# Patient Record
Sex: MALE | Race: WHITE | NOT HISPANIC OR LATINO
[De-identification: names, ages, dates, MRNs, and addresses within clinical notes are randomized per-mention and may not be internally consistent; named-entity substitution may affect disease eponyms.]

---

## 2019-01-03 PROBLEM — Z00.00 ENCOUNTER FOR PREVENTIVE HEALTH EXAMINATION: Status: ACTIVE | Noted: 2019-01-03

## 2023-01-01 ENCOUNTER — RESULT REVIEW (OUTPATIENT)
Age: 63
End: 2023-01-01

## 2023-01-01 ENCOUNTER — APPOINTMENT (OUTPATIENT)
Dept: RADIATION ONCOLOGY | Facility: CLINIC | Age: 63
End: 2023-01-01

## 2023-01-01 ENCOUNTER — OUTPATIENT (OUTPATIENT)
Dept: OUTPATIENT SERVICES | Facility: HOSPITAL | Age: 63
LOS: 1 days | End: 2023-01-01
Payer: COMMERCIAL

## 2023-01-01 ENCOUNTER — APPOINTMENT (OUTPATIENT)
Dept: HEMATOLOGY ONCOLOGY | Facility: CLINIC | Age: 63
End: 2023-01-01
Payer: COMMERCIAL

## 2023-01-01 ENCOUNTER — APPOINTMENT (OUTPATIENT)
Dept: HEMATOLOGY ONCOLOGY | Facility: CLINIC | Age: 63
End: 2023-01-01

## 2023-01-01 ENCOUNTER — NON-APPOINTMENT (OUTPATIENT)
Age: 63
End: 2023-01-01

## 2023-01-01 ENCOUNTER — INPATIENT (INPATIENT)
Facility: HOSPITAL | Age: 63
LOS: 4 days | Discharge: NURSING, REHAB OR LONG-TERM CARE | DRG: 25 | End: 2024-01-05
Attending: NEUROLOGICAL SURGERY | Admitting: NEUROLOGICAL SURGERY
Payer: COMMERCIAL

## 2023-01-01 ENCOUNTER — APPOINTMENT (OUTPATIENT)
Dept: CT IMAGING | Facility: IMAGING CENTER | Age: 63
End: 2023-01-01
Payer: COMMERCIAL

## 2023-01-01 ENCOUNTER — OUTPATIENT (OUTPATIENT)
Dept: OUTPATIENT SERVICES | Facility: HOSPITAL | Age: 63
LOS: 1 days | Discharge: HOME | End: 2023-01-01
Payer: COMMERCIAL

## 2023-01-01 ENCOUNTER — LABORATORY RESULT (OUTPATIENT)
Age: 63
End: 2023-01-01

## 2023-01-01 ENCOUNTER — APPOINTMENT (OUTPATIENT)
Dept: NUCLEAR MEDICINE | Facility: IMAGING CENTER | Age: 63
End: 2023-01-01
Payer: COMMERCIAL

## 2023-01-01 ENCOUNTER — APPOINTMENT (OUTPATIENT)
Dept: INFUSION THERAPY | Facility: HOSPITAL | Age: 63
End: 2023-01-01

## 2023-01-01 ENCOUNTER — APPOINTMENT (OUTPATIENT)
Dept: MRI IMAGING | Facility: IMAGING CENTER | Age: 63
End: 2023-01-01
Payer: COMMERCIAL

## 2023-01-01 ENCOUNTER — APPOINTMENT (OUTPATIENT)
Dept: NEUROSURGERY | Facility: CLINIC | Age: 63
End: 2023-01-01
Payer: COMMERCIAL

## 2023-01-01 ENCOUNTER — APPOINTMENT (OUTPATIENT)
Dept: NEUROLOGY | Facility: CLINIC | Age: 63
End: 2023-01-01
Payer: COMMERCIAL

## 2023-01-01 ENCOUNTER — APPOINTMENT (OUTPATIENT)
Dept: RADIATION ONCOLOGY | Facility: CLINIC | Age: 63
End: 2023-01-01
Payer: COMMERCIAL

## 2023-01-01 ENCOUNTER — APPOINTMENT (OUTPATIENT)
Dept: NEUROLOGY | Facility: CLINIC | Age: 63
End: 2023-01-01

## 2023-01-01 ENCOUNTER — OUTPATIENT (OUTPATIENT)
Dept: OUTPATIENT SERVICES | Facility: HOSPITAL | Age: 63
LOS: 1 days | Discharge: HOME | End: 2023-01-01

## 2023-01-01 ENCOUNTER — TRANSCRIPTION ENCOUNTER (OUTPATIENT)
Age: 63
End: 2023-01-01

## 2023-01-01 ENCOUNTER — APPOINTMENT (OUTPATIENT)
Dept: MRI IMAGING | Facility: CLINIC | Age: 63
End: 2023-01-01
Payer: COMMERCIAL

## 2023-01-01 ENCOUNTER — APPOINTMENT (OUTPATIENT)
Dept: CT IMAGING | Facility: IMAGING CENTER | Age: 63
End: 2023-01-01

## 2023-01-01 ENCOUNTER — APPOINTMENT (OUTPATIENT)
Dept: NEUROSURGERY | Facility: CLINIC | Age: 63
End: 2023-01-01

## 2023-01-01 ENCOUNTER — APPOINTMENT (OUTPATIENT)
Dept: MRI IMAGING | Facility: IMAGING CENTER | Age: 63
End: 2023-01-01

## 2023-01-01 ENCOUNTER — APPOINTMENT (OUTPATIENT)
Dept: CARDIOLOGY | Facility: CLINIC | Age: 63
End: 2023-01-01

## 2023-01-01 ENCOUNTER — INPATIENT (INPATIENT)
Facility: HOSPITAL | Age: 63
LOS: 3 days | Discharge: HOME | End: 2023-08-07
Attending: HOSPITALIST | Admitting: HOSPITALIST
Payer: COMMERCIAL

## 2023-01-01 VITALS
OXYGEN SATURATION: 97 % | HEART RATE: 101 BPM | HEIGHT: 71.97 IN | RESPIRATION RATE: 16 BRPM | SYSTOLIC BLOOD PRESSURE: 132 MMHG | TEMPERATURE: 97.5 F | DIASTOLIC BLOOD PRESSURE: 91 MMHG | BODY MASS INDEX: 25.73 KG/M2 | WEIGHT: 190 LBS

## 2023-01-01 VITALS
TEMPERATURE: 97.7 F | WEIGHT: 184.75 LBS | SYSTOLIC BLOOD PRESSURE: 125 MMHG | OXYGEN SATURATION: 99 % | DIASTOLIC BLOOD PRESSURE: 91 MMHG | HEIGHT: 71.97 IN | HEART RATE: 109 BPM | RESPIRATION RATE: 16 BRPM | BODY MASS INDEX: 25.02 KG/M2

## 2023-01-01 VITALS
RESPIRATION RATE: 16 BRPM | DIASTOLIC BLOOD PRESSURE: 87 MMHG | HEART RATE: 103 BPM | HEIGHT: 71 IN | TEMPERATURE: 97.88 F | OXYGEN SATURATION: 99 % | BODY MASS INDEX: 27.16 KG/M2 | SYSTOLIC BLOOD PRESSURE: 126 MMHG | WEIGHT: 194 LBS

## 2023-01-01 VITALS
SYSTOLIC BLOOD PRESSURE: 129 MMHG | WEIGHT: 184.5 LBS | DIASTOLIC BLOOD PRESSURE: 90 MMHG | HEART RATE: 100 BPM | TEMPERATURE: 98.3 F | RESPIRATION RATE: 16 BRPM | OXYGEN SATURATION: 98 % | BODY MASS INDEX: 26.48 KG/M2

## 2023-01-01 VITALS
OXYGEN SATURATION: 99 % | TEMPERATURE: 97.7 F | HEART RATE: 127 BPM | SYSTOLIC BLOOD PRESSURE: 131 MMHG | RESPIRATION RATE: 16 BRPM | DIASTOLIC BLOOD PRESSURE: 91 MMHG | WEIGHT: 187.39 LBS | HEIGHT: 71.97 IN | BODY MASS INDEX: 25.38 KG/M2

## 2023-01-01 VITALS
SYSTOLIC BLOOD PRESSURE: 123 MMHG | DIASTOLIC BLOOD PRESSURE: 88 MMHG | WEIGHT: 185.63 LBS | BODY MASS INDEX: 24.6 KG/M2 | HEART RATE: 106 BPM | OXYGEN SATURATION: 97 % | HEIGHT: 72.99 IN | RESPIRATION RATE: 16 BRPM | TEMPERATURE: 97.9 F

## 2023-01-01 VITALS
SYSTOLIC BLOOD PRESSURE: 120 MMHG | HEART RATE: 112 BPM | BODY MASS INDEX: 26.67 KG/M2 | RESPIRATION RATE: 16 BRPM | TEMPERATURE: 96.6 F | OXYGEN SATURATION: 99 % | DIASTOLIC BLOOD PRESSURE: 88 MMHG | WEIGHT: 190.48 LBS | HEIGHT: 70.98 IN

## 2023-01-01 VITALS
DIASTOLIC BLOOD PRESSURE: 86 MMHG | HEART RATE: 121 BPM | SYSTOLIC BLOOD PRESSURE: 117 MMHG | RESPIRATION RATE: 22 BRPM | TEMPERATURE: 97.3 F | BODY MASS INDEX: 25.71 KG/M2 | WEIGHT: 189.38 LBS | OXYGEN SATURATION: 98 %

## 2023-01-01 VITALS
WEIGHT: 183.87 LBS | HEART RATE: 109 BPM | DIASTOLIC BLOOD PRESSURE: 90 MMHG | OXYGEN SATURATION: 95 % | BODY MASS INDEX: 24.9 KG/M2 | RESPIRATION RATE: 16 BRPM | SYSTOLIC BLOOD PRESSURE: 130 MMHG | TEMPERATURE: 98 F | HEIGHT: 71.97 IN

## 2023-01-01 VITALS
HEART RATE: 109 BPM | SYSTOLIC BLOOD PRESSURE: 121 MMHG | DIASTOLIC BLOOD PRESSURE: 85 MMHG | WEIGHT: 182.98 LBS | RESPIRATION RATE: 16 BRPM | OXYGEN SATURATION: 100 % | TEMPERATURE: 96.6 F | BODY MASS INDEX: 24.84 KG/M2

## 2023-01-01 VITALS
WEIGHT: 188.83 LBS | HEIGHT: 72 IN | SYSTOLIC BLOOD PRESSURE: 138 MMHG | RESPIRATION RATE: 17 BRPM | HEART RATE: 98 BPM | OXYGEN SATURATION: 98 % | DIASTOLIC BLOOD PRESSURE: 94 MMHG | BODY MASS INDEX: 25.58 KG/M2 | TEMPERATURE: 98.96 F

## 2023-01-01 VITALS
RESPIRATION RATE: 16 BRPM | TEMPERATURE: 96.8 F | OXYGEN SATURATION: 98 % | WEIGHT: 181.22 LBS | BODY MASS INDEX: 24.02 KG/M2 | RESPIRATION RATE: 16 BRPM | WEIGHT: 185.85 LBS | DIASTOLIC BLOOD PRESSURE: 92 MMHG | HEART RATE: 110 BPM | TEMPERATURE: 96.8 F | SYSTOLIC BLOOD PRESSURE: 145 MMHG | SYSTOLIC BLOOD PRESSURE: 127 MMHG | HEART RATE: 75 BPM | HEIGHT: 72.99 IN | DIASTOLIC BLOOD PRESSURE: 98 MMHG | HEIGHT: 70.98 IN | OXYGEN SATURATION: 99 % | BODY MASS INDEX: 26.02 KG/M2

## 2023-01-01 VITALS
OXYGEN SATURATION: 98 % | HEART RATE: 110 BPM | HEIGHT: 71.97 IN | DIASTOLIC BLOOD PRESSURE: 105 MMHG | SYSTOLIC BLOOD PRESSURE: 139 MMHG | RESPIRATION RATE: 20 BRPM | TEMPERATURE: 99 F

## 2023-01-01 VITALS
HEART RATE: 121 BPM | BODY MASS INDEX: 26.04 KG/M2 | OXYGEN SATURATION: 98 % | SYSTOLIC BLOOD PRESSURE: 122 MMHG | RESPIRATION RATE: 17 BRPM | DIASTOLIC BLOOD PRESSURE: 85 MMHG | WEIGHT: 191.8 LBS | TEMPERATURE: 96.6 F

## 2023-01-01 VITALS
DIASTOLIC BLOOD PRESSURE: 118 MMHG | SYSTOLIC BLOOD PRESSURE: 181 MMHG | HEART RATE: 65 BPM | RESPIRATION RATE: 18 BRPM | TEMPERATURE: 98 F | OXYGEN SATURATION: 100 %

## 2023-01-01 VITALS
OXYGEN SATURATION: 98 % | TEMPERATURE: 98 F | SYSTOLIC BLOOD PRESSURE: 136 MMHG | HEART RATE: 84 BPM | RESPIRATION RATE: 18 BRPM | DIASTOLIC BLOOD PRESSURE: 97 MMHG

## 2023-01-01 VITALS
SYSTOLIC BLOOD PRESSURE: 121 MMHG | RESPIRATION RATE: 17 BRPM | OXYGEN SATURATION: 97 % | WEIGHT: 173.5 LBS | HEART RATE: 110 BPM | DIASTOLIC BLOOD PRESSURE: 88 MMHG | TEMPERATURE: 97.2 F | BODY MASS INDEX: 23.55 KG/M2

## 2023-01-01 VITALS
OXYGEN SATURATION: 99 % | HEART RATE: 103 BPM | WEIGHT: 178.77 LBS | TEMPERATURE: 96.4 F | BODY MASS INDEX: 24.27 KG/M2 | SYSTOLIC BLOOD PRESSURE: 127 MMHG | DIASTOLIC BLOOD PRESSURE: 95 MMHG | RESPIRATION RATE: 16 BRPM

## 2023-01-01 VITALS
HEART RATE: 124 BPM | DIASTOLIC BLOOD PRESSURE: 94 MMHG | RESPIRATION RATE: 17 BRPM | TEMPERATURE: 97 F | OXYGEN SATURATION: 97 % | SYSTOLIC BLOOD PRESSURE: 129 MMHG

## 2023-01-01 VITALS
HEART RATE: 93 BPM | TEMPERATURE: 97.7 F | SYSTOLIC BLOOD PRESSURE: 128 MMHG | OXYGEN SATURATION: 99 % | RESPIRATION RATE: 17 BRPM | BODY MASS INDEX: 26.4 KG/M2 | HEIGHT: 70 IN | DIASTOLIC BLOOD PRESSURE: 83 MMHG | WEIGHT: 184.41 LBS

## 2023-01-01 VITALS
DIASTOLIC BLOOD PRESSURE: 72 MMHG | OXYGEN SATURATION: 96 % | TEMPERATURE: 97.1 F | HEART RATE: 130 BPM | WEIGHT: 187.39 LBS | SYSTOLIC BLOOD PRESSURE: 101 MMHG | BODY MASS INDEX: 25.44 KG/M2 | RESPIRATION RATE: 16 BRPM

## 2023-01-01 DIAGNOSIS — C79.31 SECONDARY MALIGNANT NEOPLASM OF BRAIN: ICD-10-CM

## 2023-01-01 DIAGNOSIS — C43.9 MALIGNANT MELANOMA OF SKIN, UNSPECIFIED: ICD-10-CM

## 2023-01-01 DIAGNOSIS — Z51.11 ENCOUNTER FOR ANTINEOPLASTIC CHEMOTHERAPY: ICD-10-CM

## 2023-01-01 DIAGNOSIS — Z98.890 OTHER SPECIFIED POSTPROCEDURAL STATES: Chronic | ICD-10-CM

## 2023-01-01 DIAGNOSIS — C78.00 SECONDARY MALIGNANT NEOPLASM OF UNSPECIFIED LUNG: ICD-10-CM

## 2023-01-01 DIAGNOSIS — L70.8 OTHER ACNE: ICD-10-CM

## 2023-01-01 DIAGNOSIS — R09.89 OTHER SPECIFIED SYMPTOMS AND SIGNS INVOLVING THE CIRCULATORY AND RESPIRATORY SYSTEMS: ICD-10-CM

## 2023-01-01 DIAGNOSIS — I26.99 OTHER PULMONARY EMBOLISM WITHOUT ACUTE COR PULMONALE: ICD-10-CM

## 2023-01-01 DIAGNOSIS — I26.99 OTHER PULMONARY EMBOLISM W/OUT ACUTE COR PULMONALE: ICD-10-CM

## 2023-01-01 DIAGNOSIS — D75.89 OTHER SPECIFIED DISEASES OF BLOOD AND BLOOD-FORMING ORGANS: ICD-10-CM

## 2023-01-01 DIAGNOSIS — Z29.9 ENCOUNTER FOR PROPHYLACTIC MEASURES, UNSPECIFIED: ICD-10-CM

## 2023-01-01 LAB
ACTH SER-ACNC: 31.3 PG/ML
ALBUMIN SERPL ELPH-MCNC: 3.1 G/DL — LOW (ref 3.3–5)
ALBUMIN SERPL ELPH-MCNC: 3.1 G/DL — LOW (ref 3.3–5)
ALBUMIN SERPL ELPH-MCNC: 3.2 G/DL — LOW (ref 3.3–5)
ALBUMIN SERPL ELPH-MCNC: 3.3 G/DL — SIGNIFICANT CHANGE UP (ref 3.3–5)
ALBUMIN SERPL ELPH-MCNC: 3.4 G/DL
ALBUMIN SERPL ELPH-MCNC: 3.4 G/DL
ALBUMIN SERPL ELPH-MCNC: 3.5 G/DL
ALBUMIN SERPL ELPH-MCNC: 3.5 G/DL
ALBUMIN SERPL ELPH-MCNC: 3.5 G/DL — SIGNIFICANT CHANGE UP (ref 3.3–5)
ALBUMIN SERPL ELPH-MCNC: 3.5 G/DL — SIGNIFICANT CHANGE UP (ref 3.3–5)
ALBUMIN SERPL ELPH-MCNC: 3.6 G/DL
ALBUMIN SERPL ELPH-MCNC: 3.7 G/DL
ALBUMIN SERPL ELPH-MCNC: 3.8 G/DL
ALBUMIN SERPL ELPH-MCNC: 3.9 G/DL
ALBUMIN SERPL ELPH-MCNC: 4 G/DL
ALP BLD-CCNC: 101 U/L
ALP BLD-CCNC: 107 U/L
ALP BLD-CCNC: 108 U/L
ALP BLD-CCNC: 111 U/L
ALP BLD-CCNC: 113 U/L
ALP BLD-CCNC: 114 U/L
ALP BLD-CCNC: 73 U/L
ALP BLD-CCNC: 77 U/L
ALP BLD-CCNC: 78 U/L
ALP BLD-CCNC: 79 U/L
ALP BLD-CCNC: 84 U/L
ALP BLD-CCNC: 84 U/L
ALP BLD-CCNC: 87 U/L
ALP BLD-CCNC: 94 U/L
ALP BLD-CCNC: 94 U/L
ALP BLD-CCNC: 96 U/L
ALP SERPL-CCNC: 76 U/L — SIGNIFICANT CHANGE UP (ref 40–120)
ALP SERPL-CCNC: 77 U/L — SIGNIFICANT CHANGE UP (ref 40–120)
ALP SERPL-CCNC: 81 U/L — SIGNIFICANT CHANGE UP (ref 40–120)
ALP SERPL-CCNC: 90 U/L — SIGNIFICANT CHANGE UP (ref 40–120)
ALP SERPL-CCNC: 90 U/L — SIGNIFICANT CHANGE UP (ref 40–120)
ALT FLD-CCNC: 14 U/L — SIGNIFICANT CHANGE UP (ref 10–45)
ALT FLD-CCNC: 14 U/L — SIGNIFICANT CHANGE UP (ref 10–45)
ALT FLD-CCNC: 24 U/L — SIGNIFICANT CHANGE UP (ref 10–45)
ALT FLD-CCNC: 24 U/L — SIGNIFICANT CHANGE UP (ref 10–45)
ALT FLD-CCNC: 31 U/L — SIGNIFICANT CHANGE UP (ref 4–41)
ALT FLD-CCNC: 39 U/L — SIGNIFICANT CHANGE UP (ref 4–41)
ALT FLD-CCNC: 43 U/L — HIGH (ref 4–41)
ALT FLD-CCNC: 51 U/L — HIGH (ref 4–41)
ALT SERPL-CCNC: 11 U/L
ALT SERPL-CCNC: 17 U/L
ALT SERPL-CCNC: 20 U/L
ALT SERPL-CCNC: 22 U/L
ALT SERPL-CCNC: 25 U/L
ALT SERPL-CCNC: 26 U/L
ALT SERPL-CCNC: 26 U/L
ALT SERPL-CCNC: 29 U/L
ALT SERPL-CCNC: 31 U/L
ALT SERPL-CCNC: 40 U/L
ALT SERPL-CCNC: 40 U/L
ALT SERPL-CCNC: 41 U/L
ALT SERPL-CCNC: 54 U/L
ALT SERPL-CCNC: 56 U/L
ALT SERPL-CCNC: 58 U/L
ALT SERPL-CCNC: 61 U/L
ALT SERPL-CCNC: 62 U/L
ALT SERPL-CCNC: 89 U/L
ANCA AB SER IF-ACNC: NEGATIVE
ANION GAP SERPL CALC-SCNC: 10 MMOL/L — SIGNIFICANT CHANGE UP (ref 5–17)
ANION GAP SERPL CALC-SCNC: 11 MMOL/L
ANION GAP SERPL CALC-SCNC: 11 MMOL/L
ANION GAP SERPL CALC-SCNC: 11 MMOL/L — SIGNIFICANT CHANGE UP (ref 5–17)
ANION GAP SERPL CALC-SCNC: 11 MMOL/L — SIGNIFICANT CHANGE UP (ref 5–17)
ANION GAP SERPL CALC-SCNC: 11 MMOL/L — SIGNIFICANT CHANGE UP (ref 7–14)
ANION GAP SERPL CALC-SCNC: 12 MMOL/L
ANION GAP SERPL CALC-SCNC: 12 MMOL/L — SIGNIFICANT CHANGE UP (ref 7–14)
ANION GAP SERPL CALC-SCNC: 13 MMOL/L
ANION GAP SERPL CALC-SCNC: 13 MMOL/L
ANION GAP SERPL CALC-SCNC: 13 MMOL/L — SIGNIFICANT CHANGE UP (ref 7–14)
ANION GAP SERPL CALC-SCNC: 14 MMOL/L
ANION GAP SERPL CALC-SCNC: 14 MMOL/L
ANION GAP SERPL CALC-SCNC: 14 MMOL/L — SIGNIFICANT CHANGE UP (ref 7–14)
ANION GAP SERPL CALC-SCNC: 15 MMOL/L
ANION GAP SERPL CALC-SCNC: 16 MMOL/L
ANISOCYTOSIS BLD QL: SLIGHT — SIGNIFICANT CHANGE UP
APTT BLD: 18.6 SEC — LOW (ref 24.5–35.6)
APTT BLD: 18.6 SEC — LOW (ref 24.5–35.6)
APTT BLD: 20.9 SEC — LOW (ref 24.5–35.6)
APTT BLD: 22 SEC — LOW (ref 24.5–35.6)
APTT BLD: 25.8 SEC — SIGNIFICANT CHANGE UP (ref 24.5–35.6)
APTT BLD: 55.8 SEC — HIGH (ref 24.5–35.6)
APTT BLD: 63 SEC — HIGH (ref 24.5–35.6)
APTT BLD: 64.2 SEC — HIGH (ref 24.5–35.6)
APTT BLD: 74.8 SEC — HIGH (ref 24.5–35.6)
APTT BLD: 76.9 SEC — HIGH (ref 24.5–35.6)
APTT BLD: 83.8 SEC — HIGH (ref 24.5–35.6)
APTT BLD: 88.4 SEC — HIGH (ref 24.5–35.6)
AST SERPL-CCNC: 10 U/L
AST SERPL-CCNC: 11 U/L
AST SERPL-CCNC: 13 U/L
AST SERPL-CCNC: 16 U/L
AST SERPL-CCNC: 17 U/L
AST SERPL-CCNC: 17 U/L
AST SERPL-CCNC: 19 U/L
AST SERPL-CCNC: 19 U/L — SIGNIFICANT CHANGE UP (ref 4–40)
AST SERPL-CCNC: 20 U/L
AST SERPL-CCNC: 20 U/L — SIGNIFICANT CHANGE UP (ref 4–40)
AST SERPL-CCNC: 20 U/L — SIGNIFICANT CHANGE UP (ref 4–40)
AST SERPL-CCNC: 21 U/L
AST SERPL-CCNC: 22 U/L
AST SERPL-CCNC: 23 U/L
AST SERPL-CCNC: 23 U/L — SIGNIFICANT CHANGE UP (ref 10–40)
AST SERPL-CCNC: 23 U/L — SIGNIFICANT CHANGE UP (ref 10–40)
AST SERPL-CCNC: 24 U/L
AST SERPL-CCNC: 24 U/L — SIGNIFICANT CHANGE UP (ref 4–40)
AST SERPL-CCNC: 26 U/L
AST SERPL-CCNC: 26 U/L
AST SERPL-CCNC: 26 U/L — SIGNIFICANT CHANGE UP (ref 10–40)
AST SERPL-CCNC: 26 U/L — SIGNIFICANT CHANGE UP (ref 10–40)
AST SERPL-CCNC: 28 U/L
AST SERPL-CCNC: 30 U/L
BASOPHILS # BLD AUTO: 0 K/UL — SIGNIFICANT CHANGE UP (ref 0–0.2)
BASOPHILS # BLD AUTO: 0.02 K/UL — SIGNIFICANT CHANGE UP (ref 0–0.2)
BASOPHILS # BLD AUTO: 0.03 K/UL — SIGNIFICANT CHANGE UP (ref 0–0.2)
BASOPHILS # BLD AUTO: 0.04 K/UL — SIGNIFICANT CHANGE UP (ref 0–0.2)
BASOPHILS # BLD AUTO: 0.04 K/UL — SIGNIFICANT CHANGE UP (ref 0–0.2)
BASOPHILS # BLD AUTO: 0.05 K/UL — SIGNIFICANT CHANGE UP (ref 0–0.2)
BASOPHILS # BLD AUTO: 0.05 K/UL — SIGNIFICANT CHANGE UP (ref 0–0.2)
BASOPHILS # BLD AUTO: 0.06 K/UL — SIGNIFICANT CHANGE UP (ref 0–0.2)
BASOPHILS # BLD AUTO: 0.08 K/UL — SIGNIFICANT CHANGE UP (ref 0–0.2)
BASOPHILS # BLD AUTO: 0.08 K/UL — SIGNIFICANT CHANGE UP (ref 0–0.2)
BASOPHILS # BLD AUTO: 0.1 K/UL — SIGNIFICANT CHANGE UP (ref 0–0.2)
BASOPHILS # BLD AUTO: 0.1 K/UL — SIGNIFICANT CHANGE UP (ref 0–0.2)
BASOPHILS NFR BLD AUTO: 0 % — SIGNIFICANT CHANGE UP (ref 0–2)
BASOPHILS NFR BLD AUTO: 0.2 % — SIGNIFICANT CHANGE UP (ref 0–2)
BASOPHILS NFR BLD AUTO: 0.5 % — SIGNIFICANT CHANGE UP (ref 0–2)
BASOPHILS NFR BLD AUTO: 0.6 % — SIGNIFICANT CHANGE UP (ref 0–2)
BASOPHILS NFR BLD AUTO: 0.7 % — SIGNIFICANT CHANGE UP (ref 0–2)
BASOPHILS NFR BLD AUTO: 0.9 % — SIGNIFICANT CHANGE UP (ref 0–2)
BASOPHILS NFR BLD AUTO: 0.9 % — SIGNIFICANT CHANGE UP (ref 0–2)
BASOPHILS NFR BLD AUTO: 2 % — SIGNIFICANT CHANGE UP (ref 0–2)
BILIRUB SERPL-MCNC: 0.2 MG/DL
BILIRUB SERPL-MCNC: 0.2 MG/DL — SIGNIFICANT CHANGE UP (ref 0.2–1.2)
BILIRUB SERPL-MCNC: 0.3 MG/DL
BILIRUB SERPL-MCNC: 0.4 MG/DL
BILIRUB SERPL-MCNC: 0.4 MG/DL — SIGNIFICANT CHANGE UP (ref 0.2–1.2)
BILIRUB SERPL-MCNC: 0.4 MG/DL — SIGNIFICANT CHANGE UP (ref 0.2–1.2)
BILIRUB SERPL-MCNC: 0.5 MG/DL — SIGNIFICANT CHANGE UP (ref 0.2–1.2)
BILIRUB SERPL-MCNC: 0.5 MG/DL — SIGNIFICANT CHANGE UP (ref 0.2–1.2)
BILIRUB SERPL-MCNC: 0.6 MG/DL
BLASTS # FLD: 1 % — HIGH (ref 0–0)
BLASTS # FLD: 1 % — HIGH (ref 0–0)
BLASTS # FLD: 2 % — HIGH (ref 0–0)
BLD GP AB SCN SERPL QL: NEGATIVE — SIGNIFICANT CHANGE UP
BUN SERPL-MCNC: 10 MG/DL
BUN SERPL-MCNC: 11 MG/DL
BUN SERPL-MCNC: 18 MG/DL — SIGNIFICANT CHANGE UP (ref 7–23)
BUN SERPL-MCNC: 18 MG/DL — SIGNIFICANT CHANGE UP (ref 7–23)
BUN SERPL-MCNC: 19 MG/DL — SIGNIFICANT CHANGE UP (ref 7–23)
BUN SERPL-MCNC: 19 MG/DL — SIGNIFICANT CHANGE UP (ref 7–23)
BUN SERPL-MCNC: 21 MG/DL
BUN SERPL-MCNC: 22 MG/DL
BUN SERPL-MCNC: 22 MG/DL
BUN SERPL-MCNC: 25 MG/DL
BUN SERPL-MCNC: 26 MG/DL
BUN SERPL-MCNC: 27 MG/DL
BUN SERPL-MCNC: 27 MG/DL — HIGH (ref 7–23)
BUN SERPL-MCNC: 28 MG/DL — HIGH (ref 7–23)
BUN SERPL-MCNC: 28 MG/DL — HIGH (ref 7–23)
BUN SERPL-MCNC: 29 MG/DL
BUN SERPL-MCNC: 29 MG/DL — HIGH (ref 7–23)
BUN SERPL-MCNC: 29 MG/DL — HIGH (ref 7–23)
BUN SERPL-MCNC: 30 MG/DL
BUN SERPL-MCNC: 30 MG/DL — HIGH (ref 7–23)
BUN SERPL-MCNC: 32 MG/DL
BUN SERPL-MCNC: 36 MG/DL
BUN SERPL-MCNC: 6 MG/DL
BUN SERPL-MCNC: 8 MG/DL
CA-I SERPL-SCNC: 4.9 MG/DL
CALCIUM SERPL-MCNC: 8.6 MG/DL — SIGNIFICANT CHANGE UP (ref 8.4–10.5)
CALCIUM SERPL-MCNC: 8.6 MG/DL — SIGNIFICANT CHANGE UP (ref 8.4–10.5)
CALCIUM SERPL-MCNC: 8.7 MG/DL
CALCIUM SERPL-MCNC: 8.7 MG/DL — SIGNIFICANT CHANGE UP (ref 8.4–10.5)
CALCIUM SERPL-MCNC: 8.8 MG/DL
CALCIUM SERPL-MCNC: 8.8 MG/DL — SIGNIFICANT CHANGE UP (ref 8.4–10.5)
CALCIUM SERPL-MCNC: 8.8 MG/DL — SIGNIFICANT CHANGE UP (ref 8.4–10.5)
CALCIUM SERPL-MCNC: 8.9 MG/DL
CALCIUM SERPL-MCNC: 8.9 MG/DL
CALCIUM SERPL-MCNC: 8.9 MG/DL — SIGNIFICANT CHANGE UP (ref 8.4–10.5)
CALCIUM SERPL-MCNC: 8.9 MG/DL — SIGNIFICANT CHANGE UP (ref 8.4–10.5)
CALCIUM SERPL-MCNC: 9 MG/DL
CALCIUM SERPL-MCNC: 9 MG/DL — SIGNIFICANT CHANGE UP (ref 8.4–10.5)
CALCIUM SERPL-MCNC: 9.1 MG/DL
CALCIUM SERPL-MCNC: 9.2 MG/DL
CALCIUM SERPL-MCNC: 9.3 MG/DL
CALCIUM SERPL-MCNC: 9.4 MG/DL
CALCIUM SERPL-MCNC: 9.9 MG/DL
CHLORIDE SERPL-SCNC: 100 MMOL/L
CHLORIDE SERPL-SCNC: 100 MMOL/L — SIGNIFICANT CHANGE UP (ref 98–107)
CHLORIDE SERPL-SCNC: 100 MMOL/L — SIGNIFICANT CHANGE UP (ref 98–107)
CHLORIDE SERPL-SCNC: 101 MMOL/L
CHLORIDE SERPL-SCNC: 101 MMOL/L
CHLORIDE SERPL-SCNC: 102 MMOL/L
CHLORIDE SERPL-SCNC: 102 MMOL/L
CHLORIDE SERPL-SCNC: 103 MMOL/L
CHLORIDE SERPL-SCNC: 103 MMOL/L — SIGNIFICANT CHANGE UP (ref 96–108)
CHLORIDE SERPL-SCNC: 103 MMOL/L — SIGNIFICANT CHANGE UP (ref 96–108)
CHLORIDE SERPL-SCNC: 104 MMOL/L
CHLORIDE SERPL-SCNC: 104 MMOL/L — SIGNIFICANT CHANGE UP (ref 96–108)
CHLORIDE SERPL-SCNC: 104 MMOL/L — SIGNIFICANT CHANGE UP (ref 96–108)
CHLORIDE SERPL-SCNC: 105 MMOL/L
CHLORIDE SERPL-SCNC: 106 MMOL/L — SIGNIFICANT CHANGE UP (ref 96–108)
CHLORIDE SERPL-SCNC: 106 MMOL/L — SIGNIFICANT CHANGE UP (ref 96–108)
CHLORIDE SERPL-SCNC: 107 MMOL/L
CHLORIDE SERPL-SCNC: 108 MMOL/L
CHLORIDE SERPL-SCNC: 98 MMOL/L
CHLORIDE SERPL-SCNC: 99 MMOL/L
CHLORIDE SERPL-SCNC: 99 MMOL/L — SIGNIFICANT CHANGE UP (ref 98–107)
CHLORIDE SERPL-SCNC: 99 MMOL/L — SIGNIFICANT CHANGE UP (ref 98–107)
CO2 SERPL-SCNC: 19 MMOL/L
CO2 SERPL-SCNC: 19 MMOL/L
CO2 SERPL-SCNC: 20 MMOL/L
CO2 SERPL-SCNC: 20 MMOL/L
CO2 SERPL-SCNC: 21 MMOL/L
CO2 SERPL-SCNC: 22 MMOL/L
CO2 SERPL-SCNC: 22 MMOL/L
CO2 SERPL-SCNC: 22 MMOL/L — SIGNIFICANT CHANGE UP (ref 22–31)
CO2 SERPL-SCNC: 23 MMOL/L
CO2 SERPL-SCNC: 23 MMOL/L
CO2 SERPL-SCNC: 23 MMOL/L — SIGNIFICANT CHANGE UP (ref 22–31)
CO2 SERPL-SCNC: 24 MMOL/L
CO2 SERPL-SCNC: 24 MMOL/L — SIGNIFICANT CHANGE UP (ref 22–31)
CO2 SERPL-SCNC: 24 MMOL/L — SIGNIFICANT CHANGE UP (ref 22–31)
CO2 SERPL-SCNC: 26 MMOL/L
CO2 SERPL-SCNC: 27 MMOL/L
CO2 SERPL-SCNC: 28 MMOL/L — SIGNIFICANT CHANGE UP (ref 22–31)
CO2 SERPL-SCNC: 28 MMOL/L — SIGNIFICANT CHANGE UP (ref 22–31)
CO2 SERPL-SCNC: 29 MMOL/L
CORTICOSTEROID BINDING GLOBULIN RESULT: 2 MG/DL — SIGNIFICANT CHANGE UP
CORTICOSTEROID BINDING GLOBULIN RESULT: 2 MG/DL — SIGNIFICANT CHANGE UP
CORTIS F/TOTAL MFR SERPL: 3.6 % — SIGNIFICANT CHANGE UP
CORTIS F/TOTAL MFR SERPL: 3.6 % — SIGNIFICANT CHANGE UP
CORTIS SERPL-MCNC: 15.6 UG/DL
CORTIS SERPL-MCNC: 4.2 UG/DL — SIGNIFICANT CHANGE UP
CORTIS SERPL-MCNC: 4.2 UG/DL — SIGNIFICANT CHANGE UP
CORTISOL, FREE RESULT: 0.15 UG/DL — LOW
CORTISOL, FREE RESULT: 0.15 UG/DL — LOW
CREAT SERPL-MCNC: 0.82 MG/DL
CREAT SERPL-MCNC: 0.84 MG/DL — SIGNIFICANT CHANGE UP (ref 0.5–1.3)
CREAT SERPL-MCNC: 0.88 MG/DL
CREAT SERPL-MCNC: 0.88 MG/DL — SIGNIFICANT CHANGE UP (ref 0.5–1.3)
CREAT SERPL-MCNC: 0.89 MG/DL — SIGNIFICANT CHANGE UP (ref 0.5–1.3)
CREAT SERPL-MCNC: 0.89 MG/DL — SIGNIFICANT CHANGE UP (ref 0.5–1.3)
CREAT SERPL-MCNC: 0.9 MG/DL
CREAT SERPL-MCNC: 0.9 MG/DL
CREAT SERPL-MCNC: 0.92 MG/DL
CREAT SERPL-MCNC: 0.93 MG/DL — SIGNIFICANT CHANGE UP (ref 0.5–1.3)
CREAT SERPL-MCNC: 0.96 MG/DL — SIGNIFICANT CHANGE UP (ref 0.5–1.3)
CREAT SERPL-MCNC: 0.98 MG/DL
CREAT SERPL-MCNC: 1 MG/DL
CREAT SERPL-MCNC: 1.04 MG/DL
CREAT SERPL-MCNC: 1.04 MG/DL
CREAT SERPL-MCNC: 1.05 MG/DL — SIGNIFICANT CHANGE UP (ref 0.5–1.3)
CREAT SERPL-MCNC: 1.05 MG/DL — SIGNIFICANT CHANGE UP (ref 0.5–1.3)
CREAT SERPL-MCNC: 1.07 MG/DL
CREAT SERPL-MCNC: 1.07 MG/DL — SIGNIFICANT CHANGE UP (ref 0.5–1.3)
CREAT SERPL-MCNC: 1.07 MG/DL — SIGNIFICANT CHANGE UP (ref 0.5–1.3)
CREAT SERPL-MCNC: 1.09 MG/DL
CREAT SERPL-MCNC: 1.14 MG/DL
CREAT SERPL-MCNC: 1.14 MG/DL
CREAT SERPL-MCNC: 1.15 MG/DL
CREAT SERPL-MCNC: 1.17 MG/DL
CREAT SERPL-MCNC: 1.18 MG/DL
CREAT SERPL-MCNC: 1.23 MG/DL
CREAT SERPL-MCNC: 1.25 MG/DL
DACRYOCYTES BLD QL SMEAR: SLIGHT — SIGNIFICANT CHANGE UP
EGFR: 100 ML/MIN/1.73M2 — SIGNIFICANT CHANGE UP
EGFR: 101 ML/MIN/1.73M2
EGFR: 66 ML/MIN/1.73M2
EGFR: 67 ML/MIN/1.73M2
EGFR: 71 ML/MIN/1.73M2
EGFR: 71 ML/MIN/1.73M2
EGFR: 72 ML/MIN/1.73M2
EGFR: 73 ML/MIN/1.73M2
EGFR: 74 ML/MIN/1.73M2
EGFR: 78 ML/MIN/1.73M2
EGFR: 79 ML/MIN/1.73M2
EGFR: 79 ML/MIN/1.73M2 — SIGNIFICANT CHANGE UP
EGFR: 79 ML/MIN/1.73M2 — SIGNIFICANT CHANGE UP
EGFR: 81 ML/MIN/1.73M2 — SIGNIFICANT CHANGE UP
EGFR: 81 ML/MIN/1.73M2 — SIGNIFICANT CHANGE UP
EGFR: 82 ML/MIN/1.73M2
EGFR: 82 ML/MIN/1.73M2
EGFR: 86 ML/MIN/1.73M2
EGFR: 88 ML/MIN/1.73M2
EGFR: 90 ML/MIN/1.73M2 — SIGNIFICANT CHANGE UP
EGFR: 94 ML/MIN/1.73M2 — SIGNIFICANT CHANGE UP
EGFR: 95 ML/MIN/1.73M2
EGFR: 98 ML/MIN/1.73M2
EGFR: 98 ML/MIN/1.73M2 — SIGNIFICANT CHANGE UP
EOSINOPHIL # BLD AUTO: 0 K/UL — SIGNIFICANT CHANGE UP (ref 0–0.5)
EOSINOPHIL # BLD AUTO: 0.01 K/UL — SIGNIFICANT CHANGE UP (ref 0–0.5)
EOSINOPHIL # BLD AUTO: 0.02 K/UL — SIGNIFICANT CHANGE UP (ref 0–0.5)
EOSINOPHIL # BLD AUTO: 0.04 K/UL — SIGNIFICANT CHANGE UP (ref 0–0.5)
EOSINOPHIL # BLD AUTO: 0.06 K/UL — SIGNIFICANT CHANGE UP (ref 0–0.5)
EOSINOPHIL # BLD AUTO: 0.06 K/UL — SIGNIFICANT CHANGE UP (ref 0–0.5)
EOSINOPHIL # BLD AUTO: 0.08 K/UL — SIGNIFICANT CHANGE UP (ref 0–0.5)
EOSINOPHIL # BLD AUTO: 0.09 K/UL — SIGNIFICANT CHANGE UP (ref 0–0.5)
EOSINOPHIL # BLD AUTO: 0.09 K/UL — SIGNIFICANT CHANGE UP (ref 0–0.5)
EOSINOPHIL # BLD AUTO: 0.12 K/UL — SIGNIFICANT CHANGE UP (ref 0–0.5)
EOSINOPHIL # BLD AUTO: 0.12 K/UL — SIGNIFICANT CHANGE UP (ref 0–0.5)
EOSINOPHIL # BLD AUTO: 0.14 K/UL — SIGNIFICANT CHANGE UP (ref 0–0.5)
EOSINOPHIL # BLD AUTO: 0.14 K/UL — SIGNIFICANT CHANGE UP (ref 0–0.5)
EOSINOPHIL # BLD AUTO: 0.15 K/UL — SIGNIFICANT CHANGE UP (ref 0–0.5)
EOSINOPHIL # BLD AUTO: 0.15 K/UL — SIGNIFICANT CHANGE UP (ref 0–0.5)
EOSINOPHIL # BLD AUTO: 0.16 K/UL — SIGNIFICANT CHANGE UP (ref 0–0.5)
EOSINOPHIL # BLD AUTO: 0.17 K/UL — SIGNIFICANT CHANGE UP (ref 0–0.5)
EOSINOPHIL # BLD AUTO: 0.17 K/UL — SIGNIFICANT CHANGE UP (ref 0–0.5)
EOSINOPHIL # BLD AUTO: 0.23 K/UL — SIGNIFICANT CHANGE UP (ref 0–0.5)
EOSINOPHIL # BLD AUTO: 0.23 K/UL — SIGNIFICANT CHANGE UP (ref 0–0.5)
EOSINOPHIL NFR BLD AUTO: 0 % — SIGNIFICANT CHANGE UP (ref 0–6)
EOSINOPHIL NFR BLD AUTO: 0.1 % — SIGNIFICANT CHANGE UP (ref 0–6)
EOSINOPHIL NFR BLD AUTO: 0.5 % — SIGNIFICANT CHANGE UP (ref 0–6)
EOSINOPHIL NFR BLD AUTO: 0.6 % — SIGNIFICANT CHANGE UP (ref 0–6)
EOSINOPHIL NFR BLD AUTO: 0.6 % — SIGNIFICANT CHANGE UP (ref 0–6)
EOSINOPHIL NFR BLD AUTO: 1 % — SIGNIFICANT CHANGE UP (ref 0–6)
EOSINOPHIL NFR BLD AUTO: 1.4 % — SIGNIFICANT CHANGE UP (ref 0–6)
EOSINOPHIL NFR BLD AUTO: 1.4 % — SIGNIFICANT CHANGE UP (ref 0–6)
EOSINOPHIL NFR BLD AUTO: 1.7 % — SIGNIFICANT CHANGE UP (ref 0–6)
EOSINOPHIL NFR BLD AUTO: 2 % — SIGNIFICANT CHANGE UP (ref 0–6)
EOSINOPHIL NFR BLD AUTO: 2.2 % — SIGNIFICANT CHANGE UP (ref 0–6)
EOSINOPHIL NFR BLD AUTO: 2.3 % — SIGNIFICANT CHANGE UP (ref 0–6)
EOSINOPHIL NFR BLD AUTO: 2.3 % — SIGNIFICANT CHANGE UP (ref 0–6)
EOSINOPHIL NFR BLD AUTO: 3 % — SIGNIFICANT CHANGE UP (ref 0–6)
EOSINOPHIL NFR BLD AUTO: 3 % — SIGNIFICANT CHANGE UP (ref 0–6)
FOLATE SERPL-MCNC: 11.3 NG/ML
GLUCOSE BLDC GLUCOMTR-MCNC: 98 MG/DL — SIGNIFICANT CHANGE UP (ref 70–99)
GLUCOSE BLDC GLUCOMTR-MCNC: 98 MG/DL — SIGNIFICANT CHANGE UP (ref 70–99)
GLUCOSE SERPL-MCNC: 102 MG/DL — HIGH (ref 70–99)
GLUCOSE SERPL-MCNC: 102 MG/DL — HIGH (ref 70–99)
GLUCOSE SERPL-MCNC: 107 MG/DL
GLUCOSE SERPL-MCNC: 109 MG/DL
GLUCOSE SERPL-MCNC: 111 MG/DL — HIGH (ref 70–99)
GLUCOSE SERPL-MCNC: 112 MG/DL
GLUCOSE SERPL-MCNC: 116 MG/DL
GLUCOSE SERPL-MCNC: 119 MG/DL — HIGH (ref 70–99)
GLUCOSE SERPL-MCNC: 119 MG/DL — HIGH (ref 70–99)
GLUCOSE SERPL-MCNC: 120 MG/DL
GLUCOSE SERPL-MCNC: 120 MG/DL
GLUCOSE SERPL-MCNC: 126 MG/DL
GLUCOSE SERPL-MCNC: 130 MG/DL
GLUCOSE SERPL-MCNC: 131 MG/DL
GLUCOSE SERPL-MCNC: 131 MG/DL
GLUCOSE SERPL-MCNC: 131 MG/DL — HIGH (ref 70–99)
GLUCOSE SERPL-MCNC: 132 MG/DL
GLUCOSE SERPL-MCNC: 135 MG/DL
GLUCOSE SERPL-MCNC: 135 MG/DL — HIGH (ref 70–99)
GLUCOSE SERPL-MCNC: 140 MG/DL — HIGH (ref 70–99)
GLUCOSE SERPL-MCNC: 141 MG/DL
GLUCOSE SERPL-MCNC: 143 MG/DL
GLUCOSE SERPL-MCNC: 151 MG/DL
GLUCOSE SERPL-MCNC: 154 MG/DL
GLUCOSE SERPL-MCNC: 156 MG/DL
GLUCOSE SERPL-MCNC: 161 MG/DL
GLUCOSE SERPL-MCNC: 91 MG/DL — SIGNIFICANT CHANGE UP (ref 70–99)
GLUCOSE SERPL-MCNC: 91 MG/DL — SIGNIFICANT CHANGE UP (ref 70–99)
HAV IGM SER QL: NONREACTIVE
HBV CORE IGM SER QL: NONREACTIVE
HBV SURFACE AG SER QL: NONREACTIVE
HCT VFR BLD CALC: 33.7 % — LOW (ref 39–50)
HCT VFR BLD CALC: 35.6 % — LOW (ref 39–50)
HCT VFR BLD CALC: 35.6 % — LOW (ref 39–50)
HCT VFR BLD CALC: 36 % — LOW (ref 39–50)
HCT VFR BLD CALC: 36.2 % — LOW (ref 39–50)
HCT VFR BLD CALC: 36.4 % — LOW (ref 39–50)
HCT VFR BLD CALC: 36.6 % — LOW (ref 39–50)
HCT VFR BLD CALC: 36.6 % — LOW (ref 39–50)
HCT VFR BLD CALC: 36.7 % — LOW (ref 39–50)
HCT VFR BLD CALC: 36.7 % — LOW (ref 39–50)
HCT VFR BLD CALC: 36.9 % — LOW (ref 39–50)
HCT VFR BLD CALC: 36.9 % — LOW (ref 39–50)
HCT VFR BLD CALC: 37.1 % — LOW (ref 39–50)
HCT VFR BLD CALC: 37.1 % — LOW (ref 39–50)
HCT VFR BLD CALC: 37.2 % — LOW (ref 39–50)
HCT VFR BLD CALC: 37.4 % — LOW (ref 39–50)
HCT VFR BLD CALC: 37.4 % — LOW (ref 39–50)
HCT VFR BLD CALC: 37.8 % — LOW (ref 39–50)
HCT VFR BLD CALC: 37.8 % — LOW (ref 39–50)
HCT VFR BLD CALC: 37.9 % — LOW (ref 39–50)
HCT VFR BLD CALC: 37.9 % — LOW (ref 39–50)
HCT VFR BLD CALC: 38.2 % — LOW (ref 39–50)
HCT VFR BLD CALC: 38.8 % — LOW (ref 39–50)
HCT VFR BLD CALC: 39 % — SIGNIFICANT CHANGE UP (ref 39–50)
HCT VFR BLD CALC: 40 % — SIGNIFICANT CHANGE UP (ref 39–50)
HCT VFR BLD CALC: 40.4 % — SIGNIFICANT CHANGE UP (ref 39–50)
HCT VFR BLD CALC: 40.4 % — SIGNIFICANT CHANGE UP (ref 39–50)
HCT VFR BLD CALC: 40.8 % — SIGNIFICANT CHANGE UP (ref 39–50)
HCT VFR BLD CALC: 40.9 % — SIGNIFICANT CHANGE UP (ref 39–50)
HCT VFR BLD CALC: 41 % — SIGNIFICANT CHANGE UP (ref 39–50)
HCT VFR BLD CALC: 41.5 % — SIGNIFICANT CHANGE UP (ref 39–50)
HCT VFR BLD CALC: 41.7 % — SIGNIFICANT CHANGE UP (ref 39–50)
HCT VFR BLD CALC: 41.7 % — SIGNIFICANT CHANGE UP (ref 39–50)
HCT VFR BLD CALC: 41.9 % — SIGNIFICANT CHANGE UP (ref 39–50)
HCT VFR BLD CALC: 42.1 % — SIGNIFICANT CHANGE UP (ref 39–50)
HCT VFR BLD CALC: 42.6 % — SIGNIFICANT CHANGE UP (ref 39–50)
HCT VFR BLD CALC: 43.9 % — SIGNIFICANT CHANGE UP (ref 39–50)
HCT VFR BLD CALC: 45.3 % — SIGNIFICANT CHANGE UP (ref 39–50)
HCT VFR BLD CALC: 45.6 % — SIGNIFICANT CHANGE UP (ref 39–50)
HCT VFR BLD CALC: 46 % — SIGNIFICANT CHANGE UP (ref 39–50)
HCT VFR BLD CALC: 46.8 % — SIGNIFICANT CHANGE UP (ref 39–50)
HCT VFR BLD CALC: 47.2 % — SIGNIFICANT CHANGE UP (ref 39–50)
HCT VFR BLD CALC: 47.4 % — SIGNIFICANT CHANGE UP (ref 39–50)
HCT VFR BLD CALC: 48.9 % — SIGNIFICANT CHANGE UP (ref 39–50)
HCV AB S/CO SERPL IA: 0.06 S/CO — SIGNIFICANT CHANGE UP (ref 0–0.99)
HCV AB SER QL: NONREACTIVE
HCV AB SERPL-IMP: SIGNIFICANT CHANGE UP
HCV S/CO RATIO: 0.07 S/CO
HGB BLD-MCNC: 11.8 G/DL — LOW (ref 13–17)
HGB BLD-MCNC: 12 G/DL — LOW (ref 13–17)
HGB BLD-MCNC: 12.1 G/DL — LOW (ref 13–17)
HGB BLD-MCNC: 12.1 G/DL — LOW (ref 13–17)
HGB BLD-MCNC: 12.2 G/DL — LOW (ref 13–17)
HGB BLD-MCNC: 12.3 G/DL — LOW (ref 13–17)
HGB BLD-MCNC: 12.4 G/DL — LOW (ref 13–17)
HGB BLD-MCNC: 12.5 G/DL — LOW (ref 13–17)
HGB BLD-MCNC: 12.7 G/DL — LOW (ref 13–17)
HGB BLD-MCNC: 12.9 G/DL — LOW (ref 13–17)
HGB BLD-MCNC: 12.9 G/DL — LOW (ref 13–17)
HGB BLD-MCNC: 13.2 G/DL — SIGNIFICANT CHANGE UP (ref 13–17)
HGB BLD-MCNC: 13.4 G/DL — SIGNIFICANT CHANGE UP (ref 13–17)
HGB BLD-MCNC: 13.4 G/DL — SIGNIFICANT CHANGE UP (ref 13–17)
HGB BLD-MCNC: 13.5 G/DL — SIGNIFICANT CHANGE UP (ref 13–17)
HGB BLD-MCNC: 13.5 G/DL — SIGNIFICANT CHANGE UP (ref 13–17)
HGB BLD-MCNC: 13.6 G/DL — SIGNIFICANT CHANGE UP (ref 13–17)
HGB BLD-MCNC: 13.6 G/DL — SIGNIFICANT CHANGE UP (ref 13–17)
HGB BLD-MCNC: 13.8 G/DL — SIGNIFICANT CHANGE UP (ref 13–17)
HGB BLD-MCNC: 13.9 G/DL — SIGNIFICANT CHANGE UP (ref 13–17)
HGB BLD-MCNC: 14 G/DL — SIGNIFICANT CHANGE UP (ref 13–17)
HGB BLD-MCNC: 14.4 G/DL — SIGNIFICANT CHANGE UP (ref 13–17)
HGB BLD-MCNC: 14.4 G/DL — SIGNIFICANT CHANGE UP (ref 13–17)
HGB BLD-MCNC: 14.7 G/DL — SIGNIFICANT CHANGE UP (ref 13–17)
HGB BLD-MCNC: 15.2 G/DL — SIGNIFICANT CHANGE UP (ref 13–17)
HGB BLD-MCNC: 15.3 G/DL — SIGNIFICANT CHANGE UP (ref 13–17)
HGB BLD-MCNC: 15.6 G/DL — SIGNIFICANT CHANGE UP (ref 13–17)
HGB BLD-MCNC: 15.7 G/DL — SIGNIFICANT CHANGE UP (ref 13–17)
HGB BLD-MCNC: 15.8 G/DL — SIGNIFICANT CHANGE UP (ref 13–17)
HGB BLD-MCNC: 15.8 G/DL — SIGNIFICANT CHANGE UP (ref 13–17)
HGB BLD-MCNC: 16 G/DL — SIGNIFICANT CHANGE UP (ref 13–17)
IANC: 9.25 K/UL — HIGH (ref 1.8–7.4)
IMM GRANULOCYTES NFR BLD AUTO: 0 % — SIGNIFICANT CHANGE UP (ref 0–0.9)
IMM GRANULOCYTES NFR BLD AUTO: 0.3 % — SIGNIFICANT CHANGE UP (ref 0–0.9)
IMM GRANULOCYTES NFR BLD AUTO: 0.4 % — SIGNIFICANT CHANGE UP (ref 0–0.9)
IMM GRANULOCYTES NFR BLD AUTO: 0.6 % — SIGNIFICANT CHANGE UP (ref 0–0.9)
IMM GRANULOCYTES NFR BLD AUTO: 0.6 % — SIGNIFICANT CHANGE UP (ref 0–0.9)
IMM GRANULOCYTES NFR BLD AUTO: 0.7 % — SIGNIFICANT CHANGE UP (ref 0–0.9)
IMM GRANULOCYTES NFR BLD AUTO: 0.7 % — SIGNIFICANT CHANGE UP (ref 0–0.9)
IMM GRANULOCYTES NFR BLD AUTO: 0.8 % — SIGNIFICANT CHANGE UP (ref 0–0.9)
IMM GRANULOCYTES NFR BLD AUTO: 0.8 % — SIGNIFICANT CHANGE UP (ref 0–0.9)
IMM GRANULOCYTES NFR BLD AUTO: 2.1 % — HIGH (ref 0–0.9)
IMM GRANULOCYTES NFR BLD AUTO: 2.9 % — HIGH (ref 0–0.9)
IMM GRANULOCYTES NFR BLD AUTO: 7.8 % — HIGH (ref 0–0.9)
INR BLD: 0.89 — SIGNIFICANT CHANGE UP (ref 0.85–1.18)
INR BLD: 0.89 — SIGNIFICANT CHANGE UP (ref 0.85–1.18)
INR BLD: 1 RATIO — SIGNIFICANT CHANGE UP (ref 0.85–1.18)
INR BLD: 1.01 RATIO — SIGNIFICANT CHANGE UP (ref 0.85–1.18)
INR BLD: 1.01 RATIO — SIGNIFICANT CHANGE UP (ref 0.85–1.18)
LDH SERPL L TO P-CCNC: 691 U/L — HIGH (ref 50–242)
LDH SERPL L TO P-CCNC: 691 U/L — HIGH (ref 50–242)
LDH SERPL-CCNC: 1243 U/L
LDH SERPL-CCNC: 381 U/L
LDH SERPL-CCNC: 397 U/L
LDH SERPL-CCNC: 510 U/L
LDH SERPL-CCNC: 703 U/L
LDH SERPL-CCNC: 708 U/L
LDH SERPL-CCNC: 741 U/L
LDH SERPL-CCNC: 764 U/L
LDH SERPL-CCNC: 783 U/L
LDH SERPL-CCNC: 807 U/L
LDH SERPL-CCNC: 825 U/L
LDH SERPL-CCNC: 850 U/L
LDH SERPL-CCNC: 906 U/L
LYMPHOCYTES # BLD AUTO: 0.24 K/UL — LOW (ref 1–3.3)
LYMPHOCYTES # BLD AUTO: 0.24 K/UL — LOW (ref 1–3.3)
LYMPHOCYTES # BLD AUTO: 0.38 K/UL — LOW (ref 1–3.3)
LYMPHOCYTES # BLD AUTO: 0.4 K/UL — LOW (ref 1–3.3)
LYMPHOCYTES # BLD AUTO: 0.44 K/UL — LOW (ref 1–3.3)
LYMPHOCYTES # BLD AUTO: 0.52 K/UL — LOW (ref 1–3.3)
LYMPHOCYTES # BLD AUTO: 0.56 K/UL — LOW (ref 1–3.3)
LYMPHOCYTES # BLD AUTO: 0.57 K/UL — LOW (ref 1–3.3)
LYMPHOCYTES # BLD AUTO: 0.58 K/UL — LOW (ref 1–3.3)
LYMPHOCYTES # BLD AUTO: 0.64 K/UL — LOW (ref 1–3.3)
LYMPHOCYTES # BLD AUTO: 0.72 K/UL — LOW (ref 1–3.3)
LYMPHOCYTES # BLD AUTO: 0.77 K/UL — LOW (ref 1–3.3)
LYMPHOCYTES # BLD AUTO: 0.81 K/UL — LOW (ref 1–3.3)
LYMPHOCYTES # BLD AUTO: 0.81 K/UL — LOW (ref 1–3.3)
LYMPHOCYTES # BLD AUTO: 0.96 K/UL — LOW (ref 1–3.3)
LYMPHOCYTES # BLD AUTO: 1.04 K/UL — SIGNIFICANT CHANGE UP (ref 1–3.3)
LYMPHOCYTES # BLD AUTO: 1.14 K/UL — SIGNIFICANT CHANGE UP (ref 1–3.3)
LYMPHOCYTES # BLD AUTO: 1.14 K/UL — SIGNIFICANT CHANGE UP (ref 1–3.3)
LYMPHOCYTES # BLD AUTO: 1.18 K/UL — SIGNIFICANT CHANGE UP (ref 1–3.3)
LYMPHOCYTES # BLD AUTO: 1.18 K/UL — SIGNIFICANT CHANGE UP (ref 1–3.3)
LYMPHOCYTES # BLD AUTO: 1.19 K/UL — SIGNIFICANT CHANGE UP (ref 1–3.3)
LYMPHOCYTES # BLD AUTO: 1.19 K/UL — SIGNIFICANT CHANGE UP (ref 1–3.3)
LYMPHOCYTES # BLD AUTO: 1.25 K/UL — SIGNIFICANT CHANGE UP (ref 1–3.3)
LYMPHOCYTES # BLD AUTO: 1.25 K/UL — SIGNIFICANT CHANGE UP (ref 1–3.3)
LYMPHOCYTES # BLD AUTO: 1.28 K/UL — SIGNIFICANT CHANGE UP (ref 1–3.3)
LYMPHOCYTES # BLD AUTO: 1.33 K/UL — SIGNIFICANT CHANGE UP (ref 1–3.3)
LYMPHOCYTES # BLD AUTO: 1.33 K/UL — SIGNIFICANT CHANGE UP (ref 1–3.3)
LYMPHOCYTES # BLD AUTO: 1.38 K/UL — SIGNIFICANT CHANGE UP (ref 1–3.3)
LYMPHOCYTES # BLD AUTO: 1.38 K/UL — SIGNIFICANT CHANGE UP (ref 1–3.3)
LYMPHOCYTES # BLD AUTO: 1.58 K/UL — SIGNIFICANT CHANGE UP (ref 1–3.3)
LYMPHOCYTES # BLD AUTO: 1.58 K/UL — SIGNIFICANT CHANGE UP (ref 1–3.3)
LYMPHOCYTES # BLD AUTO: 1.7 % — LOW (ref 13–44)
LYMPHOCYTES # BLD AUTO: 1.7 % — LOW (ref 13–44)
LYMPHOCYTES # BLD AUTO: 1.96 K/UL — SIGNIFICANT CHANGE UP (ref 1–3.3)
LYMPHOCYTES # BLD AUTO: 11 % — LOW (ref 13–44)
LYMPHOCYTES # BLD AUTO: 11.2 % — LOW (ref 13–44)
LYMPHOCYTES # BLD AUTO: 11.2 % — LOW (ref 13–44)
LYMPHOCYTES # BLD AUTO: 12 % — LOW (ref 13–44)
LYMPHOCYTES # BLD AUTO: 12.5 % — LOW (ref 13–44)
LYMPHOCYTES # BLD AUTO: 13.5 % — SIGNIFICANT CHANGE UP (ref 13–44)
LYMPHOCYTES # BLD AUTO: 13.5 % — SIGNIFICANT CHANGE UP (ref 13–44)
LYMPHOCYTES # BLD AUTO: 14.1 % — SIGNIFICANT CHANGE UP (ref 13–44)
LYMPHOCYTES # BLD AUTO: 14.1 % — SIGNIFICANT CHANGE UP (ref 13–44)
LYMPHOCYTES # BLD AUTO: 14.2 % — SIGNIFICANT CHANGE UP (ref 13–44)
LYMPHOCYTES # BLD AUTO: 14.2 % — SIGNIFICANT CHANGE UP (ref 13–44)
LYMPHOCYTES # BLD AUTO: 14.3 % — SIGNIFICANT CHANGE UP (ref 13–44)
LYMPHOCYTES # BLD AUTO: 14.3 % — SIGNIFICANT CHANGE UP (ref 13–44)
LYMPHOCYTES # BLD AUTO: 15 % — SIGNIFICANT CHANGE UP (ref 13–44)
LYMPHOCYTES # BLD AUTO: 16.8 % — SIGNIFICANT CHANGE UP (ref 13–44)
LYMPHOCYTES # BLD AUTO: 16.8 % — SIGNIFICANT CHANGE UP (ref 13–44)
LYMPHOCYTES # BLD AUTO: 17 % — SIGNIFICANT CHANGE UP (ref 13–44)
LYMPHOCYTES # BLD AUTO: 18.4 % — SIGNIFICANT CHANGE UP (ref 13–44)
LYMPHOCYTES # BLD AUTO: 18.4 % — SIGNIFICANT CHANGE UP (ref 13–44)
LYMPHOCYTES # BLD AUTO: 2.02 K/UL — SIGNIFICANT CHANGE UP (ref 1–3.3)
LYMPHOCYTES # BLD AUTO: 2.5 % — LOW (ref 13–44)
LYMPHOCYTES # BLD AUTO: 2.6 K/UL — SIGNIFICANT CHANGE UP (ref 1–3.3)
LYMPHOCYTES # BLD AUTO: 25 % — SIGNIFICANT CHANGE UP (ref 13–44)
LYMPHOCYTES # BLD AUTO: 3 % — LOW (ref 13–44)
LYMPHOCYTES # BLD AUTO: 3.16 K/UL — SIGNIFICANT CHANGE UP (ref 1–3.3)
LYMPHOCYTES # BLD AUTO: 38 % — SIGNIFICANT CHANGE UP (ref 13–44)
LYMPHOCYTES # BLD AUTO: 4 % — LOW (ref 13–44)
LYMPHOCYTES # BLD AUTO: 48 % — HIGH (ref 13–44)
LYMPHOCYTES # BLD AUTO: 5 % — LOW (ref 13–44)
LYMPHOCYTES # BLD AUTO: 5 % — LOW (ref 13–44)
LYMPHOCYTES # BLD AUTO: 5.6 % — LOW (ref 13–44)
LYMPHOCYTES # BLD AUTO: 6.7 % — LOW (ref 13–44)
LYMPHOCYTES # BLD AUTO: 66 % — HIGH (ref 13–44)
LYMPHOCYTES # BLD AUTO: 80 % — HIGH (ref 13–44)
LYMPHOCYTES # BLD AUTO: 87 % — HIGH (ref 13–44)
LYMPHOCYTES # BLD AUTO: 9 % — LOW (ref 13–44)
MAGNESIUM SERPL-MCNC: 2.2 MG/DL
MAGNESIUM SERPL-MCNC: 2.2 MG/DL
MAGNESIUM SERPL-MCNC: 2.4 MG/DL
MAGNESIUM SERPL-MCNC: 2.4 MG/DL — SIGNIFICANT CHANGE UP (ref 1.6–2.6)
MAGNESIUM SERPL-MCNC: 2.6 MG/DL — SIGNIFICANT CHANGE UP (ref 1.6–2.6)
MANUAL DIF COMMENT BLD-IMP: SIGNIFICANT CHANGE UP
MANUAL SMEAR VERIFICATION: SIGNIFICANT CHANGE UP
MANUAL SMEAR VERIFICATION: SIGNIFICANT CHANGE UP
MCHC RBC-ENTMCNC: 27.3 PG — SIGNIFICANT CHANGE UP (ref 27–34)
MCHC RBC-ENTMCNC: 27.9 PG — SIGNIFICANT CHANGE UP (ref 27–34)
MCHC RBC-ENTMCNC: 27.9 PG — SIGNIFICANT CHANGE UP (ref 27–34)
MCHC RBC-ENTMCNC: 28.1 PG — SIGNIFICANT CHANGE UP (ref 27–34)
MCHC RBC-ENTMCNC: 28.1 PG — SIGNIFICANT CHANGE UP (ref 27–34)
MCHC RBC-ENTMCNC: 28.5 PG — SIGNIFICANT CHANGE UP (ref 27–34)
MCHC RBC-ENTMCNC: 28.5 PG — SIGNIFICANT CHANGE UP (ref 27–34)
MCHC RBC-ENTMCNC: 28.6 PG — SIGNIFICANT CHANGE UP (ref 27–34)
MCHC RBC-ENTMCNC: 28.7 PG — SIGNIFICANT CHANGE UP (ref 27–34)
MCHC RBC-ENTMCNC: 28.8 PG — SIGNIFICANT CHANGE UP (ref 27–34)
MCHC RBC-ENTMCNC: 29 PG — SIGNIFICANT CHANGE UP (ref 27–34)
MCHC RBC-ENTMCNC: 29.1 PG — SIGNIFICANT CHANGE UP (ref 27–34)
MCHC RBC-ENTMCNC: 29.1 PG — SIGNIFICANT CHANGE UP (ref 27–34)
MCHC RBC-ENTMCNC: 29.2 PG — SIGNIFICANT CHANGE UP (ref 27–34)
MCHC RBC-ENTMCNC: 29.3 PG — SIGNIFICANT CHANGE UP (ref 27–34)
MCHC RBC-ENTMCNC: 29.4 PG — SIGNIFICANT CHANGE UP (ref 27–34)
MCHC RBC-ENTMCNC: 29.6 PG — SIGNIFICANT CHANGE UP (ref 27–34)
MCHC RBC-ENTMCNC: 29.6 PG — SIGNIFICANT CHANGE UP (ref 27–34)
MCHC RBC-ENTMCNC: 29.7 PG — SIGNIFICANT CHANGE UP (ref 27–34)
MCHC RBC-ENTMCNC: 29.7 PG — SIGNIFICANT CHANGE UP (ref 27–34)
MCHC RBC-ENTMCNC: 29.8 PG — SIGNIFICANT CHANGE UP (ref 27–34)
MCHC RBC-ENTMCNC: 29.9 PG — SIGNIFICANT CHANGE UP (ref 27–34)
MCHC RBC-ENTMCNC: 30 PG — SIGNIFICANT CHANGE UP (ref 27–34)
MCHC RBC-ENTMCNC: 30.1 PG — SIGNIFICANT CHANGE UP (ref 27–34)
MCHC RBC-ENTMCNC: 30.2 PG — SIGNIFICANT CHANGE UP (ref 27–34)
MCHC RBC-ENTMCNC: 30.5 PG — SIGNIFICANT CHANGE UP (ref 27–34)
MCHC RBC-ENTMCNC: 30.5 PG — SIGNIFICANT CHANGE UP (ref 27–34)
MCHC RBC-ENTMCNC: 30.6 PG — SIGNIFICANT CHANGE UP (ref 27–34)
MCHC RBC-ENTMCNC: 30.7 PG — SIGNIFICANT CHANGE UP (ref 27–34)
MCHC RBC-ENTMCNC: 31.7 G/DL — LOW (ref 32–36)
MCHC RBC-ENTMCNC: 31.7 G/DL — LOW (ref 32–36)
MCHC RBC-ENTMCNC: 31.9 G/DL — LOW (ref 32–36)
MCHC RBC-ENTMCNC: 32.2 G/DL — SIGNIFICANT CHANGE UP (ref 32–36)
MCHC RBC-ENTMCNC: 32.2 G/DL — SIGNIFICANT CHANGE UP (ref 32–36)
MCHC RBC-ENTMCNC: 32.4 GM/DL — SIGNIFICANT CHANGE UP (ref 32–36)
MCHC RBC-ENTMCNC: 32.4 GM/DL — SIGNIFICANT CHANGE UP (ref 32–36)
MCHC RBC-ENTMCNC: 32.6 G/DL — SIGNIFICANT CHANGE UP (ref 32–36)
MCHC RBC-ENTMCNC: 32.6 G/DL — SIGNIFICANT CHANGE UP (ref 32–36)
MCHC RBC-ENTMCNC: 32.7 G/DL — SIGNIFICANT CHANGE UP (ref 32–36)
MCHC RBC-ENTMCNC: 32.7 G/DL — SIGNIFICANT CHANGE UP (ref 32–36)
MCHC RBC-ENTMCNC: 32.8 G/DL — SIGNIFICANT CHANGE UP (ref 32–36)
MCHC RBC-ENTMCNC: 32.8 GM/DL — SIGNIFICANT CHANGE UP (ref 32–36)
MCHC RBC-ENTMCNC: 32.9 G/DL — SIGNIFICANT CHANGE UP (ref 32–36)
MCHC RBC-ENTMCNC: 32.9 G/DL — SIGNIFICANT CHANGE UP (ref 32–36)
MCHC RBC-ENTMCNC: 33.1 G/DL — SIGNIFICANT CHANGE UP (ref 32–36)
MCHC RBC-ENTMCNC: 33.1 G/DL — SIGNIFICANT CHANGE UP (ref 32–36)
MCHC RBC-ENTMCNC: 33.2 G/DL — SIGNIFICANT CHANGE UP (ref 32–36)
MCHC RBC-ENTMCNC: 33.2 G/DL — SIGNIFICANT CHANGE UP (ref 32–36)
MCHC RBC-ENTMCNC: 33.3 G/DL — SIGNIFICANT CHANGE UP (ref 32–36)
MCHC RBC-ENTMCNC: 33.4 GM/DL — SIGNIFICANT CHANGE UP (ref 32–36)
MCHC RBC-ENTMCNC: 33.5 GM/DL — SIGNIFICANT CHANGE UP (ref 32–36)
MCHC RBC-ENTMCNC: 33.6 G/DL — SIGNIFICANT CHANGE UP (ref 32–36)
MCHC RBC-ENTMCNC: 33.6 GM/DL — SIGNIFICANT CHANGE UP (ref 32–36)
MCHC RBC-ENTMCNC: 33.8 G/DL — SIGNIFICANT CHANGE UP (ref 32–36)
MCHC RBC-ENTMCNC: 33.8 G/DL — SIGNIFICANT CHANGE UP (ref 32–36)
MCHC RBC-ENTMCNC: 33.8 GM/DL — SIGNIFICANT CHANGE UP (ref 32–36)
MCHC RBC-ENTMCNC: 33.8 GM/DL — SIGNIFICANT CHANGE UP (ref 32–36)
MCHC RBC-ENTMCNC: 33.9 G/DL — SIGNIFICANT CHANGE UP (ref 32–36)
MCHC RBC-ENTMCNC: 34 G/DL — SIGNIFICANT CHANGE UP (ref 32–36)
MCHC RBC-ENTMCNC: 34 G/DL — SIGNIFICANT CHANGE UP (ref 32–36)
MCHC RBC-ENTMCNC: 34 GM/DL — SIGNIFICANT CHANGE UP (ref 32–36)
MCHC RBC-ENTMCNC: 34.1 G/DL — SIGNIFICANT CHANGE UP (ref 32–36)
MCHC RBC-ENTMCNC: 34.7 GM/DL — SIGNIFICANT CHANGE UP (ref 32–36)
MCHC RBC-ENTMCNC: 35 G/DL — SIGNIFICANT CHANGE UP (ref 32–36)
MCV RBC AUTO: 84.4 FL — SIGNIFICANT CHANGE UP (ref 80–100)
MCV RBC AUTO: 84.4 FL — SIGNIFICANT CHANGE UP (ref 80–100)
MCV RBC AUTO: 85.2 FL — SIGNIFICANT CHANGE UP (ref 80–100)
MCV RBC AUTO: 85.2 FL — SIGNIFICANT CHANGE UP (ref 80–100)
MCV RBC AUTO: 85.4 FL — SIGNIFICANT CHANGE UP (ref 80–100)
MCV RBC AUTO: 85.5 FL — SIGNIFICANT CHANGE UP (ref 80–100)
MCV RBC AUTO: 85.6 FL — SIGNIFICANT CHANGE UP (ref 80–100)
MCV RBC AUTO: 85.6 FL — SIGNIFICANT CHANGE UP (ref 80–100)
MCV RBC AUTO: 86.1 FL — SIGNIFICANT CHANGE UP (ref 80–100)
MCV RBC AUTO: 86.1 FL — SIGNIFICANT CHANGE UP (ref 80–100)
MCV RBC AUTO: 86.4 FL — SIGNIFICANT CHANGE UP (ref 80–100)
MCV RBC AUTO: 86.8 FL — SIGNIFICANT CHANGE UP (ref 80–100)
MCV RBC AUTO: 86.9 FL — SIGNIFICANT CHANGE UP (ref 80–100)
MCV RBC AUTO: 87 FL — SIGNIFICANT CHANGE UP (ref 80–100)
MCV RBC AUTO: 87.4 FL — SIGNIFICANT CHANGE UP (ref 80–100)
MCV RBC AUTO: 87.5 FL — SIGNIFICANT CHANGE UP (ref 80–100)
MCV RBC AUTO: 87.6 FL — SIGNIFICANT CHANGE UP (ref 80–100)
MCV RBC AUTO: 87.6 FL — SIGNIFICANT CHANGE UP (ref 80–100)
MCV RBC AUTO: 87.8 FL — SIGNIFICANT CHANGE UP (ref 80–100)
MCV RBC AUTO: 87.9 FL — SIGNIFICANT CHANGE UP (ref 80–100)
MCV RBC AUTO: 88.1 FL — SIGNIFICANT CHANGE UP (ref 80–100)
MCV RBC AUTO: 88.1 FL — SIGNIFICANT CHANGE UP (ref 80–100)
MCV RBC AUTO: 88.2 FL — SIGNIFICANT CHANGE UP (ref 80–100)
MCV RBC AUTO: 88.2 FL — SIGNIFICANT CHANGE UP (ref 80–100)
MCV RBC AUTO: 88.4 FL — SIGNIFICANT CHANGE UP (ref 80–100)
MCV RBC AUTO: 88.9 FL — SIGNIFICANT CHANGE UP (ref 80–100)
MCV RBC AUTO: 89.4 FL — SIGNIFICANT CHANGE UP (ref 80–100)
MCV RBC AUTO: 89.6 FL — SIGNIFICANT CHANGE UP (ref 80–100)
MCV RBC AUTO: 89.6 FL — SIGNIFICANT CHANGE UP (ref 80–100)
MCV RBC AUTO: 89.7 FL — SIGNIFICANT CHANGE UP (ref 80–100)
MCV RBC AUTO: 90 FL — SIGNIFICANT CHANGE UP (ref 80–100)
MCV RBC AUTO: 90.1 FL — SIGNIFICANT CHANGE UP (ref 80–100)
MCV RBC AUTO: 90.1 FL — SIGNIFICANT CHANGE UP (ref 80–100)
MCV RBC AUTO: 90.2 FL — SIGNIFICANT CHANGE UP (ref 80–100)
MCV RBC AUTO: 90.4 FL — SIGNIFICANT CHANGE UP (ref 80–100)
MCV RBC AUTO: 91.3 FL — SIGNIFICANT CHANGE UP (ref 80–100)
MCV RBC AUTO: 91.9 FL — SIGNIFICANT CHANGE UP (ref 80–100)
MCV RBC AUTO: 92.3 FL — SIGNIFICANT CHANGE UP (ref 80–100)
MCV RBC AUTO: 92.7 FL — SIGNIFICANT CHANGE UP (ref 80–100)
MCV RBC AUTO: 92.7 FL — SIGNIFICANT CHANGE UP (ref 80–100)
MCV RBC AUTO: 93.5 FL — SIGNIFICANT CHANGE UP (ref 80–100)
METAMYELOCYTES # FLD: 0.5 % — HIGH (ref 0–0)
METAMYELOCYTES # FLD: 1 % — HIGH (ref 0–0)
METAMYELOCYTES # FLD: 1.7 % — HIGH (ref 0–0)
METAMYELOCYTES # FLD: 1.7 % — HIGH (ref 0–0)
METAMYELOCYTES # FLD: 14 % — HIGH (ref 0–0)
METAMYELOCYTES # FLD: 2 % — HIGH (ref 0–0)
METAMYELOCYTES # FLD: 3 % — HIGH (ref 0–0)
METAMYELOCYTES # FLD: 4 % — HIGH (ref 0–0)
MICROCYTES BLD QL: SLIGHT — SIGNIFICANT CHANGE UP
MICROCYTES BLD QL: SLIGHT — SIGNIFICANT CHANGE UP
MONOCYTES # BLD AUTO: 0.06 K/UL — SIGNIFICANT CHANGE UP (ref 0–0.9)
MONOCYTES # BLD AUTO: 0.14 K/UL — SIGNIFICANT CHANGE UP (ref 0–0.9)
MONOCYTES # BLD AUTO: 0.26 K/UL — SIGNIFICANT CHANGE UP (ref 0–0.9)
MONOCYTES # BLD AUTO: 0.27 K/UL — SIGNIFICANT CHANGE UP (ref 0–0.9)
MONOCYTES # BLD AUTO: 0.29 K/UL — SIGNIFICANT CHANGE UP (ref 0–0.9)
MONOCYTES # BLD AUTO: 0.3 K/UL — SIGNIFICANT CHANGE UP (ref 0–0.9)
MONOCYTES # BLD AUTO: 0.34 K/UL — SIGNIFICANT CHANGE UP (ref 0–0.9)
MONOCYTES # BLD AUTO: 0.37 K/UL — SIGNIFICANT CHANGE UP (ref 0–0.9)
MONOCYTES # BLD AUTO: 0.38 K/UL — SIGNIFICANT CHANGE UP (ref 0–0.9)
MONOCYTES # BLD AUTO: 0.45 K/UL — SIGNIFICANT CHANGE UP (ref 0–0.9)
MONOCYTES # BLD AUTO: 0.51 K/UL — SIGNIFICANT CHANGE UP (ref 0–0.9)
MONOCYTES # BLD AUTO: 0.53 K/UL — SIGNIFICANT CHANGE UP (ref 0–0.9)
MONOCYTES # BLD AUTO: 0.61 K/UL — SIGNIFICANT CHANGE UP (ref 0–0.9)
MONOCYTES # BLD AUTO: 0.65 K/UL — SIGNIFICANT CHANGE UP (ref 0–0.9)
MONOCYTES # BLD AUTO: 0.65 K/UL — SIGNIFICANT CHANGE UP (ref 0–0.9)
MONOCYTES # BLD AUTO: 0.68 K/UL — SIGNIFICANT CHANGE UP (ref 0–0.9)
MONOCYTES # BLD AUTO: 0.75 K/UL — SIGNIFICANT CHANGE UP (ref 0–0.9)
MONOCYTES # BLD AUTO: 0.75 K/UL — SIGNIFICANT CHANGE UP (ref 0–0.9)
MONOCYTES # BLD AUTO: 0.76 K/UL — SIGNIFICANT CHANGE UP (ref 0–0.9)
MONOCYTES # BLD AUTO: 0.76 K/UL — SIGNIFICANT CHANGE UP (ref 0–0.9)
MONOCYTES # BLD AUTO: 0.8 K/UL — SIGNIFICANT CHANGE UP (ref 0–0.9)
MONOCYTES # BLD AUTO: 0.88 K/UL — SIGNIFICANT CHANGE UP (ref 0–0.9)
MONOCYTES # BLD AUTO: 0.91 K/UL — HIGH (ref 0–0.9)
MONOCYTES # BLD AUTO: 0.99 K/UL — HIGH (ref 0–0.9)
MONOCYTES # BLD AUTO: 0.99 K/UL — HIGH (ref 0–0.9)
MONOCYTES # BLD AUTO: 1.1 K/UL — HIGH (ref 0–0.9)
MONOCYTES # BLD AUTO: 1.2 K/UL — HIGH (ref 0–0.9)
MONOCYTES # BLD AUTO: 1.25 K/UL — HIGH (ref 0–0.9)
MONOCYTES # BLD AUTO: 1.31 K/UL — HIGH (ref 0–0.9)
MONOCYTES NFR BLD AUTO: 15.6 % — HIGH (ref 2–14)
MONOCYTES NFR BLD AUTO: 22 % — HIGH (ref 2–14)
MONOCYTES NFR BLD AUTO: 26 % — HIGH (ref 2–14)
MONOCYTES NFR BLD AUTO: 3 % — SIGNIFICANT CHANGE UP (ref 2–14)
MONOCYTES NFR BLD AUTO: 3.5 % — SIGNIFICANT CHANGE UP (ref 2–14)
MONOCYTES NFR BLD AUTO: 31 % — HIGH (ref 2–14)
MONOCYTES NFR BLD AUTO: 4 % — SIGNIFICANT CHANGE UP (ref 2–14)
MONOCYTES NFR BLD AUTO: 4.7 % — SIGNIFICANT CHANGE UP (ref 2–14)
MONOCYTES NFR BLD AUTO: 5 % — SIGNIFICANT CHANGE UP (ref 2–14)
MONOCYTES NFR BLD AUTO: 5 % — SIGNIFICANT CHANGE UP (ref 2–14)
MONOCYTES NFR BLD AUTO: 5.9 % — SIGNIFICANT CHANGE UP (ref 2–14)
MONOCYTES NFR BLD AUTO: 6 % — SIGNIFICANT CHANGE UP (ref 2–14)
MONOCYTES NFR BLD AUTO: 6.6 % — SIGNIFICANT CHANGE UP (ref 2–14)
MONOCYTES NFR BLD AUTO: 6.6 % — SIGNIFICANT CHANGE UP (ref 2–14)
MONOCYTES NFR BLD AUTO: 7 % — SIGNIFICANT CHANGE UP (ref 2–14)
MONOCYTES NFR BLD AUTO: 7.3 % — SIGNIFICANT CHANGE UP (ref 2–14)
MONOCYTES NFR BLD AUTO: 7.3 % — SIGNIFICANT CHANGE UP (ref 2–14)
MONOCYTES NFR BLD AUTO: 7.5 % — SIGNIFICANT CHANGE UP (ref 2–14)
MONOCYTES NFR BLD AUTO: 7.5 % — SIGNIFICANT CHANGE UP (ref 2–14)
MONOCYTES NFR BLD AUTO: 7.9 % — SIGNIFICANT CHANGE UP (ref 2–14)
MONOCYTES NFR BLD AUTO: 7.9 % — SIGNIFICANT CHANGE UP (ref 2–14)
MONOCYTES NFR BLD AUTO: 8 % — SIGNIFICANT CHANGE UP (ref 2–14)
MONOCYTES NFR BLD AUTO: 8.3 % — SIGNIFICANT CHANGE UP (ref 2–14)
MONOCYTES NFR BLD AUTO: 8.3 % — SIGNIFICANT CHANGE UP (ref 2–14)
MONOCYTES NFR BLD AUTO: 8.9 % — SIGNIFICANT CHANGE UP (ref 2–14)
MONOCYTES NFR BLD AUTO: 8.9 % — SIGNIFICANT CHANGE UP (ref 2–14)
MONOCYTES NFR BLD AUTO: 9 % — SIGNIFICANT CHANGE UP (ref 2–14)
MYELOCYTES NFR BLD: 1 % — HIGH (ref 0–0)
MYELOCYTES NFR BLD: 1 % — HIGH (ref 0–0)
MYELOCYTES NFR BLD: 10 % — HIGH (ref 0–0)
MYELOCYTES NFR BLD: 18 % — HIGH (ref 0–0)
MYELOCYTES NFR BLD: 2 % — HIGH (ref 0–0)
MYELOCYTES NFR BLD: 26 % — HIGH (ref 0–0)
MYELOCYTES NFR BLD: 3 % — HIGH (ref 0–0)
MYELOCYTES NFR BLD: 4 % — HIGH (ref 0–0)
MYELOCYTES NFR BLD: 5 % — HIGH (ref 0–0)
MYELOCYTES NFR BLD: 6 % — HIGH (ref 0–0)
NEUTROPHILS # BLD AUTO: 0.01 K/UL — LOW (ref 1.8–7.4)
NEUTROPHILS # BLD AUTO: 0.02 K/UL — LOW (ref 1.8–7.4)
NEUTROPHILS # BLD AUTO: 0.09 K/UL — LOW (ref 1.8–7.4)
NEUTROPHILS # BLD AUTO: 0.11 K/UL — LOW (ref 1.8–7.4)
NEUTROPHILS # BLD AUTO: 0.42 K/UL — LOW (ref 1.8–7.4)
NEUTROPHILS # BLD AUTO: 11.01 K/UL — HIGH (ref 1.8–7.4)
NEUTROPHILS # BLD AUTO: 12.11 K/UL — HIGH (ref 1.8–7.4)
NEUTROPHILS # BLD AUTO: 12.62 K/UL — HIGH (ref 1.8–7.4)
NEUTROPHILS # BLD AUTO: 12.62 K/UL — HIGH (ref 1.8–7.4)
NEUTROPHILS # BLD AUTO: 12.95 K/UL — HIGH (ref 1.8–7.4)
NEUTROPHILS # BLD AUTO: 14.03 K/UL — HIGH (ref 1.8–7.4)
NEUTROPHILS # BLD AUTO: 14.44 K/UL — HIGH (ref 1.8–7.4)
NEUTROPHILS # BLD AUTO: 20.35 K/UL — HIGH (ref 1.8–7.4)
NEUTROPHILS # BLD AUTO: 3.58 K/UL — SIGNIFICANT CHANGE UP (ref 1.8–7.4)
NEUTROPHILS # BLD AUTO: 4.8 K/UL — SIGNIFICANT CHANGE UP (ref 1.8–7.4)
NEUTROPHILS # BLD AUTO: 5.9 K/UL — SIGNIFICANT CHANGE UP (ref 1.8–7.4)
NEUTROPHILS # BLD AUTO: 5.9 K/UL — SIGNIFICANT CHANGE UP (ref 1.8–7.4)
NEUTROPHILS # BLD AUTO: 6.08 K/UL — SIGNIFICANT CHANGE UP (ref 1.8–7.4)
NEUTROPHILS # BLD AUTO: 6.08 K/UL — SIGNIFICANT CHANGE UP (ref 1.8–7.4)
NEUTROPHILS # BLD AUTO: 6.26 K/UL — SIGNIFICANT CHANGE UP (ref 1.8–7.4)
NEUTROPHILS # BLD AUTO: 6.26 K/UL — SIGNIFICANT CHANGE UP (ref 1.8–7.4)
NEUTROPHILS # BLD AUTO: 6.27 K/UL — SIGNIFICANT CHANGE UP (ref 1.8–7.4)
NEUTROPHILS # BLD AUTO: 6.27 K/UL — SIGNIFICANT CHANGE UP (ref 1.8–7.4)
NEUTROPHILS # BLD AUTO: 6.41 K/UL — SIGNIFICANT CHANGE UP (ref 1.8–7.4)
NEUTROPHILS # BLD AUTO: 6.83 K/UL — SIGNIFICANT CHANGE UP (ref 1.8–7.4)
NEUTROPHILS # BLD AUTO: 7.05 K/UL — SIGNIFICANT CHANGE UP (ref 1.8–7.4)
NEUTROPHILS # BLD AUTO: 7.05 K/UL — SIGNIFICANT CHANGE UP (ref 1.8–7.4)
NEUTROPHILS # BLD AUTO: 7.21 K/UL — SIGNIFICANT CHANGE UP (ref 1.8–7.4)
NEUTROPHILS # BLD AUTO: 7.21 K/UL — SIGNIFICANT CHANGE UP (ref 1.8–7.4)
NEUTROPHILS # BLD AUTO: 7.94 K/UL — HIGH (ref 1.8–7.4)
NEUTROPHILS # BLD AUTO: 7.94 K/UL — HIGH (ref 1.8–7.4)
NEUTROPHILS # BLD AUTO: 9.17 K/UL — HIGH (ref 1.8–7.4)
NEUTROPHILS # BLD AUTO: 9.25 K/UL — HIGH (ref 1.8–7.4)
NEUTROPHILS # BLD AUTO: 9.4 K/UL — HIGH (ref 1.8–7.4)
NEUTROPHILS # BLD AUTO: 9.71 K/UL — HIGH (ref 1.8–7.4)
NEUTROPHILS NFR BLD AUTO: 1 % — LOW (ref 43–77)
NEUTROPHILS NFR BLD AUTO: 2.2 % — LOW (ref 43–77)
NEUTROPHILS NFR BLD AUTO: 36 % — LOW (ref 43–77)
NEUTROPHILS NFR BLD AUTO: 38 % — LOW (ref 43–77)
NEUTROPHILS NFR BLD AUTO: 50 % — SIGNIFICANT CHANGE UP (ref 43–77)
NEUTROPHILS NFR BLD AUTO: 67.5 % — SIGNIFICANT CHANGE UP (ref 43–77)
NEUTROPHILS NFR BLD AUTO: 7 % — LOW (ref 43–77)
NEUTROPHILS NFR BLD AUTO: 71 % — SIGNIFICANT CHANGE UP (ref 43–77)
NEUTROPHILS NFR BLD AUTO: 71 % — SIGNIFICANT CHANGE UP (ref 43–77)
NEUTROPHILS NFR BLD AUTO: 72.1 % — SIGNIFICANT CHANGE UP (ref 43–77)
NEUTROPHILS NFR BLD AUTO: 72.1 % — SIGNIFICANT CHANGE UP (ref 43–77)
NEUTROPHILS NFR BLD AUTO: 74 % — SIGNIFICANT CHANGE UP (ref 43–77)
NEUTROPHILS NFR BLD AUTO: 74.6 % — SIGNIFICANT CHANGE UP (ref 43–77)
NEUTROPHILS NFR BLD AUTO: 74.6 % — SIGNIFICANT CHANGE UP (ref 43–77)
NEUTROPHILS NFR BLD AUTO: 75 % — SIGNIFICANT CHANGE UP (ref 43–77)
NEUTROPHILS NFR BLD AUTO: 75 % — SIGNIFICANT CHANGE UP (ref 43–77)
NEUTROPHILS NFR BLD AUTO: 75.3 % — SIGNIFICANT CHANGE UP (ref 43–77)
NEUTROPHILS NFR BLD AUTO: 75.3 % — SIGNIFICANT CHANGE UP (ref 43–77)
NEUTROPHILS NFR BLD AUTO: 75.7 % — SIGNIFICANT CHANGE UP (ref 43–77)
NEUTROPHILS NFR BLD AUTO: 75.7 % — SIGNIFICANT CHANGE UP (ref 43–77)
NEUTROPHILS NFR BLD AUTO: 77.7 % — HIGH (ref 43–77)
NEUTROPHILS NFR BLD AUTO: 77.7 % — HIGH (ref 43–77)
NEUTROPHILS NFR BLD AUTO: 78.1 % — HIGH (ref 43–77)
NEUTROPHILS NFR BLD AUTO: 78.1 % — HIGH (ref 43–77)
NEUTROPHILS NFR BLD AUTO: 80 % — HIGH (ref 43–77)
NEUTROPHILS NFR BLD AUTO: 81.3 % — HIGH (ref 43–77)
NEUTROPHILS NFR BLD AUTO: 83.4 % — HIGH (ref 43–77)
NEUTROPHILS NFR BLD AUTO: 85 % — HIGH (ref 43–77)
NEUTROPHILS NFR BLD AUTO: 87 % — HIGH (ref 43–77)
NEUTROPHILS NFR BLD AUTO: 87 % — HIGH (ref 43–77)
NEUTROPHILS NFR BLD AUTO: 89.6 % — HIGH (ref 43–77)
NEUTROPHILS NFR BLD AUTO: 89.6 % — HIGH (ref 43–77)
NEUTROPHILS NFR BLD AUTO: 9 % — LOW (ref 43–77)
NEUTROPHILS NFR BLD AUTO: 90 % — HIGH (ref 43–77)
NEUTROPHILS NFR BLD AUTO: 90.2 % — HIGH (ref 43–77)
NRBC # BLD: 0 /100 WBCS — SIGNIFICANT CHANGE UP (ref 0–0)
NRBC # BLD: 0 /100 — SIGNIFICANT CHANGE UP (ref 0–0)
NRBC # BLD: 1 /100 — HIGH (ref 0–0)
NRBC # BLD: 3 /100 — HIGH (ref 0–0)
NRBC # BLD: SIGNIFICANT CHANGE UP /100 WBCS (ref 0–0)
NRBC # FLD: 0 K/UL — SIGNIFICANT CHANGE UP (ref 0–0)
NT-PROBNP SERPL-SCNC: 794 PG/ML — HIGH
OVALOCYTES BLD QL SMEAR: SLIGHT — SIGNIFICANT CHANGE UP
OVALOCYTES BLD QL SMEAR: SLIGHT — SIGNIFICANT CHANGE UP
PHOSPHATE SERPL-MCNC: 3.8 MG/DL — SIGNIFICANT CHANGE UP (ref 2.5–4.5)
PHOSPHATE SERPL-MCNC: 3.8 MG/DL — SIGNIFICANT CHANGE UP (ref 2.5–4.5)
PHOSPHATE SERPL-MCNC: 3.9 MG/DL — SIGNIFICANT CHANGE UP (ref 2.5–4.5)
PHOSPHATE SERPL-MCNC: 4 MG/DL — SIGNIFICANT CHANGE UP (ref 2.5–4.5)
PHOSPHATE SERPL-MCNC: 4 MG/DL — SIGNIFICANT CHANGE UP (ref 2.5–4.5)
PLAT MORPH BLD: ABNORMAL
PLAT MORPH BLD: ABNORMAL
PLAT MORPH BLD: NORMAL — SIGNIFICANT CHANGE UP
PLATELET # BLD AUTO: 100 K/UL — LOW (ref 150–400)
PLATELET # BLD AUTO: 103 K/UL — LOW (ref 150–400)
PLATELET # BLD AUTO: 105 K/UL — LOW (ref 150–400)
PLATELET # BLD AUTO: 106 K/UL — LOW (ref 150–400)
PLATELET # BLD AUTO: 111 K/UL — LOW (ref 150–400)
PLATELET # BLD AUTO: 111 K/UL — LOW (ref 150–400)
PLATELET # BLD AUTO: 114 K/UL — LOW (ref 150–400)
PLATELET # BLD AUTO: 117 K/UL — LOW (ref 150–400)
PLATELET # BLD AUTO: 123 K/UL — LOW (ref 150–400)
PLATELET # BLD AUTO: 129 K/UL — LOW (ref 150–400)
PLATELET # BLD AUTO: 138 K/UL — LOW (ref 150–400)
PLATELET # BLD AUTO: 141 K/UL — LOW (ref 150–400)
PLATELET # BLD AUTO: 148 K/UL — LOW (ref 150–400)
PLATELET # BLD AUTO: 157 K/UL — SIGNIFICANT CHANGE UP (ref 150–400)
PLATELET # BLD AUTO: 161 K/UL — SIGNIFICANT CHANGE UP (ref 150–400)
PLATELET # BLD AUTO: 161 K/UL — SIGNIFICANT CHANGE UP (ref 150–400)
PLATELET # BLD AUTO: 164 K/UL — SIGNIFICANT CHANGE UP (ref 150–400)
PLATELET # BLD AUTO: 172 K/UL — SIGNIFICANT CHANGE UP (ref 150–400)
PLATELET # BLD AUTO: 172 K/UL — SIGNIFICANT CHANGE UP (ref 150–400)
PLATELET # BLD AUTO: 181 K/UL — SIGNIFICANT CHANGE UP (ref 150–400)
PLATELET # BLD AUTO: 184 K/UL — SIGNIFICANT CHANGE UP (ref 150–400)
PLATELET # BLD AUTO: 205 K/UL — SIGNIFICANT CHANGE UP (ref 150–400)
PLATELET # BLD AUTO: 230 K/UL — SIGNIFICANT CHANGE UP (ref 150–400)
PLATELET # BLD AUTO: 235 K/UL — SIGNIFICANT CHANGE UP (ref 150–400)
PLATELET # BLD AUTO: 237 K/UL — SIGNIFICANT CHANGE UP (ref 150–400)
PLATELET # BLD AUTO: 246 K/UL — SIGNIFICANT CHANGE UP (ref 150–400)
PLATELET # BLD AUTO: 250 K/UL — SIGNIFICANT CHANGE UP (ref 150–400)
PLATELET # BLD AUTO: 250 K/UL — SIGNIFICANT CHANGE UP (ref 150–400)
PLATELET # BLD AUTO: 254 K/UL — SIGNIFICANT CHANGE UP (ref 150–400)
PLATELET # BLD AUTO: 271 K/UL — SIGNIFICANT CHANGE UP (ref 150–400)
PLATELET # BLD AUTO: 271 K/UL — SIGNIFICANT CHANGE UP (ref 150–400)
PLATELET # BLD AUTO: 277 K/UL — SIGNIFICANT CHANGE UP (ref 150–400)
PLATELET # BLD AUTO: 277 K/UL — SIGNIFICANT CHANGE UP (ref 150–400)
PLATELET # BLD AUTO: 295 K/UL — SIGNIFICANT CHANGE UP (ref 150–400)
PLATELET # BLD AUTO: 295 K/UL — SIGNIFICANT CHANGE UP (ref 150–400)
PLATELET # BLD AUTO: 317 K/UL — SIGNIFICANT CHANGE UP (ref 150–400)
PLATELET # BLD AUTO: 317 K/UL — SIGNIFICANT CHANGE UP (ref 150–400)
PLATELET # BLD AUTO: 334 K/UL — SIGNIFICANT CHANGE UP (ref 150–400)
PLATELET # BLD AUTO: 334 K/UL — SIGNIFICANT CHANGE UP (ref 150–400)
PLATELET # BLD AUTO: 337 K/UL — SIGNIFICANT CHANGE UP (ref 150–400)
PLATELET # BLD AUTO: 337 K/UL — SIGNIFICANT CHANGE UP (ref 150–400)
PLATELET # BLD AUTO: 356 K/UL — SIGNIFICANT CHANGE UP (ref 150–400)
PLATELET # BLD AUTO: 356 K/UL — SIGNIFICANT CHANGE UP (ref 150–400)
PLATELET # BLD AUTO: 80 K/UL — LOW (ref 150–400)
PLATELET # BLD AUTO: 83 K/UL — LOW (ref 150–400)
PLATELET # BLD AUTO: 85 K/UL — LOW (ref 150–400)
POIKILOCYTOSIS BLD QL AUTO: SLIGHT — SIGNIFICANT CHANGE UP
POLYCHROMASIA BLD QL SMEAR: SLIGHT — SIGNIFICANT CHANGE UP
POTASSIUM SERPL-MCNC: 3.4 MMOL/L — LOW (ref 3.5–5.3)
POTASSIUM SERPL-MCNC: 3.4 MMOL/L — LOW (ref 3.5–5.3)
POTASSIUM SERPL-MCNC: 3.6 MMOL/L — SIGNIFICANT CHANGE UP (ref 3.5–5.3)
POTASSIUM SERPL-MCNC: 3.6 MMOL/L — SIGNIFICANT CHANGE UP (ref 3.5–5.3)
POTASSIUM SERPL-MCNC: 4.2 MMOL/L — SIGNIFICANT CHANGE UP (ref 3.5–5.3)
POTASSIUM SERPL-MCNC: 4.5 MMOL/L — SIGNIFICANT CHANGE UP (ref 3.5–5.3)
POTASSIUM SERPL-MCNC: 4.6 MMOL/L — SIGNIFICANT CHANGE UP (ref 3.5–5.3)
POTASSIUM SERPL-MCNC: 4.6 MMOL/L — SIGNIFICANT CHANGE UP (ref 3.5–5.3)
POTASSIUM SERPL-SCNC: 3.1 MMOL/L
POTASSIUM SERPL-SCNC: 3.4 MMOL/L
POTASSIUM SERPL-SCNC: 3.4 MMOL/L
POTASSIUM SERPL-SCNC: 3.4 MMOL/L — LOW (ref 3.5–5.3)
POTASSIUM SERPL-SCNC: 3.4 MMOL/L — LOW (ref 3.5–5.3)
POTASSIUM SERPL-SCNC: 3.6 MMOL/L
POTASSIUM SERPL-SCNC: 3.6 MMOL/L — SIGNIFICANT CHANGE UP (ref 3.5–5.3)
POTASSIUM SERPL-SCNC: 3.6 MMOL/L — SIGNIFICANT CHANGE UP (ref 3.5–5.3)
POTASSIUM SERPL-SCNC: 3.7 MMOL/L
POTASSIUM SERPL-SCNC: 3.7 MMOL/L
POTASSIUM SERPL-SCNC: 3.8 MMOL/L
POTASSIUM SERPL-SCNC: 4.1 MMOL/L
POTASSIUM SERPL-SCNC: 4.2 MMOL/L
POTASSIUM SERPL-SCNC: 4.2 MMOL/L — SIGNIFICANT CHANGE UP (ref 3.5–5.3)
POTASSIUM SERPL-SCNC: 4.3 MMOL/L
POTASSIUM SERPL-SCNC: 4.4 MMOL/L
POTASSIUM SERPL-SCNC: 4.5 MMOL/L
POTASSIUM SERPL-SCNC: 4.5 MMOL/L — SIGNIFICANT CHANGE UP (ref 3.5–5.3)
POTASSIUM SERPL-SCNC: 4.6 MMOL/L — SIGNIFICANT CHANGE UP (ref 3.5–5.3)
POTASSIUM SERPL-SCNC: 4.6 MMOL/L — SIGNIFICANT CHANGE UP (ref 3.5–5.3)
POTASSIUM SERPL-SCNC: 4.7 MMOL/L
POTASSIUM SERPL-SCNC: 4.8 MMOL/L
POTASSIUM SERPL-SCNC: 5 MMOL/L
POTASSIUM SERPL-SCNC: 5.1 MMOL/L
PROMYELOCYTES # FLD: 0.5 % — HIGH (ref 0–0)
PROT SERPL-MCNC: 5.6 G/DL
PROT SERPL-MCNC: 5.7 G/DL
PROT SERPL-MCNC: 5.8 G/DL
PROT SERPL-MCNC: 5.8 G/DL — LOW (ref 6–8.3)
PROT SERPL-MCNC: 5.8 G/DL — LOW (ref 6–8.3)
PROT SERPL-MCNC: 5.9 G/DL
PROT SERPL-MCNC: 6 G/DL
PROT SERPL-MCNC: 6.1 G/DL — SIGNIFICANT CHANGE UP (ref 6–8.3)
PROT SERPL-MCNC: 6.2 G/DL
PROT SERPL-MCNC: 6.2 G/DL — SIGNIFICANT CHANGE UP (ref 6–8.3)
PROT SERPL-MCNC: 6.3 G/DL
PROT SERPL-MCNC: 6.3 G/DL — SIGNIFICANT CHANGE UP (ref 6–8.3)
PROT SERPL-MCNC: 6.4 G/DL
PROT SERPL-MCNC: 6.4 G/DL
PROT SERPL-MCNC: 6.5 G/DL — SIGNIFICANT CHANGE UP (ref 6–8.3)
PROT SERPL-MCNC: 6.8 G/DL
PROT SERPL-MCNC: 6.9 G/DL
PROTHROM AB SERPL-ACNC: 10.2 SEC — SIGNIFICANT CHANGE UP (ref 9.5–13)
PROTHROM AB SERPL-ACNC: 10.2 SEC — SIGNIFICANT CHANGE UP (ref 9.5–13)
PROTHROM AB SERPL-ACNC: 11.3 SEC — SIGNIFICANT CHANGE UP (ref 9.5–13)
PROTHROM AB SERPL-ACNC: 11.3 SEC — SIGNIFICANT CHANGE UP (ref 9.5–13)
PROTHROM AB SERPL-ACNC: 11.4 SEC — SIGNIFICANT CHANGE UP (ref 9.5–13)
RBC # BLD: 3.84 M/UL — LOW (ref 4.2–5.8)
RBC # BLD: 3.99 M/UL — LOW (ref 4.2–5.8)
RBC # BLD: 4.02 M/UL — LOW (ref 4.2–5.8)
RBC # BLD: 4.07 M/UL — LOW (ref 4.2–5.8)
RBC # BLD: 4.15 M/UL — LOW (ref 4.2–5.8)
RBC # BLD: 4.15 M/UL — LOW (ref 4.2–5.8)
RBC # BLD: 4.2 M/UL — SIGNIFICANT CHANGE UP (ref 4.2–5.8)
RBC # BLD: 4.2 M/UL — SIGNIFICANT CHANGE UP (ref 4.2–5.8)
RBC # BLD: 4.21 M/UL — SIGNIFICANT CHANGE UP (ref 4.2–5.8)
RBC # BLD: 4.22 M/UL — SIGNIFICANT CHANGE UP (ref 4.2–5.8)
RBC # BLD: 4.22 M/UL — SIGNIFICANT CHANGE UP (ref 4.2–5.8)
RBC # BLD: 4.25 M/UL — SIGNIFICANT CHANGE UP (ref 4.2–5.8)
RBC # BLD: 4.28 M/UL — SIGNIFICANT CHANGE UP (ref 4.2–5.8)
RBC # BLD: 4.28 M/UL — SIGNIFICANT CHANGE UP (ref 4.2–5.8)
RBC # BLD: 4.32 M/UL — SIGNIFICANT CHANGE UP (ref 4.2–5.8)
RBC # BLD: 4.33 M/UL — SIGNIFICANT CHANGE UP (ref 4.2–5.8)
RBC # BLD: 4.36 M/UL — SIGNIFICANT CHANGE UP (ref 4.2–5.8)
RBC # BLD: 4.4 M/UL — SIGNIFICANT CHANGE UP (ref 4.2–5.8)
RBC # BLD: 4.4 M/UL — SIGNIFICANT CHANGE UP (ref 4.2–5.8)
RBC # BLD: 4.44 M/UL — SIGNIFICANT CHANGE UP (ref 4.2–5.8)
RBC # BLD: 4.47 M/UL — SIGNIFICANT CHANGE UP (ref 4.2–5.8)
RBC # BLD: 4.78 M/UL — SIGNIFICANT CHANGE UP (ref 4.2–5.8)
RBC # BLD: 4.79 M/UL — SIGNIFICANT CHANGE UP (ref 4.2–5.8)
RBC # BLD: 4.86 M/UL — SIGNIFICANT CHANGE UP (ref 4.2–5.8)
RBC # BLD: 4.9 M/UL — SIGNIFICANT CHANGE UP (ref 4.2–5.8)
RBC # BLD: 4.93 M/UL — SIGNIFICANT CHANGE UP (ref 4.2–5.8)
RBC # BLD: 4.94 M/UL — SIGNIFICANT CHANGE UP (ref 4.2–5.8)
RBC # BLD: 4.94 M/UL — SIGNIFICANT CHANGE UP (ref 4.2–5.8)
RBC # BLD: 5.13 M/UL — SIGNIFICANT CHANGE UP (ref 4.2–5.8)
RBC # BLD: 5.21 M/UL — SIGNIFICANT CHANGE UP (ref 4.2–5.8)
RBC # BLD: 5.21 M/UL — SIGNIFICANT CHANGE UP (ref 4.2–5.8)
RBC # BLD: 5.25 M/UL — SIGNIFICANT CHANGE UP (ref 4.2–5.8)
RBC # BLD: 5.26 M/UL — SIGNIFICANT CHANGE UP (ref 4.2–5.8)
RBC # BLD: 5.39 M/UL — SIGNIFICANT CHANGE UP (ref 4.2–5.8)
RBC # BLD: 5.45 M/UL — SIGNIFICANT CHANGE UP (ref 4.2–5.8)
RBC # BLD: 5.5 M/UL — SIGNIFICANT CHANGE UP (ref 4.2–5.8)
RBC # FLD: 13.4 % — SIGNIFICANT CHANGE UP (ref 10.3–14.5)
RBC # FLD: 13.4 % — SIGNIFICANT CHANGE UP (ref 10.3–14.5)
RBC # FLD: 13.7 % — SIGNIFICANT CHANGE UP (ref 10.3–14.5)
RBC # FLD: 13.7 % — SIGNIFICANT CHANGE UP (ref 10.3–14.5)
RBC # FLD: 13.8 % — SIGNIFICANT CHANGE UP (ref 10.3–14.5)
RBC # FLD: 13.8 % — SIGNIFICANT CHANGE UP (ref 10.3–14.5)
RBC # FLD: 13.9 % — SIGNIFICANT CHANGE UP (ref 10.3–14.5)
RBC # FLD: 13.9 % — SIGNIFICANT CHANGE UP (ref 10.3–14.5)
RBC # FLD: 14.1 % — SIGNIFICANT CHANGE UP (ref 10.3–14.5)
RBC # FLD: 14.3 % — SIGNIFICANT CHANGE UP (ref 10.3–14.5)
RBC # FLD: 14.3 % — SIGNIFICANT CHANGE UP (ref 10.3–14.5)
RBC # FLD: 14.5 % — SIGNIFICANT CHANGE UP (ref 10.3–14.5)
RBC # FLD: 14.6 % — HIGH (ref 10.3–14.5)
RBC # FLD: 14.6 % — HIGH (ref 10.3–14.5)
RBC # FLD: 15 % — HIGH (ref 10.3–14.5)
RBC # FLD: 15.1 % — HIGH (ref 10.3–14.5)
RBC # FLD: 15.2 % — HIGH (ref 10.3–14.5)
RBC # FLD: 15.2 % — HIGH (ref 10.3–14.5)
RBC # FLD: 15.4 % — HIGH (ref 10.3–14.5)
RBC # FLD: 15.5 % — HIGH (ref 10.3–14.5)
RBC # FLD: 15.6 % — HIGH (ref 10.3–14.5)
RBC # FLD: 15.6 % — HIGH (ref 10.3–14.5)
RBC # FLD: 15.7 % — HIGH (ref 10.3–14.5)
RBC # FLD: 15.8 % — HIGH (ref 10.3–14.5)
RBC # FLD: 15.9 % — HIGH (ref 10.3–14.5)
RBC # FLD: 15.9 % — HIGH (ref 10.3–14.5)
RBC # FLD: 16.1 % — HIGH (ref 10.3–14.5)
RBC # FLD: 16.1 % — HIGH (ref 10.3–14.5)
RBC # FLD: 16.2 % — HIGH (ref 10.3–14.5)
RBC # FLD: 16.7 % — HIGH (ref 10.3–14.5)
RBC # FLD: 17.2 % — HIGH (ref 10.3–14.5)
RBC # FLD: 17.2 % — HIGH (ref 10.3–14.5)
RBC # FLD: 17.7 % — HIGH (ref 10.3–14.5)
RBC # FLD: 17.8 % — HIGH (ref 10.3–14.5)
RBC # FLD: 18 % — HIGH (ref 10.3–14.5)
RBC BLD AUTO: ABNORMAL
RBC BLD AUTO: SIGNIFICANT CHANGE UP
RBC BLD AUTO: SIGNIFICANT CHANGE UP
RH IG SCN BLD-IMP: POSITIVE — SIGNIFICANT CHANGE UP
SMUDGE CELLS # BLD: PRESENT — SIGNIFICANT CHANGE UP
SMUDGE CELLS # BLD: PRESENT — SIGNIFICANT CHANGE UP
SODIUM SERPL-SCNC: 133 MMOL/L — LOW (ref 135–145)
SODIUM SERPL-SCNC: 134 MMOL/L — LOW (ref 135–145)
SODIUM SERPL-SCNC: 135 MMOL/L — SIGNIFICANT CHANGE UP (ref 135–145)
SODIUM SERPL-SCNC: 135 MMOL/L — SIGNIFICANT CHANGE UP (ref 135–145)
SODIUM SERPL-SCNC: 136 MMOL/L
SODIUM SERPL-SCNC: 136 MMOL/L
SODIUM SERPL-SCNC: 136 MMOL/L — SIGNIFICANT CHANGE UP (ref 135–145)
SODIUM SERPL-SCNC: 136 MMOL/L — SIGNIFICANT CHANGE UP (ref 135–145)
SODIUM SERPL-SCNC: 137 MMOL/L
SODIUM SERPL-SCNC: 137 MMOL/L
SODIUM SERPL-SCNC: 138 MMOL/L
SODIUM SERPL-SCNC: 139 MMOL/L
SODIUM SERPL-SCNC: 140 MMOL/L
SODIUM SERPL-SCNC: 140 MMOL/L — SIGNIFICANT CHANGE UP (ref 135–145)
SODIUM SERPL-SCNC: 140 MMOL/L — SIGNIFICANT CHANGE UP (ref 135–145)
SODIUM SERPL-SCNC: 141 MMOL/L
SODIUM SERPL-SCNC: 141 MMOL/L
SODIUM SERPL-SCNC: 142 MMOL/L
SODIUM SERPL-SCNC: 143 MMOL/L
SODIUM SERPL-SCNC: 143 MMOL/L — SIGNIFICANT CHANGE UP (ref 135–145)
SODIUM SERPL-SCNC: 143 MMOL/L — SIGNIFICANT CHANGE UP (ref 135–145)
SURGICAL PATHOLOGY STUDY: SIGNIFICANT CHANGE UP
T4 FREE+ TSH PNL SERPL: 1.62 UIU/ML — SIGNIFICANT CHANGE UP (ref 0.27–4.2)
T4 FREE+ TSH PNL SERPL: 1.62 UIU/ML — SIGNIFICANT CHANGE UP (ref 0.27–4.2)
T4 FREE+ TSH PNL SERPL: 1.76 UIU/ML — SIGNIFICANT CHANGE UP (ref 0.27–4.2)
T4 FREE+ TSH PNL SERPL: 1.76 UIU/ML — SIGNIFICANT CHANGE UP (ref 0.27–4.2)
TROPONIN T, HIGH SENSITIVITY RESULT: 16 NG/L — SIGNIFICANT CHANGE UP
TSH SERPL-ACNC: 0.79 UIU/ML
TSH SERPL-ACNC: 1.23 UIU/ML
TSH SERPL-ACNC: 1.61 UIU/ML
TSH SERPL-ACNC: 1.88 UIU/ML
TSH SERPL-ACNC: 1.95 UIU/ML
TSH SERPL-ACNC: 2.77 UIU/ML
TSH SERPL-ACNC: 2.8 UIU/ML
UFH PPP CHRO-ACNC: 0.73 IU/ML — HIGH (ref 0.3–0.7)
VARIANT LYMPHS # BLD: 2 % — SIGNIFICANT CHANGE UP (ref 0–6)
VIT B12 SERPL-MCNC: 459 PG/ML
WBC # BLD: 0.65 K/UL — CRITICAL LOW (ref 3.8–10.5)
WBC # BLD: 0.9 K/UL — CRITICAL LOW (ref 3.8–10.5)
WBC # BLD: 1.17 K/UL — LOW (ref 3.8–10.5)
WBC # BLD: 1.21 K/UL — LOW (ref 3.8–10.5)
WBC # BLD: 1.23 K/UL — LOW (ref 3.8–10.5)
WBC # BLD: 10 K/UL — SIGNIFICANT CHANGE UP (ref 3.8–10.5)
WBC # BLD: 10 K/UL — SIGNIFICANT CHANGE UP (ref 3.8–10.5)
WBC # BLD: 10.16 K/UL — SIGNIFICANT CHANGE UP (ref 3.8–10.5)
WBC # BLD: 10.16 K/UL — SIGNIFICANT CHANGE UP (ref 3.8–10.5)
WBC # BLD: 10.18 K/UL — SIGNIFICANT CHANGE UP (ref 3.8–10.5)
WBC # BLD: 10.3 K/UL — SIGNIFICANT CHANGE UP (ref 3.8–10.5)
WBC # BLD: 10.44 K/UL — SIGNIFICANT CHANGE UP (ref 3.8–10.5)
WBC # BLD: 11.16 K/UL — HIGH (ref 3.8–10.5)
WBC # BLD: 11.38 K/UL — HIGH (ref 3.8–10.5)
WBC # BLD: 12.62 K/UL — HIGH (ref 3.8–10.5)
WBC # BLD: 12.66 K/UL — HIGH (ref 3.8–10.5)
WBC # BLD: 13.13 K/UL — HIGH (ref 3.8–10.5)
WBC # BLD: 14.02 K/UL — HIGH (ref 3.8–10.5)
WBC # BLD: 14.02 K/UL — HIGH (ref 3.8–10.5)
WBC # BLD: 14.09 K/UL — HIGH (ref 3.8–10.5)
WBC # BLD: 14.09 K/UL — HIGH (ref 3.8–10.5)
WBC # BLD: 15.53 K/UL — HIGH (ref 3.8–10.5)
WBC # BLD: 16 K/UL — HIGH (ref 3.8–10.5)
WBC # BLD: 16.36 K/UL — HIGH (ref 3.8–10.5)
WBC # BLD: 18.34 K/UL — HIGH (ref 3.8–10.5)
WBC # BLD: 20.79 K/UL — HIGH (ref 3.8–10.5)
WBC # BLD: 23.94 K/UL — HIGH (ref 3.8–10.5)
WBC # BLD: 4.77 K/UL — SIGNIFICANT CHANGE UP (ref 3.8–10.5)
WBC # BLD: 8.19 K/UL — SIGNIFICANT CHANGE UP (ref 3.8–10.5)
WBC # BLD: 8.19 K/UL — SIGNIFICANT CHANGE UP (ref 3.8–10.5)
WBC # BLD: 8.32 K/UL — SIGNIFICANT CHANGE UP (ref 3.8–10.5)
WBC # BLD: 8.32 K/UL — SIGNIFICANT CHANGE UP (ref 3.8–10.5)
WBC # BLD: 8.41 K/UL — SIGNIFICANT CHANGE UP (ref 3.8–10.5)
WBC # BLD: 8.41 K/UL — SIGNIFICANT CHANGE UP (ref 3.8–10.5)
WBC # BLD: 8.54 K/UL — SIGNIFICANT CHANGE UP (ref 3.8–10.5)
WBC # BLD: 8.54 K/UL — SIGNIFICANT CHANGE UP (ref 3.8–10.5)
WBC # BLD: 8.58 K/UL — SIGNIFICANT CHANGE UP (ref 3.8–10.5)
WBC # BLD: 8.58 K/UL — SIGNIFICANT CHANGE UP (ref 3.8–10.5)
WBC # BLD: 9.29 K/UL — SIGNIFICANT CHANGE UP (ref 3.8–10.5)
WBC # BLD: 9.29 K/UL — SIGNIFICANT CHANGE UP (ref 3.8–10.5)
WBC # BLD: 9.3 K/UL — SIGNIFICANT CHANGE UP (ref 3.8–10.5)
WBC # BLD: 9.3 K/UL — SIGNIFICANT CHANGE UP (ref 3.8–10.5)
WBC # BLD: 9.39 K/UL — SIGNIFICANT CHANGE UP (ref 3.8–10.5)
WBC # BLD: 9.43 K/UL — SIGNIFICANT CHANGE UP (ref 3.8–10.5)
WBC # BLD: 9.75 K/UL — SIGNIFICANT CHANGE UP (ref 3.8–10.5)
WBC # BLD: 9.75 K/UL — SIGNIFICANT CHANGE UP (ref 3.8–10.5)
WBC # FLD AUTO: 0.65 K/UL — CRITICAL LOW (ref 3.8–10.5)
WBC # FLD AUTO: 0.9 K/UL — CRITICAL LOW (ref 3.8–10.5)
WBC # FLD AUTO: 1.17 K/UL — LOW (ref 3.8–10.5)
WBC # FLD AUTO: 1.21 K/UL — LOW (ref 3.8–10.5)
WBC # FLD AUTO: 1.23 K/UL — LOW (ref 3.8–10.5)
WBC # FLD AUTO: 10 K/UL — SIGNIFICANT CHANGE UP (ref 3.8–10.5)
WBC # FLD AUTO: 10 K/UL — SIGNIFICANT CHANGE UP (ref 3.8–10.5)
WBC # FLD AUTO: 10.16 K/UL — SIGNIFICANT CHANGE UP (ref 3.8–10.5)
WBC # FLD AUTO: 10.16 K/UL — SIGNIFICANT CHANGE UP (ref 3.8–10.5)
WBC # FLD AUTO: 10.18 K/UL — SIGNIFICANT CHANGE UP (ref 3.8–10.5)
WBC # FLD AUTO: 10.3 K/UL — SIGNIFICANT CHANGE UP (ref 3.8–10.5)
WBC # FLD AUTO: 10.44 K/UL — SIGNIFICANT CHANGE UP (ref 3.8–10.5)
WBC # FLD AUTO: 11.16 K/UL — HIGH (ref 3.8–10.5)
WBC # FLD AUTO: 11.38 K/UL — HIGH (ref 3.8–10.5)
WBC # FLD AUTO: 12.62 K/UL — HIGH (ref 3.8–10.5)
WBC # FLD AUTO: 12.66 K/UL — HIGH (ref 3.8–10.5)
WBC # FLD AUTO: 13.13 K/UL — HIGH (ref 3.8–10.5)
WBC # FLD AUTO: 14.02 K/UL — HIGH (ref 3.8–10.5)
WBC # FLD AUTO: 14.02 K/UL — HIGH (ref 3.8–10.5)
WBC # FLD AUTO: 14.09 K/UL — HIGH (ref 3.8–10.5)
WBC # FLD AUTO: 14.09 K/UL — HIGH (ref 3.8–10.5)
WBC # FLD AUTO: 15.53 K/UL — HIGH (ref 3.8–10.5)
WBC # FLD AUTO: 16 K/UL — HIGH (ref 3.8–10.5)
WBC # FLD AUTO: 16.36 K/UL — HIGH (ref 3.8–10.5)
WBC # FLD AUTO: 18.34 K/UL — HIGH (ref 3.8–10.5)
WBC # FLD AUTO: 20.79 K/UL — HIGH (ref 3.8–10.5)
WBC # FLD AUTO: 23.94 K/UL — HIGH (ref 3.8–10.5)
WBC # FLD AUTO: 4.77 K/UL — SIGNIFICANT CHANGE UP (ref 3.8–10.5)
WBC # FLD AUTO: 8.19 K/UL — SIGNIFICANT CHANGE UP (ref 3.8–10.5)
WBC # FLD AUTO: 8.19 K/UL — SIGNIFICANT CHANGE UP (ref 3.8–10.5)
WBC # FLD AUTO: 8.32 K/UL — SIGNIFICANT CHANGE UP (ref 3.8–10.5)
WBC # FLD AUTO: 8.32 K/UL — SIGNIFICANT CHANGE UP (ref 3.8–10.5)
WBC # FLD AUTO: 8.41 K/UL — SIGNIFICANT CHANGE UP (ref 3.8–10.5)
WBC # FLD AUTO: 8.41 K/UL — SIGNIFICANT CHANGE UP (ref 3.8–10.5)
WBC # FLD AUTO: 8.54 K/UL — SIGNIFICANT CHANGE UP (ref 3.8–10.5)
WBC # FLD AUTO: 8.54 K/UL — SIGNIFICANT CHANGE UP (ref 3.8–10.5)
WBC # FLD AUTO: 8.58 K/UL — SIGNIFICANT CHANGE UP (ref 3.8–10.5)
WBC # FLD AUTO: 8.58 K/UL — SIGNIFICANT CHANGE UP (ref 3.8–10.5)
WBC # FLD AUTO: 9.29 K/UL — SIGNIFICANT CHANGE UP (ref 3.8–10.5)
WBC # FLD AUTO: 9.29 K/UL — SIGNIFICANT CHANGE UP (ref 3.8–10.5)
WBC # FLD AUTO: 9.3 K/UL — SIGNIFICANT CHANGE UP (ref 3.8–10.5)
WBC # FLD AUTO: 9.3 K/UL — SIGNIFICANT CHANGE UP (ref 3.8–10.5)
WBC # FLD AUTO: 9.39 K/UL — SIGNIFICANT CHANGE UP (ref 3.8–10.5)
WBC # FLD AUTO: 9.43 K/UL — SIGNIFICANT CHANGE UP (ref 3.8–10.5)
WBC # FLD AUTO: 9.75 K/UL — SIGNIFICANT CHANGE UP (ref 3.8–10.5)
WBC # FLD AUTO: 9.75 K/UL — SIGNIFICANT CHANGE UP (ref 3.8–10.5)

## 2023-01-01 PROCEDURE — 71260 CT THORAX DX C+: CPT | Mod: 26

## 2023-01-01 PROCEDURE — 61796 SRS CRANIAL LESION SIMPLE: CPT

## 2023-01-01 PROCEDURE — 93010 ELECTROCARDIOGRAM REPORT: CPT

## 2023-01-01 PROCEDURE — 99214 OFFICE O/P EST MOD 30 MIN: CPT

## 2023-01-01 PROCEDURE — 99213 OFFICE O/P EST LOW 20 MIN: CPT | Mod: 95

## 2023-01-01 PROCEDURE — 70553 MRI BRAIN STEM W/O & W/DYE: CPT | Mod: 26

## 2023-01-01 PROCEDURE — 99233 SBSQ HOSP IP/OBS HIGH 50: CPT

## 2023-01-01 PROCEDURE — 70470 CT HEAD/BRAIN W/O & W/DYE: CPT

## 2023-01-01 PROCEDURE — 99215 OFFICE O/P EST HI 40 MIN: CPT

## 2023-01-01 PROCEDURE — 70470 CT HEAD/BRAIN W/O & W/DYE: CPT | Mod: 26

## 2023-01-01 PROCEDURE — 99223 1ST HOSP IP/OBS HIGH 75: CPT

## 2023-01-01 PROCEDURE — 99024 POSTOP FOLLOW-UP VISIT: CPT

## 2023-01-01 PROCEDURE — 99255 IP/OBS CONSLTJ NEW/EST HI 80: CPT | Mod: GC

## 2023-01-01 PROCEDURE — 77295 3-D RADIOTHERAPY PLAN: CPT | Mod: 26

## 2023-01-01 PROCEDURE — 93970 EXTREMITY STUDY: CPT | Mod: 26

## 2023-01-01 PROCEDURE — 71260 CT THORAX DX C+: CPT

## 2023-01-01 PROCEDURE — 38222 DX BONE MARROW BX & ASPIR: CPT | Mod: LT

## 2023-01-01 PROCEDURE — 77300 RADIATION THERAPY DOSE PLAN: CPT | Mod: 26

## 2023-01-01 PROCEDURE — A9585: CPT

## 2023-01-01 PROCEDURE — 70553 MRI BRAIN STEM W/O & W/DYE: CPT

## 2023-01-01 PROCEDURE — 99285 EMERGENCY DEPT VISIT HI MDM: CPT

## 2023-01-01 PROCEDURE — 88321 CONSLTJ&REPRT SLD PREP ELSWR: CPT

## 2023-01-01 PROCEDURE — 78816 PET IMAGE W/CT FULL BODY: CPT | Mod: 26,PS

## 2023-01-01 PROCEDURE — 78816 PET IMAGE W/CT FULL BODY: CPT

## 2023-01-01 PROCEDURE — 99213 OFFICE O/P EST LOW 20 MIN: CPT

## 2023-01-01 PROCEDURE — 61797 SRS CRAN LES SIMPLE ADDL: CPT

## 2023-01-01 PROCEDURE — 70450 CT HEAD/BRAIN W/O DYE: CPT | Mod: 26

## 2023-01-01 PROCEDURE — 99232 SBSQ HOSP IP/OBS MODERATE 35: CPT

## 2023-01-01 PROCEDURE — 99239 HOSP IP/OBS DSCHRG MGMT >30: CPT

## 2023-01-01 PROCEDURE — 77432 STEREOTACTIC RADIATION TRMT: CPT

## 2023-01-01 PROCEDURE — A9552: CPT

## 2023-01-01 PROCEDURE — 70450 CT HEAD/BRAIN W/O DYE: CPT | Mod: 26,MA

## 2023-01-01 PROCEDURE — 77334 RADIATION TREATMENT AID(S): CPT | Mod: 26

## 2023-01-01 PROCEDURE — 77263 THER RADIOLOGY TX PLNG CPLX: CPT

## 2023-01-01 PROCEDURE — 99443: CPT

## 2023-01-01 PROCEDURE — 74177 CT ABD & PELVIS W/CONTRAST: CPT | Mod: 26

## 2023-01-01 PROCEDURE — 93306 TTE W/DOPPLER COMPLETE: CPT | Mod: 26

## 2023-01-01 PROCEDURE — 99205 OFFICE O/P NEW HI 60 MIN: CPT

## 2023-01-01 PROCEDURE — 77290 THER RAD SIMULAJ FIELD CPLX: CPT | Mod: 26

## 2023-01-01 PROCEDURE — 99254 IP/OBS CNSLTJ NEW/EST MOD 60: CPT | Mod: 25

## 2023-01-01 PROCEDURE — 99205 OFFICE O/P NEW HI 60 MIN: CPT | Mod: GC

## 2023-01-01 PROCEDURE — 74177 CT ABD & PELVIS W/CONTRAST: CPT

## 2023-01-01 RX ORDER — HEPARIN SODIUM 5000 [USP'U]/ML
1350 INJECTION INTRAVENOUS; SUBCUTANEOUS
Qty: 25000 | Refills: 0 | Status: DISCONTINUED | OUTPATIENT
Start: 2023-01-01 | End: 2023-01-01

## 2023-01-01 RX ORDER — HEPARIN SODIUM 5000 [USP'U]/ML
1450 INJECTION INTRAVENOUS; SUBCUTANEOUS
Qty: 25000 | Refills: 0 | Status: DISCONTINUED | OUTPATIENT
Start: 2023-01-01 | End: 2023-01-01

## 2023-01-01 RX ORDER — LEVETIRACETAM 250 MG/1
500 TABLET, FILM COATED ORAL
Refills: 0 | Status: DISCONTINUED | OUTPATIENT
Start: 2023-01-01 | End: 2023-01-01

## 2023-01-01 RX ORDER — DEXTROSE 50 % IN WATER 50 %
25 SYRINGE (ML) INTRAVENOUS ONCE
Refills: 0 | Status: DISCONTINUED | OUTPATIENT
Start: 2023-01-01 | End: 2023-01-01

## 2023-01-01 RX ORDER — HEPARIN SODIUM 5000 [USP'U]/ML
3000 INJECTION INTRAVENOUS; SUBCUTANEOUS EVERY 6 HOURS
Refills: 0 | Status: DISCONTINUED | OUTPATIENT
Start: 2023-01-01 | End: 2023-01-01

## 2023-01-01 RX ORDER — GLUCAGON INJECTION, SOLUTION 0.5 MG/.1ML
1 INJECTION, SOLUTION SUBCUTANEOUS ONCE
Refills: 0 | Status: DISCONTINUED | OUTPATIENT
Start: 2023-01-01 | End: 2023-01-01

## 2023-01-01 RX ORDER — HEPARIN SODIUM 5000 [USP'U]/ML
1100 INJECTION INTRAVENOUS; SUBCUTANEOUS
Qty: 25000 | Refills: 0 | Status: DISCONTINUED | OUTPATIENT
Start: 2023-01-01 | End: 2023-01-01

## 2023-01-01 RX ORDER — ACETAMINOPHEN 500 MG
650 TABLET ORAL EVERY 6 HOURS
Refills: 0 | Status: DISCONTINUED | OUTPATIENT
Start: 2023-01-01 | End: 2023-01-01

## 2023-01-01 RX ORDER — SODIUM CHLORIDE 9 MG/ML
1000 INJECTION, SOLUTION INTRAVENOUS
Refills: 0 | Status: DISCONTINUED | OUTPATIENT
Start: 2023-01-01 | End: 2023-01-01

## 2023-01-01 RX ORDER — LANOLIN ALCOHOL/MO/W.PET/CERES
3 CREAM (GRAM) TOPICAL AT BEDTIME
Refills: 0 | Status: DISCONTINUED | OUTPATIENT
Start: 2023-01-01 | End: 2023-01-01

## 2023-01-01 RX ORDER — ACETAMINOPHEN 500 MG
650 TABLET ORAL EVERY 6 HOURS
Refills: 0 | Status: DISCONTINUED | OUTPATIENT
Start: 2023-01-01 | End: 2024-01-01

## 2023-01-01 RX ORDER — DEXAMETHASONE 0.5 MG/5ML
4 ELIXIR ORAL EVERY 6 HOURS
Refills: 0 | Status: DISCONTINUED | OUTPATIENT
Start: 2023-01-01 | End: 2024-01-01

## 2023-01-01 RX ORDER — SODIUM CHLORIDE 9 MG/ML
1000 INJECTION INTRAMUSCULAR; INTRAVENOUS; SUBCUTANEOUS
Refills: 0 | Status: DISCONTINUED | OUTPATIENT
Start: 2023-01-01 | End: 2023-01-01

## 2023-01-01 RX ORDER — LEVETIRACETAM 250 MG/1
500 TABLET, FILM COATED ORAL EVERY 12 HOURS
Refills: 0 | Status: DISCONTINUED | OUTPATIENT
Start: 2023-01-01 | End: 2024-01-01

## 2023-01-01 RX ORDER — HEPARIN SODIUM 5000 [USP'U]/ML
1500 INJECTION INTRAVENOUS; SUBCUTANEOUS
Qty: 25000 | Refills: 0 | Status: DISCONTINUED | OUTPATIENT
Start: 2023-01-01 | End: 2023-01-01

## 2023-01-01 RX ORDER — HEPARIN SODIUM 5000 [USP'U]/ML
1250 INJECTION INTRAVENOUS; SUBCUTANEOUS
Qty: 25000 | Refills: 0 | Status: DISCONTINUED | OUTPATIENT
Start: 2023-01-01 | End: 2023-01-01

## 2023-01-01 RX ORDER — ACETAMINOPHEN 325 MG/1
325 TABLET ORAL
Qty: 0 | Refills: 0 | Status: COMPLETED | OUTPATIENT
Start: 2023-01-01

## 2023-01-01 RX ORDER — ENOXAPARIN SODIUM 100 MG/ML
80 INJECTION SUBCUTANEOUS EVERY 12 HOURS
Refills: 0 | Status: DISCONTINUED | OUTPATIENT
Start: 2023-01-01 | End: 2023-01-01

## 2023-01-01 RX ORDER — PANTOPRAZOLE SODIUM 20 MG/1
40 TABLET, DELAYED RELEASE ORAL
Refills: 0 | Status: DISCONTINUED | OUTPATIENT
Start: 2023-01-01 | End: 2024-01-01

## 2023-01-01 RX ORDER — PANTOPRAZOLE SODIUM 20 MG/1
40 TABLET, DELAYED RELEASE ORAL
Refills: 0 | Status: DISCONTINUED | OUTPATIENT
Start: 2023-01-01 | End: 2023-01-01

## 2023-01-01 RX ORDER — DEXAMETHASONE 0.5 MG/5ML
4 ELIXIR ORAL EVERY 6 HOURS
Refills: 0 | Status: DISCONTINUED | OUTPATIENT
Start: 2023-01-01 | End: 2023-01-01

## 2023-01-01 RX ORDER — ENOXAPARIN SODIUM 100 MG/ML
80 INJECTION SUBCUTANEOUS
Qty: 60 | Refills: 0
Start: 2023-01-01 | End: 2023-01-01

## 2023-01-01 RX ORDER — HEPARIN SODIUM 5000 [USP'U]/ML
6500 INJECTION INTRAVENOUS; SUBCUTANEOUS EVERY 6 HOURS
Refills: 0 | Status: DISCONTINUED | OUTPATIENT
Start: 2023-01-01 | End: 2023-01-01

## 2023-01-01 RX ORDER — INSULIN LISPRO 100/ML
VIAL (ML) SUBCUTANEOUS
Refills: 0 | Status: DISCONTINUED | OUTPATIENT
Start: 2023-01-01 | End: 2024-01-01

## 2023-01-01 RX ORDER — DEXAMETHASONE 0.5 MG/5ML
1 ELIXIR ORAL
Qty: 20 | Refills: 0
Start: 2023-01-01 | End: 2023-01-01

## 2023-01-01 RX ORDER — SENNA PLUS 8.6 MG/1
2 TABLET ORAL AT BEDTIME
Refills: 0 | Status: DISCONTINUED | OUTPATIENT
Start: 2023-01-01 | End: 2024-01-01

## 2023-01-01 RX ORDER — ACETAMINOPHEN 500 MG
2 TABLET ORAL
Qty: 0 | Refills: 0 | DISCHARGE
Start: 2023-01-01

## 2023-01-01 RX ORDER — ENOXAPARIN SODIUM 100 MG/ML
80 INJECTION SUBCUTANEOUS
Qty: 30 | Refills: 0
Start: 2023-01-01 | End: 2023-01-01

## 2023-01-01 RX ORDER — HYDRALAZINE HCL 50 MG
10 TABLET ORAL ONCE
Refills: 0 | Status: COMPLETED | OUTPATIENT
Start: 2023-01-01 | End: 2023-01-01

## 2023-01-01 RX ORDER — PANTOPRAZOLE SODIUM 20 MG/1
1 TABLET, DELAYED RELEASE ORAL
Qty: 30 | Refills: 0
Start: 2023-01-01 | End: 2023-01-01

## 2023-01-01 RX ORDER — ONDANSETRON 8 MG/1
4 TABLET, FILM COATED ORAL EVERY 8 HOURS
Refills: 0 | Status: DISCONTINUED | OUTPATIENT
Start: 2023-01-01 | End: 2023-01-01

## 2023-01-01 RX ORDER — LEVETIRACETAM 250 MG/1
1 TABLET, FILM COATED ORAL
Qty: 60 | Refills: 0
Start: 2023-01-01 | End: 2023-01-01

## 2023-01-01 RX ORDER — DEXAMETHASONE 0.5 MG/5ML
2 ELIXIR ORAL
Refills: 0 | Status: DISCONTINUED | OUTPATIENT
Start: 2023-01-01 | End: 2023-01-01

## 2023-01-01 RX ORDER — DEXAMETHASONE 2 MG/1
2 TABLET ORAL TWICE DAILY
Qty: 60 | Refills: 1 | Status: DISCONTINUED | COMMUNITY
Start: 2023-01-01 | End: 2023-01-01

## 2023-01-01 RX ORDER — HEPARIN SODIUM 5000 [USP'U]/ML
6500 INJECTION INTRAVENOUS; SUBCUTANEOUS ONCE
Refills: 0 | Status: COMPLETED | OUTPATIENT
Start: 2023-01-01 | End: 2023-01-01

## 2023-01-01 RX ORDER — HEPARIN SODIUM 5000 [USP'U]/ML
INJECTION INTRAVENOUS; SUBCUTANEOUS
Qty: 25000 | Refills: 0 | Status: DISCONTINUED | OUTPATIENT
Start: 2023-01-01 | End: 2023-01-01

## 2023-01-01 RX ORDER — PREDNISONE 20 MG/1
20 TABLET ORAL
Qty: 120 | Refills: 0 | Status: DISCONTINUED | COMMUNITY
Start: 2023-01-01 | End: 2023-01-01

## 2023-01-01 RX ORDER — DEXTROSE 50 % IN WATER 50 %
15 SYRINGE (ML) INTRAVENOUS ONCE
Refills: 0 | Status: DISCONTINUED | OUTPATIENT
Start: 2023-01-01 | End: 2023-01-01

## 2023-01-01 RX ADMIN — Medication 4 MILLIGRAM(S): at 12:32

## 2023-01-01 RX ADMIN — LEVETIRACETAM 500 MILLIGRAM(S): 250 TABLET, FILM COATED ORAL at 20:20

## 2023-01-01 RX ADMIN — Medication 2 MILLIGRAM(S): at 05:24

## 2023-01-01 RX ADMIN — LEVETIRACETAM 500 MILLIGRAM(S): 250 TABLET, FILM COATED ORAL at 17:02

## 2023-01-01 RX ADMIN — Medication 4 MILLIGRAM(S): at 17:18

## 2023-01-01 RX ADMIN — PANTOPRAZOLE SODIUM 40 MILLIGRAM(S): 20 TABLET, DELAYED RELEASE ORAL at 06:08

## 2023-01-01 RX ADMIN — HEPARIN SODIUM 11 UNIT(S)/HR: 5000 INJECTION INTRAVENOUS; SUBCUTANEOUS at 23:16

## 2023-01-01 RX ADMIN — LEVETIRACETAM 500 MILLIGRAM(S): 250 TABLET, FILM COATED ORAL at 05:24

## 2023-01-01 RX ADMIN — Medication 3 MILLIGRAM(S): at 21:38

## 2023-01-01 RX ADMIN — Medication 4 MILLIGRAM(S): at 23:50

## 2023-01-01 RX ADMIN — PANTOPRAZOLE SODIUM 40 MILLIGRAM(S): 20 TABLET, DELAYED RELEASE ORAL at 05:33

## 2023-01-01 RX ADMIN — HEPARIN SODIUM 15 UNIT(S)/HR: 5000 INJECTION INTRAVENOUS; SUBCUTANEOUS at 16:32

## 2023-01-01 RX ADMIN — Medication 4 MILLIGRAM(S): at 12:12

## 2023-01-01 RX ADMIN — LEVETIRACETAM 500 MILLIGRAM(S): 250 TABLET, FILM COATED ORAL at 17:19

## 2023-01-01 RX ADMIN — ENOXAPARIN SODIUM 80 MILLIGRAM(S): 100 INJECTION SUBCUTANEOUS at 14:08

## 2023-01-01 RX ADMIN — HEPARIN SODIUM 13.5 UNIT(S)/HR: 5000 INJECTION INTRAVENOUS; SUBCUTANEOUS at 08:26

## 2023-01-01 RX ADMIN — Medication 4 MILLIGRAM(S): at 05:34

## 2023-01-01 RX ADMIN — LEVETIRACETAM 500 MILLIGRAM(S): 250 TABLET, FILM COATED ORAL at 06:08

## 2023-01-01 RX ADMIN — Medication 4 MILLIGRAM(S): at 17:13

## 2023-01-01 RX ADMIN — HEPARIN SODIUM 11 UNIT(S)/HR: 5000 INJECTION INTRAVENOUS; SUBCUTANEOUS at 07:45

## 2023-01-01 RX ADMIN — HEPARIN SODIUM 14.5 UNIT(S)/HR: 5000 INJECTION INTRAVENOUS; SUBCUTANEOUS at 23:49

## 2023-01-01 RX ADMIN — Medication 2 MILLIGRAM(S): at 17:02

## 2023-01-01 RX ADMIN — SODIUM CHLORIDE 65 MILLILITER(S): 9 INJECTION INTRAMUSCULAR; INTRAVENOUS; SUBCUTANEOUS at 09:06

## 2023-01-01 RX ADMIN — HEPARIN SODIUM 11 UNIT(S)/HR: 5000 INJECTION INTRAVENOUS; SUBCUTANEOUS at 12:17

## 2023-01-01 RX ADMIN — Medication 3 MILLIGRAM(S): at 22:24

## 2023-01-01 RX ADMIN — HEPARIN SODIUM 15 UNIT(S)/HR: 5000 INJECTION INTRAVENOUS; SUBCUTANEOUS at 19:13

## 2023-01-01 RX ADMIN — Medication 4 MILLIGRAM(S): at 06:08

## 2023-01-01 RX ADMIN — HEPARIN SODIUM 15 UNIT(S)/HR: 5000 INJECTION INTRAVENOUS; SUBCUTANEOUS at 08:51

## 2023-01-01 RX ADMIN — LEVETIRACETAM 500 MILLIGRAM(S): 250 TABLET, FILM COATED ORAL at 05:36

## 2023-01-01 RX ADMIN — ENOXAPARIN SODIUM 80 MILLIGRAM(S): 100 INJECTION SUBCUTANEOUS at 02:55

## 2023-01-01 RX ADMIN — Medication 4 MILLIGRAM(S): at 19:19

## 2023-01-01 RX ADMIN — Medication 4 MILLIGRAM(S): at 12:17

## 2023-01-01 RX ADMIN — HEPARIN SODIUM 12.5 UNIT(S)/HR: 5000 INJECTION INTRAVENOUS; SUBCUTANEOUS at 15:39

## 2023-01-01 RX ADMIN — LEVETIRACETAM 500 MILLIGRAM(S): 250 TABLET, FILM COATED ORAL at 19:18

## 2023-01-01 RX ADMIN — Medication 10 MILLIGRAM(S): at 14:23

## 2023-01-01 RX ADMIN — LEVETIRACETAM 500 MILLIGRAM(S): 250 TABLET, FILM COATED ORAL at 17:09

## 2023-01-01 RX ADMIN — Medication 2 MILLIGRAM(S): at 06:09

## 2023-01-01 RX ADMIN — HEPARIN SODIUM 12.5 UNIT(S)/HR: 5000 INJECTION INTRAVENOUS; SUBCUTANEOUS at 19:26

## 2023-01-01 RX ADMIN — LEVETIRACETAM 500 MILLIGRAM(S): 250 TABLET, FILM COATED ORAL at 06:09

## 2023-01-01 RX ADMIN — Medication 2 MILLIGRAM(S): at 19:30

## 2023-01-01 RX ADMIN — Medication 4 MILLIGRAM(S): at 23:56

## 2023-01-01 RX ADMIN — HEPARIN SODIUM 1500 UNIT(S)/HR: 5000 INJECTION INTRAVENOUS; SUBCUTANEOUS at 14:56

## 2023-01-01 RX ADMIN — HEPARIN SODIUM 6500 UNIT(S): 5000 INJECTION INTRAVENOUS; SUBCUTANEOUS at 14:55

## 2023-07-13 NOTE — ASSESSMENT
[FreeTextEntry1] : Mr. Nogueira is a 62 year old gentleman with a prior history of a cutaneous melanoma of the right cheek, now with an M1d, stage IV, melanoma affecting the brain, lungs and lymph nodes.  His tumor is wild type for BRAF.\par \par I had a long discussion with the patient and his wife regarding his diagnosis, prognosis and management options.  I reviewed standard front-line immunotherapy options as well as ongoing investigational strategies  I discussed the need for further radiation oncology input.  \par \par After a long discussion, the patient expressed interest in proceeding with active therapy with ipilimumab and nivolumab.  I reviewed the treatment logistics, potential benefits and toxicities.  We will proceed with baseline bloodwork, including an LDH and TSH, ECG and echocardiogram, and tentatively schedule him to begin therapy next week, with weekly toxicity checks for the first 2 doses followed by restaging studies.\par \par We will arrange for him to see radiation oncology for treatment of the resection cavity as well as the other CNS lesions.  We will also arrange for him to see dermatology for a full body skin examination.  \par \par Finally, we will obtain a larger next generation sequencing panel on his tumor and will obtain prior records from [facility]. [Curative] : Goals of care discussed with patient: Curative

## 2023-07-13 NOTE — HISTORY OF PRESENT ILLNESS
[Disease: _____________________] : Disease: [unfilled] [M: ___] : M[unfilled] [AJCC Stage: ____] : AJCC Stage: [unfilled] [de-identified] : DIAGNOSIS:  M1d, stage IV, melanoma\par \par MOLECULAR FINDINGS: \par [employer]path OnkoSight NGS BRAF was negative for a BRAF mutation\par \par CURRENT THERAPY:  None\par \par PRIOR THERAPIES:  None\par \par HISTORY OF PRESENT ILLNESS:\par Mr. Nogueira is a 62 year old gentleman with a prior history of cutaneous melanoma arising from the right cheek resected by Dr. Marcus at [facility] approximately 5 years ago (no michael evaluation or adjuvant therapy per the patient's report) who is seen today for a discussion of management options for his recently diagnosed BRAF wild-type M1d, stage IV melanoma.  He was last evaluated by his dermatologist approximately 18 months ago.\par \par He initially developed a change in mental status, with slight confusion and balance problems, on Friday 06/16/2023.  Non contrast head CT scan, performed on 06/22/2022 was significant for multiple masses in the bilateral cerebral hemisphere, the largest measuring approximately 5 cm in the right parietal lobe, associated with mass effect and a 14 mm right to left midline shift. He was then admitted to [facility].  Chest, abdominal and pelvic CT scan, performed on 06/22/2023 demonstrates a 9cm heterogeneous right upper lobe mass extending to the right hilum and right perihilar adenopathy.  Brain MRI, performed on 06/23/2023, again demonstrated the multifocal brain metastases associated with adjacent edema.\par \par He then underwent right craniotomy for resection of the dominant right frontal parietal complex mass on 06/26/2023 by Dr. Robert Worthy.  Pathology was consistent with metastatic melanoma.  He was evaluated by Dr. Maco Marr hematology/oncology as well as by Dr. Nasima English of radiation oncology, and was ultimately discharged on 06/26/2023.  He has had the staples removed.  \par \par Clinically, he is feeling well without significant complaint.  He does report increased aggitation and appetite on the steroids.\par \par PAST MEDICAL HISTORY:\par 1. Cutaneous melanoma arising from the right cheek\par 2. Right lower extremity deep venous thrombosis in the setting of a vein stripping procedure\par \par SOCIAL HISTORY:\par , denies tobacco.  He drinks alcohol socially.  He has two daughters, one who is getting  later on this year in [place], and the other who is having a baby in the near future.   He currently works in [employer].\par \par ALLERGIES: \par No known drug allergies\par \par CURRENT MEDICATIONS:\par 1. Decadron taper\par 2. Keppra [de-identified] : LDH

## 2023-07-13 NOTE — RESULTS/DATA
[FreeTextEntry1] : All recent data were reviewed in the EMR.  I reviewed the recent brain imaging and CT imaging and agree with the above noted findings.

## 2023-07-13 NOTE — PHYSICAL EXAM
[Restricted in physically strenuous activity but ambulatory and able to carry out work of a light or sedentary nature] : Status 1- Restricted in physically strenuous activity but ambulatory and able to carry out work of a light or sedentary nature, e.g., light house work, office work [Normal] : affect appropriate [de-identified] : Well healing surgical scar in the right scalp; well healed surgical scar in the right cheek

## 2023-07-28 NOTE — HISTORY OF PRESENT ILLNESS
[FreeTextEntry1] : Mr. Nogueira is a 62 year old male with prior history of cutaneous melanoma of the right cheek/lower eyelid s/p multiple local excisions at [facility] 2003 to ~2018, with newly metastatic disease, with involvement of the brain, lungs and lymph nodes. He recently developed altered mental status, with noncontrast head CT revealing numerous masses, for which he is now s/p right craniotomy with resection of a right frontal parietal mass 6/26/23, with surgical pathology revealing metastatic melanoma. He presents today to discuss radiation therapy recommendations. Prior to this recent presentation, he had last seen his dermatologist 18 months ago.\par \par The course of his recent illness began with appreciation of slight confusion and balance problems 6/16/23. He presented to his PCP, who obtained a CT head non-contrast, that revealed multiple masses in the bilateral cerebral hemisphere, with the largest measuring ~ 5 cm in size, and associated with mass effect as well as a 14 mm right to left midline shift. This prompted admission to [facility], with CT C/A/P demonstrative of a 9 cm heterogeneous right upper lobe mass with extension to the right hilum and notable as well for right perihilar lymphadenopathy. [facility] MRI 6/23/23, re-demonstrated the aforementioned multifocal brain metastases with adjacent edema.\par \par On 6/26/23, he underwent right craniotomy with resection of the dominant right frontal parietal complex mass, with Dr. Robert Worthy, with pathology ultimately revealing metastatic melanoma, wild-type for BRAF. He was evaluated at this time, by medical oncology and radiation oncology (Dr. Nasima English) and discharged on 6/26/23. \par \par He presented to Dr. Richards for initial consult to discuss systemic therapy recommendations, with plan made to proceed with Ipi-nivo, with referral to us, and dermatology. \par \par 7/27/23: He presents today for radiation therapy recommendations, for post-op RT to the resection cavity of the right frontal parietal complex mass, as well as definitive therapy of his other intracranial metastases. He feels well today. He reports improvement in his functional status post-operatively

## 2023-07-28 NOTE — PHYSICAL EXAM
[] : no respiratory distress [Exaggerated Use Of Accessory Muscles For Inspiration] : no accessory muscle use [Normal] : normal skin color and pigmentation and no rash [No Focal Deficits] : no focal deficits [Sensation] : the sensory exam was normal to light touch and pinprick [de-identified] : ambulates generally without issue, subtle intermittent antalgic gait, placing more weight on left foot

## 2023-07-28 NOTE — REVIEW OF SYSTEMS
[Negative] : Heme/Lymph [de-identified] : endorses some slight difficulty walking, placing more weight on left leg

## 2023-07-28 NOTE — REASON FOR VISIT
[Brain Metastasis] : brain metastasis [Other: ___] : [unfilled] [Consideration of Curative Therapy] : consideration of curative therapy for

## 2023-08-03 NOTE — H&P ADULT - NSHPPHYSICALEXAM_GEN_ALL_CORE
Vital Signs Last 24 Hrs  T(C): 36.7 (03 Aug 2023 18:30), Max: 37.2 (03 Aug 2023 13:20)  T(F): 98 (03 Aug 2023 18:30), Max: 98.9 (03 Aug 2023 13:20)  HR: 70 (03 Aug 2023 18:30) (70 - 110)  BP: 136/50 (03 Aug 2023 18:30) (128/99 - 139/105)  BP(mean): --  RR: 16 (03 Aug 2023 18:30) (16 - 20)  SpO2: 100% (03 Aug 2023 18:30) (96% - 100%)    Parameters below as of 03 Aug 2023 18:30  Patient On (Oxygen Delivery Method): room air    GENERAL: NAD, well-developed  HEENT:  Atraumatic, Normocephalic, Conjunctiva and sclera clear, oral mucosa moist, clear w/o any exudate   NECK: Supple, No JVD  CHEST/LUNG: Clear to auscultation bilaterally; No wheeze  HEART: Regular rate and rhythm; No murmurs, rubs, or gallops  ABDOMEN: Soft, Nontender, Nondistended; Bowel sounds present  EXTREMITIES:  2+ Peripheral Pulses, +LLE edema   PSYCH: AAOx3  NEUROLOGY: non-focal  SKIN: No rashes or lesions

## 2023-08-03 NOTE — ED ADULT NURSE NOTE - CHIEF COMPLAINT QUOTE
Pt AOX4 sent by Hem-Onc at [facility] for eval of PE; pt had CT today and discovered more than 1 blood clot; pt has Primary Melanoma with mets to brain and lung Dx June 2023; denies SOB, had surgery to remove large brain tumor late June 2023, has had one dose of immune therapy via infusion 7/18  Hem-Onc Dr. Richards

## 2023-08-03 NOTE — H&P ADULT - PROBLEM SELECTOR PLAN 1
Patient with SOB for the last couple of days   -CTA chest showed New bilateral pulmonary emboli; specifically in the distal right main pulmonary artery and left upper and left lower lobar pulmonary artery branches  -Patient with multiple brain mets due to known skin melanoma. Consequently, starting AC can his risk of bleeding in the brain   -Neurosurgery consulted, pending recs and discussion with the attending   -I explained to the patient that based on neurosurgery recs, we will see if AC will be an option for now. The other option is placement of IVC filter. Patient understood his options and verbalized his understanding. He wants to wait for neurosurgeons recs about AC. If he can not be on AC due to increased risk of bleeding, then he will undergo IVC placement   -F/u with [facility]   -F/u with [facility]TE  -Monitor SO2 for now

## 2023-08-03 NOTE — ED PROVIDER NOTE - DATE/TIME 1
Message to Leigha Lassiter on call. See [employer] message from 6/14. Pt would like to be induced on 6/23. Ok to call [facility] and schedule?   Any recs other than covid test? 03-Aug-2023 17:32

## 2023-08-03 NOTE — H&P ADULT - NSHPLABSRESULTS_GEN_ALL_CORE
14.7   11.38 )-----------( 105      ( 03 Aug 2023 13:58 )             43.9     Hgb Trend: 14.7<--, 15.3<--  08-03    135  |  99  |  29<H>  ----------------------------<  111<H>  4.5   |  22  |  0.96    Ca    8.6      03 Aug 2023 13:58    TPro  6.3  /  Alb  3.1<L>  /  TBili  0.4  /  DBili  x   /  AST  24  /  ALT  39  /  AlkPhos  77  08-03    Creatinine Trend: 0.96<--  PT/INR - ( 03 Aug 2023 13:58 )   PT: 11.4 sec;   INR: 1.01 ratio         PTT - ( 03 Aug 2023 13:58 )  PTT:22.0 sec      Urinalysis Basic - ( 03 Aug 2023 13:58 )    Color: x / Appearance: x / SG: x / pH: x  Gluc: 111 mg/dL / Ketone: x  / Bili: x / Urobili: x   Blood: x / Protein: x / Nitrite: x   Leuk Esterase: x / RBC: x / WBC x   Sq Epi: x / Non Sq Epi: x / Bacteria: x        CTA chest as reviewed by the radiologist:   New bilateral pulmonary emboli; specifically in the distal right main   pulmonary artery and left upper and left lower lobar pulmonary artery   branches. These are suboptimally assessed due to poor contrast   opacification. No CT evidence of right heart strain.    A 9.2 cm heterogeneous right upper lobe pulmonary mass is minimally   increased in size since June 22, 2023.    CT head as reviewed by the radiologist: IMPRESSION: Redemonstration of multiple hemorrhagic intracranial   metastatic masses with surrounding vasogenic edema, as discussed,   compatible with the patient's reported history of metastatic melanoma.    Overall appearance is similar when compared to the outside MRI study from   6/27/2023, given differences in modality.

## 2023-08-03 NOTE — ED PROVIDER NOTE - ATTENDING CONTRIBUTION TO CARE
Dr. Lopez: This is a 63 yo male with melanoma metastatic to brain and lung, referred to ED today after outpatient CT (performed due to SOB) showed PE, no evidence of heart strain.  Pt without acute complaints at time of my eval.  On exam pt well appearing, in NAD, heart RRR, lungs CTAB, abd NTND, extremities without swelling, strength 5/5 in all extremities and skin without rash.

## 2023-08-03 NOTE — H&P ADULT - HISTORY OF PRESENT ILLNESS
61 yo M with PMhx of Melanoma with mets to the brain and lungs on immunotherapy presents to the ED with SOB, found to have PE on outpatient CT chest. Patient reports that for the last couple of days he has been feeling SOB. Today he underwent CTA chest which showed PE, prompting him to come to the ED. Currently, he reports to have SOB with exertion but denies any fever, chills, nausea, vomiting, abdominal pain, diarrhea, melena or hematochezia. He has hx of brain mets and had resection of one nodule.   In the ED, his vitals were notable for tachycardia. Labs were notable for thrombocytopenia and slightly elevated proBNP. CTA chest showed New bilateral pulmonary emboli; specifically in the distal right main pulmonary artery and left upper and left lower lobar pulmonary artery branches.

## 2023-08-03 NOTE — CONSULT NOTE ADULT - SUBJECTIVE AND OBJECTIVE BOX
HPI:  62 year old male with PMH of melanoma with mets to brain and lung dx in June 2023, s/p Crani by Dr. Robert Worthy 6/26/23 he had a CT scan today which showed multiple filling defects bilaterally in the lungs, so he was sent by Hem[facility]Onc at [facility] for eval of PE. Patient has a history of a blood clot over 10 years ago, and complains of some shortness of breath. He denies fever, chest pain, recent infection, recent travel, leg pain, leg swelling. Patient CT scan showing B/L PE with no evidence of right heart strain. States he had a PE over 8 years go. States he has had his first infusion immunotherapy for melanoma on July 18th. Denies any abd pain, nausea, vomiting, diarrhea, constipation, urinary complaints.    PAST MEDICAL & SURGICAL HISTORY:  Melanoma  with mets to liver and brain  H/O brain surgery        Allergies    No Known Allergies    Intolerances        SOCIAL HISTORY:  FAMILY HISTORY:    Vital Signs Last 24 Hrs  T(C): 36.9 (03 Aug 2023 15:27), Max: 37.2 (03 Aug 2023 13:20)  T(F): 98.4 (03 Aug 2023 15:27), Max: 98.9 (03 Aug 2023 13:20)  HR: 95 (03 Aug 2023 15:27) (95 - 110)  BP: 128/99 (03 Aug 2023 15:27) (128/99 - 139/105)  BP(mean): --  RR: 18 (03 Aug 2023 14:00) (18 - 20)  SpO2: 96% (03 Aug 2023 14:00) (96% - 98%)    Parameters below as of 03 Aug 2023 14:00  Patient On (Oxygen Delivery Method): room air        PHYSICAL EXAM:  AOx3, appropriate, follows commands  PERRL, EOMI, face symmetrical   LAZARO x 4 with good strength  No pronator drift   Sensation intact to light touch    LABS:                          14.7   11.38 )-----------( 105      ( 03 Aug 2023 13:58 )             43.9     08-03    135  |  99  |  29<H>  ----------------------------<  111<H>  4.5   |  22  |  0.96    Ca    8.6      03 Aug 2023 13:58    TPro  6.3  /  Alb  3.1<L>  /  TBili  0.4  /  DBili  x   /  AST  24  /  ALT  39  /  AlkPhos  77  08-03    PT/INR - ( 03 Aug 2023 13:58 )   PT: 11.4 sec;   INR: 1.01 ratio         PTT - ( 03 Aug 2023 13:58 )  PTT:22.0 sec  Urinalysis Basic - ( 03 Aug 2023 13:58 )    Color: x / Appearance: x / SG: x / pH: x  Gluc: 111 mg/dL / Ketone: x  / Bili: x / Urobili: x   Blood: x / Protein: x / Nitrite: x   Leuk Esterase: x / RBC: x / WBC x   Sq Epi: x / Non Sq Epi: x / Bacteria: x        RADIOLOGY & ADDITIONAL STUDIES:

## 2023-08-03 NOTE — ED ADULT NURSE REASSESSMENT NOTE - NS ED NURSE REASSESS COMMENT FT1
Second 18g IV placed in the R AC, flushes without difficulty and + Blood return. Medications administered as per EMAR. pt. educated on adverse affects of heparin such as abnormal bleeding, and educated to notify RN if any develop. call bell placed within reach of patient.

## 2023-08-03 NOTE — H&P ADULT - ASSESSMENT
61 yo M with PMhx of Melanoma with mets to the brain and lungs on immunotherapy presents to the ED with SOB, found to have PE on outpatient CT chest, admitted for further eval.

## 2023-08-03 NOTE — ED ADULT NURSE NOTE - NSFALLUNIVINTERV_ED_ALL_ED
Bed/Stretcher in lowest position, wheels locked, appropriate side rails in place/Call bell, personal items and telephone in reach/Instruct patient to call for assistance before getting out of bed/chair/stretcher/Non-slip footwear applied when patient is off stretcher/[place] to call system/Physically safe environment - no spills, clutter or unnecessary equipment/Purposeful proactive rounding/Room/bathroom lighting operational, light cord in reach

## 2023-08-03 NOTE — ED ADULT NURSE REASSESSMENT NOTE - NS ED NURSE REASSESS COMMENT FT1
Heparin gtt stopped at 1507 as per MD Fernandez until CT of the Head results are resulted. Provider to RN noted in chart. Heparin gtt stopped at 1507 as per MD Fernandez until CT of the Head results are resulted. MD aware of pause on infusion. Provider to RN noted in chart.

## 2023-08-03 NOTE — ASSESSMENT
[FreeTextEntry1] : Mr. Nogueira is a 62 year old gentleman with a prior history of a cutaneous melanoma of the right cheek, now with an M1d, stage IV, melanoma affecting the brain, lungs and lymph nodes. His tumor is wild type for BRAF.  #Metastatic Melanoma -continue on ipilmumab and nivolumab. C#2 scheduled 8/823 -continue weekly toxicity checks for the first 2 doses followed by restaging studies.   -Patient cancelled echocardiogram. Advised patient to reschedule  ASAP -Scheduled for GK treatment of the resection cavity as well as the other CNS lesions.  -[facility].   -physical therapy referral   -Await [facility] Results (sequencing panel on his tumor.)  #Dyspnea/cough -Given acute onset of symptoms and concern for possible immune mediated pneumonitis will request stat CT chest to be performed today. -No evidence of DVT on exam but also must consider possibility of PE given acuity of onset and tachycardia on exam.

## 2023-08-03 NOTE — ED PROVIDER NOTE - OBJECTIVE STATEMENT
Patient is a 61 y/o man with primary melanoma with mets to brain and lung dx in June 2023, he had a CT scan today which showed multiple filling defects bilaterally in the lungs, so he was sent by Heme-Onc at [facility] to [facility] ED for eval of PE. Patient has a history of a blood clot over 10 years ago, and complains of some shortness of breath. He denies fever, chest pain, recent infection, recent travel, leg pain, leg swelling. Patient is a 63 y/o man with primary melanoma with mets to brain and lung dx in June 2023, he had a CT scan today which showed multiple filling defects bilaterally in the lungs, so he was sent by Heme-Onc at [facility] to [facility] ED for eval of PE. Patient has a history of a blood clot over 10 years ago, and complains of some shortness of breath. He denies fever, chest pain, recent infection, recent travel, leg pain, leg swelling. Patient CT scan showing B/L PE with no evidence of right heart strain. States he had a PE over 8 years go. States he has had his first infusion immunotherapy for melanoma on July 18th. Denies any abd pain, nausea, vomiting, diarrhea, constipation, urinary complaints.

## 2023-08-03 NOTE — H&P ADULT - PROBLEM SELECTOR PLAN 2
CT head showed Redemonstration of multiple hemorrhagic intracranial metastatic masses with surrounding vasogenic edema, as discussed, compatible with the patient's reported history of metastatic melanoma  -No focal deficits on exam   -C/w keppra and dexamethasone   -F/u with neurosurgery recs
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
## 2023-08-03 NOTE — CONSULT NOTE ADULT - ASSESSMENT
62 year old male with PMH of melanoma with mets to brain and lung dx in June 2023, s/p Crani by Dr. Robert Worthy 6/26/23 sent in by [facility] for new PE.

## 2023-08-03 NOTE — ED ADULT TRIAGE NOTE - CHIEF COMPLAINT QUOTE
Pt AOX4 sent by Hem-Onc at [facility] for eval of PE; pt had CT today and discovered more than 1 blood clot; pt has Primary Melanoma with mets to brain and lung Dx June 2023; denies SOB, had surgery to remove large brain tumor late June 2023, has had one dose of immune therapy via infusion 7/18  Hem-Onc Dr. Richards

## 2023-08-03 NOTE — ED ADULT NURSE NOTE - OBJECTIVE STATEMENT
pt. received to room 7 A&Ox4 ambulatory sent in from [facility] for evaluation. pt. endorses having a CT done at [facility] where he was told he has "blood clots in the lungs". pt. offering no complaints at this time. LLE noted to have +2 pitting edema. NAD noted. respirations even and unlabored on RA. NSR noted on bedside cardiac monitor. 18g IV placed in the R AC. Labs drawn and sent. weight as noted in flowsheet. comfort measures provided. safety precautions maintained.

## 2023-08-03 NOTE — CONSULT NOTE ADULT - PROBLEM SELECTOR RECOMMENDATION 9
Case to be d/w attending - recommend IVC filter  - Benefit of starting heparin drip is to be determined by covering primary team. Patient is at risk of developing intracerebral hemorrhage if they are restarted on anticoagulation, especially given primary of melanoma of which mets are typically hemorrhagic in nature, which could increase morbidity/mortality.  If PE burden increases, benefit of heparin drip should be determined by primary team.  - Dr. Cheng to reach out to Dr. Warren regarding SRS vs surgical options for current mets.    [id number]    Case discussed with attending neurosurgeon Dr. Cheng - recommend IVC filter  - Benefit of starting heparin drip is to be determined by covering primary team. Patient is at risk of developing intracerebral hemorrhage if they are restarted on anticoagulation, especially given primary of melanoma of which mets are typically hemorrhagic in nature, which could increase morbidity/mortality.  If PE burden increases, benefit of heparin drip should be determined by primary team.  - Dr. Cheng to reach out to Dr. Warren regarding SRS vs surgical options for current mets.  - Please obtain new MRI brain w/ and w/o contrast, stereotactic protocol    [id number]    Case discussed with attending neurosurgeon Dr. Cheng

## 2023-08-03 NOTE — ED PROVIDER NOTE - PROGRESS NOTE DETAILS
Dylan Fernandez DO (PGY2)  patient with mets to the brain with hemorrhagic components. heparin was not given to prevent bleeding. discussed CT results with patient and plan. Patient likely needing IVC filter for B/L PE. not endorsing any chest pain or shortness of breath at this time. nsgy consulted to evaluate patient. Patricia Angela MD (PGY3): Neurosurgery evaluated patient.  States based on the imaging and the fact that he is stable can keep PTT goal 55-70.  No bolus.  Spoke to Dr. Neri and will admit under her service under telemetry.

## 2023-08-03 NOTE — ED PROVIDER NOTE - CLINICAL SUMMARY MEDICAL DECISION MAKING FREE TEXT BOX
Patient is a 61 y/o man with primary melanoma with mets to brain and lung dx in June 2023, he had a CT scan today which showed multiple filling defects bilaterally in the lungs, so he was sent by Heme-Onc at [facility] to [facility] ED for eval of PE. Patient has a history of a blood clot over 10 years ago, and complains of some shortness of breath. He denies fever, chest pain, recent infection, recent travel, leg pain, leg swelling. Patient CT scan showing B/L PE with no evidence of right heart strain. States he had a PE over 8 years go.   Vital signs stable, afebrile, not hypoxic. Plan for basic labs, coags, EKG, Heparin, TBA for pulmonary embolism

## 2023-08-03 NOTE — ED PROVIDER NOTE - NS ED ROS FT
Constitutional: No fever, chills, headache  HEENT:  no vision changes, no eye pain/redness/discharge; no hearing changes, no rhinorrhea  Respiratory: +Shortness of breath per wife, No cough, or wheezing  Cardiovascular: No chest pain or palpitations  Gastrointestinal: No abdominal pain, nausea, vomiting, diarrhea, or constipation.  Genitourinary: No dysuria, urinary frequency, hematuria, or incontinence  Extremities: No leg swelling  Skin: No rashes  Musculoskeletal: +left lower ankle swelling. No muscle pain see HPI

## 2023-08-03 NOTE — PHYSICAL EXAM
[Restricted in physically strenuous activity but ambulatory and able to carry out work of a light or sedentary nature] : Status 1- Restricted in physically strenuous activity but ambulatory and able to carry out work of a light or sedentary nature, e.g., light house work, office work [Normal] : affect appropriate [de-identified] : Tachnirmal regular ~110 [de-identified] : No edema [de-identified] : Right foot drop

## 2023-08-03 NOTE — ED PROVIDER NOTE - PHYSICAL EXAMINATION
T(C): 37.2 (08-03-23 @ 13:20), Max: 37.2 (08-03-23 @ 13:20)  HR: 110 (08-03-23 @ 13:20) (110 - 110)  BP: 139/105 (08-03-23 @ 13:20) (139/105 - 139/105)  RR: 20 (08-03-23 @ 13:20) (20 - 20)  SpO2: 98% (08-03-23 @ 13:20) (98% - 98%)    Constitutional: Sitting comfortably in bed, no apparent distress  Eyes: EOMI, No conjunctival or scleral injection, non-icteric  HEENT: Moist mucous membranes.   Respiratory: Clear to auscultation bilaterally, no respiratory distress. No accessory muscle usage. No wheezes, rales or rhonchi.  Cardiovascular: Regular rate and rhythm, +S1 S2, no murmurs, rubs, or gallops. no JVD;   Abdominal: +BS. Soft, nontender, nondistended. No rebound tenderness, no guarding. No palpable masses, no hepatosplenomegaly.   Extremities: 2+ peripheral pulses b/l, mild left ankle edema, full range of motion. Dylan Fernandez DO (PGY2)    Constitutional: Sitting comfortably in bed, no apparent distress  Eyes: EOMI, No conjunctival or scleral injection, non-icteric  HEENT: Moist mucous membranes.   Respiratory: Clear to auscultation bilaterally, no respiratory distress. No accessory muscle usage. No wheezes, rales or rhonchi.  Cardiovascular: Regular rate and rhythm, +S1 S2, no murmurs, rubs, or gallops. no JVD;   Abdominal: +BS. Soft, nontender, nondistended. No rebound tenderness, no guarding. No palpable masses, no hepatosplenomegaly.   Extremities: 2+ peripheral pulses b/l, mild left ankle edema, full range of motion.  Neuro: No focal sensory or motor deficits  Skin: Warm, well perfused, no rash

## 2023-08-03 NOTE — HISTORY OF PRESENT ILLNESS
[Disease: _____________________] : Disease: [unfilled] [M: ___] : M[unfilled] [AJCC Stage: ____] : AJCC Stage: [unfilled] [de-identified] : DIAGNOSIS:  M1d, stage IV, melanoma  MOLECULAR FINDINGS:  [employer] OnkoSight NGS BRAF was negative for a BRAF mutation  CURRENT THERAPY:  Ipilimumab and nivolumab (C1: 07/18/2023; C2: scheduled for 08/08/2023; C3: scheduled for 08/29/2023; C4: scheduled for 09/19/2023)  PRIOR THERAPIES:  None  INTERVAL HISTORY: Mr. 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 Alla Mendez at [facility] in 2014, who is seen today for a discussion of management options for his recently diagnosed BRAF wild-type M1d, stage IV melanoma.  He was last evaluated by his dermatologist approximately 18 months ago.  Briefly, he initially developed a change in mental status, with slight confusion and balance problems, on Friday 06/16/2023.  Non contrast head CT scan, performed on 06/22/2022 was significant for multiple masses in the bilateral cerebral hemisphere, the largest measuring approximately 5 cm in the right parietal lobe, associated with mass effect and a 14 mm right to left midline shift. He was then admitted to [facility].  Chest, abdominal and pelvic CT scan, performed on 06/22/2023 demonstrates a 9cm heterogeneous right upper lobe mass extending to the right hilum and right perihilar adenopathy.  Brain MRI, performed on 06/23/2023, again demonstrated the multifocal brain metastases associated with adjacent edema.  He then underwent right craniotomy for resection of the dominant right frontal parietal complex mass on 06/26/2023 by Dr. Robert Worthy.  Pathology was consistent with metastatic melanoma.  He was evaluated by Dr. Maco Marr hematology/oncology as well as by Dr. Nasima English of radiation oncology, and was ultimately discharged on 06/26/2023.    Since his last visit a week ago, Mr Kalenka has developed significant dyspnea with minimal exertion. Previously, he had been working full time and walking 1-2 miles daily. Currently, he is unable to walk or exert himself wo feeling shortness of breath requiring him to sit down. He denies chest pain and palpitations. He reports a new non-productive cough. No fevers/diarrhea/rash or itchiness. (+) fatigue (+) chills/feeling cold. Denies leg swelling or calf pain. Has not started PT yet. Cancelled ECHO appointment. Reports has been taking tylenol at bedtime for general achiness. Sleeping well. Appetite good. Remains on decadron 2mg BID. GK scheduled for 8/21-8/24  PAST MEDICAL HISTORY: 1. 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 Right lower extremity deep venous thrombosis in the setting of a vein stripping procedure  SOCIAL HISTORY: , denies tobacco.  He drinks alcohol socially.  He has two daughters, one who is getting  later on this year in [place], and the other who is having a baby in the near future.   He currently works in [employer].  ALLERGIES:  No known drug allergies  CURRENT MEDICATIONS: 1. Decadron taper 2. Keppra [de-identified] : LDH

## 2023-08-03 NOTE — REVIEW OF SYSTEMS
[Chills] : chills [Shortness Of Breath] : shortness of breath [Cough] : cough [SOB on Exertion] : shortness of breath during exertion [Diarrhea: Grade 0] : Diarrhea: Grade 0 [Negative] : Allergic/Immunologic [FreeTextEntry2] : fatigue [FreeTextEntry6] : NEW [de-identified] : R. foot drop slightly improved

## 2023-08-04 NOTE — CHART NOTE - NSCHARTNOTEFT_GEN_A_CORE
US reported with full leg DVT and known Bilat PE.   Nsx cleared pt to restart Hep gtt this AM.  Pt ordered pt specific hep AC 50-70.   reviewed all with [facility] and  Pharmacist .

## 2023-08-04 NOTE — PROGRESS NOTE ADULT - ASSESSMENT
63 yo M with PMhx of Melanoma with mets to the brain and lungs on immunotherapy presents to the ED with SOB, found to have PE on outpatient CT chest, admitted for further eval.

## 2023-08-04 NOTE — PROGRESS NOTE ADULT - SUBJECTIVE AND OBJECTIVE BOX
[facility] Division of Hospital Medicine  Dylon Stock DO  Available via MS Teams  In house pager [phone]    SUBJECTIVE / OVERNIGHT EVENTS:    No acute events overnight. Denies acute complaints. Denies shortness of breath, difficulty breathing, chest pain, n/v/d/c, abdominal pain, palpitations.    ADDITIONAL REVIEW OF SYSTEMS:    MEDICATIONS  (STANDING):  dexAMETHasone     Tablet 2 milliGRAM(s) Oral two times a day  heparin  Infusion 1500 Unit(s)/Hr (15 mL/Hr) IV Continuous <Continuous>  levETIRAcetam 500 milliGRAM(s) Oral two times a day    MEDICATIONS  (PRN):  acetaminophen     Tablet .. 650 milliGRAM(s) Oral every 6 hours PRN Temp greater or equal to 38C (100.4F), Mild Pain (1 - 3)  aluminum hydroxide/magnesium hydroxide/simethicone Suspension 30 milliLiter(s) Oral every 4 hours PRN Dyspepsia  melatonin 3 milliGRAM(s) Oral at bedtime PRN Insomnia  ondansetron Injectable 4 milliGRAM(s) IV Push every 8 hours PRN Nausea and/or Vomiting      I&O's Summary      PHYSICAL EXAM:  Vital Signs Last 24 Hrs  T(C): 36.6 (04 Aug 2023 12:13), Max: 37.2 (03 Aug 2023 13:20)  T(F): 97.9 (04 Aug 2023 12:13), Max: 98.9 (03 Aug 2023 13:20)  HR: 88 (04 Aug 2023 12:13) (81 - 110)  BP: 153/93 (04 Aug 2023 12:13) (128/99 - 153/93)  BP(mean): --  RR: 18 (04 Aug 2023 12:13) (16 - 20)  SpO2: 100% (04 Aug 2023 12:13) (96% - 100%)    Parameters below as of 04 Aug 2023 12:13  Patient On (Oxygen Delivery Method): room air      CONSTITUTIONAL: NAD, well-developed, well-groomed  EYES: PERRLA; conjunctiva and sclera clear  ENMT: Moist oral mucosa, no pharyngeal injection or exudates; normal dentition  NECK: Supple, no palpable masses; no thyromegaly  RESPIRATORY: Normal respiratory effort; lungs are clear to auscultation bilaterally  CARDIOVASCULAR: Regular rate and rhythm, normal S1 and S2, no murmur/rub/gallop; left lower extremity mild nonpitting edema; Peripheral pulses are 2+ bilaterally  ABDOMEN: Nontender to palpation, normoactive bowel sounds, no rebound/guarding; No hepatosplenomegaly  MUSCULOSKELETAL:  no clubbing or cyanosis of digits; no joint swelling or tenderness to palpation  PSYCH: A+O to person, place, and time; affect appropriate  NEUROLOGY: CN 2-12 are intact and symmetric; no gross sensory deficits   SKIN: No rashes; no palpable lesions    LABS:                        13.8   9.43  )-----------( 100      ( 04 Aug 2023 06:10 )             42.1     08-03    135  |  99  |  29<H>  ----------------------------<  111<H>  4.5   |  22  |  0.96    Ca    8.6      03 Aug 2023 13:58    TPro  6.3  /  Alb  3.1<L>  /  TBili  0.4  /  DBili  x   /  AST  24  /  ALT  39  /  AlkPhos  77  08-03    PT/INR - ( 03 Aug 2023 13:58 )   PT: 11.4 sec;   INR: 1.01 ratio         PTT - ( 03 Aug 2023 21:10 )  PTT:20.9 sec      Urinalysis Basic - ( 03 Aug 2023 13:58 )    Color: x / Appearance: x / SG: x / pH: x  Gluc: 111 mg/dL / Ketone: x  / Bili: x / Urobili: x   Blood: x / Protein: x / Nitrite: x   Leuk Esterase: x / RBC: x / WBC x   Sq Epi: x / Non Sq Epi: x / Bacteria: x

## 2023-08-04 NOTE — DISCHARGE NOTE PROVIDER - NSDCMRMEDTOKEN_GEN_ALL_CORE_FT
dexAMETHasone 2 mg oral tablet: 1 orally 2 times a day  levETIRAcetam 500 mg oral tablet: 1 orally 2 times a day   dexAMETHasone 2 mg oral tablet: 1 orally 2 times a day  enoxaparin 80 mg/0.8 mL injectable solution: 80 milligram(s) subcutaneously 2 times a day every 12 hours  levETIRAcetam 500 mg oral tablet: 1 orally 2 times a day   acetaminophen 325 mg oral tablet: 2 tab(s) orally every 6 hours As needed Temp greater or equal to 38C (100.4F), Mild Pain (1 - 3)  dexAMETHasone 4 mg oral tablet: 1 tab(s) orally every 6 hours  enoxaparin 80 mg/0.8 mL injectable solution: 80 milligram(s) subcutaneously 2 times a day every 12 hours  levETIRAcetam 500 mg oral tablet: 1 tab(s) orally 2 times a day  pantoprazole 40 mg oral delayed release tablet: 1 tab(s) orally once a day (before a meal)   acetaminophen 325 mg oral tablet: 2 tab(s) orally every 6 hours As needed Temp greater or equal to 38C (100.4F), Mild Pain (1 - 3)  dexAMETHasone 4 mg oral tablet: 1 tab(s) orally every 6 hours  enoxaparin 80 mg/0.8 mL injectable solution: 80 milligram(s) subcutaneously once a day  levETIRAcetam 500 mg oral tablet: 1 tab(s) orally 2 times a day  pantoprazole 40 mg oral delayed release tablet: 1 tab(s) orally once a day (before a meal)

## 2023-08-04 NOTE — DISCHARGE NOTE PROVIDER - HOSPITAL COURSE
Patient is a 61yo M with PMH significant for melanoma with mets to lung and brain diagnosed Jun 2023 on immunotherapy, who presented with shortness of breath and recent outpatient CT showing pulmonary embolism. He was seen by neurosurgery for known melanoma lesions in brain, with hemorrhages noted. With neurosurgical input, patient was started on heparin gtt. He underwent TTE showing ____. Neurosurgery recommended MRI brain in anticipation of gamma knife procedure, which showed ____. Patient is a 61yo M with PMH significant for melanoma with mets to lung and brain diagnosed Jun 2023 on immunotherapy, who presented with shortness of breath and recent outpatient CT showing pulmonary embolism. He was seen by neurosurgery for known melanoma lesions in brain, with hemorrhages noted. With neurosurgical input, patient was started on heparin gtt. He underwent TTE showing Right ventricular enlargement with normal right ventricular systolic function. Heparin gtt transitioned to therapeutic Lovenox after repeat CTH with stable metastatic lesions. Pt to get gamma knife radiation tomorrow with Dr. Warren who will also determine the Decadron further dosing. After discussion with neurosurgery and oncology it was decided to fully anticoagulate for PE despite the risk of increasing hemorrhagic metastasis. Risks discussed with patient's wife. Patient is a 63yo M with PMH significant for melanoma with mets to lung and brain diagnosed Jun 2023 on immunotherapy, who presented with shortness of breath and recent outpatient CT showing pulmonary embolism. He was seen by neurosurgery for known melanoma lesions in brain, with hemorrhages noted. With neurosurgical input, patient was started on heparin gtt. He underwent TTE showing Right ventricular enlargement with normal right ventricular systolic function. Heparin gtt transitioned to therapeutic Lovenox after repeat CTH with stable metastatic lesions. Pt to get gamma knife radiation tomorrow with Dr. Warren who will also determine the Decadron further dosing. After discussion with neurosurgery and oncology it was decided to fully anticoagulate for PE despite the risk of increasing hemorrhagic metastasis. Risks discussed with patient's wife. plan of care d/w neurosx, out-pt and house oncologist Patient is a 63yo M with PMH significant for melanoma with mets to lung and brain diagnosed Jun 2023 on immunotherapy, who presented with shortness of breath and recent outpatient CT showing pulmonary embolism. He was seen by neurosurgery for known melanoma lesions in brain, with hemorrhages noted. With neurosurgical input, patient was started on heparin gtt. He underwent TTE showing Right ventricular enlargement with normal right ventricular systolic function. Heparin gtt transitioned to therapeutic Lovenox after repeat CTH with stable metastatic lesions. Pt to get gamma knife radiation tomorrow with Dr. Warren who will also determine the Decadron further dosing. After d/w pt's outpt oncologist it was decided to send pt on half dose Lovenox due to increasing hemorrhagic metastasis. Case d/w Dr. Schuler, pt is medically stable for discharge home with half dose Lovenox.

## 2023-08-04 NOTE — CONSULT NOTE ADULT - ASSESSMENT
62y Male  with PMH of met Melanoma with mets to the brain and lungs on immunotherapy presents to the ED with SOB, found to have PE on outpatient CT chest, admitted for further eval. Most recently started  on immunotherapy C1 of Ipilimumab and nivolumab last on 7/18/23. Admitted for PE  Oncology consulted for further evaluation     CT chest with IV con 8/3  New bilateral pulmonary emboli; specifically in the distal right main   pulmonary artery and left upper and left lower lobar pulmonary artery   branches. These are suboptimally assessed due to poor contrast   opacification. No CT evidence of right heart strain.  A 9.2 cm heterogeneous right upper lobe pulmonary mass is minimally   increased in size since June 22, 2023.    CT head 8/3  Redemonstration of multiple hemorrhagic intracranial   metastatic masses with surrounding vasogenic edema, as discussed,   compatible with the patient's reported history of metastatic melanoma.  Overall appearance is similar when compared to the outside MRI study from   6/27/2023, given differences in modality.        -CT chest as above  -US LE  -Appreciate NeuroSx recs and clearance to resume AC,  -Pending MR brain   -Please get ECHO  -Patient with known brain mets, Scheduled for GK treatment of the resection cavity as well as the other CNS lesions by Rad Onc (Briana) as outpatient  -No plans for inpatient immuno/chemotherapy  -C/w Supportive care, pain control, Nutrition, PT, DVT ppx  -Rest of care as per primary team  -Patient to followup with  ([facility]) upon discharge  -Oncology will continue to follow with you    Case d/w oncology attending      Michelle MARTINEZ  Oncology Physician Assistant  [facility]    Pager [phone] also available on TEAMS as Michelle MARTINEZ    If before 8am/after 5pm or on weekends please page On-call Oncology Fellow   62y Male  with PMH of met Melanoma with mets to the brain and lungs on immunotherapy presents to the ED with SOB, found to have PE on outpatient CT chest, admitted for further eval. Most recently started  on immunotherapy C1 of Ipilimumab and nivolumab last on 7/18/23. Admitted for PE  Oncology consulted for further evaluation     CT chest with IV con 8/3  New bilateral pulmonary emboli; specifically in the distal right main   pulmonary artery and left upper and left lower lobar pulmonary artery   branches. These are suboptimally assessed due to poor contrast   opacification. No CT evidence of right heart strain.  A 9.2 cm heterogeneous right upper lobe pulmonary mass is minimally   increased in size since June 22, 2023.    CT head 8/3  Redemonstration of multiple hemorrhagic intracranial   metastatic masses with surrounding vasogenic edema, as discussed,   compatible with the patient's reported history of metastatic melanoma.  Overall appearance is similar when compared to the outside MRI study from   6/27/2023, given differences in modality.        -CT chest as above  -US BLE  -Appreciate NeuroSx recs and clearance to resume AC,  -Pending MR brain   -Please get ECHO  -Patient with known brain mets, Scheduled for GK treatment of the resection cavity as well as the other CNS lesions by Rad Onc (Briana) as outpatient  -No plans for inpatient immuno/chemotherapy  -C/w Supportive care, pain control, Nutrition, PT, DVT ppx  -Rest of care as per primary team  -Patient to followup with  ([facility]) upon discharge  -Oncology will continue to follow with you    Case d/w oncology attending      Michelle MARTINEZ  Oncology Physician Assistant  [facility]    Pager [phone] also available on TEAMS as Michelle MARTINEZ    If before 8am/after 5pm or on weekends please page On-call Oncology Fellow   62y Male  with PMH of met Melanoma with mets to the brain and lungs on immunotherapy presents to the ED with SOB, found to have PE on outpatient CT chest, admitted for further eval. Most recently started  on immunotherapy C1 of Ipilimumab and nivolumab last on 7/18/23. Admitted for PE  Oncology consulted for further evaluation     CT chest with IV con 8/3  New bilateral pulmonary emboli; specifically in the distal right main   pulmonary artery and left upper and left lower lobar pulmonary artery   branches. These are suboptimally assessed due to poor contrast   opacification. No CT evidence of right heart strain.  A 9.2 cm heterogeneous right upper lobe pulmonary mass is minimally   increased in size since June 22, 2023.    CT head 8/3  Redemonstration of multiple hemorrhagic intracranial   metastatic masses with surrounding vasogenic edema, as discussed,   compatible with the patient's reported history of metastatic melanoma.  Overall appearance is similar when compared to the outside MRI study from   6/27/2023, given differences in modality.        -CT chest as above  -US BLE results pending  -Appreciate NeuroSx recs and clearance to resume AC, currently on heparin gtt. to be transitioned to DOAC or Lovenox when medically optimized  -Pending MR brain   -Please get ECHO  -Patient with known brain mets, Scheduled for GK treatment of the resection cavity as well as the other CNS lesions by Rad Onc (Briana) as outpatient  -No plans for inpatient immuno/chemotherapy  -C/w Supportive care, pain control, Nutrition, PT, DVT ppx  -Rest of care as per primary team  -Patient to followup with Dr. Richards  ([facility]) upon discharge  -Oncology will continue to follow with you    Case d/w oncology attending      Michelle MARTINEZ  Oncology Physician Assistant  [facility]    Pager [phone] also available on TEAMS as Michelle MARTINEZ    If before 8am/after 5pm or on weekends please page On-call Oncology Fellow   62y Male  with PMH of met Melanoma with mets to the brain and lungs on immunotherapy presents to the ED with SOB, found to have PE on outpatient CT chest, admitted for further eval. Most recently started  on immunotherapy C1 of Ipilimumab and nivolumab last on 7/18/23. Admitted for PE  Oncology consulted for further evaluation     CT chest with IV con 8/3  New bilateral pulmonary emboli; specifically in the distal right main   pulmonary artery and left upper and left lower lobar pulmonary artery   branches. These are suboptimally assessed due to poor contrast   opacification. No CT evidence of right heart strain.  A 9.2 cm heterogeneous right upper lobe pulmonary mass is minimally   increased in size since June 22, 2023.    CT head 8/3  Redemonstration of multiple hemorrhagic intracranial   metastatic masses with surrounding vasogenic edema, as discussed,   compatible with the patient's reported history of metastatic melanoma.  Overall appearance is similar when compared to the outside MRI study from   6/27/2023, given differences in modality.        -CT chest and head as above  -US BLE + LLE extensive DVT  -Appreciate NeuroSx recs and clearance to resume AC, currently on heparin gtt. Would rec transitioning to Lovenox when medically optimized. Lovenox more reversible than DOAC in the event of bleed. Patient endorsing familiarity to medication and injection  -Would rec repeat CT head non con in the next 24 hours for eval of known hemorrhagic mets while on AC  -Pending MR brain   -Please get ECHO  -Patient with known brain mets, Scheduled for GK treatment of the resection cavity as well as the other CNS lesions by Rad Onc (Briana) as outpatient. Dr Warren has been emailed re case, will followup if any further recs  -No plans for inpatient immuno/chemotherapy. Next outpatient  appt for immunotherapy tentative scheduled for 8/8   -C/w Supportive care, pain control, Nutrition, PT, DVT ppx  -Rest of care as per primary team  -Patient to followup with Dr. Richards  ([facility]) upon discharge  -Oncology will continue to follow with you    Case d/w oncology attending      Michelle MARTINEZ  Oncology Physician Assistant  [facility]    Pager [phone] also available on TEAMS as Michelle MARTINEZ    If before 8am/after 5pm or on weekends please page On-call Oncology Fellow

## 2023-08-04 NOTE — PROGRESS NOTE ADULT - PROBLEM SELECTOR PLAN 2
CT head showed Redemonstration of multiple hemorrhagic intracranial metastatic masses with surrounding vasogenic edema, as discussed, compatible with the patient's reported history of metastatic melanoma  - No focal deficits on exam   - C/w keppra and dexamethasone   - F/u with neurosurgery recs - seem to be planning for outpatient gamma knife procedure

## 2023-08-04 NOTE — DISCHARGE NOTE PROVIDER - NSDCFUSCHEDAPPT_GEN_ALL_CORE_FT
[facility]  [facility]  Scheduled Appointment: 08/08/2023    [facility]  [facility]  Scheduled Appointment: 08/10/2023    [facility]  MRI  [facility]  Scheduled Appointment: 08/21/2023    Alberto Rosen  [facility]  NEUROSURG [address]  Scheduled Appointment: 08/22/2023    [facility]  [facility]  Scheduled Appointment: 08/29/2023    [facility]  [facility]  Scheduled Appointment: 09/19/2023     [facility]  [facility]  Scheduled Appointment: 08/10/2023    [facility]  MRI  [facility]  Scheduled Appointment: 08/21/2023    Alberto Rosen  [facility]  NEUROSURG [address]  Scheduled Appointment: 08/22/2023    [facility]  [facility]  Scheduled Appointment: 08/29/2023    [facility]  [facility]  Scheduled Appointment: 09/19/2023

## 2023-08-04 NOTE — CONSULT NOTE ADULT - NS ATTEND AMEND GEN_ALL_CORE FT
advanced melanoma with hemorrhagic brain metastasis, bilateral PE and distal right main pulmonary artery and extensive LLE DVT  on heparin drip   will need closely monitor his hemorrhagic brain mets while on heparin drip, CT head in 24 hours. If patient develops any new symptoms like headache, blurry vision, leg and arm weakness, please discontinue heparin right away.   discuss with Dr. Warren, Rad Onc and NSx for further care

## 2023-08-04 NOTE — CHART NOTE - NSCHARTNOTEFT_GEN_A_CORE
Recommend MRI w/wo Brain, Dr. Collins to speak w Dr. Warren. RIght temporal lesion appears larger on CT compared to MRI from June. Rec increasing dex to 4mg q 6 hr.     Can start heparin drip for PE, no bolus, PTT goal 50-70. Risk vs benefit     If patient needs OR for brain lesion hep can be held the night before. Please do not start any AC with a long half life.     [id number]    Case discussed with attending neurosurgeon Dr. Collins Recommend MRI w/wo Brain, Dr. Collins d/w Dr. Warren . RIght temporal lesion appears larger on CT compared to MRI from June. Will treat with gamma knife outpatient Rec increasing dex to 4mg q 6 hr    Can start heparin drip for PE, no bolus, PTT goal 50-70. Risk vs benefit, ok to transition to Oral AC when therapeutic     If patient needs OR for brain lesion hep can be held the night before.    [id number]    Case discussed with attending neurosurgeon Dr. Collins Recommend MRI w/wo Brain, Dr. Collins d/w Dr. Warren . RIght temporal lesion appears larger on CT compared to MRI from June. Will treat with gamma knife outpatient     Rec increasing dex to 4mg q 6 hr    Can start heparin drip for PE, no bolus, PTT goal 50-70. Risk vs benefit, ok to transition to Oral AC when therapeutic       [id number]    Case discussed with attending neurosurgeon Dr. Collins

## 2023-08-04 NOTE — PROGRESS NOTE ADULT - PROBLEM SELECTOR PLAN 1
- CTA chest: New bilateral pulmonary emboli; specifically in the distal right main pulmonary artery and left upper and left lower lobar pulmonary artery branches  - Patient with multiple brain mets due to known skin melanoma. Consequently, starting AC can his risk of bleeding in the brain   - Neurosurgery consulted - risk of hemorrhage given brain lesions but possible to start AC with heparin at this time  - F/u [facility] duplex   - F/u TTE  - no current supplemental O2 requirements  - will discuss with neurosurgery and hem/onc as to when patient can be transitioned to po with DOAC

## 2023-08-04 NOTE — DISCHARGE NOTE PROVIDER - CARE PROVIDER_API CALL
Marcial Warren  Radiation Oncology  [address] [place] [id number]  Phone: [phone]  Fax: [phone]  Follow Up Time:

## 2023-08-04 NOTE — DISCHARGE NOTE PROVIDER - NSDCCPCAREPLAN_GEN_ALL_CORE_FT
PRINCIPAL DISCHARGE DIAGNOSIS  Diagnosis: Pulmonary embolism  Assessment and Plan of Treatment:       SECONDARY DISCHARGE DIAGNOSES  Diagnosis: Metastasis to brain  Assessment and Plan of Treatment: Your Decadron was increased to 4mg every 6 hours. Further management of steroids will be managed by Dr. Warren. Please follow Dr. Warren's directions regarding further dosing of Decadron. Periodic CT scans will also be monitored.     PRINCIPAL DISCHARGE DIAGNOSIS  Diagnosis: Pulmonary embolism  Assessment and Plan of Treatment: After discussion with multiple teams, it is decided to send you on full dose Lovenox 80mg twice a day. There is a risk of increasing bleeding, and your outpatient oncologist may decrease the dose.      SECONDARY DISCHARGE DIAGNOSES  Diagnosis: Metastasis to brain  Assessment and Plan of Treatment: Your Decadron was increased to 4mg every 6 hours. Further management of steroids will be managed by Dr. Warren. Please follow Dr. Warren's directions regarding further dosing of Decadron. Periodic CT scans will also be monitored.     PRINCIPAL DISCHARGE DIAGNOSIS  Diagnosis: Pulmonary embolism  Assessment and Plan of Treatment: After discussion with multiple teams, it is decided to send you on full dose Lovenox 80mg twice a day. There is a risk of increasing bleeding, and your outpatient oncologist may decrease the dose.  Return  to the Emergency Department immediately if you  experience severe headache, nausea, seizure, coma, focal weakness, or neurologic deterioration.      SECONDARY DISCHARGE DIAGNOSES  Diagnosis: Metastasis to brain  Assessment and Plan of Treatment: Your Decadron was increased to 4mg every 6 hours. Further management of steroids will be managed by Dr. Warren. Please follow Dr. Warren's directions regarding further dosing of Decadron. Periodic CT scans will also be monitored.     PRINCIPAL DISCHARGE DIAGNOSIS  Diagnosis: Pulmonary embolism  Assessment and Plan of Treatment: After discussion with multiple teams, it is decided to send you on half dose of Lovenox which is 80mg once a day due to the risk of an increasing hemoorhagic metastatsis. Please follow up with your outpatient oncologist for further management.      SECONDARY DISCHARGE DIAGNOSES  Diagnosis: Metastasis to brain  Assessment and Plan of Treatment: Your Decadron dose was increased to 4mg every 6 hours. Further management of steroids will be managed by Dr. Warren. Please follow Dr. Warren's directions regarding further dosing of Decadron. Periodic CT scans will also be monitored.

## 2023-08-05 NOTE — PROGRESS NOTE ADULT - PROBLEM SELECTOR PLAN 1
- CTA chest: New bilateral pulmonary emboli; specifically in the distal right main pulmonary artery and left upper and left lower lobar pulmonary artery branches  - Patient with multiple brain mets due to known skin melanoma. Consequently, starting AC can his risk of bleeding in the brain   - Neurosurgery consulted - risk of hemorrhage given brain lesions but possible to start AC with heparin at this time  - F/u [facility] duplex   - F/u TTE  - no current supplemental O2 requirements  - will discuss with neurosurgery and hem/onc as to when patient can be transitioned to po with DOAC - CTA chest: New bilateral pulmonary emboli; specifically in the distal right main pulmonary artery and left upper and left lower lobar pulmonary artery branches  - Patient with multiple brain mets due to known skin melanoma. Consequently, starting AC can his risk of bleeding in the brain   - Neurosurgery consulted - risk of hemorrhage given brain lesions but possible to start AC with patient specific heparin at this time  - Duplex + acute, occlusive, deep venous thrombosis identified involving the left common femoral, proximal femoral,  middle femoral, distal femoral, popliteal, posterior tibial, peroneal and gastrocnemius veins.   - TTE reviewed and normal LVSF   - no current supplemental O2 requirements  - Per onc, will transition to lovenox with CT head after 24 hrs remains stable

## 2023-08-05 NOTE — PROGRESS NOTE ADULT - PROBLEM SELECTOR PLAN 4
DVT PPx: Hep gtt Dispo: pending medical optimization. Transition to lovenox once CT head stable, will need teaching prior to discharge

## 2023-08-05 NOTE — PROGRESS NOTE ADULT - ASSESSMENT
63 yo M with PMhx of Melanoma with mets to the brain and lungs on immunotherapy presents to the ED with SOB, found to have PE on outpatient CT chest, admitted for further eval.  62M with hx of Melanoma with mets to the brain and lungs on immunotherapy presents to the ED with SOB, found to have PE on outpatient CT chest, admitted for further eval.

## 2023-08-05 NOTE — PROGRESS NOTE ADULT - SUBJECTIVE AND OBJECTIVE BOX
[facility] Division of Hospital Medicine  Rica Harris MD  Available on [employer] TEAMS    SUBJECTIVE / OVERNIGHT EVENTS: Patient seen and examined. Reports that he feels completely fine but does not want to stay in the hospital any longer. Discussed need for further testing including CT head to assess for stability while on blood thinner and MRI brain for better evaluation. Also discussed he will need Lovenox teaching and injections prior to discharged, states his wife is coming today to learn. He denies any focal weakness, chest pain, shortness of breath.       MEDICATIONS  (STANDING):  dexAMETHasone     Tablet 4 milliGRAM(s) Oral every 6 hours  heparin  Infusion 1350 Unit(s)/Hr (13.5 mL/Hr) IV Continuous <Continuous>  levETIRAcetam 500 milliGRAM(s) Oral two times a day  pantoprazole    Tablet 40 milliGRAM(s) Oral before breakfast    MEDICATIONS  (PRN):  acetaminophen     Tablet .. 650 milliGRAM(s) Oral every 6 hours PRN Temp greater or equal to 38C (100.4F), Mild Pain (1 - 3)  aluminum hydroxide/magnesium hydroxide/simethicone Suspension 30 milliLiter(s) Oral every 4 hours PRN Dyspepsia  melatonin 3 milliGRAM(s) Oral at bedtime PRN Insomnia  ondansetron Injectable 4 milliGRAM(s) IV Push every 8 hours PRN Nausea and/or Vomiting      I&O's Summary    04 Aug 2023 07:01  -  05 Aug 2023 07:00  --------------------------------------------------------  IN: 326 mL / OUT: 930 mL / NET: -604 mL        PHYSICAL EXAM:  Vital Signs Last 24 Hrs  T(C): 36.8 (05 Aug 2023 12:02), Max: 36.9 (04 Aug 2023 21:10)  T(F): 98.3 (05 Aug 2023 12:02), Max: 98.5 (04 Aug 2023 21:10)  HR: 84 (05 Aug 2023 12:02) (77 - 92)  BP: 130/88 (05 Aug 2023 12:02) (130/88 - 147/91)  BP(mean): --  RR: 17 (05 Aug 2023 12:02) (17 - 17)  SpO2: 100% (05 Aug 2023 12:02) (98% - 100%)    Parameters below as of 05 Aug 2023 05:22  Patient On (Oxygen Delivery Method): room air      CONSTITUTIONAL: NAD  ENMT: Moist oral mucosa  RESPIRATORY: Normal respiratory effort; lungs are clear to auscultation bilaterally; No wheezes or rales  CARDIOVASCULAR: Regular rate and rhythm; Normal S1 and S2; No murmurs, rubs, or gallops; LLE swelling and mild warmth > RLE   ABDOMEN: Soft, Nontender, Nondistended; Bowel sounds present  MUSCULOSKELETAL:  No clubbing or cyanosis of digits; No joint swelling or tenderness to palpation  PSYCH: AAOx3 (oriented to person, place, and time); affect appropriate  NEUROLOGY: No focal deficits, answering questions appropriately; bilateral UE and LE strength 5/5       LABS:                        14.4   9.39  )-----------( 111      ( 05 Aug 2023 06:05 )             42.6     08-05    134<L>  |  100  |  27<H>  ----------------------------<  131<H>  4.2   |  23  |  0.88    Ca    8.6      05 Aug 2023 06:05  Phos  3.9     08-05  Mg     2.40     08-05    TPro  6.1  /  Alb  3.1<L>  /  TBili  0.4  /  DBili  x   /  AST  20  /  ALT  31  /  AlkPhos  76  08-05    PT/INR - ( 03 Aug 2023 13:58 )   PT: 11.4 sec;   INR: 1.01 ratio         PTT - ( 05 Aug 2023 06:05 )  PTT:83.8 sec      Urinalysis Basic - ( 05 Aug 2023 06:05 )    Color: x / Appearance: x / SG: x / pH: x  Gluc: 131 mg/dL / Ketone: x  / Bili: x / Urobili: x   Blood: x / Protein: x / Nitrite: x   Leuk Esterase: x / RBC: x / WBC x   Sq Epi: x / Non Sq Epi: x / Bacteria: x

## 2023-08-05 NOTE — CHART NOTE - NSCHARTNOTEFT_GEN_A_CORE
Vital Signs Last 24 Hrs  T(C): 36.4 (05 Aug 2023 05:22), Max: 36.9 (04 Aug 2023 21:10)  T(F): 97.6 (05 Aug 2023 05:22), Max: 98.5 (04 Aug 2023 21:10)  HR: 77 (05 Aug 2023 05:22) (77 - 92)  BP: 145/93 (05 Aug 2023 05:22) (145/93 - 153/93)  BP(mean): --  RR: 17 (05 Aug 2023 05:22) (17 - 18)  SpO2: 99% (05 Aug 2023 05:22) (98% - 100%)    Parameters below as of 05 Aug 2023 05:22  Patient On (Oxygen Delivery Method): room air                          14.4   9.39  )-----------( 111      ( 05 Aug 2023 06:05 )             42.6   08-05    134<L>  |  100  |  27<H>  ----------------------------<  131<H>  4.2   |  23  |  0.88    Ca    8.6      05 Aug 2023 06:05  Phos  3.9     08-05  Mg     2.40     08-05    TPro  6.1  /  Alb  3.1<L>  /  TBili  0.4  /  DBili  x   /  AST  20  /  ALT  31  /  AlkPhos  76  08-05    PT/INR - ( 03 Aug 2023 13:58 )   PT: 11.4 sec;   INR: 1.01 ratio    PTT - ( 05 Aug 2023 06:05 )  PTT:83.8 sec    aPTT noted 83.8, on patient specific heparin gtt (goal 50-70), Writer reviewed results with pharmacist Yokasta and decreased rate to 13.5ml/hr. As per Neurosurgery refcs adjusted decadron dosing. Spoke with Heme/Onc> repeat CTH ordered as discussed. Discussed with RN and attending

## 2023-08-05 NOTE — PROGRESS NOTE ADULT - PROBLEM SELECTOR PLAN 3
hem/onc input appreciated  Diagnosed June 2023 on immunotherapy C1 of Ipilimumab and nivolumab last on 7/18/23.  - hem/onc input appreciated, awaiting repeat CT head after 24hrs on heparin gtt and MRI brain   -No plans for inpatient immuno/chemotherapy. Next outpatient appt for immunotherapy tentative scheduled for 8/8

## 2023-08-05 NOTE — PROGRESS NOTE ADULT - PROBLEM SELECTOR PLAN 2
CT head showed Redemonstration of multiple hemorrhagic intracranial metastatic masses with surrounding vasogenic edema, as discussed, compatible with the patient's reported history of metastatic melanoma  - No focal deficits on exam   - C/w keppra and dexamethasone   - F/u with neurosurgery recs - seem to be planning for outpatient gamma knife procedure CT head showed "redemonstration of multiple hemorrhagic intracranial metastatic masses with surrounding vasogenic edema.. compatible with the patient's reported history of metastatic melanoma"  - No focal deficits on exam   - C/w keppra and increased dose of dexamethasone   - Seen by neurosx and recommending MRI brain as R temporal lesion appears larger on CT compared to MRI from June  - Plan for outpatient gamma knife

## 2023-08-06 NOTE — PROGRESS NOTE ADULT - ATTENDING COMMENTS
Mr. Nogueira is a very kind gentleman with multiple melanoma brain metastases, currently being treated with immunotherapy. He presented with DVTs and pulmonary embolism.    Neurologically intact on exam.    MRI demonstrates some increase in size of his brain metastases.     After discussion with his oncologist and radiation oncologst, we will plan for transition to therapeutic lovenox and discharge, to expedite his brain tumor treatment with gamma knife radiosurgery.    He has been informed that due to his melanoma brain metastases he is at increased risk of brain hemorrhage due to his therapeutic lovenox; however benefits outweigh risks due to his pulmonary embolism requiring treatment.    He has been informed to come to the Emergency Department immediately if he experiences severe headache, nausea, seizure, coma, focal weakness, or neurologic deterioration.

## 2023-08-06 NOTE — CONSULT NOTE ADULT - ASSESSMENT
Assessment: 62y Male with metastatic melanoma with DVT and PE on heparin gtt. As per discussion with primary team, patient OK to remain on hep gtt despite hemorrhagic brain lesions with plan for f/u CT head tomorrow, 8/7.    Plan: IR to defer IVC filter placement in this patient who is on A/C with plans to continue A/C  - if plan changes regarding A/C, please re-consult IR for evaluation of IVC filter placement  - plan reviewed with IR attending Dr. Hook  - discussed with primary team    Tomas Vicente MD  PGY-VI, Interventional Radiology Integrated Resident    -Available on [employer] TEAMS  -Emergent issues: [facility]-[phone]; [facility]-p.[phone] ([phone])  -Non-emergent consults: Please place a [place] order "IR Consult" with an appropriate callback number  -Scheduling questions: [facility]: [phone]; [facility]: [phone]  -Clinic/Outpatient booking: [facility]: [phone]; [facility]: [phone]

## 2023-08-06 NOTE — PROGRESS NOTE ADULT - PROBLEM SELECTOR PLAN 1
- plan for outpatient gamma knife with Dr. Warren this week     [id number]    Case discussed with attending neurosurgeon Dr. Collins

## 2023-08-06 NOTE — PROGRESS NOTE ADULT - PROBLEM SELECTOR PLAN 1
- CTA chest: New bilateral pulmonary emboli; specifically in the distal right main pulmonary artery and left upper and left lower lobar pulmonary artery branches  - Patient with multiple brain mets due to known skin melanoma. Consequently, starting AC can his risk of bleeding in the brain   - Discussed with neurosx PA - okay for AC from their standpoint, recommend outpatient follow-up for gamma knife therapy   - Duplex + acute, occlusive, deep venous thrombosis identified involving the left common femoral, proximal femoral,  middle femoral, distal femoral, popliteal, posterior tibial, peroneal and gastrocnemius veins.   - TTE reviewed and normal LVSF   - no current supplemental O2 requirements  - Per onc, will transition to lovenox with CT head after 24 hrs remains stable - CTA chest: New bilateral pulmonary emboli; specifically in the distal right main pulmonary artery and left upper and left lower lobar pulmonary artery branches  - Duplex + acute, occlusive, deep venous thrombosis identified involving the left common femoral, proximal femoral,  middle femoral, distal femoral, popliteal, posterior tibial, peroneal and gastrocnemius veins.   - Patient with multiple brain mets due to known melanoma, high risk for hemorrhagic conversion   - Discussed with neurosx PA - okay for AC from their standpoint,  - Started on patient specific heparin gtt, will transition to weight based lovenox BID   - TTE reviewed and normal LVSF   - no current supplemental O2 requirements  - IR consulted per onc recs for IVC filter, at this time no role as patient is on AC

## 2023-08-06 NOTE — CONSULT NOTE ADULT - SUBJECTIVE AND OBJECTIVE BOX
Vascular & Interventional Radiology Consult Note    Evaluate for Procedure: IVC filter placement    HPI: 62y Male with metastatic melanoma found to have PE on outside imaging on heparin gtt. IR consulted for IVC filter placement. As per discussion with primary team ROMARIO Gilman, patient is currently on heparin gtt and is OK to continue the heparin despite hemorrhagic brain mets. Patient with DVT on US.     Allergies: No Known Allergies    Medications (Abx/Cardiac/Anticoagulation/Blood Products)    heparin  Infusion: 15 mL/Hr IV Continuous (08-04 @ 16:33)  heparin  Infusion: 14.5 mL/Hr IV Continuous (08-04 @ 23:22)  heparin  Infusion: 13.5 mL/Hr IV Continuous (08-05 @ 07:50)  heparin  Infusion: 11 mL/Hr IV Continuous (08-06 @ 12:06)  heparin  Infusion: 12.5 mL/Hr IV Continuous (08-05 @ 15:40)  heparin  Infusion: 11 mL/Hr IV Continuous (08-05 @ 23:17)    Data:    T(C): 36.4  HR: 81  BP: 134/99  RR: 18  SpO2: 95%    -WBC 10.30 / HgB 15.8 / Hct 46.8 / Plt 141  -Na 133 / Cl 99 / BUN 29 / Glucose 140  -K 4.6 / CO2 22 / Cr 0.93  -ALT 43 / Alk Phos 81 / T.Bili 0.5  -INR -- / PTT 64.2      Imaging: CT chest and US LE reviewed

## 2023-08-06 NOTE — CHART NOTE - NSCHARTNOTEFT_GEN_A_CORE
Vital Signs Last 24 Hrs  T(C): 36.4 (06 Aug 2023 05:29), Max: 36.8 (05 Aug 2023 12:02)  T(F): 97.5 (06 Aug 2023 05:29), Max: 98.3 (05 Aug 2023 12:02)  HR: 81 (06 Aug 2023 05:29) (81 - 95)  BP: 134/99 (06 Aug 2023 05:29) (130/88 - 141/98)  BP(mean): --  RR: 18 (06 Aug 2023 05:29) (17 - 18)  SpO2: 95% (06 Aug 2023 05:29) (95% - 100%)    Parameters below as of 06 Aug 2023 05:29  Patient On (Oxygen Delivery Method): room air                          15.8   10.30 )-----------( 141      ( 06 Aug 2023 03:18 )             46.8     08-06    133<L>  |  99  |  29<H>  ----------------------------<  140<H>  4.6   |  22  |  0.93    Ca    9.0      06 Aug 2023 03:18  Phos  4.0     08-06  Mg     2.60     08-06    TPro  6.5  /  Alb  3.2<L>  /  TBili  0.5  /  DBili  x   /  AST  19  /  ALT  43<H>  /  AlkPhos  81  08-06    MRI w/wo Brain IMPRESSION:  Multiple nodular and ring-enhancing masses with hemorrhage scattered throughout the brain parenchyma which have increased in size since 6/27/2023 consistent with metastatic melanoma. The right frontal parietal postoperative bed also appears to be slightly larger raising the possibility of tumor progression.    MRI results reviewed with NeuroSx resident (ext [phone]) > ok for AC. Writer discussed MRI results with Heme/Onc Dr. Quintana - will review with onc attending re decision for lovenox vs IVC filter and inform primary team.   Results and case discussed with Dr. Harris, plan of care discussed with RN Vital Signs Last 24 Hrs  T(C): 36.4 (06 Aug 2023 05:29), Max: 36.8 (05 Aug 2023 12:02)  T(F): 97.5 (06 Aug 2023 05:29), Max: 98.3 (05 Aug 2023 12:02)  HR: 81 (06 Aug 2023 05:29) (81 - 95)  BP: 134/99 (06 Aug 2023 05:29) (130/88 - 141/98)  BP(mean): --  RR: 18 (06 Aug 2023 05:29) (17 - 18)  SpO2: 95% (06 Aug 2023 05:29) (95% - 100%)    Parameters below as of 06 Aug 2023 05:29  Patient On (Oxygen Delivery Method): room air                          15.8   10.30 )-----------( 141      ( 06 Aug 2023 03:18 )             46.8     08-06    133<L>  |  99  |  29<H>  ----------------------------<  140<H>  4.6   |  22  |  0.93    Ca    9.0      06 Aug 2023 03:18  Phos  4.0     08-06  Mg     2.60     08-06    TPro  6.5  /  Alb  3.2<L>  /  TBili  0.5  /  DBili  x   /  AST  19  /  ALT  43<H>  /  AlkPhos  81  08-06    MRI w/wo Brain IMPRESSION:  Multiple nodular and ring-enhancing masses with hemorrhage scattered throughout the brain parenchyma which have increased in size since 6/27/2023 consistent with metastatic melanoma. The right frontal parietal postoperative bed also appears to be slightly larger raising the possibility of tumor progression.    MRI results reviewed with NeuroSx resident (ext [phone]) > ok for AC. Writer discussed MRI results with Heme/Onc Dr. Quintana - will review with onc attending re decision for lovenox vs IVC filter and inform primary team.   Spoke with Oncologist Dr. aSnches - no need for repeat CTH as MRI performed. Results and case discussed with Dr. Harris, plan of care discussed with RN Vital Signs Last 24 Hrs  T(C): 36.4 (06 Aug 2023 05:29), Max: 36.8 (05 Aug 2023 12:02)  T(F): 97.5 (06 Aug 2023 05:29), Max: 98.3 (05 Aug 2023 12:02)  HR: 81 (06 Aug 2023 05:29) (81 - 95)  BP: 134/99 (06 Aug 2023 05:29) (130/88 - 141/98)  BP(mean): --  RR: 18 (06 Aug 2023 05:29) (17 - 18)  SpO2: 95% (06 Aug 2023 05:29) (95% - 100%)    Parameters below as of 06 Aug 2023 05:29  Patient On (Oxygen Delivery Method): room air                          15.8   10.30 )-----------( 141      ( 06 Aug 2023 03:18 )             46.8     08-06    133<L>  |  99  |  29<H>  ----------------------------<  140<H>  4.6   |  22  |  0.93    Ca    9.0      06 Aug 2023 03:18  Phos  4.0     08-06  Mg     2.60     08-06    TPro  6.5  /  Alb  3.2<L>  /  TBili  0.5  /  DBili  x   /  AST  19  /  ALT  43<H>  /  AlkPhos  81  08-06    MRI w/wo Brain IMPRESSION:  Multiple nodular and ring-enhancing masses with hemorrhage scattered throughout the brain parenchyma which have increased in size since 6/27/2023 consistent with metastatic melanoma. The right frontal parietal postoperative bed also appears to be slightly larger raising the possibility of tumor progression.    MRI results reviewed with NeuroSx resident (ext [phone]) > ok for AC. Writer discussed MRI results with Heme/Onc Dr. Quintana >will review with onc attending re decision for lovenox vs IVC filter and inform primary team.  offered but patient prefers daughter at bedside for translation.    Spoke with Oncologist Dr. Sanches this afternoon again - reviewed MRI, recommend to obtain IR consult for IVC filter, continue heparin gtt for now, and obtain repeat CT head tomorrow to eval hemorrhagic mets area, discussed with Dr. Harris

## 2023-08-06 NOTE — PROGRESS NOTE ADULT - ASSESSMENT
62 year old male with PMH of melanoma with mets to brain and lung dx in June 2023, s/p Crani by Dr. Robert Worthy 6/26/23 sent in by [facility] for new PE. Patient got new MRi which shows increased tumor size since last MRI, stable compared to CTH this admission. Patient being transitioned to oral AC 62 year old male with PMH of melanoma with mets to brain and lung dx in June 2023, s/p Crani by Dr. Robert Worthy 6/26/23 sent in by [facility] for new PE. Patient got new MRi which shows increased tumor size since last MRI, stable compared to CTH this admission. Patient being transitioned to therapeutic lovenox

## 2023-08-06 NOTE — PROGRESS NOTE ADULT - PROBLEM SELECTOR PLAN 4
Dispo: pending medical optimization. Transition to lovenox once CT head stable, will need teaching prior to discharge Dispo: goal for home tomorrow after stable CT head and lovenox teaching completed

## 2023-08-06 NOTE — PROGRESS NOTE ADULT - SUBJECTIVE AND OBJECTIVE BOX
[facility] Division of Hospital Medicine  Rica Harris MD  Available on [employer] TEAMS    SUBJECTIVE / OVERNIGHT EVENTS: Patient seen and examined. Reports      MEDICATIONS  (STANDING):  dexAMETHasone     Tablet 4 milliGRAM(s) Oral every 6 hours  heparin  Infusion 1100 Unit(s)/Hr (11 mL/Hr) IV Continuous <Continuous>  levETIRAcetam 500 milliGRAM(s) Oral two times a day  pantoprazole    Tablet 40 milliGRAM(s) Oral before breakfast  sodium chloride 0.9%. 1000 milliLiter(s) (65 mL/Hr) IV Continuous <Continuous>    MEDICATIONS  (PRN):  acetaminophen     Tablet .. 650 milliGRAM(s) Oral every 6 hours PRN Temp greater or equal to 38C (100.4F), Mild Pain (1 - 3)  aluminum hydroxide/magnesium hydroxide/simethicone Suspension 30 milliLiter(s) Oral every 4 hours PRN Dyspepsia  melatonin 3 milliGRAM(s) Oral at bedtime PRN Insomnia  ondansetron Injectable 4 milliGRAM(s) IV Push every 8 hours PRN Nausea and/or Vomiting      I&O's Summary      PHYSICAL EXAM:  Vital Signs Last 24 Hrs  T(C): 36.4 (06 Aug 2023 05:29), Max: 36.8 (05 Aug 2023 12:02)  T(F): 97.5 (06 Aug 2023 05:29), Max: 98.3 (05 Aug 2023 12:02)  HR: 81 (06 Aug 2023 05:29) (81 - 95)  BP: 134/99 (06 Aug 2023 05:29) (130/88 - 141/98)  BP(mean): --  RR: 18 (06 Aug 2023 05:29) (17 - 18)  SpO2: 95% (06 Aug 2023 05:29) (95% - 100%)    Parameters below as of 06 Aug 2023 05:29  Patient On (Oxygen Delivery Method): room air      LABS:                        15.8   10.30 )-----------( 141      ( 06 Aug 2023 03:18 )             46.8     08-06    133<L>  |  99  |  29<H>  ----------------------------<  140<H>  4.6   |  22  |  0.93    Ca    9.0      06 Aug 2023 03:18  Phos  4.0     08-06  Mg     2.60     08-06    TPro  6.5  /  Alb  3.2<L>  /  TBili  0.5  /  DBili  x   /  AST  19  /  ALT  43<H>  /  AlkPhos  81  08-06    PT/INR - ( 06 Aug 2023 03:18 )   PT: 11.3 sec;   INR: 1.01 ratio         PTT - ( 06 Aug 2023 03:18 )  PTT:63.0 sec      Urinalysis Basic - ( 06 Aug 2023 03:18 )    Color: x / Appearance: x / SG: x / pH: x  Gluc: 140 mg/dL / Ketone: x  / Bili: x / Urobili: x   Blood: x / Protein: x / Nitrite: x   Leuk Esterase: x / RBC: x / WBC x   Sq Epi: x / Non Sq Epi: x / Bacteria: x            RADIOLOGY & ADDITIONAL TESTS:  New Results Reviewed Today:   New Imaging Personally Reviewed Today:  New Electrocardiogram Personally Reviewed Today:  Prior or Outpatient Records Reviewed Today:    COMMUNICATION:  Care Discussed with Consultants/Other Providers and Details of Discussion:      [facility] Division of Hospital Medicine  Rica Harris MD  Available on [employer] TEAMS    SUBJECTIVE / OVERNIGHT EVENTS: Patient seen and examined, wife at bedside. Discussed results of MRI brain showing increased size of mets, Discussed reviewed this with neurosx and plan is for outpatient gamma knife therapy, wife and patient in agreement. Discussed plan to switch to lovenox from heparin gtt, wife to inject lovenox at home and has done it for patient in the past. Discussed return precautions with blood thinners.       MEDICATIONS  (STANDING):  dexAMETHasone     Tablet 4 milliGRAM(s) Oral every 6 hours  heparin  Infusion 1100 Unit(s)/Hr (11 mL/Hr) IV Continuous <Continuous>  levETIRAcetam 500 milliGRAM(s) Oral two times a day  pantoprazole    Tablet 40 milliGRAM(s) Oral before breakfast  sodium chloride 0.9%. 1000 milliLiter(s) (65 mL/Hr) IV Continuous <Continuous>    MEDICATIONS  (PRN):  acetaminophen     Tablet .. 650 milliGRAM(s) Oral every 6 hours PRN Temp greater or equal to 38C (100.4F), Mild Pain (1 - 3)  aluminum hydroxide/magnesium hydroxide/simethicone Suspension 30 milliLiter(s) Oral every 4 hours PRN Dyspepsia  melatonin 3 milliGRAM(s) Oral at bedtime PRN Insomnia  ondansetron Injectable 4 milliGRAM(s) IV Push every 8 hours PRN Nausea and/or Vomiting      I&O's Summary      PHYSICAL EXAM:  Vital Signs Last 24 Hrs  T(C): 36.4 (06 Aug 2023 05:29), Max: 36.8 (05 Aug 2023 12:02)  T(F): 97.5 (06 Aug 2023 05:29), Max: 98.3 (05 Aug 2023 12:02)  HR: 81 (06 Aug 2023 05:29) (81 - 95)  BP: 134/99 (06 Aug 2023 05:29) (130/88 - 141/98)  BP(mean): --  RR: 18 (06 Aug 2023 05:29) (17 - 18)  SpO2: 95% (06 Aug 2023 05:29) (95% - 100%)    Parameters below as of 06 Aug 2023 05:29  Patient On (Oxygen Delivery Method): room air    CONSTITUTIONAL: NAD  ENMT: Moist oral mucosa  RESPIRATORY: Normal respiratory effort; lungs are clear to auscultation bilaterally; No wheezes or rales  CARDIOVASCULAR: Regular rate and rhythm; Normal S1 and S2; No murmurs, rubs, or gallops; LLE swelling and mild warmth > RLE   ABDOMEN: Soft, Nontender, Nondistended; Bowel sounds present  MUSCULOSKELETAL:  No clubbing or cyanosis of digits; No joint swelling or tenderness to palpation  PSYCH: AAOx3 (oriented to person, place, and time); affect appropriate  NEUROLOGY: No focal deficits, answering questions appropriately; bilateral UE and LE strength 5/5     LABS:                        15.8   10.30 )-----------( 141      ( 06 Aug 2023 03:18 )             46.8     08-06    133<L>  |  99  |  29<H>  ----------------------------<  140<H>  4.6   |  22  |  0.93    Ca    9.0      06 Aug 2023 03:18  Phos  4.0     08-06  Mg     2.60     08-06    TPro  6.5  /  Alb  3.2<L>  /  TBili  0.5  /  DBili  x   /  AST  19  /  ALT  43<H>  /  AlkPhos  81  08-06    PT/INR - ( 06 Aug 2023 03:18 )   PT: 11.3 sec;   INR: 1.01 ratio         PTT - ( 06 Aug 2023 03:18 )  PTT:63.0 sec      Urinalysis Basic - ( 06 Aug 2023 03:18 )    Color: x / Appearance: x / SG: x / pH: x  Gluc: 140 mg/dL / Ketone: x  / Bili: x / Urobili: x   Blood: x / Protein: x / Nitrite: x   Leuk Esterase: x / RBC: x / WBC x   Sq Epi: x / Non Sq Epi: x / Bacteria: x            RADIOLOGY & ADDITIONAL TESTS:  New Results Reviewed Today: MRI brain     COMMUNICATION:  Care Discussed with Consultants/Other Providers and Details of Discussion: Discussed MRI results with neurosx PA and recommend outpatient follow-up for gamma knife, OK for AC from their perspective. Discussed with onc Dr. Plascencia, recommend IR eval for IVC filter in the case that there is change in hemorrhagic mets on lovenox. Recs CT head once lovenox started.

## 2023-08-06 NOTE — PROGRESS NOTE ADULT - ASSESSMENT
62M with hx of Melanoma with mets to the brain and lungs on immunotherapy presents to the ED with SOB, found to have PE on outpatient CT chest, admitted for further eval.

## 2023-08-06 NOTE — PROGRESS NOTE ADULT - PROBLEM SELECTOR PLAN 2
CT head showed "redemonstration of multiple hemorrhagic intracranial metastatic masses with surrounding vasogenic edema.. compatible with the patient's reported history of metastatic melanoma"  - No focal deficits on exam   - C/w keppra and increased dose of dexamethasone   - Seen by neurosx and recommending MRI brain as R temporal lesion appears larger on CT compared to MRI from June  - Plan for outpatient gamma knife CT head showed "redemonstration of multiple hemorrhagic intracranial metastatic masses with surrounding vasogenic edema.. compatible with the patient's reported history of metastatic melanoma"  - No focal deficits on exam   - C/w keppra and increased dose of dexamethasone   - Neurosx following   - MRI brain showing "multiple nodular and ring-enhancing masses with hemorrhage scattered throughout the brain parenchyma which have increased in size since 6/27/2023 consistent with metastatic melanoma. The right frontal   parietal postoperative bed also appears to be slightly larger raising the possibility of tumor progression." --> discussed results with Neurosx and recommend close outpatient follow-up for gamma knife w/ Dr. Warren, patient and wife are aware  - Discussed with oncology Dr. Plascencia, recommend lovenox and check CT head after to assess for any change/progression

## 2023-08-06 NOTE — PROGRESS NOTE ADULT - TIME BILLING
Time-based billing (NON-critical care).     50 minutes spent on total encounter; more than 50% of the visit was spent counseling and / or coordinating care by the attending physician.  The necessity of the time spent during the encounter on this date of service was due to:     review of laboratory data, radiology results, consultants' recommendations, documentation in [place], discussion with patient/ACP and interdisciplinary staff (such as , social workers, etc). Interventions were performed as documented above.
Discussing risks and benefits of surgery versus radiosurgery, and therapeutic lovenox versus IVC filter
Time-based billing (NON-critical care).     51 minutes spent on total encounter; more than 50% of the visit was spent counseling and / or coordinating care by the attending physician.  The necessity of the time spent during the encounter on this date of service was due to:     review of laboratory data, radiology results, consultants' recommendations, documentation in [place], discussion with patient/caregivers/consultants/ACP. Interventions were performed as documented above.

## 2023-08-06 NOTE — PROGRESS NOTE ADULT - PROBLEM SELECTOR PLAN 3
on immunotherapy C1 of Ipilimumab and nivolumab last on 7/18/23.  - hem/onc input appreciated, awaiting repeat CT head after 24hrs on heparin gtt and MRI brain   -No plans for inpatient immuno/chemotherapy. Next outpatient appt for immunotherapy tentative scheduled for 8/8 on immunotherapy C1 of Ipilimumab and nivolumab last on 7/18/23.  - hem/onc input appreciated  -No plans for inpatient immuno/chemotherapy. Next outpatient appt for immunotherapy tentative scheduled for 8/8

## 2023-08-06 NOTE — PROGRESS NOTE ADULT - SUBJECTIVE AND OBJECTIVE BOX
PAST 24HR EVENTS: patient neuro intact, being transitioned to lovenox and to go home on coumadin.     Vital Signs Last 24 Hrs  T(C): 36.6 (06 Aug 2023 13:07), Max: 36.6 (06 Aug 2023 13:07)  T(F): 97.8 (06 Aug 2023 13:07), Max: 97.8 (06 Aug 2023 13:07)  HR: 82 (06 Aug 2023 13:07) (81 - 95)  BP: 131/93 (06 Aug 2023 13:07) (131/93 - 141/98)  BP(mean): --  RR: 18 (06 Aug 2023 13:07) (18 - 18)  SpO2: 96% (06 Aug 2023 13:07) (95% - 98%)    Parameters below as of 06 Aug 2023 13:07  Patient On (Oxygen Delivery Method): room air        MEDS:   acetaminophen     Tablet .. 650 milliGRAM(s) Oral every 6 hours PRN  aluminum hydroxide/magnesium hydroxide/simethicone Suspension 30 milliLiter(s) Oral every 4 hours PRN  dexAMETHasone     Tablet 4 milliGRAM(s) Oral every 6 hours  enoxaparin Injectable 80 milliGRAM(s) SubCutaneous every 12 hours  levETIRAcetam 500 milliGRAM(s) Oral two times a day  melatonin 3 milliGRAM(s) Oral at bedtime PRN  ondansetron Injectable 4 milliGRAM(s) IV Push every 8 hours PRN  pantoprazole    Tablet 40 milliGRAM(s) Oral before breakfast  sodium chloride 0.9%. 1000 milliLiter(s) IV Continuous <Continuous>      LABS:                        15.8   10.30 )-----------( 141      ( 06 Aug 2023 03:18 )             46.8     08-06    133<L>  |  99  |  29<H>  ----------------------------<  140<H>  4.6   |  22  |  0.93    Ca    9.0      06 Aug 2023 03:18  Phos  4.0     08-06  Mg     2.60     08-06    TPro  6.5  /  Alb  3.2<L>  /  TBili  0.5  /  DBili  x   /  AST  19  /  ALT  43<H>  /  AlkPhos  81  08-06    PT/INR - ( 06 Aug 2023 03:18 )   PT: 11.3 sec;   INR: 1.01 ratio         PTT - ( 06 Aug 2023 10:11 )  PTT:64.2 sec    RADIOLOGY:   < from: MR Brain Stereotactic w/wo IV Cont (08.05.23 @ 18:11) >  There has been a right frontal parietal craniotomy. There are   postoperative changes in the right frontal parietal postoperative bed   with some peripheral signal hyperintensity likely representing subacute   hemorrhage rather than enhancement. Compared with 6/27/2023 the   postoperative bed is slightly larger, previously measuring 3.4 cm in AP   diameter by 2.5 cm transversely by 2.8 cm in craniocaudal diameter now   measuring 3.6 cm in AP diameter by 3.7 cm transversely by 3.3 cm in   craniocaudal diameter. Tumor progression is considered.    The right frontal parasagittal enhancing lesion with hemorrhage is larger   measuring 2.5 cm in AP diameter by 1.9 cm transversely by 2.5 cm in   craniocaudal diameter previously measuring 1.5 cm in AP diameter by 1.6   cm transversely by 2.2 cm in craniocaudal diameter.    The nodular lesion in the right thalamus is larger, measuring 5.7 mm in   AP diameter compared with the prior of 3 mm.    The left occipital enhancing lesion is also slightly larger measuring 1.8   cm in AP diameter by 1.8 cm transversely by 1.7 cm in craniocaudal   diameter, previously measuring 1.5 cm in AP diameter by 1.7 cm   transversely by 1.4 cm in craniocaudal diameter.     A left parietal enhancing lesion is also larger measuring 1.7 cm in AP   diameter by 1.7 cm transversely by 1.8 cm in craniocaudal diameter,   previously measuring 1.2 cm in AP diameter by 1.4 cm transversely by 1.2   cm in craniocaudal diameter.     The right temporal ring-enhancing mass is slightly larger measuring 3.4   cm in AP diameter by 3.3 cm transversely by 3.8 cm in craniocaudal   diameter compared with the prior of 3.4 cm in AP diameter by 2.7 cm   transversely by 3.2 cm in craniocaudal diameter    There is slightly less vasogenic edema associated with the right frontal   postoperative bed. There is no change in vasogenic edema involving the   right temporal lobe lesion or the right frontal parasagittal lesion.  There is no midline shift or hydrocephalus.    IMPRESSION: Multiple nodular and ring-enhancing masses withhemorrhage   scattered throughout the brain parenchyma which have increased in size   since 6/27/2023 consistent with metastatic melanoma. The right frontal   parietal postoperative bed also appears to be slightly larger raising the   possibility of tumor progression.    PHYSICAL EXAM:  AOx3, appropriate, follows commands  PERRL, EOMI, face symmetrical   ALZARO x 4 with good strength   Sensation intact to light touch   No pronator drift      PAST 24HR EVENTS: patient neuro intact, being transitioned to therapeutic lovenox.     Vital Signs Last 24 Hrs  T(C): 36.6 (06 Aug 2023 13:07), Max: 36.6 (06 Aug 2023 13:07)  T(F): 97.8 (06 Aug 2023 13:07), Max: 97.8 (06 Aug 2023 13:07)  HR: 82 (06 Aug 2023 13:07) (81 - 95)  BP: 131/93 (06 Aug 2023 13:07) (131/93 - 141/98)  BP(mean): --  RR: 18 (06 Aug 2023 13:07) (18 - 18)  SpO2: 96% (06 Aug 2023 13:07) (95% - 98%)    Parameters below as of 06 Aug 2023 13:07  Patient On (Oxygen Delivery Method): room air        MEDS:   acetaminophen     Tablet .. 650 milliGRAM(s) Oral every 6 hours PRN  aluminum hydroxide/magnesium hydroxide/simethicone Suspension 30 milliLiter(s) Oral every 4 hours PRN  dexAMETHasone     Tablet 4 milliGRAM(s) Oral every 6 hours  enoxaparin Injectable 80 milliGRAM(s) SubCutaneous every 12 hours  levETIRAcetam 500 milliGRAM(s) Oral two times a day  melatonin 3 milliGRAM(s) Oral at bedtime PRN  ondansetron Injectable 4 milliGRAM(s) IV Push every 8 hours PRN  pantoprazole    Tablet 40 milliGRAM(s) Oral before breakfast  sodium chloride 0.9%. 1000 milliLiter(s) IV Continuous <Continuous>      LABS:                        15.8   10.30 )-----------( 141      ( 06 Aug 2023 03:18 )             46.8     08-06    133<L>  |  99  |  29<H>  ----------------------------<  140<H>  4.6   |  22  |  0.93    Ca    9.0      06 Aug 2023 03:18  Phos  4.0     08-06  Mg     2.60     08-06    TPro  6.5  /  Alb  3.2<L>  /  TBili  0.5  /  DBili  x   /  AST  19  /  ALT  43<H>  /  AlkPhos  81  08-06    PT/INR - ( 06 Aug 2023 03:18 )   PT: 11.3 sec;   INR: 1.01 ratio         PTT - ( 06 Aug 2023 10:11 )  PTT:64.2 sec    RADIOLOGY:   < from: MR Brain Stereotactic w/wo IV Cont (08.05.23 @ 18:11) >  There has been a right frontal parietal craniotomy. There are   postoperative changes in the right frontal parietal postoperative bed   with some peripheral signal hyperintensity likely representing subacute   hemorrhage rather than enhancement. Compared with 6/27/2023 the   postoperative bed is slightly larger, previously measuring 3.4 cm in AP   diameter by 2.5 cm transversely by 2.8 cm in craniocaudal diameter now   measuring 3.6 cm in AP diameter by 3.7 cm transversely by 3.3 cm in   craniocaudal diameter. Tumor progression is considered.    The right frontal parasagittal enhancing lesion with hemorrhage is larger   measuring 2.5 cm in AP diameter by 1.9 cm transversely by 2.5 cm in   craniocaudal diameter previously measuring 1.5 cm in AP diameter by 1.6   cm transversely by 2.2 cm in craniocaudal diameter.    The nodular lesion in the right thalamus is larger, measuring 5.7 mm in   AP diameter compared with the prior of 3 mm.    The left occipital enhancing lesion is also slightly larger measuring 1.8   cm in AP diameter by 1.8 cm transversely by 1.7 cm in craniocaudal   diameter, previously measuring 1.5 cm in AP diameter by 1.7 cm   transversely by 1.4 cm in craniocaudal diameter.     A left parietal enhancing lesion is also larger measuring 1.7 cm in AP   diameter by 1.7 cm transversely by 1.8 cm in craniocaudal diameter,   previously measuring 1.2 cm in AP diameter by 1.4 cm transversely by 1.2   cm in craniocaudal diameter.     The right temporal ring-enhancing mass is slightly larger measuring 3.4   cm in AP diameter by 3.3 cm transversely by 3.8 cm in craniocaudal   diameter compared with the prior of 3.4 cm in AP diameter by 2.7 cm   transversely by 3.2 cm in craniocaudal diameter    There is slightly less vasogenic edema associated with the right frontal   postoperative bed. There is no change in vasogenic edema involving the   right temporal lobe lesion or the right frontal parasagittal lesion.  There is no midline shift or hydrocephalus.    IMPRESSION: Multiple nodular and ring-enhancing masses withhemorrhage   scattered throughout the brain parenchyma which have increased in size   since 6/27/2023 consistent with metastatic melanoma. The right frontal   parietal postoperative bed also appears to be slightly larger raising the   possibility of tumor progression.    PHYSICAL EXAM:  AOx3, appropriate, follows commands  PERRL, EOMI, face symmetrical   LAZARO x 4 with good strength   Sensation intact to light touch   No pronator drift

## 2023-08-07 NOTE — PROGRESS NOTE ADULT - PROBLEM SELECTOR PLAN 2
CT head showed "redemonstration of multiple hemorrhagic intracranial metastatic masses with surrounding vasogenic edema.. compatible with the patient's reported history of metastatic melanoma"  - No focal deficits on exam   - C/w keppra and increased dose of dexamethasone   - Neurosx following   - MRI brain showing "multiple nodular and ring-enhancing masses with hemorrhage scattered throughout the brain parenchyma which have increased in size since 6/27/2023 consistent with metastatic melanoma. The right frontal   parietal postoperative bed also appears to be slightly larger raising the possibility of tumor progression." --> discussed results with Neurosx and recommend close outpatient follow-up for gamma knife w/ Dr. Warren, patient and wife are aware  - Discussed with oncology Dr. Plascencia, recommend lovenox and check CT head after to assess for any change/progression

## 2023-08-07 NOTE — PROGRESS NOTE ADULT - PROBLEM SELECTOR PLAN 1
- CTA chest: New bilateral pulmonary emboli; specifically in the distal right main pulmonary artery and left upper and left lower lobar pulmonary artery branches  - Duplex + acute, occlusive, deep venous thrombosis identified involving the left common femoral, proximal femoral,  middle femoral, distal femoral, popliteal, posterior tibial, peroneal and gastrocnemius veins.   - Patient with multiple brain mets due to known melanoma, high risk for hemorrhagic conversion   - Discussed with neurosx PA - okay for AC from their standpoint,  - lovenox BID   - TTE reviewed and normal LVSF   - no current supplemental O2 requirements  - IR consulted per onc recs for IVC filter, at this time no role as patient is on AC

## 2023-08-07 NOTE — PROGRESS NOTE ADULT - SUBJECTIVE AND OBJECTIVE BOX
Patient is a 62y old  Male who presents with a chief complaint of PE (06 Aug 2023 12:58)      SUBJECTIVE / OVERNIGHT EVENTS: wife at bedside - pt frustrated and anxious to go home - called and dw out-pt onc - dw neurosx at bedside     ROS:  No HA/DZ  No Vision changes   No CP, SOB  No N/V/D  No Edema  No Rash  NO weakness, numbness    MEDICATIONS  (STANDING):  dexAMETHasone     Tablet 4 milliGRAM(s) Oral every 6 hours  enoxaparin Injectable 80 milliGRAM(s) SubCutaneous every 12 hours  levETIRAcetam 500 milliGRAM(s) Oral two times a day  pantoprazole    Tablet 40 milliGRAM(s) Oral before breakfast  sodium chloride 0.9%. 1000 milliLiter(s) (65 mL/Hr) IV Continuous <Continuous>    MEDICATIONS  (PRN):  acetaminophen     Tablet .. 650 milliGRAM(s) Oral every 6 hours PRN Temp greater or equal to 38C (100.4F), Mild Pain (1 - 3)  aluminum hydroxide/magnesium hydroxide/simethicone Suspension 30 milliLiter(s) Oral every 4 hours PRN Dyspepsia  melatonin 3 milliGRAM(s) Oral at bedtime PRN Insomnia  ondansetron Injectable 4 milliGRAM(s) IV Push every 8 hours PRN Nausea and/or Vomiting      T(C): 36.4 (08-07-23 @ 12:20)  HR: 84 (08-07-23 @ 12:20)  BP: 136/97 (08-07-23 @ 12:20)  RR: 18 (08-07-23 @ 12:20)  SpO2: 98% (08-07-23 @ 12:20)  CAPILLARY BLOOD GLUCOSE        I&O's Summary      PHYSICAL EXAM:  GENERAL: NAD, well-developed, AOx3  HEAD:  Atraumatic, Normocephalic  EYES: EOMI, PERRL, conjunctiva and sclera clear  NECK: Supple, No JVD  CHEST/LUNG: Clear to auscultation bilaterally  HEART: Regular rate and rhythm; No murmurs, rubs, or gallops, No Edema  ABDOMEN: Soft, Nontender, Nondistended; Bowel sounds present  EXTREMITIES:  2+ Peripheral Pulses, No clubbing, cyanosis  PSYCH: No SI/HI, anxious   NEUROLOGY: non-focal  SKIN: No rashes or lesions    LABS:                        15.2   13.13 )-----------( 157      ( 07 Aug 2023 05:42 )             45.3     08-07    135  |  100  |  30<H>  ----------------------------<  135<H>  4.6   |  22  |  0.84    Ca    8.7      07 Aug 2023 05:42  Phos  4.0     08-07  Mg     2.60     08-07    TPro  6.2  /  Alb  3.3  /  TBili  0.5  /  DBili  x   /  AST  20  /  ALT  51<H>  /  AlkPhos  77  08-07    PT/INR - ( 07 Aug 2023 05:42 )   PT: 11.3 sec;   INR: 1.00 ratio         PTT - ( 07 Aug 2023 05:42 )  PTT:25.8 sec      Urinalysis Basic - ( 07 Aug 2023 05:42 )    Color: x / Appearance: x / SG: x / pH: x  Gluc: 135 mg/dL / Ketone: x  / Bili: x / Urobili: x   Blood: x / Protein: x / Nitrite: x   Leuk Esterase: x / RBC: x / WBC x   Sq Epi: x / Non Sq Epi: x / Bacteria: x            RADIOLOGY & ADDITIONAL TESTS:    Imaging Personally Reviewed:    Consultant(s) Notes Reviewed:      Care Discussed with Consultants/Other Providers:

## 2023-08-07 NOTE — PROGRESS NOTE ADULT - PROBLEM SELECTOR PLAN 3
on immunotherapy C1 of Ipilimumab and nivolumab last on 7/18/23.  - hem/onc input appreciated  -No plans for inpatient immuno/chemotherapy. Next outpatient appt for immunotherapy tentative scheduled for 8/8

## 2023-08-07 NOTE — PROGRESS NOTE ADULT - PROBLEM/PLAN-1
DISPLAY PLAN FREE TEXT
DISPLAY PLAN FREE TEXT
Nutrition assessment for NPO/liquid diet; lives home with family PTA; skin intact; fluctuated wt due to cyclical vomiting per pt, usual body po=764 lb, admission oc=280 lb; nausea/vomiting improved, still having diarrhea last night; NPO/clear liquid diet x 4d in-house, diet just advanced today
DISPLAY PLAN FREE TEXT

## 2023-08-07 NOTE — PROGRESS NOTE ADULT - NSPROGADDITIONALINFOA_GEN_ALL_CORE
I dw wife w neurosx team at bedside in detail  I called pt's oncologist Dr Lázaro Richards this am and dw him plan of care - per Dr Richards concern for feasibility of long term AC and risk of bleed and reaching out to IR for reconsideration of IVC filter - for now repeat CTH stable and no objection by neurosx to cw AC - will await IR re-input on IVC filter - if confirm no plan for IVC filter, dc with plan for gamma knife as out-pt - confirmed gamma knife not available inpt  wife agreeable to plan

## 2023-08-07 NOTE — CHART NOTE - NSCHARTNOTEFT_GEN_A_CORE
case dw neurosx, onc (house/out-pt), IR  final decision to change Lovenox to daily in light of bleed risk and await response to gamma knife  stable for dc  pt planned for RT sana

## 2023-08-07 NOTE — PROGRESS NOTE ADULT - SUBJECTIVE AND OBJECTIVE BOX
INTERVAL HPI/OVERNIGHT EVENTS:  Patient seen at bedside.  Patient expressing frustrations  Would like to be discharged immediately  Accompanied by his wife      VITAL SIGNS:  T(F): 97.5 (08-07-23 @ 12:20)  HR: 84 (08-07-23 @ 12:20)  BP: 136/97 (08-07-23 @ 12:20)  RR: 18 (08-07-23 @ 12:20)  SpO2: 98% (08-07-23 @ 12:20)  Wt(kg): --    PHYSICAL EXAM:      MEDICATIONS  (STANDING):  dexAMETHasone     Tablet 4 milliGRAM(s) Oral every 6 hours  enoxaparin Injectable 80 milliGRAM(s) SubCutaneous every 12 hours  levETIRAcetam 500 milliGRAM(s) Oral two times a day  pantoprazole    Tablet 40 milliGRAM(s) Oral before breakfast  sodium chloride 0.9%. 1000 milliLiter(s) (65 mL/Hr) IV Continuous <Continuous>    MEDICATIONS  (PRN):  acetaminophen     Tablet .. 650 milliGRAM(s) Oral every 6 hours PRN Temp greater or equal to 38C (100.4F), Mild Pain (1 - 3)  aluminum hydroxide/magnesium hydroxide/simethicone Suspension 30 milliLiter(s) Oral every 4 hours PRN Dyspepsia  melatonin 3 milliGRAM(s) Oral at bedtime PRN Insomnia  ondansetron Injectable 4 milliGRAM(s) IV Push every 8 hours PRN Nausea and/or Vomiting      Allergies    No Known Allergies    Intolerances        LABS:                        15.2   13.13 )-----------( 157      ( 07 Aug 2023 05:42 )             45.3     08-07    135  |  100  |  30<H>  ----------------------------<  135<H>  4.6   |  22  |  0.84    Ca    8.7      07 Aug 2023 05:42  Phos  4.0     08-07  Mg     2.60     08-07    TPro  6.2  /  Alb  3.3  /  TBili  0.5  /  DBili  x   /  AST  20  /  ALT  51<H>  /  AlkPhos  77  08-07    PT/INR - ( 07 Aug 2023 05:42 )   PT: 11.3 sec;   INR: 1.00 ratio         PTT - ( 07 Aug 2023 05:42 )  PTT:25.8 sec  Urinalysis Basic - ( 07 Aug 2023 05:42 )    Color: x / Appearance: x / SG: x / pH: x  Gluc: 135 mg/dL / Ketone: x  / Bili: x / Urobili: x   Blood: x / Protein: x / Nitrite: x   Leuk Esterase: x / RBC: x / WBC x   Sq Epi: x / Non Sq Epi: x / Bacteria: x        RADIOLOGY & ADDITIONAL TESTS:  Studies reviewed.     INTERVAL HPI/OVERNIGHT EVENTS:  Patient seen at bedside.  Patient expressing frustrations  Would like to be discharged immediately  No acute symptoms reported  Accompanied by his wife      VITAL SIGNS:  T(F): 97.5 (08-07-23 @ 12:20)  HR: 84 (08-07-23 @ 12:20)  BP: 136/97 (08-07-23 @ 12:20)  RR: 18 (08-07-23 @ 12:20)  SpO2: 98% (08-07-23 @ 12:20)  Wt(kg): --    PHYSICAL EXAM:  PHYSICAL EXAM:  GENERAL: NAD, well-developed, AOx3  HEAD:  Atraumatic, Normocephalic  EYES: EOMI, PERRL, conjunctiva and sclera clear  NECK: Supple, No JVD  CHEST/LUNG: Clear to auscultation bilaterally  HEART: Regular rate and rhythm; No murmurs, rubs, or gallops, No Edema  ABDOMEN: Soft, Nontender, Nondistended; Bowel sounds present  EXTREMITIES:  2+ Peripheral Pulses, No clubbing, cyanosis  PSYCH: No SI/HI, anxious   NEUROLOGY: non-focal  SKIN: No rashes or lesions      MEDICATIONS  (STANDING):  dexAMETHasone     Tablet 4 milliGRAM(s) Oral every 6 hours  enoxaparin Injectable 80 milliGRAM(s) SubCutaneous every 12 hours  levETIRAcetam 500 milliGRAM(s) Oral two times a day  pantoprazole    Tablet 40 milliGRAM(s) Oral before breakfast  sodium chloride 0.9%. 1000 milliLiter(s) (65 mL/Hr) IV Continuous <Continuous>    MEDICATIONS  (PRN):  acetaminophen     Tablet .. 650 milliGRAM(s) Oral every 6 hours PRN Temp greater or equal to 38C (100.4F), Mild Pain (1 - 3)  aluminum hydroxide/magnesium hydroxide/simethicone Suspension 30 milliLiter(s) Oral every 4 hours PRN Dyspepsia  melatonin 3 milliGRAM(s) Oral at bedtime PRN Insomnia  ondansetron Injectable 4 milliGRAM(s) IV Push every 8 hours PRN Nausea and/or Vomiting      Allergies    No Known Allergies    Intolerances        LABS:                        15.2   13.13 )-----------( 157      ( 07 Aug 2023 05:42 )             45.3     08-07    135  |  100  |  30<H>  ----------------------------<  135<H>  4.6   |  22  |  0.84    Ca    8.7      07 Aug 2023 05:42  Phos  4.0     08-07  Mg     2.60     08-07    TPro  6.2  /  Alb  3.3  /  TBili  0.5  /  DBili  x   /  AST  20  /  ALT  51<H>  /  AlkPhos  77  08-07    PT/INR - ( 07 Aug 2023 05:42 )   PT: 11.3 sec;   INR: 1.00 ratio         PTT - ( 07 Aug 2023 05:42 )  PTT:25.8 sec  Urinalysis Basic - ( 07 Aug 2023 05:42 )    Color: x / Appearance: x / SG: x / pH: x  Gluc: 135 mg/dL / Ketone: x  / Bili: x / Urobili: x   Blood: x / Protein: x / Nitrite: x   Leuk Esterase: x / RBC: x / WBC x   Sq Epi: x / Non Sq Epi: x / Bacteria: x        RADIOLOGY & ADDITIONAL TESTS:  Studies reviewed.

## 2023-08-07 NOTE — DISCHARGE NOTE NURSING/CASE MANAGEMENT/SOCIAL WORK - NSDCPEFALRISK_GEN_ALL_CORE
For information on Fall & Injury Prevention, visit: [email or web address]  [email or web address] OR  https://www.cdc.gov/steadi/patient.html

## 2023-08-07 NOTE — DISCHARGE NOTE NURSING/CASE MANAGEMENT/SOCIAL WORK - PATIENT PORTAL LINK FT
You can access the FollowMyHealth Patient Portal offered by [facility] by registering at the following website: [email or web address]. By joining [facility]’s FollowMyHealth portal, you will also be able to view your health information using other applications (apps) compatible with our system.

## 2023-08-07 NOTE — CHART NOTE - NSCHARTNOTESELECT_GEN_ALL_CORE
Event Note
Event Note
NSG/Event Note
critical finding/Event Note
Hematology/Event Note
MR/Event Note

## 2023-08-07 NOTE — PROGRESS NOTE ADULT - ASSESSMENT
62y Male  with PMH of met Melanoma with mets to the brain and lungs on immunotherapy presents to the ED with SOB, found to have PE on outpatient CT chest, admitted for further eval. Most recently started  on immunotherapy C1 of Ipilimumab and nivolumab last on 7/18/23. Admitted for PE  Oncology consulted for further evaluation     CT chest with IV con 8/3  New bilateral pulmonary emboli; specifically in the distal right main   pulmonary artery and left upper and left lower lobar pulmonary artery   branches. These are suboptimally assessed due to poor contrast   opacification. No CT evidence of right heart strain.  A 9.2 cm heterogeneous right upper lobe pulmonary mass is minimally   increased in size since June 22, 2023.    CT head 8/3  Redemonstration of multiple hemorrhagic intracranial   metastatic masses with surrounding vasogenic edema, as discussed,   compatible with the patient's reported history of metastatic melanoma.  Overall appearance is similar when compared to the outside MRI study from   6/27/2023, given differences in modality.        -CT chest and head as above  -US BLE + LLE extensive DVT  -Appreciate NeuroSx recs and clearance to resume AC, s/p  heparin gtt now on Lovenox 80mg q12h.  - Given active and UNTREATED hemorrhagic brain mets would veer on the side of caution re AC and discharge on Lovenox 80mg [id number]. This recommendation was discussed with patient oncologist Dr Richards and hematologist Dr Myron Plascencia. Further recommendations re anticoagulation  to made as an outpatient. Patient has Rad Onc appt tomorrow at 230 with DR Warren to initiate GK/RT treatment. Continue with steroids and Keppra.   -No contraindication to discharge. . Would dischae ion . Would rec transitioning to Lovenox when medically optimized. Lovenox more reversible than DOAC in the event of bleed. Patient endorsing familiarity to medication and injection  -Repeat CT head non con and MR brain reviewed  -ECHO reviewed  -No plans for inpatient immuno/chemotherapy. Next outpatient  appt for immunotherapy tentative scheduled for 8/8   -C/w Supportive care, pain control, Nutrition, PT, DVT ppx  -Rest of care as per primary team  -Patient to followup with Dr. Richards  ([facility]) upon discharge  -Oncology will continue to follow with you    Case d/w oncology attendings  Dr Richards, Dr Arguelol and hematologist Dr Myron Plascencia.      Michelle MARTINEZ  Oncology Physician Assistant  [facility]    Pager [phone] also available on TEAMS as Michelle MARTINEZ    If before 8am/after 5pm or on weekends please page On-call Oncology Fellow   62y Male  with PMH of met Melanoma with mets to the brain and lungs on immunotherapy presents to the ED with SOB, found to have PE on outpatient CT chest, admitted for further eval. Most recently started  on immunotherapy C1 of Ipilimumab and nivolumab last on 7/18/23. Admitted for PE  Oncology consulted for further evaluation     CT chest with IV con 8/3  New bilateral pulmonary emboli; specifically in the distal right main   pulmonary artery and left upper and left lower lobar pulmonary artery   branches. These are suboptimally assessed due to poor contrast   opacification. No CT evidence of right heart strain.  A 9.2 cm heterogeneous right upper lobe pulmonary mass is minimally   increased in size since June 22, 2023.    CT head 8/3  Redemonstration of multiple hemorrhagic intracranial   metastatic masses with surrounding vasogenic edema, as discussed,   compatible with the patient's reported history of metastatic melanoma.  Overall appearance is similar when compared to the outside MRI study from   6/27/2023, given differences in modality.        -CT chest and head as above  -US BLE + LLE extensive DVT  -Appreciate NeuroSx recs and clearance to resume AC, s/p  heparin gtt now on Lovenox 80mg q12h.  - Given active and UNTREATED hemorrhagic brain mets would veer on the side of caution re AC and discharge on Lovenox 80mg [id number]. This recommendation was discussed with patient oncologist Dr Richards and hematologist Dr Myron Plascencia. Further recommendations re anticoagulation  to made as an outpatient. Patient has Rad Onc appt tomorrow at 230pm with DR Warren to initiate GK/RT treatment. Continue with steroids and Keppra.   -Repeat CT head non con and MR brain reviewed  -ECHO reviewed  -No plans for inpatient immuno/chemotherapy. Next outpatient  appt for immunotherapy tentative scheduled for 8/8   -C/w Supportive care, pain control, Nutrition, PT, DVT ppx  -Rest of care as per primary team  -Patient to followup with Dr. Richards  ([facility]) upon discharge  -Oncology will continue to follow with you    Case d/w oncology attendings  Dr Richards, Dr Arguello and hematologist Dr Myron Plascencia.      Michelle MARTINEZ  Oncology Physician Assistant  [facility]    Pager [phone] also available on TEAMS as Michelle MARTINEZ    If before 8am/after 5pm or on weekends please page On-call Oncology Fellow   62y Male  with PMH of met Melanoma with mets to the brain and lungs on immunotherapy presents to the ED with SOB, found to have PE on outpatient CT chest, admitted for further eval. Most recently started  on immunotherapy C1 of Ipilimumab and nivolumab last on 7/18/23. Admitted for PE  Oncology consulted for further evaluation     CT chest with IV con 8/3  New bilateral pulmonary emboli; specifically in the distal right main   pulmonary artery and left upper and left lower lobar pulmonary artery   branches. These are suboptimally assessed due to poor contrast   opacification. No CT evidence of right heart strain.  A 9.2 cm heterogeneous right upper lobe pulmonary mass is minimally   increased in size since June 22, 2023.    CT head 8/3  Redemonstration of multiple hemorrhagic intracranial   metastatic masses with surrounding vasogenic edema, as discussed,   compatible with the patient's reported history of metastatic melanoma.  Overall appearance is similar when compared to the outside MRI study from   6/27/2023, given differences in modality.        -CT chest and head as above  -US BLE + LLE extensive DVT  -Appreciate NeuroSx recs and clearance to resume AC, s/p  heparin gtt now on Lovenox 80mg q12h.  - Given active and UNTREATED hemorrhagic brain mets would veer on the side of caution re AC and discharge on Lovenox 80mg [id number]. This recommendation was discussed with patient oncologist Dr Richards and hematologist Dr Myron Plascencia. Further recommendations re anticoagulation  to made as an outpatient. Patient has Rad Onc appt tomorrow at 230pm with DR Warren to initiate GK/RT treatment. Continue with steroids and Keppra.   -Repeat CT head non con and MR brain reviewed  -Outpatient oncology to coordinate repeat CT head as outpatient  -ECHO reviewed  -No plans for inpatient immuno/chemotherapy. Next outpatient  appt for immunotherapy tentative scheduled for 8/8   -C/w Supportive care, pain control, Nutrition, PT, DVT ppx  -Rest of care as per primary team  -Patient to followup with Dr. Richards  ([facility]) upon discharge  -Oncology will continue to follow with you    Case d/w oncology attendings  Dr Richards, Dr Arguello and hematologist Dr Myron Plascencia.      Michelle MARTINEZ  Oncology Physician Assistant  [facility]    Pager [phone] also available on TEAMS as Michelle MARTINEZ    If before 8am/after 5pm or on weekends please page On-call Oncology Fellow   62y Male  with PMH of met Melanoma with mets to the brain and lungs on immunotherapy presents to the ED with SOB, found to have PE on outpatient CT chest, admitted for further eval. Most recently started  on immunotherapy C1 of Ipilimumab and nivolumab last on 7/18/23. Admitted for PE  Oncology consulted for further evaluation     CT chest with IV con 8/3  New bilateral pulmonary emboli; specifically in the distal right main   pulmonary artery and left upper and left lower lobar pulmonary artery   branches. These are suboptimally assessed due to poor contrast   opacification. No CT evidence of right heart strain.  A 9.2 cm heterogeneous right upper lobe pulmonary mass is minimally   increased in size since June 22, 2023.    CT head 8/3  Redemonstration of multiple hemorrhagic intracranial   metastatic masses with surrounding vasogenic edema, as discussed,   compatible with the patient's reported history of metastatic melanoma.  Overall appearance is similar when compared to the outside MRI study from   6/27/2023, given differences in modality.        -CT chest and head as above  -US BLE + LLE extensive DVT  -Appreciate NeuroSx recs and clearance to resume AC, s/p  heparin gtt now on Lovenox 80mg q12h. (lovenox 1mg/kg daily)  - Given active and UNTREATED hemorrhagic brain mets would veer on the side of caution re AC and discharge on Lovenox 80mg [id number]. This recommendation was discussed with patient oncologist Dr Richards and hematologist Dr Myron Plascencia. Further recommendations re anticoagulation  to made as an outpatient. Patient has Rad Onc appt tomorrow at 230pm with DR Warren to initiate GK/RT treatment. Continue with steroids and Keppra.   -Repeat CT head non con and MR brain reviewed  -Outpatient oncology to coordinate repeat CT head as outpatient  -ECHO reviewed  -No plans for inpatient immuno/chemotherapy. Next outpatient  appt for immunotherapy tentative scheduled for 8/8   -C/w Supportive care, pain control, Nutrition, PT, DVT ppx  -Rest of care as per primary team  -Patient to followup with Dr. Richards  ([facility]) upon discharge  -Oncology will continue to follow with you    Case d/w oncology attendings  Dr Richards, Dr Arguello and hematologist Dr Myron Plascencia.      Michelle MARTINEZ  Oncology Physician Assistant  [facility]    Pager [phone] also available on TEAMS as Michelle MARTINEZ    If before 8am/after 5pm or on weekends please page On-call Oncology Fellow

## 2023-08-09 PROBLEM — C43.9 MALIGNANT MELANOMA OF SKIN, UNSPECIFIED: Chronic | Status: ACTIVE | Noted: 2023-01-01

## 2023-08-10 NOTE — PHYSICAL EXAM
[Fully active, able to carry on all pre-disease performance without restriction] : Status 0 - Fully active, able to carry on all pre-disease performance without restriction [Normal] : affect appropriate [de-identified] : Right foot drop

## 2023-08-10 NOTE — HISTORY OF PRESENT ILLNESS
[Disease: _____________________] : Disease: [unfilled] [M: ___] : M[unfilled] [AJCC Stage: ____] : AJCC Stage: [unfilled] [de-identified] : DIAGNOSIS:  M1d, stage IV, melanoma  MOLECULAR FINDINGS:  [employer] OnkoSight NGS BRAF was negative for a BRAF mutation  CURRENT THERAPY:  Ipilimumab and nivolumab (C1: 07/18/2023; C2: previously scheduled for 08/08/2023, but now delayed)  PRIOR THERAPIES:  None  INTERVAL HISTORY: Mr. 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 Alla Mendez at [facility] in 2014, who is seen today for a discussion of management options for his recently diagnosed BRAF wild-type M1d, stage IV melanoma.  He was last evaluated by his dermatologist approximately 18 months ago.  Briefly, he initially developed a change in mental status, with slight confusion and balance problems, on Friday 06/16/2023.  Non contrast head CT scan, performed on 06/22/2022 was significant for multiple masses in the bilateral cerebral hemisphere, the largest measuring approximately 5 cm in the right parietal lobe, associated with mass effect and a 14 mm right to left midline shift. He was then admitted to [facility].  Chest, abdominal and pelvic CT scan, performed on 06/22/2023 demonstrates a 9cm heterogeneous right upper lobe mass extending to the right hilum and right perihilar adenopathy.  Brain MRI, performed on 06/23/2023, again demonstrated the multifocal brain metastases associated with adjacent edema.  He then underwent right craniotomy for resection of the dominant right frontal parietal complex mass on 06/26/2023 by Dr. Robert Worthy.  Pathology was consistent with metastatic melanoma.  He was evaluated by Dr. Maco Marr hematology/oncology as well as by Dr. Nasima English of radiation oncology, and was ultimately discharged on 06/26/2023.  He has had the staples removed.    Since he was last seen here, he was admitted for symptomatic pulmonary emboli and PEs.  He was discharged on lovenox QD and has since initiated radiotherapy for the brain metastases.  His steroids have been tapered to decadron 4 mg TID, with plans to decrease to BID on Tuesday which is his final planned dose of RT.  He remains dyspeic upon exertion; however, his O2 saturation remains in the high 90% range.  PAST MEDICAL HISTORY: 1. 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 Right lower extremity deep venous thrombosis in the setting of a vein stripping procedure  SOCIAL HISTORY: , denies tobacco.  He drinks alcohol socially.  He has two daughters, one who is getting  later on this year in [place], and the other who is having a baby in the near future.   He currently works in [employer].  ALLERGIES:  No known drug allergies  CURRENT MEDICATIONS: 1. Decadron taper 2. Keppra [de-identified] : LDH [ECOG Performance Status: 2 - Ambulatory and capable of all self care but unable to carry out any work activities] : Performance Status: 2 - Ambulatory and capable of all self care but unable to carry out any work activities. Up and about more than 50% of waking hours

## 2023-08-10 NOTE — REVIEW OF SYSTEMS
[Fatigue] : fatigue [Recent Change In Weight] : ~T recent weight change [SOB on Exertion] : shortness of breath during exertion [Joint Pain] : joint pain [Anxiety] : anxiety [Negative] : Allergic/Immunologic [de-identified] : Right foot drop

## 2023-08-17 NOTE — HISTORY OF PRESENT ILLNESS
[Disease: _____________________] : Disease: [unfilled] [M: ___] : M[unfilled] [AJCC Stage: ____] : AJCC Stage: [unfilled] [ECOG Performance Status: 2 - Ambulatory and capable of all self care but unable to carry out any work activities] : Performance Status: 2 - Ambulatory and capable of all self care but unable to carry out any work activities. Up and about more than 50% of waking hours [de-identified] : DIAGNOSIS:  M1d, stage IV, melanoma  MOLECULAR FINDINGS:  Genpath OnkoSight NGS BRAF was negative for a BRAF mutation  CURRENT THERAPY:  Ipilimumab and nivolumab (C1: 07/18/2023; C2: previously scheduled for 08/08/2023, but now delayed)  PRIOR THERAPIES:  None  INTERVAL HISTORY: Mr. 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 Alla Mendez at [facility] in 2014, who is seen today for a discussion of management options for his recently diagnosed BRAF wild-type M1d, stage IV melanoma.  He was last evaluated by his dermatologist approximately 18 months ago.  Briefly, he initially developed a change in mental status, with slight confusion and balance problems, on Friday 06/16/2023.  Non contrast head CT scan, performed on 06/22/2022 was significant for multiple masses in the bilateral cerebral hemisphere, the largest measuring approximately 5 cm in the right parietal lobe, associated with mass effect and a 14 mm right to left midline shift. He was then admitted to [facility].  Chest, abdominal and pelvic CT scan, performed on 06/22/2023 demonstrates a 9cm heterogeneous right upper lobe mass extending to the right hilum and right perihilar adenopathy.  Brain MRI, performed on 06/23/2023, again demonstrated the multifocal brain metastases associated with adjacent edema.  He then underwent right craniotomy for resection of the dominant right frontal parietal complex mass on 06/26/2023 by Dr. Robert Worthy.  Pathology was consistent with metastatic melanoma.  He was evaluated by Dr. Maco Marr hematology/oncology as well as by Dr. Nasima English of radiation oncology, and was ultimately discharged on 06/26/2023.  He has had the staples removed.    Prior to GK, he was admitted for symptomatic pulmonary emboli and PEs.  He remains on lovenox 80mg QD and has completed 5 fractions of GK radiotherapy on 8/15 for the brain metastases. CT brain was done yesterday to assess for bleeding. Official reading is pending but does not appear to reflect any acute bleeding. He has no new neuro symptoms.  His steroids have been tapered to decadron 4 mg BID, with plans to decrease to decadron 2mg BID tomorrow (8/18). He will resume immunotherapy today which was delayed for recent hospitalization and GK.   He remains dyspneic upon exertion; however, his O2 saturation remains in the high 90% range. His heart rate however, increases to 130's with exrtion.   PAST MEDICAL HISTORY: 1. 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 Right lower extremity deep venous thrombosis in the setting of a vein stripping procedure  SOCIAL HISTORY: , denies tobacco.  He drinks alcohol socially.  He has two daughters, one who is getting  later on this year in [place], and the other who is having a baby in the near future.   He currently works in [employer].  ALLERGIES:  No known drug allergies  CURRENT MEDICATIONS: 1. Decadron taper 2. Keppra 3. Bactrim 4. Lovenox 80mg once daily [de-identified] : LDH

## 2023-08-17 NOTE — PHYSICAL EXAM
[Fully active, able to carry on all pre-disease performance without restriction] : Status 0 - Fully active, able to carry on all pre-disease performance without restriction [Normal] : affect appropriate [de-identified] : Tachycardia  [de-identified] : Trace L. ankle edema [de-identified] : Right foot drop

## 2023-08-17 NOTE — REVIEW OF SYSTEMS
[Fatigue] : fatigue [Recent Change In Weight] : ~T recent weight change [SOB on Exertion] : shortness of breath during exertion [Joint Pain] : joint pain [Anxiety] : anxiety [Negative] : Allergic/Immunologic [de-identified] : Right foot drop

## 2023-08-24 NOTE — REVIEW OF SYSTEMS
[Fatigue] : fatigue [Recent Change In Weight] : ~T recent weight change [SOB on Exertion] : shortness of breath during exertion [Diarrhea: Grade 0] : Diarrhea: Grade 0 [Negative] : Allergic/Immunologic

## 2023-08-29 NOTE — ASSESSMENT
[FreeTextEntry1] : Mr. Nogueira is a 62 year old gentleman with a prior history of a cutaneous melanoma of the right cheek, now with an M1d, stage IV, melanoma affecting the brain, lungs and lymph nodes. His tumor is wild type for BRAF.  We briefly reviewed his tumor molecular findings and potential clinical and therapeutic implications.  At this time, we will continue him on ipilmumab and nivolumab, along with weekly toxicity checks for the first 2 doses followed by restaging studies.  Clinically, he is improving.  We will decrease his decadron to 2 mg QAM and monitor him closely for symptoms.  We will continue his current lovenox dosing but may increase in the future.  He has not yet initiated physical therapy for the foot drop; however, a prescription has already been provided.  We will see him back in clinic next week for a toxicity check.  Of note, he has a few trips upcoming, including one to [place] for the [employer] game as well as a trip to [place].

## 2023-08-29 NOTE — HISTORY OF PRESENT ILLNESS
[Disease: _____________________] : Disease: [unfilled] [M: ___] : M[unfilled] [AJCC Stage: ____] : AJCC Stage: [unfilled] [de-identified] : DIAGNOSIS:  M1d, stage IV, melanoma  MOLECULAR FINDINGS:  Genpath OnkoSight NGS BRAF was negative for a BRAF mutation  [employer]: TMB 82 mut/Mb, LEONARDA, KRAS Q22K, NF1 Q684*  S1001C, EZH2, DAXX, , SPHA3, SPEN, STK11 Q302*, TERT promoter mutation (wt for BRAF, KIT and NNRAS)   CURRENT THERAPY:  Ipilimumab and nivolumab (C1: 07/18/2023; C2: delayed, administered on 08/17/2023)  PRIOR THERAPIES:  None  INTERVAL HISTORY: Mr. 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 Alla Mendez at [facility] in 2014, who is seen today for continued management of his M1d, stage IV melanoma, now with brain and lung metastases.  HIs treatment course has been complicated by the development of a DVT and PE. He was last evaluated by his dermatologist approximately 18 months ago.  Briefly, he initially developed a change in mental status, with slight confusion and balance problems, on Friday 06/16/2023.  Non contrast head CT scan, performed on 06/22/2022 was significant for multiple masses in the bilateral cerebral hemisphere, the largest measuring approximately 5 cm in the right parietal lobe, associated with mass effect and a 14 mm right to left midline shift. He was then admitted to [facility].  Chest, abdominal and pelvic CT scan, performed on 06/22/2023 demonstrates a 9cm heterogeneous right upper lobe mass extending to the right hilum and right perihilar adenopathy.  Brain MRI, performed on 06/23/2023, again demonstrated the multifocal brain metastases associated with adjacent edema.  He then underwent right craniotomy for resection of the dominant right frontal parietal complex mass on 06/26/2023 by Dr. Robert Worthy.  Pathology was consistent with metastatic melanoma.  He was evaluated by Dr. Maco Marr hematology/oncology as well as by Dr. Nasima English of radiation oncology, and was ultimately discharged on 06/26/2023.  He has had the staples removed.    He was initiated on therapy with ipilimumab and nivolumab, receiving his first dose on 07/18/2023. Prior to his second dose, he was admitted for symptomatic pulmonary emboli and PEs.  He was discharged on lovenox QD.  He remains on lovenox 80mg QD and has completed 5 fractions of GK radiotherapy on 08/15/2023 for the brain metastases. A head CT scan, performed on 08/16/2023, when compared with a prior study dated 08/07/2023, demonstrated relative stability and no significant change in the hemorraghic brain metastases despite the anticoagulation.  His steroids have been tapered to decadron 2 mg BID. He received his second dose of ipilimumab and nivolumab last week and presents for a toxicity check.  PAST MEDICAL HISTORY: 1. 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 Right lower extremity deep venous thrombosis in the setting of a vein stripping procedure  SOCIAL HISTORY: , denies tobacco.  He drinks alcohol socially.  He has two daughters, one who is getting  later on this year in [place], and the other who is having a baby in the near future.   He currently works in [employer].  ALLERGIES:  No known drug allergies  CURRENT MEDICATIONS: 1. Decadron taper 2. Keppra [de-identified] : LDH

## 2023-08-31 PROBLEM — I26.99 PULMONARY EMBOLISM, OTHER: Status: ACTIVE | Noted: 2023-01-01

## 2023-08-31 NOTE — PHYSICAL EXAM
[Restricted in physically strenuous activity but ambulatory and able to carry out work of a light or sedentary nature] : Status 1- Restricted in physically strenuous activity but ambulatory and able to carry out work of a light or sedentary nature, e.g., light house work, office work [Normal] : affect appropriate [de-identified] : ROSALBA foot drop

## 2023-08-31 NOTE — HISTORY OF PRESENT ILLNESS
[Disease: _____________________] : Disease: [unfilled] [M: ___] : M[unfilled] [AJCC Stage: ____] : AJCC Stage: [unfilled] [de-identified] : DIAGNOSIS:  M1d, stage IV, melanoma  MOLECULAR FINDINGS:  Genpath OnkoSight NGS BRAF was negative for a BRAF mutation  FoundationOne: TMB 82 mut/Mb, LEONARDA, KRAS Q22K, NF1 Q684*  Q9930M, EZH2, DAXX, , SPHA3, SPEN, STK11 Q302*, TERT promoter mutation (wt for BRAF, KIT and NNRAS)   CURRENT THERAPY:  Ipilimumab and nivolumab (C1: 07/18/2023; C2: delayed, administered on 08/17/2023)  PRIOR THERAPIES:  None  INTERVAL HISTORY: Mr. 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 Alla Mendez at [facility] in 2014, who is seen today for continued management of his M1d, stage IV melanoma, now with brain and lung metastases.  HIs treatment course has been complicated by the development of a DVT and PE. He was last evaluated by his dermatologist approximately 18 months ago.  Briefly, he initially developed a change in mental status, with slight confusion and balance problems, on Friday 06/16/2023.  Non contrast head CT scan, performed on 06/22/2022 was significant for multiple masses in the bilateral cerebral hemisphere, the largest measuring approximately 5 cm in the right parietal lobe, associated with mass effect and a 14 mm right to left midline shift. He was then admitted to [facility].  Chest, abdominal and pelvic CT scan, performed on 06/22/2023 demonstrates a 9cm heterogeneous right upper lobe mass extending to the right hilum and right perihilar adenopathy.  Brain MRI, performed on 06/23/2023, again demonstrated the multifocal brain metastases associated with adjacent edema.  He then underwent right craniotomy for resection of the dominant right frontal parietal complex mass on 06/26/2023 by Dr. Robert Worthy.  Pathology was consistent with metastatic melanoma.  He was evaluated by Dr. Maco Marr hematology/oncology as well as by Dr. Nasima English of radiation oncology, and was ultimately discharged on 06/26/2023.  He has had the staples removed.    He was initiated on therapy with ipilimumab and nivolumab, receiving his first dose on 07/18/2023. Prior to his second dose, he was admitted for symptomatic pulmonary emboli and PEs.  He was discharged on lovenox 80mg QD.  He remains on lovenox 80mg QD and has completed 5 fractions of GK radiotherapy on 08/15/2023 for the brain metastases. A head CT scan, performed on 08/16/2023, when compared with a prior study dated 08/07/2023, demonstrated relative stability and no significant change in the hemorraghic brain metastases despite the anticoagulation.  His steroids have been tapered to decadron 2 mg daily. He received his second dose of ipilimumab and nivolumab on 8/17/23 and presents for a toxicity check and follow up. He is feeling stronger and reports less LOCKHART. Platelets have been low but remain stable at 83. No reported bruising or bleeding. Pt starting to gain weight back. No new concerns or complaints.   PAST MEDICAL HISTORY: 1. 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 Right lower extremity deep venous thrombosis in the setting of a vein stripping procedure  SOCIAL HISTORY: , denies tobacco.  He drinks alcohol socially.  He has two daughters, one who is getting  later on this year in [place], and the other who is having a baby in the near future.   He currently works in [employer].  ALLERGIES:  No known drug allergies  CURRENT MEDICATIONS: 1. Decadron taper 2. Keppra [de-identified] : LDH

## 2023-08-31 NOTE — ASSESSMENT
[FreeTextEntry1] : Mr. Nogueira is a 62 year old gentleman with a prior history of a cutaneous melanoma of the right cheek, now with an M1d, stage IV, melanoma affecting the brain, lungs and lymph nodes. His tumor is wild type for BRAF.  We reviewed his recent blood work and potential clinical and therapeutic implications.  At this time, we will continue him on ipilmumab and nivolumab. He is scheduled for C3 on 9/7/23. We will continue with weekly toxicity checks and blood work to monitor platelets.  We will continue his decadron to 2 mg QAM.  We will continue his current lovenox dosing. Given current platelet count and higher risk of bleeding, we are not inclined to increase dose. He has not yet initiated physical therapy for the foot drop; we will re-refer and involve nurse navigation to expedite scheduling.  We will schedule imaging studies in the next week to assess response to therapy. We will see him back in clinic next week for a toxicity check.  Of note, he has a few trips upcoming, including one to [place] for the [employer] game as well as a trip to [place].

## 2023-09-07 NOTE — ASSESSMENT
[FreeTextEntry1] : Mr. Nogueira is a 62 year old gentleman with a prior history of a cutaneous melanoma of the right cheek, now with an M1d, stage IV, melanoma affecting the brain, lungs and lymph nodes. His tumor is wild type for BRAF.  We reviewed his recent blood work and potential clinical and therapeutic implications.  At this time, we will continue him on ipilmumab and nivolumab. He is scheduled for C3 today. We will continue with weekly toxicity checks and blood work to monitor platelets following this cycle. If bw remains stable, we can space out monitoring.   We will continue his decadron to 2 mg QAM until he returns from [place]. We have advised him to double the steroid the day before and day of travel.  We will continue his current lovenox dosing. Given current platelet count and higher risk of bleeding, we are not inclined to increase dose. He has not yet initiated physical therapy for the foot drop; we re-referred and requested nurse navigation to expedite scheduling. He ia scheduled for imaging studies this weekend to assess response to therapy. We will see him back in clinic next week for a toxicity check and review of imaging.

## 2023-09-07 NOTE — HISTORY OF PRESENT ILLNESS
[Disease: _____________________] : Disease: [unfilled] [M: ___] : M[unfilled] [AJCC Stage: ____] : AJCC Stage: [unfilled] [de-identified] : DIAGNOSIS:  M1d, stage IV, melanoma  MOLECULAR FINDINGS:  Genpath OnkoSight NGS BRAF was negative for a BRAF mutation  FoundationOne: TMB 82 mut/Mb, LEONARDA, KRAS Q22K, NF1 Q684*  L8906L, EZH2, DAXX, , SPHA3, SPEN, STK11 Q302*, TERT promoter mutation (wt for BRAF, KIT and NNRAS)   CURRENT THERAPY:  Ipilimumab and nivolumab (C1: 07/18/2023; C2: delayed, administered on 08/17/2023)  PRIOR THERAPIES:  None  INTERVAL HISTORY: Mr. 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 Alla Mendez at [facility] in 2014, who is seen today for continued management of his M1d, stage IV melanoma, now with brain and lung metastases.  HIs treatment course has been complicated by the development of a DVT and PE. He was last evaluated by his dermatologist approximately 18 months ago.  Briefly, he initially developed a change in mental status, with slight confusion and balance problems, on Friday 06/16/2023.  Non contrast head CT scan, performed on 06/22/2022 was significant for multiple masses in the bilateral cerebral hemisphere, the largest measuring approximately 5 cm in the right parietal lobe, associated with mass effect and a 14 mm right to left midline shift. He was then admitted to [facility].  Chest, abdominal and pelvic CT scan, performed on 06/22/2023 demonstrates a 9cm heterogeneous right upper lobe mass extending to the right hilum and right perihilar adenopathy.  Brain MRI, performed on 06/23/2023, again demonstrated the multifocal brain metastases associated with adjacent edema.  He then underwent right craniotomy for resection of the dominant right frontal parietal complex mass on 06/26/2023 by Dr. Robert Worthy.  Pathology was consistent with metastatic melanoma.  He was evaluated by Dr. Maco Marr hematology/oncology as well as by Dr. Nasima English of radiation oncology, and was ultimately discharged on 06/26/2023.  He has had the staples removed.    He was initiated on therapy with ipilimumab and nivolumab, receiving his first dose on 07/18/2023. Prior to his second dose, he was admitted for symptomatic pulmonary emboli and PEs.  He was discharged on lovenox 80mg QD.  He remains on lovenox 80mg QD and has completed 5 fractions of GK radiotherapy on 08/15/2023 for the brain metastases. A head CT scan, performed on 08/16/2023, when compared with a prior study dated 08/07/2023, demonstrated relative stability and no significant change in the hemorraghic brain metastases despite the anticoagulation.  His steroids have been tapered to decadron 2 mg daily. he tolerated taper wo incident. He received his second dose of ipilimumab and nivolumab on 8/17/23 and is scheduled for C3 today. He continues to feel stronger and reports less LOCKHART. Platelets remain low but improving. Today they are 129. No reported bruising or bleeding. Pt will be traveling to [place] next friday and is looking forward to it. No new concerns or complaints.   PAST MEDICAL HISTORY: 1. 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 Right lower extremity deep venous thrombosis in the setting of a vein stripping procedure  SOCIAL HISTORY: , denies tobacco.  He drinks alcohol socially.  He has two daughters, one who is getting  later on this year in [place], and the other who is having a baby in the near future.   He currently works in [employer].  ALLERGIES:  No known drug allergies  CURRENT MEDICATIONS: 1. Decadron taper 2. Keppra [de-identified] : LDH

## 2023-09-07 NOTE — PHYSICAL EXAM
[Restricted in physically strenuous activity but ambulatory and able to carry out work of a light or sedentary nature] : Status 1- Restricted in physically strenuous activity but ambulatory and able to carry out work of a light or sedentary nature, e.g., light house work, office work [Normal] : affect appropriate [de-identified] : ROSALBA foot drop

## 2023-09-29 PROBLEM — C78.00: Status: ACTIVE | Noted: 2023-01-01

## 2023-10-09 PROBLEM — D75.89 BICYTOPENIA: Status: ACTIVE | Noted: 2023-01-01

## 2023-12-06 PROBLEM — L70.8 ACNEIFORM RASH: Status: ACTIVE | Noted: 2023-01-01

## 2023-12-22 NOTE — PHYSICAL EXAM
[Restricted in physically strenuous activity but ambulatory and able to carry out work of a light or sedentary nature] : Status 1- Restricted in physically strenuous activity but ambulatory and able to carry out work of a light or sedentary nature, e.g., light house work, office work [Normal] : affect appropriate [de-identified] : Facial skin rash [de-identified] : Alert and oriented. Appears confused. Affect blunted.

## 2023-12-22 NOTE — REVIEW OF SYSTEMS
[Fatigue] : fatigue [Recent Change In Weight] : ~T recent weight change [Diarrhea: Grade 0] : Diarrhea: Grade 0 [Skin Rash] : skin rash [Negative] : Allergic/Immunologic [de-identified] : Facial skin rash

## 2023-12-22 NOTE — ASSESSMENT
[FreeTextEntry1] : Mr. Nogueira is a 63 year old gentleman with a prior history of a cutaneous melanoma of the right cheek, now with an M1d, stage IV, melanoma affecting the brain, lungs and lymph nodes. His tumor is wild type for BRAF.  His course has been complicated by symptomatic pulmonary emboli as well as neutropenia of unclear etiology.  Based upon history, examination and review of imaging studies, he has experienced some mild progression in the brain to ipilimumab/nivolumab followed by nivolumab.  I discussed his case with Dr. Marcial Warren.  At this time, we will place the patient on a brief treatment break while we begin him on a short course of steroids in an effort to address the significant facial rash.  Given the radiographic changes, an escalation of systemic therapy to nivolumab/relatlimab was decided upon and patient received C1 last week which he tolerated well.   Based upon underlying brain metastatis/ anticoagulation use/recent fall and new cognitive changes, must evaluate for possible bleed vs disease progression. I have arranged for a STAT MRI brain and will follow up findings. I have notified Dr. Richards and Dr. Warren and will communicate results once available.

## 2023-12-22 NOTE — HISTORY OF PRESENT ILLNESS
[Disease: _____________________] : Disease: [unfilled] [M: ___] : M[unfilled] [AJCC Stage: ____] : AJCC Stage: [unfilled] [de-identified] : DIAGNOSIS:  M1d, stage IV, melanoma  MOLECULAR FINDINGS:  Genpath OnkoSight NGS BRAF was negative for a BRAF mutation  FoundationOne: TMB 82 mut/Mb, LEONARDA, KRAS Q22K, NF1 Q684*  Q0429A, EZH2, DAXX, , SPHA3, SPEN, STK11 Q302*, TERT promoter mutation (wt for BRAF, KIT and NNRAS)   CURRENT THERAPY:   Ipilimumab and nivolumab (C1: 07/18/2023; C2: delayed, administered on 08/17/2023; C3: 09/07/2023; C4: held due to neutropenia) Nivolumab 240 mg IV q2 weeks (starting 10/19/2023)  PRIOR THERAPIES:  None  INTERVAL HISTORY: Mr. 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 Alla Mendez at [facility] in 2014, who is seen today for continued management of his M1d, stage IV melanoma, now with brain and lung metastases.  HIs treatment course has been complicated by the development of a DVT and PE. He presents to clinic today for continued management of his advanced disease.  Briefly, he initially developed a change in mental status, with slight confusion and balance problems, on Friday 06/16/2023.  Non contrast head CT scan, performed on 06/22/2022 was significant for multiple masses in the bilateral cerebral hemisphere, the largest measuring approximately 5 cm in the right parietal lobe, associated with mass effect and a 14 mm right to left midline shift. He was then admitted to [facility].  Chest, abdominal and pelvic CT scan, performed on 06/22/2023 demonstrates a 9cm heterogeneous right upper lobe mass extending to the right hilum and right perihilar adenopathy.  Brain MRI, performed on 06/23/2023, again demonstrated the multifocal brain metastases associated with adjacent edema. He then underwent right craniotomy for resection of the dominant right frontal parietal complex mass on 06/26/2023 by Dr. Robert Worthy.  Pathology was consistent with metastatic melanoma.  He was evaluated by Dr. Maco Marr hematology/oncology as well as by Dr. Nasima English of radiation oncology, and was ultimately discharged on 06/26/2023.   He was initiated on therapy with ipilimumab and nivolumab, receiving his first dose on 07/18/2023. Prior to his second dose, he was admitted for symptomatic pulmonary emboli.  He was discharged on lovenox QD.  He remains on lovenox 80mg QD and has completed GK radiotherapy on 08/15/2023 for the brain metastases. He ultimately received his second dose on 08/17/2023 and received his third dose of ipilimumab and nivolumab on 9/7/23. Repeat imaging was performed on 9/9/2023 to assess response to treatment and compared with a prior study dated 08/16/2023, CT head demonstrated improvement in hemorrhagic lesion within the right inferior temporal lobe and hemorrhagic lesion within the posterior parietal lobe is minimally smaller. CT C/A/P demonstrated Slight interval increase in size of right upper lobe lung mass since CT of 8/3/2023. Diminished thrombus in left pulmonary artery.  On 09/27/2023, he was found to be progressively neutropenic and his fourth cycle of therapy was held. The etiology was felt to be possibly related to recently started antibiotics, to the keppra or to immunotherapy. Bone marrow biopsy was performed, antibiotics discontinued and steroids initiated, with subsequent improvement in the counts. After a long discussion, it was opted to continue him on therapy with nivolumab alone which he initiated on 10/19/2023.   On 10/25/2023, he underwent repeat brain imaging with MRI. This study, when compared with a prior dated 08/05/2023, demonstrated at least 10 small enhancing nodules which are new as well as stable or slightly larger additional pre-existing lesions.  He has been evaluated by Dr. Marcial Warren of radiation oncology who discussed standard and investigational options and who recommended gamma knife if standard of care options were to be pursued.  After several discussions, the patient has opted for close observation, with radiation should there be progression on the next set of imaging studies.  Since the patient was last seen here, he reports having a wonderful time at his daughter's wedding. His facial rash has increased in severity, but with some improvement in the pustular component with the addition of minocycline to the topical steroids.  His weight has remained relatively stable.  On 12/05/2023, he underwent a PET/CT scan which, when compared with a prior CT scan dated 09/09/2023, demonstrated an FDG avid, partially necrotic right upper lobe lung mass similar in size and appearance to the prior scan.  A brain MRI, performed on 12/04/2023, demonstrated increase in size of a right medial thalamus lesion, now measuring 1.3 cm with surrounding edema. There are other lesions with some evidence of growth.  PAST MEDICAL HISTORY: 1. 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 Right lower extremity deep venous thrombosis in the setting of a vein stripping procedure  SOCIAL HISTORY: , denies tobacco.  He drinks alcohol socially.  He has two daughters, one who is getting  next month in [place], and the other who is having a baby in the near future.   He currently works in [employer].  ALLERGIES:  No known drug allergies  CURRENT MEDICATIONS: 1. Decadron taper 2. Keppra [de-identified] : LDH [de-identified] : 12/22/23: Pt being seen as an urgent add on. Pt reports fell from bed on Wednesday striking head very hard. He did not lose consciousness but reports was definitely stunned. Wife reports that he has "been off" the last few days. This morning he was unable to correct pants from being inside out and couldn't remember how to button them once on. He also had trouble getting his shoes on correctly. He denies HA/visual changes. Affect does appear different. Denies nausea/vomiting. Completed first dose of Opdualog last week

## 2023-12-31 NOTE — H&P ADULT - HISTORY OF PRESENT ILLNESS
62 yo male PMH DVT/PE 8/2023 on therapeutic SQL (last dose 12/30/23 AM), melanoma on face s/p MOHs 15 yrs ago, melanoma brain mets dx'd 6/2023 s/p right crani for resection at [facility] with Dr. Worthy, s/p SRS (completed 5 rounds 8/15/23), on immunotherapy monthly (last dose 12/15/23, next dose 1/15/2023), drug induced neutropenia (likely bactrim), presents for resection of brain tumor. Patient was initially diagnosed after he was found to be leaving the car door opened, putting his shoes on the wrong feet, and being forgetful of his usual daily routine. Patient reports on 12/20/23 he fell and hit his head (no LOC) d/t developing left sided weakness. Within 2 days, the symptoms of forgetfulness returned. MRI o/p 12/22/23 showed progression of right parietal lobe lesion w/ worsening mass effect, edema, and 1.2cm MLS. Admitted to [facility] for further management.     Heme-onc: Dr. Richards  Rad-onc: Dr. Warren  62 yo male PMH DVT/PE 8/2023 on therapeutic SQL (last dose 12/30/23 AM), melanoma on face s/p MOHs 15 yrs ago, melanoma brain mets dx'd 6/2023 s/p right crani for resection at [facility] with Dr. Worthy, s/p SRS (completed 5 rounds 8/15/23), on immunotherapy monthly (last dose 12/15/23, next dose 1/15/2023), drug induced neutropenia (likely bactrim), presents for resection of brain tumor. Patient was initially diagnosed after he was found to be leaving the car door opened, putting his shoes on the wrong feet, and being forgetful of his usual daily routine. Patient reports on 12/20/23 he fell and hit his head (no LOC) d/t developing left sided weakness. Within 2 days, the symptoms of forgetfulness returned. MRI o/p 12/22/23 showed progression of right parietal lobe lesion w/ worsening mass effect, edema, and 1.2cm MLS. Admitted to [facility] for further management.     Heme-onc: Dr. Richards  Rad-onc: Dr. Warren

## 2023-12-31 NOTE — H&P ADULT - NSHPPHYSICALEXAM_GEN_ALL_CORE
PHYSICAL EXAM:  Constitutional: awake, alert, sitting up in bed. NAD.   Respiratory: non-labored breathing. Normal chest rise.   Cardiovascular: Regular rate and rhythm  Gastrointestinal:  Soft, nontender, nondistended.  .  Vascular: Extremities warm, no ulcers, no discoloration of skin.   Neurological: Gen: AA&O x 3, conversant, appropriate.      CN II-XII grossly intact.    Motor: LAZARO x 4, 5/5 throughout UE/LE.    Sens: Sensation intact to light touch throughout.    Extremities: warm and well perfused     No pronator drift

## 2023-12-31 NOTE — H&P ADULT - NSHPSOCIALHISTORY_GEN_ALL_CORE
Lives alone with wife.  Stairs in home to get to bedroom.  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 Endorses drinking alcohol socially, though not recently. Lives alone with wife.  Stairs in home to get to bedroom.  Patient currently works - combination of [employer] and office  Denies any hx of smoking or illicit drug use. Endorses drinking alcohol socially, though not recently.

## 2023-12-31 NOTE — CONSULT NOTE ADULT - SUBJECTIVE AND OBJECTIVE BOX
Mr. Pacheco Nogueira is a 63M w/ prior DVT after RLE vein stripping yeas ago and  melanoma on face s/p MOHS c/b brain metasasis s/p right craniotomy (6/2023) an dinitiation of immunotherapy c/b PE (8/2023) and has since been on therapuetic lovenox, now admitted for progression of right parietal lobe lesion and awaiting brain surgery on Tuesday 1/2/24. Vascular surgery was consulted for IVC filter placement given inability to therapeutically anticoagulate for PE/DVT history during perioperative period.     On exam, he is A&Ox3 in NAD. Normal resp effort. Abdomen soft and nontender. has mild bilateral LE swelling. Both feet has some chronic purpling of skin. Faintly palpable DP pulses bilaterally.  Mr. Pacheco Nogueira is a 63M w/ prior DVT after RLE vein stripping yeas ago and  melanoma on face s/p MOHS c/b brain metasasis s/p right craniotomy (6/2023) an dinitiation of immunotherapy c/b PE (8/2023) and has since been on therapuetic lovenox, now admitted for progression of right parietal lobe lesion and awaiting brain surgery on Tuesday 1/2/24. Vascular surgery was consulted for IVC filter placement given inability to therapeutically anticoagulate for PE/DVT history during perioperative period.     On exam, he is A&Ox3 in NAD. Normal resp effort. Abdomen soft and nontender. has mild bilateral LE swelling. Both feet has some chronic purpling of skin. Faintly palpable DP pulses bilaterally.     He denies any allergies. no prior IVC filter placement.

## 2023-12-31 NOTE — PATIENT PROFILE ADULT - FALL HARM RISK - HARM RISK INTERVENTIONS
Communicate Risk of Fall with Harm to all staff/Reinforce activity limits and safety measures with patient and family/Tailored Fall Risk Interventions/Visual Cue: Yellow wristband and red socks/Bed in lowest position, wheels locked, appropriate side rails in place/Call bell, personal items and telephone in reach/Instruct patient to call for assistance before getting out of bed or chair/Non-slip footwear when patient is out of bed/[place] to call system/Physically safe environment - no spills, clutter or unnecessary equipment/Purposeful Proactive Rounding/Room/bathroom lighting operational, light cord in reach Communicate Risk of Fall with Harm to all staff/Reinforce activity limits and safety measures with patient and family/Tailored Fall Risk Interventions/Visual Cue: Yellow wristband and red socks/Bed in lowest position, wheels locked, appropriate side rails in place/Call bell, personal items and telephone in reach/Instruct patient to call for assistance before getting out of bed or chair/Non-slip footwear when patient is out of bed/[place] to call system/Physically safe environment - no spills, clutter or unnecessary equipment/Purposeful Proactive Rounding/Room/bathroom lighting operational, light cord in reach

## 2023-12-31 NOTE — PATIENT PROFILE ADULT - NSPROGENOTHERPROVIDER_GEN_A_NUR
History    Not on file   Social Needs    Financial resource strain: Not on file    Food insecurity:     Worry: Not on file     Inability: Not on file    Transportation needs:     Medical: Not on file     Non-medical: Not on file   Tobacco Use    Smoking status: Former Smoker     Packs/day: 1.00     Types: Cigarettes    Smokeless tobacco: Never Used   Substance and Sexual Activity    Alcohol use: Yes     Comment: soc    Drug use: Never    Sexual activity: Not on file   Lifestyle    Physical activity:     Days per week: Not on file     Minutes per session: Not on file    Stress: Not on file   Relationships    Social connections:     Talks on phone: Not on file     Gets together: Not on file     Attends [employer] service: Not on file     Active member of club or organization: Not on file     Attends meetings of clubs or organizations: Not on file     Relationship status: Not on file    Intimate partner violence:     Fear of current or ex partner: Not on file     Emotionally abused: Not on file     Physically abused: Not on file     Forced sexual activity: Not on file   Other Topics Concern    Not on file   Social History Narrative    Not on file     Current Facility-Administered Medications   Medication Dose Route Frequency Provider Last Rate Last Dose    nitroGLYCERIN (NITROSTAT) SL tablet 0.4 mg  0.4 mg Sublingual Q5 Min PRN Wynelle Spatz, MD         Current Outpatient Medications   Medication Sig Dispense Refill    aspirin (ASPIRIN CHILDRENS) 81 MG chewable tablet Take 1 tablet by mouth daily 30 tablet 0    atorvastatin (LIPITOR) 80 MG tablet Take 1 tablet by mouth nightly 30 tablet 0    clopidogrel (PLAVIX) 75 MG tablet Take 1 tablet by mouth daily 30 tablet 0    lisinopril (PRINIVIL;ZESTRIL) 20 MG tablet Take 1 tablet by mouth daily 30 tablet 0    metoprolol tartrate (LOPRESSOR) 25 MG tablet Take 1 tablet by mouth 2 times daily 60 tablet 0     No Known Allergies    Nursing Notes Reviewed    Physical Exam:  ED Triage Vitals [04/15/19 1126]   Enc Vitals Group      /70      Pulse 70      Resp 17      Temp 97.4 °F (36.3 °C)      Temp Source Oral      SpO2 97 %      Weight 222 lb (100.7 kg)      Height 5' 9\" (1.753 m)      Head Circumference       Peak Flow       Pain Score       Pain Loc       Pain Edu? Excl. in [address]? My pulse ox interpretation is - normal    General appearance:  No acute distress. Skin:  Warm. Dry. Eye:  Extraocular movements intact. Ears, nose, mouth and throat:  Oral mucosa moist   Neck:  Trachea midline. Extremity:  No swelling. Normal ROM     Heart:  Regular rate and rhythm, normal S1 & S2, no extra heart sounds. Perfusion:  intact  Respiratory:  Lungs clear to auscultation bilaterally. Respirations nonlabored. Abdominal:  Normal bowel sounds. Soft. Nontender. Non distended. Back:  No CVA tenderness to palpation     Neurological:  Alert and oriented times 3. No focal neuro deficits.              Psychiatric:  Appropriate    I have reviewed and interpreted all of the currently available lab results from this visit (if applicable):  Results for orders placed or performed during the hospital encounter of 04/15/19   CBC Auto Differential   Result Value Ref Range    WBC 12.4 (H) 4.0 - 10.5 K/CU MM    RBC 5.36 4.6 - 6.2 M/CU MM    Hemoglobin 16.3 13.5 - 18.0 GM/DL    Hematocrit 51.2 42 - 52 %    MCV 95.5 78 - 100 FL    MCH 30.4 27 - 31 PG    MCHC 31.8 (L) 32.0 - 36.0 %    RDW 12.8 11.7 - 14.9 %    Platelets 286 402 - 729 K/CU MM    MPV 10.2 7.5 - 11.1 FL    Differential Type AUTOMATED DIFFERENTIAL     Segs Relative 71.8 (H) 36 - 66 %    Lymphocytes % 20.8 (L) 24 - 44 %    Monocytes % 4.9 (H) 0 - 4 %    Eosinophils % 1.1 0 - 3 %    Basophils % 0.8 0 - 1 %    Segs Absolute 8.9 K/CU MM    Lymphocytes # 2.6 K/CU MM    Monocytes # 0.6 K/CU MM    Eosinophils # 0.1 K/CU MM    Basophils # 0.1 K/CU MM    Nucleated RBC % 0.0 %    Total Nucleated RBC 0.0 K/CU MM    Total Immature Neutrophil 0.07 K/CU MM    Immature Neutrophil % 0.6 (H) 0 - 0.43 %   Comprehensive Metabolic Panel   Result Value Ref Range    Sodium 135 135 - 145 MMOL/L    Potassium 4.4 3.5 - 5.1 MMOL/L    Chloride 102 99 - 110 mMol/L    CO2 23 21 - 32 MMOL/L    BUN 12 6 - 23 MG/DL    CREATININE 1.0 0.9 - 1.3 MG/DL    Glucose 102 (H) 70 - 99 MG/DL    Calcium 8.9 8.3 - 10.6 MG/DL    Alb 4.0 3.4 - 5.0 GM/DL    Total Protein 7.1 6.4 - 8.2 GM/DL    Total Bilirubin 0.2 0.0 - 1.0 MG/DL    ALT 23 10 - 40 U/L    AST 14 (L) 15 - 37 IU/L    Alkaline Phosphatase 75 40 - 129 IU/L    GFR Non-African American >60 >60 mL/min/1.73m2    GFR African American >60 >60 mL/min/1.73m2    Anion Gap 10 4 - 16   Troponin   Result Value Ref Range    Troponin T <0.010 <0.01 NG/ML   EKG 12 Lead   Result Value Ref Range    Ventricular Rate 68 BPM    Atrial Rate 68 BPM    P-R Interval 134 ms    QRS Duration 86 ms    Q-T Interval 386 ms    QTc Calculation (Bazett) 410 ms    P Axis 2 degrees    R Axis 35 degrees    T Axis -10 degrees    Diagnosis       Normal sinus rhythm  T wave abnormality, consider inferior ischemia  Abnormal ECG  When compared with ECG of 28-MAR-2019 10:53,  T wave inversion now evident in Inferior leads        Radiographs (if obtained):  [] The following radiograph was interpreted by myself in the absence of a radiologist:   [] Radiologist's Report Reviewed:  XR CHEST STANDARD (2 VW)   Final Result   No evidence of acute process in the chest.               EKG (if obtained): (All EKG's are interpreted by myself in the absence of a cardiologist)  EKG shows a sinus rhythm rate of 68 normal VT and QRS intervals no ST elevation he has T-wave inversions in lead 3 and aVF, No old EKG is available for comparison with  Chart review shows recent radiographs:  Xr Chest Standard (2 Vw)    Result Date: 3/28/2019  EXAMINATION: TWO VIEWS OF THE CHEST 3/28/2019 10:43 am COMPARISON: None.  HISTORY: ORDERING SYSTEM PROVIDED HISTORY: Chest pain TECHNOLOGIST PROVIDED HISTORY: Reason for exam:->Chest pain Ordering Physician Provided Reason for Exam: Chest pain Acuity: Acute Type of Exam: Initial Additional signs and symptoms: male that presents with chest pain. Patient was in baseline state of health until last night when the above started. Chest pain is described as a burning, upper chest, constant, 4 out of 10 currently and worse with exertion. Some associated right arm \"achiness\". Some shortness of breath, nausea and sweating with exertion only. Symptoms did worsen at work today which prompted ED visit FINDINGS: Heart and pulmonary vascularity within normal limits. There are few small parenchymal and hilar calcifications seen within the mid and lower lung field bilaterally. This suggest old granulomatous disease. There is a 12 mm nodular density near the anterior aspect right 1st rib on the PA film. This could represent bone spur formation off of the anterior 1st rib; underlying granuloma; or possible malignant pulmonary nodule. No acute alveolar infiltrate or consolidation. No pleural effusion or pneumothorax. 12 mm nodular density superimposed upon right upper lobe could represent bone spur from anterior right 1st rib, underlying granuloma, but cannot exclude a malignant pulmonary nodule. Otherwise, no definite acute pulmonary finding. RECOMMENDATION: Consider follow-up CT scan of the chest for further evaluation of possible pulmonary nodule right upper lobe. Ct Chest Wo Contrast    Result Date: 3/31/2019  EXAMINATION: CT OF THE CHEST WITHOUT CONTRAST 3/31/2019 12:19 pm TECHNIQUE: CT of the chest was performed without the administration of intravenous contrast. Multiplanar reformatted images are provided for review. Dose modulation, iterative reconstruction, and/or weight based adjustment of the mA/kV was utilized to reduce the radiation dose to as low as reasonably achievable. COMPARISON: Chest radiograph 03/28/2019. HISTORY: ORDERING SYSTEM PROVIDED HISTORY: RUL nodule seen on CXR, radiologist recommends CT TECHNOLOGIST PROVIDED HISTORY: Ordering Physician Provided Reason for Exam: RUL nodule seen on CXR, radiologist recommends CT Acuity: Acute Type of Exam: Initial Additional signs and symptoms: none Relevant Medical/Surgical History: none FINDINGS: Mediastinum: Lack of intravenous contrast limits evaluation of the mediastinum. The thoracic aorta is normal in caliber with mild calcific plaquing. Coronary artery atherosclerotic vascular calcifications are also seen. The main pulmonary artery is normal in caliber. The heart is normal in size. No pericardial effusion. The mediastinal esophagus and thyroid gland are unremarkable. No pathologically enlarged lymph nodes are seen in the chest. Lungs/pleura: The central airways are patent. No pleural effusion or pneumothorax. Mild to moderate emphysematous changes visually in the upper lobes. Very mild bilateral dependent atelectasis. No consolidation or interstitial edema. 4 mm nodule in the right upper lobe on image 42 series 3. Micronodule in the right middle lobe on image 78. Micronodule in the right middle lobe on image 84.  3 mm nodule in the right lower lobe on image 63.  3 mm nodule in the right lower lobe on image 67.  2 mm nodule in the left upper lobe on image 44.  3 mm nodule in the left lower lobe on image 87. 3 mm nodule in the left lower lobe on image 65.  3 mm nodule in the left lower lobe on image 43. The possible 12 mm nodule in the right upper lobe suggested on the recent chest radiograph likely corresponds to a prominent osseous excrescence arising from the anterior aspect of the right 1st rib. Upper Abdomen: Scattered hypodensities in the liver measuring up to 2.6 cm likely representing benign cysts or hemangiomas. The bilateral adrenal glands are normal in appearance. Limited images through the upper abdomen demonstrate no acute process.  Soft Tissues/Bones: No acute osseous or soft tissue abnormality. 1. The 12 mm nodule suggested in the right upper lobe on the recent chest radiograph likely corresponds to a prominent osseous excrescence arising from the anterior aspect of the 1st rib. 2. Small scattered bilateral pulmonary nodules as detailed above measuring up to approximately 4 mm. See recommendations below. 3. Mild to moderate emphysematous changes. 4. Coronary artery atherosclerotic vascular calcifications. RECOMMENDATIONS: Fleischner Society guidelines for follow-up and management of incidentally detected pulmonary nodules: Multiple Solid Nodules: Nodule size less than 6 mm In a high-risk patient, optional CT at 12 months. - Low risk patients include individuals with minimal or absent history of smoking and other known risk factors. - High risk patients include individuals with a history or smoking or known risk factors. Radiology 2017 http://pubs. rsna.org/doi/full/10.1148/radiol. 2672138669         MDM:  Patient presented with chest pain. Given history and presentation cardiac workup initiated. Patient had labs, EKG, x-ray and troponin ordered. Patient was placed on monitor. Patient's pulse ox is normal.  Patient's chest x-ray is read by radiology as negative for acute abnormality making clinically significant pneumonia, pneumothorax or aortic pathology less likely. The patient's initial troponin is within the normal range. Patient's EKG did not show any acute diagnostic ischemic changes. The patient was given medications, is starting to feel better. Patient does not have any acute hemodynamic instability, given presentation, and patient's history, patient will require further cardiac evaluation. Clinical Impression:  1. Chest pain, unspecified type      Disposition referral (if applicable):  No follow-up provider specified.   Disposition medications (if applicable):  New Prescriptions    No medications on file       Comment: Please note this report has been produced using speech recognition software and may contain errors related to that system including errors in grammar, punctuation, and spelling, as well as words and phrases that may be inappropriate. If there are any questions or concerns please feel free to contact the dictating provider for clarification.         Kody Diallo MD  04/15/19 2809 none

## 2023-12-31 NOTE — H&P ADULT - ASSESSMENT
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 Patient was initially diagnosed after he was found to be forgetful with gait instability, and these symptoms returned 12/20 after he fell d/t developing left sided weakness. MRI o/p 12/22/23 showed progression of right parietal lobe lesion w/ worsening mass effect, edema, and 1.2cm MLS. Admitted to [facility] for further management.     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 Patient was initially diagnosed after he was found to be forgetful with gait instability, and these symptoms returned 12/20 after he fell d/t developing left sided weakness. MRI o/p 12/22/23 showed progression of right parietal lobe lesion w/ worsening mass effect, edema, and 1.2cm MLS. Admitted to [facility] for further management.     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 Patient was initially diagnosed after he was found to be forgetful with gait instability, and these symptoms returned 12/20 after he fell d/t developing left sided weakness. MRI o/p 12/22/23 showed progression of right parietal lobe lesion w/ worsening mass effect, edema, and 1.2cm MLS. Admitted to [facility] for further management.     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 Patient was initially diagnosed after he was found to be forgetful with gait instability, and these symptoms returned 12/20 after he fell d/t developing left sided weakness. MRI o/p 12/22/23 showed progression of right parietal lobe lesion w/ worsening mass effect, edema, and 1.2cm MLS. Admitted to [facility] for further management.     Plan:   - Admit to neurosurgery, regional   - MRI brain w/wo cheko  - Vascular consult for IVC filter   - Screening lower extremity dopplers for DVT hx

## 2023-12-31 NOTE — CONSULT NOTE ADULT - ASSESSMENT
PLAN & RECOMMENDATIONS  - Obtain BLE venous duplex US  - Can place retrievable IVC filter on Tuesday 1/2/24 right before nsgy procedure. I have discussed the risks, benefits and alternatives with the patient. He agrees to proceed  - Restart therapeutic anticoagulation once deemed safe by nsgy team  - Can plan to remove IVC filter once consistently back on therapeutic AC, likely in a few weeks as an elective procedure.     Thank you,    Willi Faulkner MD  Attending Vascular Surgeon  [facility] at [address] [place] [address]  Office: [phone]; Fax: [phone]  [email or web address] PLAN & RECOMMENDATIONS  - Obtain BLE venous duplex US  - Can place retrievable IVC filter on Tuesday 1/2/24 right before nsgy procedure. I have discussed the risks, benefits and alternatives with the patient. He agrees to proceed  - Restart therapeutic anticoagulation once deemed safe by nsgy team  - Can plan to remove IVC filter once consistently back on therapeutic AC, likely in a few weeks as an elective procedure.     Thank you,    Willi Faulkner MD  Attending Vascular Surgeon  [facility] at [address] [place] [address]  Office: [phone]; Fax: [phone]  [email or web address]

## 2024-01-01 ENCOUNTER — RESULT REVIEW (OUTPATIENT)
Age: 64
End: 2024-01-01

## 2024-01-01 ENCOUNTER — APPOINTMENT (OUTPATIENT)
Dept: HEMATOLOGY ONCOLOGY | Facility: CLINIC | Age: 64
End: 2024-01-01
Payer: COMMERCIAL

## 2024-01-01 ENCOUNTER — OUTPATIENT (OUTPATIENT)
Dept: OUTPATIENT SERVICES | Facility: HOSPITAL | Age: 64
LOS: 1 days | Discharge: HOME | End: 2024-01-01
Payer: COMMERCIAL

## 2024-01-01 ENCOUNTER — TRANSCRIPTION ENCOUNTER (OUTPATIENT)
Age: 64
End: 2024-01-01

## 2024-01-01 ENCOUNTER — OUTPATIENT (OUTPATIENT)
Dept: OUTPATIENT SERVICES | Facility: HOSPITAL | Age: 64
LOS: 1 days | End: 2024-01-01
Payer: COMMERCIAL

## 2024-01-01 ENCOUNTER — APPOINTMENT (OUTPATIENT)
Dept: INFUSION THERAPY | Facility: HOSPITAL | Age: 64
End: 2024-01-01

## 2024-01-01 ENCOUNTER — NON-APPOINTMENT (OUTPATIENT)
Age: 64
End: 2024-01-01

## 2024-01-01 ENCOUNTER — RX RENEWAL (OUTPATIENT)
Age: 64
End: 2024-01-01

## 2024-01-01 ENCOUNTER — APPOINTMENT (OUTPATIENT)
Dept: HEMATOLOGY ONCOLOGY | Facility: CLINIC | Age: 64
End: 2024-01-01

## 2024-01-01 ENCOUNTER — APPOINTMENT (OUTPATIENT)
Dept: VASCULAR SURGERY | Facility: CLINIC | Age: 64
End: 2024-01-01
Payer: COMMERCIAL

## 2024-01-01 ENCOUNTER — APPOINTMENT (OUTPATIENT)
Dept: NEUROLOGY | Facility: CLINIC | Age: 64
End: 2024-01-01

## 2024-01-01 ENCOUNTER — APPOINTMENT (OUTPATIENT)
Dept: RADIATION ONCOLOGY | Facility: CLINIC | Age: 64
End: 2024-01-01
Payer: COMMERCIAL

## 2024-01-01 ENCOUNTER — APPOINTMENT (OUTPATIENT)
Dept: MRI IMAGING | Facility: CLINIC | Age: 64
End: 2024-01-01
Payer: COMMERCIAL

## 2024-01-01 ENCOUNTER — INPATIENT (INPATIENT)
Facility: HOSPITAL | Age: 64
LOS: 1 days | Discharge: HOME | DRG: 100 | End: 2024-01-22
Attending: NEUROLOGICAL SURGERY | Admitting: NEUROLOGICAL SURGERY
Payer: COMMERCIAL

## 2024-01-01 ENCOUNTER — APPOINTMENT (OUTPATIENT)
Dept: NEUROSURGERY | Facility: CLINIC | Age: 64
End: 2024-01-01

## 2024-01-01 ENCOUNTER — OUTPATIENT (OUTPATIENT)
Dept: OUTPATIENT SERVICES | Facility: HOSPITAL | Age: 64
LOS: 1 days | Discharge: HOME | End: 2024-01-01

## 2024-01-01 ENCOUNTER — APPOINTMENT (OUTPATIENT)
Dept: NEUROLOGY | Facility: CLINIC | Age: 64
End: 2024-01-01
Payer: COMMERCIAL

## 2024-01-01 ENCOUNTER — APPOINTMENT (OUTPATIENT)
Dept: VASCULAR SURGERY | Facility: CLINIC | Age: 64
End: 2024-01-01

## 2024-01-01 ENCOUNTER — APPOINTMENT (OUTPATIENT)
Dept: NEUROSURGERY | Facility: CLINIC | Age: 64
End: 2024-01-01
Payer: COMMERCIAL

## 2024-01-01 ENCOUNTER — INPATIENT (INPATIENT)
Facility: HOSPITAL | Age: 64
LOS: 6 days | Discharge: HOME | DRG: 949 | End: 2024-01-12
Attending: PSYCHIATRY & NEUROLOGY | Admitting: PSYCHIATRY & NEUROLOGY
Payer: COMMERCIAL

## 2024-01-01 ENCOUNTER — APPOINTMENT (OUTPATIENT)
Dept: MRI IMAGING | Facility: CLINIC | Age: 64
End: 2024-01-01

## 2024-01-01 ENCOUNTER — APPOINTMENT (OUTPATIENT)
Dept: VASCULAR SURGERY | Facility: HOSPITAL | Age: 64
End: 2024-01-01

## 2024-01-01 VITALS
OXYGEN SATURATION: 100 % | DIASTOLIC BLOOD PRESSURE: 96 MMHG | TEMPERATURE: 98 F | HEART RATE: 97 BPM | SYSTOLIC BLOOD PRESSURE: 156 MMHG | RESPIRATION RATE: 16 BRPM

## 2024-01-01 VITALS
RESPIRATION RATE: 19 BRPM | WEIGHT: 189.59 LBS | OXYGEN SATURATION: 97 % | TEMPERATURE: 98.1 F | SYSTOLIC BLOOD PRESSURE: 119 MMHG | HEART RATE: 116 BPM | DIASTOLIC BLOOD PRESSURE: 88 MMHG | BODY MASS INDEX: 25.01 KG/M2

## 2024-01-01 VITALS
HEART RATE: 108 BPM | TEMPERATURE: 95.6 F | BODY MASS INDEX: 24.12 KG/M2 | SYSTOLIC BLOOD PRESSURE: 116 MMHG | OXYGEN SATURATION: 98 % | HEIGHT: 73 IN | RESPIRATION RATE: 18 BRPM | DIASTOLIC BLOOD PRESSURE: 87 MMHG | WEIGHT: 182 LBS

## 2024-01-01 VITALS
TEMPERATURE: 98 F | RESPIRATION RATE: 16 BRPM | HEART RATE: 78 BPM | SYSTOLIC BLOOD PRESSURE: 123 MMHG | DIASTOLIC BLOOD PRESSURE: 83 MMHG | OXYGEN SATURATION: 97 %

## 2024-01-01 VITALS
DIASTOLIC BLOOD PRESSURE: 87 MMHG | BODY MASS INDEX: 24.92 KG/M2 | RESPIRATION RATE: 16 BRPM | HEIGHT: 73 IN | SYSTOLIC BLOOD PRESSURE: 143 MMHG | TEMPERATURE: 98 F | HEART RATE: 86 BPM | WEIGHT: 188 LBS | OXYGEN SATURATION: 97 %

## 2024-01-01 VITALS
BODY MASS INDEX: 24.28 KG/M2 | HEIGHT: 73.03 IN | RESPIRATION RATE: 16 BRPM | SYSTOLIC BLOOD PRESSURE: 138 MMHG | HEART RATE: 112 BPM | WEIGHT: 183.2 LBS | OXYGEN SATURATION: 100 % | DIASTOLIC BLOOD PRESSURE: 96 MMHG | TEMPERATURE: 96.8 F

## 2024-01-01 VITALS
WEIGHT: 180 LBS | HEIGHT: 73 IN | HEART RATE: 92 BPM | DIASTOLIC BLOOD PRESSURE: 83 MMHG | BODY MASS INDEX: 23.86 KG/M2 | SYSTOLIC BLOOD PRESSURE: 120 MMHG

## 2024-01-01 VITALS
BODY MASS INDEX: 24.54 KG/M2 | WEIGHT: 185.19 LBS | OXYGEN SATURATION: 98 % | RESPIRATION RATE: 16 BRPM | HEART RATE: 110 BPM | DIASTOLIC BLOOD PRESSURE: 81 MMHG | TEMPERATURE: 96.8 F | HEIGHT: 72.99 IN | SYSTOLIC BLOOD PRESSURE: 115 MMHG

## 2024-01-01 VITALS
SYSTOLIC BLOOD PRESSURE: 130 MMHG | WEIGHT: 187.39 LBS | HEART RATE: 109 BPM | BODY MASS INDEX: 25.41 KG/M2 | RESPIRATION RATE: 18 BRPM | TEMPERATURE: 97.7 F | DIASTOLIC BLOOD PRESSURE: 90 MMHG

## 2024-01-01 VITALS
BODY MASS INDEX: 25.75 KG/M2 | DIASTOLIC BLOOD PRESSURE: 85 MMHG | WEIGHT: 179.45 LBS | OXYGEN SATURATION: 100 % | HEART RATE: 98 BPM | SYSTOLIC BLOOD PRESSURE: 125 MMHG

## 2024-01-01 VITALS
RESPIRATION RATE: 18 BRPM | SYSTOLIC BLOOD PRESSURE: 140 MMHG | HEIGHT: 73 IN | WEIGHT: 179.9 LBS | TEMPERATURE: 99 F | DIASTOLIC BLOOD PRESSURE: 97 MMHG | OXYGEN SATURATION: 98 % | HEART RATE: 77 BPM

## 2024-01-01 VITALS
OXYGEN SATURATION: 97 % | DIASTOLIC BLOOD PRESSURE: 77 MMHG | RESPIRATION RATE: 18 BRPM | HEART RATE: 80 BPM | SYSTOLIC BLOOD PRESSURE: 116 MMHG

## 2024-01-01 VITALS
HEART RATE: 91 BPM | WEIGHT: 180.78 LBS | HEIGHT: 73 IN | RESPIRATION RATE: 16 BRPM | TEMPERATURE: 98 F | DIASTOLIC BLOOD PRESSURE: 99 MMHG | OXYGEN SATURATION: 99 % | SYSTOLIC BLOOD PRESSURE: 146 MMHG

## 2024-01-01 VITALS
HEIGHT: 72.99 IN | HEART RATE: 102 BPM | OXYGEN SATURATION: 98 % | BODY MASS INDEX: 24.54 KG/M2 | DIASTOLIC BLOOD PRESSURE: 96 MMHG | TEMPERATURE: 96.8 F | RESPIRATION RATE: 16 BRPM | SYSTOLIC BLOOD PRESSURE: 131 MMHG | WEIGHT: 185.19 LBS

## 2024-01-01 VITALS
HEIGHT: 72 IN | OXYGEN SATURATION: 97 % | HEART RATE: 101 BPM | WEIGHT: 183 LBS | RESPIRATION RATE: 18 BRPM | BODY MASS INDEX: 24.79 KG/M2 | DIASTOLIC BLOOD PRESSURE: 75 MMHG | SYSTOLIC BLOOD PRESSURE: 106 MMHG | TEMPERATURE: 97.3 F

## 2024-01-01 VITALS
TEMPERATURE: 98 F | OXYGEN SATURATION: 99 % | RESPIRATION RATE: 18 BRPM | HEART RATE: 78 BPM | SYSTOLIC BLOOD PRESSURE: 123 MMHG | DIASTOLIC BLOOD PRESSURE: 89 MMHG

## 2024-01-01 VITALS — HEART RATE: 111 BPM

## 2024-01-01 DIAGNOSIS — R29.810 FACIAL WEAKNESS: ICD-10-CM

## 2024-01-01 DIAGNOSIS — C79.31 SECONDARY MALIGNANT NEOPLASM OF BRAIN: ICD-10-CM

## 2024-01-01 DIAGNOSIS — Z98.890 OTHER SPECIFIED POSTPROCEDURAL STATES: Chronic | ICD-10-CM

## 2024-01-01 DIAGNOSIS — Z51.11 ENCOUNTER FOR ANTINEOPLASTIC CHEMOTHERAPY: ICD-10-CM

## 2024-01-01 DIAGNOSIS — R21 RASH AND OTHER NONSPECIFIC SKIN ERUPTION: ICD-10-CM

## 2024-01-01 DIAGNOSIS — Z85.820 PERSONAL HISTORY OF MALIGNANT MELANOMA OF SKIN: ICD-10-CM

## 2024-01-01 DIAGNOSIS — D72.829 ELEVATED WHITE BLOOD CELL COUNT, UNSPECIFIED: ICD-10-CM

## 2024-01-01 DIAGNOSIS — E87.20 ACIDOSIS, UNSPECIFIED: ICD-10-CM

## 2024-01-01 DIAGNOSIS — D43.2 NEOPLASM OF UNCERTAIN BEHAVIOR OF BRAIN, UNSPECIFIED: ICD-10-CM

## 2024-01-01 DIAGNOSIS — I82.409 ACUTE EMBOLISM AND THROMBOSIS OF UNSPECIFIED DEEP VEINS OF UNSPECIFIED LOWER EXTREMITY: ICD-10-CM

## 2024-01-01 DIAGNOSIS — Z95.828 PRESENCE OF OTHER VASCULAR IMPLANTS AND GRAFTS: ICD-10-CM

## 2024-01-01 DIAGNOSIS — G81.94 HEMIPLEGIA, UNSPECIFIED AFFECTING LEFT NONDOMINANT SIDE: ICD-10-CM

## 2024-01-01 DIAGNOSIS — Z45.89 ENCOUNTER FOR ADJUSTMENT AND MANAGEMENT OF OTHER IMPLANTED DEVICES: ICD-10-CM

## 2024-01-01 DIAGNOSIS — C43.9 MALIGNANT MELANOMA OF SKIN, UNSPECIFIED: ICD-10-CM

## 2024-01-01 DIAGNOSIS — R79.89 OTHER SPECIFIED ABNORMAL FINDINGS OF BLOOD CHEMISTRY: ICD-10-CM

## 2024-01-01 DIAGNOSIS — G93.6 CEREBRAL EDEMA: ICD-10-CM

## 2024-01-01 DIAGNOSIS — G93.5 COMPRESSION OF BRAIN: ICD-10-CM

## 2024-01-01 DIAGNOSIS — C78.7 SECONDARY MALIGNANT NEOPLASM OF LIVER AND INTRAHEPATIC BILE DUCT: ICD-10-CM

## 2024-01-01 DIAGNOSIS — G40.109 LOCALIZATION-RELATED (FOCAL) (PARTIAL) SYMPTOMATIC EPILEPSY AND EPILEPTIC SYNDROMES WITH SIMPLE PARTIAL SEIZURES, NOT INTRACTABLE, WITHOUT STATUS EPILEPTICUS: ICD-10-CM

## 2024-01-01 DIAGNOSIS — E87.1 HYPO-OSMOLALITY AND HYPONATREMIA: ICD-10-CM

## 2024-01-01 DIAGNOSIS — R56.9 UNSPECIFIED CONVULSIONS: ICD-10-CM

## 2024-01-01 DIAGNOSIS — L27.0 GENERALIZED SKIN ERUPTION DUE TO DRUGS AND MEDICAMENTS TAKEN INTERNALLY: ICD-10-CM

## 2024-01-01 DIAGNOSIS — R91.8 OTHER NONSPECIFIC ABNORMAL FINDING OF LUNG FIELD: ICD-10-CM

## 2024-01-01 DIAGNOSIS — E87.5 HYPERKALEMIA: ICD-10-CM

## 2024-01-01 DIAGNOSIS — Z86.718 PERSONAL HISTORY OF OTHER VENOUS THROMBOSIS AND EMBOLISM: ICD-10-CM

## 2024-01-01 DIAGNOSIS — Z85.841 PERSONAL HISTORY OF MALIGNANT NEOPLASM OF BRAIN: ICD-10-CM

## 2024-01-01 DIAGNOSIS — I82.433 ACUTE EMBOLISM AND THROMBOSIS OF POPLITEAL VEIN, BILATERAL: ICD-10-CM

## 2024-01-01 DIAGNOSIS — B02.8 ZOSTER WITH OTHER COMPLICATIONS: ICD-10-CM

## 2024-01-01 DIAGNOSIS — T38.0X5A ADVERSE EFFECT OF GLUCOCORTICOIDS AND SYNTHETIC ANALOGUES, INITIAL ENCOUNTER: ICD-10-CM

## 2024-01-01 DIAGNOSIS — I82.503 CHRONIC EMBOLISM AND THROMBOSIS OF UNSPECIFIED DEEP VEINS OF LOWER EXTREMITY, BILATERAL: ICD-10-CM

## 2024-01-01 DIAGNOSIS — C78.00 SECONDARY MALIGNANT NEOPLASM OF UNSPECIFIED LUNG: ICD-10-CM

## 2024-01-01 DIAGNOSIS — R09.89 OTHER SPECIFIED SYMPTOMS AND SIGNS INVOLVING THE CIRCULATORY AND RESPIRATORY SYSTEMS: ICD-10-CM

## 2024-01-01 DIAGNOSIS — I10 ESSENTIAL (PRIMARY) HYPERTENSION: ICD-10-CM

## 2024-01-01 DIAGNOSIS — D69.6 THROMBOCYTOPENIA, UNSPECIFIED: ICD-10-CM

## 2024-01-01 DIAGNOSIS — Z79.01 LONG TERM (CURRENT) USE OF ANTICOAGULANTS: ICD-10-CM

## 2024-01-01 DIAGNOSIS — I82.403 ACUTE EMBOLISM AND THROMBOSIS OF UNSPECIFIED DEEP VEINS OF LOWER EXTREMITY, BILATERAL: ICD-10-CM

## 2024-01-01 DIAGNOSIS — Z00.8 ENCOUNTER FOR OTHER GENERAL EXAMINATION: ICD-10-CM

## 2024-01-01 DIAGNOSIS — Z86.711 PERSONAL HISTORY OF PULMONARY EMBOLISM: ICD-10-CM

## 2024-01-01 DIAGNOSIS — R47.1 DYSARTHRIA AND ANARTHRIA: ICD-10-CM

## 2024-01-01 DIAGNOSIS — C78.01 SECONDARY MALIGNANT NEOPLASM OF RIGHT LUNG: ICD-10-CM

## 2024-01-01 DIAGNOSIS — I26.99 OTHER PULMONARY EMBOLISM WITHOUT ACUTE COR PULMONALE: ICD-10-CM

## 2024-01-01 DIAGNOSIS — T50.995A ADVERSE EFFECT OF OTHER DRUGS, MEDICAMENTS AND BIOLOGICAL SUBSTANCES, INITIAL ENCOUNTER: ICD-10-CM

## 2024-01-01 DIAGNOSIS — Z85.828 PERSONAL HISTORY OF OTHER MALIGNANT NEOPLASM OF SKIN: ICD-10-CM

## 2024-01-01 LAB
A1C WITH ESTIMATED AVERAGE GLUCOSE RESULT: 5.3 % — SIGNIFICANT CHANGE UP (ref 4–5.6)
A1C WITH ESTIMATED AVERAGE GLUCOSE RESULT: 5.3 % — SIGNIFICANT CHANGE UP (ref 4–5.6)
ALBUMIN SERPL ELPH-MCNC: 2.4 G/DL — LOW (ref 3.3–5)
ALBUMIN SERPL ELPH-MCNC: 2.6 G/DL — LOW (ref 3.3–5)
ALBUMIN SERPL ELPH-MCNC: 2.6 G/DL — LOW (ref 3.3–5)
ALBUMIN SERPL ELPH-MCNC: 3.6 G/DL — SIGNIFICANT CHANGE UP (ref 3.3–5)
ALBUMIN SERPL ELPH-MCNC: 3.6 G/DL — SIGNIFICANT CHANGE UP (ref 3.3–5)
ALBUMIN SERPL ELPH-MCNC: 3.8 G/DL
ALBUMIN SERPL ELPH-MCNC: 3.9 G/DL — SIGNIFICANT CHANGE UP (ref 3.3–5)
ALBUMIN SERPL ELPH-MCNC: 4 G/DL
ALP BLD-CCNC: 85 U/L
ALP BLD-CCNC: 96 U/L
ALP SERPL-CCNC: 113 U/L — SIGNIFICANT CHANGE UP (ref 40–120)
ALP SERPL-CCNC: 142 U/L — HIGH (ref 40–120)
ALP SERPL-CCNC: 53 U/L — SIGNIFICANT CHANGE UP (ref 40–120)
ALP SERPL-CCNC: 53 U/L — SIGNIFICANT CHANGE UP (ref 40–120)
ALP SERPL-CCNC: 69 U/L — SIGNIFICANT CHANGE UP (ref 40–120)
ALP SERPL-CCNC: 69 U/L — SIGNIFICANT CHANGE UP (ref 40–120)
ALP SERPL-CCNC: 75 U/L — SIGNIFICANT CHANGE UP (ref 40–120)
ALP SERPL-CCNC: 75 U/L — SIGNIFICANT CHANGE UP (ref 40–120)
ALP SERPL-CCNC: 92 U/L — SIGNIFICANT CHANGE UP (ref 40–120)
ALT FLD-CCNC: 14 U/L — SIGNIFICANT CHANGE UP (ref 10–45)
ALT FLD-CCNC: 14 U/L — SIGNIFICANT CHANGE UP (ref 10–45)
ALT FLD-CCNC: 20 U/L — SIGNIFICANT CHANGE UP (ref 10–45)
ALT FLD-CCNC: 23 U/L — SIGNIFICANT CHANGE UP (ref 10–45)
ALT FLD-CCNC: 23 U/L — SIGNIFICANT CHANGE UP (ref 10–45)
ALT FLD-CCNC: 31 U/L — SIGNIFICANT CHANGE UP (ref 10–45)
ALT FLD-CCNC: 39 U/L — SIGNIFICANT CHANGE UP (ref 10–45)
ALT SERPL-CCNC: 33 U/L
ALT SERPL-CCNC: 33 U/L
ANION GAP SERPL CALC-SCNC: 10 MMOL/L — SIGNIFICANT CHANGE UP (ref 5–17)
ANION GAP SERPL CALC-SCNC: 11 MMOL/L — SIGNIFICANT CHANGE UP (ref 5–17)
ANION GAP SERPL CALC-SCNC: 12 MMOL/L — SIGNIFICANT CHANGE UP (ref 5–17)
ANION GAP SERPL CALC-SCNC: 12 MMOL/L — SIGNIFICANT CHANGE UP (ref 5–17)
ANION GAP SERPL CALC-SCNC: 13 MMOL/L — SIGNIFICANT CHANGE UP (ref 5–17)
ANION GAP SERPL CALC-SCNC: 14 MMOL/L
ANION GAP SERPL CALC-SCNC: 14 MMOL/L
ANION GAP SERPL CALC-SCNC: 16 MMOL/L — SIGNIFICANT CHANGE UP (ref 5–17)
ANION GAP SERPL CALC-SCNC: 17 MMOL/L — SIGNIFICANT CHANGE UP (ref 5–17)
ANION GAP SERPL CALC-SCNC: 7 MMOL/L — SIGNIFICANT CHANGE UP (ref 5–17)
ANION GAP SERPL CALC-SCNC: 8 MMOL/L — SIGNIFICANT CHANGE UP (ref 5–17)
ANION GAP SERPL CALC-SCNC: 9 MMOL/L — SIGNIFICANT CHANGE UP (ref 5–17)
ANISOCYTOSIS BLD QL: SLIGHT — SIGNIFICANT CHANGE UP
APTT BLD: 20.5 SEC — LOW (ref 24.5–35.6)
APTT BLD: 20.8 SEC — LOW (ref 24.5–35.6)
APTT BLD: 20.8 SEC — LOW (ref 24.5–35.6)
APTT BLD: 21.3 SEC — LOW (ref 24.5–35.6)
APTT BLD: 26.7 SEC — SIGNIFICANT CHANGE UP (ref 24.5–35.6)
AST SERPL-CCNC: 11 U/L — SIGNIFICANT CHANGE UP (ref 10–40)
AST SERPL-CCNC: 11 U/L — SIGNIFICANT CHANGE UP (ref 10–40)
AST SERPL-CCNC: 13 U/L — SIGNIFICANT CHANGE UP (ref 10–40)
AST SERPL-CCNC: 13 U/L — SIGNIFICANT CHANGE UP (ref 10–40)
AST SERPL-CCNC: 16 U/L — SIGNIFICANT CHANGE UP (ref 10–40)
AST SERPL-CCNC: 16 U/L — SIGNIFICANT CHANGE UP (ref 10–40)
AST SERPL-CCNC: 18 U/L
AST SERPL-CCNC: 18 U/L — SIGNIFICANT CHANGE UP (ref 10–40)
AST SERPL-CCNC: 20 U/L
AST SERPL-CCNC: 22 U/L — SIGNIFICANT CHANGE UP (ref 10–40)
AST SERPL-CCNC: 30 U/L — SIGNIFICANT CHANGE UP (ref 10–40)
BASOPHILS # BLD AUTO: 0 K/UL — SIGNIFICANT CHANGE UP (ref 0–0.2)
BASOPHILS # BLD AUTO: 0.01 K/UL — SIGNIFICANT CHANGE UP (ref 0–0.2)
BASOPHILS NFR BLD AUTO: 0 % — SIGNIFICANT CHANGE UP (ref 0–2)
BASOPHILS NFR BLD AUTO: 0.1 % — SIGNIFICANT CHANGE UP (ref 0–2)
BILIRUB SERPL-MCNC: 0.2 MG/DL
BILIRUB SERPL-MCNC: 0.2 MG/DL
BILIRUB SERPL-MCNC: 0.2 MG/DL — SIGNIFICANT CHANGE UP (ref 0.2–1.2)
BILIRUB SERPL-MCNC: 0.2 MG/DL — SIGNIFICANT CHANGE UP (ref 0.2–1.2)
BILIRUB SERPL-MCNC: 0.3 MG/DL — SIGNIFICANT CHANGE UP (ref 0.2–1.2)
BILIRUB SERPL-MCNC: 0.4 MG/DL — SIGNIFICANT CHANGE UP (ref 0.2–1.2)
BILIRUB SERPL-MCNC: 0.7 MG/DL — SIGNIFICANT CHANGE UP (ref 0.2–1.2)
BILIRUB SERPL-MCNC: 0.7 MG/DL — SIGNIFICANT CHANGE UP (ref 0.2–1.2)
BLD GP AB SCN SERPL QL: NEGATIVE — SIGNIFICANT CHANGE UP
BUN SERPL-MCNC: 21 MG/DL — SIGNIFICANT CHANGE UP (ref 7–23)
BUN SERPL-MCNC: 22 MG/DL — SIGNIFICANT CHANGE UP (ref 7–23)
BUN SERPL-MCNC: 23 MG/DL — SIGNIFICANT CHANGE UP (ref 7–23)
BUN SERPL-MCNC: 25 MG/DL — HIGH (ref 7–23)
BUN SERPL-MCNC: 25 MG/DL — HIGH (ref 7–23)
BUN SERPL-MCNC: 27 MG/DL — HIGH (ref 7–23)
BUN SERPL-MCNC: 27 MG/DL — HIGH (ref 7–23)
BUN SERPL-MCNC: 28 MG/DL — HIGH (ref 7–23)
BUN SERPL-MCNC: 30 MG/DL
BUN SERPL-MCNC: 30 MG/DL — HIGH (ref 7–23)
BUN SERPL-MCNC: 31 MG/DL — HIGH (ref 7–23)
BUN SERPL-MCNC: 31 MG/DL — HIGH (ref 7–23)
BUN SERPL-MCNC: 32 MG/DL — HIGH (ref 7–23)
BUN SERPL-MCNC: 33 MG/DL — HIGH (ref 7–23)
BUN SERPL-MCNC: 33 MG/DL — HIGH (ref 7–23)
BUN SERPL-MCNC: 37 MG/DL — HIGH (ref 7–23)
BUN SERPL-MCNC: 39 MG/DL — HIGH (ref 7–23)
BUN SERPL-MCNC: 43 MG/DL
BURR CELLS BLD QL SMEAR: PRESENT — SIGNIFICANT CHANGE UP
CALCIUM SERPL-MCNC: 8 MG/DL — LOW (ref 8.4–10.5)
CALCIUM SERPL-MCNC: 8 MG/DL — LOW (ref 8.4–10.5)
CALCIUM SERPL-MCNC: 8.2 MG/DL — LOW (ref 8.4–10.5)
CALCIUM SERPL-MCNC: 8.2 MG/DL — LOW (ref 8.4–10.5)
CALCIUM SERPL-MCNC: 8.3 MG/DL — LOW (ref 8.4–10.5)
CALCIUM SERPL-MCNC: 8.3 MG/DL — LOW (ref 8.4–10.5)
CALCIUM SERPL-MCNC: 8.4 MG/DL — SIGNIFICANT CHANGE UP (ref 8.4–10.5)
CALCIUM SERPL-MCNC: 8.5 MG/DL — SIGNIFICANT CHANGE UP (ref 8.4–10.5)
CALCIUM SERPL-MCNC: 8.5 MG/DL — SIGNIFICANT CHANGE UP (ref 8.4–10.5)
CALCIUM SERPL-MCNC: 8.6 MG/DL — SIGNIFICANT CHANGE UP (ref 8.4–10.5)
CALCIUM SERPL-MCNC: 8.8 MG/DL — SIGNIFICANT CHANGE UP (ref 8.4–10.5)
CALCIUM SERPL-MCNC: 8.8 MG/DL — SIGNIFICANT CHANGE UP (ref 8.4–10.5)
CALCIUM SERPL-MCNC: 8.9 MG/DL
CALCIUM SERPL-MCNC: 8.9 MG/DL — SIGNIFICANT CHANGE UP (ref 8.4–10.5)
CALCIUM SERPL-MCNC: 9 MG/DL — SIGNIFICANT CHANGE UP (ref 8.4–10.5)
CALCIUM SERPL-MCNC: 9 MG/DL — SIGNIFICANT CHANGE UP (ref 8.4–10.5)
CALCIUM SERPL-MCNC: 9.2 MG/DL
CALCIUM SERPL-MCNC: 9.2 MG/DL — SIGNIFICANT CHANGE UP (ref 8.4–10.5)
CALCIUM SERPL-MCNC: 9.3 MG/DL — SIGNIFICANT CHANGE UP (ref 8.4–10.5)
CHLORIDE SERPL-SCNC: 102 MMOL/L — SIGNIFICANT CHANGE UP (ref 96–108)
CHLORIDE SERPL-SCNC: 103 MMOL/L — SIGNIFICANT CHANGE UP (ref 96–108)
CHLORIDE SERPL-SCNC: 104 MMOL/L — SIGNIFICANT CHANGE UP (ref 96–108)
CHLORIDE SERPL-SCNC: 105 MMOL/L — SIGNIFICANT CHANGE UP (ref 96–108)
CHLORIDE SERPL-SCNC: 106 MMOL/L
CHLORIDE SERPL-SCNC: 106 MMOL/L
CHLORIDE SERPL-SCNC: 106 MMOL/L — SIGNIFICANT CHANGE UP (ref 96–108)
CHLORIDE SERPL-SCNC: 107 MMOL/L — SIGNIFICANT CHANGE UP (ref 96–108)
CHLORIDE SERPL-SCNC: 107 MMOL/L — SIGNIFICANT CHANGE UP (ref 96–108)
CHLORIDE SERPL-SCNC: 108 MMOL/L — SIGNIFICANT CHANGE UP (ref 96–108)
CHLORIDE SERPL-SCNC: 108 MMOL/L — SIGNIFICANT CHANGE UP (ref 96–108)
CHLORIDE SERPL-SCNC: 110 MMOL/L — HIGH (ref 96–108)
CK SERPL-CCNC: 45 U/L — SIGNIFICANT CHANGE UP (ref 30–200)
CK SERPL-CCNC: 45 U/L — SIGNIFICANT CHANGE UP (ref 30–200)
CO2 SERPL-SCNC: 18 MMOL/L — LOW (ref 22–31)
CO2 SERPL-SCNC: 18 MMOL/L — LOW (ref 22–31)
CO2 SERPL-SCNC: 20 MMOL/L — LOW (ref 22–31)
CO2 SERPL-SCNC: 22 MMOL/L
CO2 SERPL-SCNC: 22 MMOL/L
CO2 SERPL-SCNC: 22 MMOL/L — SIGNIFICANT CHANGE UP (ref 22–31)
CO2 SERPL-SCNC: 23 MMOL/L — SIGNIFICANT CHANGE UP (ref 22–31)
CO2 SERPL-SCNC: 25 MMOL/L — SIGNIFICANT CHANGE UP (ref 22–31)
CO2 SERPL-SCNC: 26 MMOL/L — SIGNIFICANT CHANGE UP (ref 22–31)
CREAT SERPL-MCNC: 0.79 MG/DL — SIGNIFICANT CHANGE UP (ref 0.5–1.3)
CREAT SERPL-MCNC: 0.79 MG/DL — SIGNIFICANT CHANGE UP (ref 0.5–1.3)
CREAT SERPL-MCNC: 0.84 MG/DL — SIGNIFICANT CHANGE UP (ref 0.5–1.3)
CREAT SERPL-MCNC: 0.86 MG/DL — SIGNIFICANT CHANGE UP (ref 0.5–1.3)
CREAT SERPL-MCNC: 0.92 MG/DL — SIGNIFICANT CHANGE UP (ref 0.5–1.3)
CREAT SERPL-MCNC: 0.92 MG/DL — SIGNIFICANT CHANGE UP (ref 0.5–1.3)
CREAT SERPL-MCNC: 0.95 MG/DL — SIGNIFICANT CHANGE UP (ref 0.5–1.3)
CREAT SERPL-MCNC: 0.95 MG/DL — SIGNIFICANT CHANGE UP (ref 0.5–1.3)
CREAT SERPL-MCNC: 0.99 MG/DL — SIGNIFICANT CHANGE UP (ref 0.5–1.3)
CREAT SERPL-MCNC: 1.01 MG/DL
CREAT SERPL-MCNC: 1.02 MG/DL — SIGNIFICANT CHANGE UP (ref 0.5–1.3)
CREAT SERPL-MCNC: 1.02 MG/DL — SIGNIFICANT CHANGE UP (ref 0.5–1.3)
CREAT SERPL-MCNC: 1.03 MG/DL — SIGNIFICANT CHANGE UP (ref 0.5–1.3)
CREAT SERPL-MCNC: 1.05 MG/DL — SIGNIFICANT CHANGE UP (ref 0.5–1.3)
CREAT SERPL-MCNC: 1.05 MG/DL — SIGNIFICANT CHANGE UP (ref 0.5–1.3)
CREAT SERPL-MCNC: 1.08 MG/DL — SIGNIFICANT CHANGE UP (ref 0.5–1.3)
CREAT SERPL-MCNC: 1.09 MG/DL — SIGNIFICANT CHANGE UP (ref 0.5–1.3)
CREAT SERPL-MCNC: 1.27 MG/DL
DACRYOCYTES BLD QL SMEAR: SLIGHT — SIGNIFICANT CHANGE UP
DACRYOCYTES BLD QL SMEAR: SLIGHT — SIGNIFICANT CHANGE UP
EGFR: 101 ML/MIN/1.73M2 — SIGNIFICANT CHANGE UP
EGFR: 101 ML/MIN/1.73M2 — SIGNIFICANT CHANGE UP
EGFR: 64 ML/MIN/1.73M2
EGFR: 77 ML/MIN/1.73M2 — SIGNIFICANT CHANGE UP
EGFR: 78 ML/MIN/1.73M2 — SIGNIFICANT CHANGE UP
EGFR: 80 ML/MIN/1.73M2 — SIGNIFICANT CHANGE UP
EGFR: 80 ML/MIN/1.73M2 — SIGNIFICANT CHANGE UP
EGFR: 83 ML/MIN/1.73M2 — SIGNIFICANT CHANGE UP
EGFR: 84 ML/MIN/1.73M2 — SIGNIFICANT CHANGE UP
EGFR: 84 ML/MIN/1.73M2 — SIGNIFICANT CHANGE UP
EGFR: 85 ML/MIN/1.73M2
EGFR: 87 ML/MIN/1.73M2 — SIGNIFICANT CHANGE UP
EGFR: 91 ML/MIN/1.73M2 — SIGNIFICANT CHANGE UP
EGFR: 91 ML/MIN/1.73M2 — SIGNIFICANT CHANGE UP
EGFR: 95 ML/MIN/1.73M2 — SIGNIFICANT CHANGE UP
EGFR: 95 ML/MIN/1.73M2 — SIGNIFICANT CHANGE UP
EGFR: 99 ML/MIN/1.73M2 — SIGNIFICANT CHANGE UP
ELLIPTOCYTES BLD QL SMEAR: SLIGHT — SIGNIFICANT CHANGE UP
EOSINOPHIL # BLD AUTO: 0 K/UL — SIGNIFICANT CHANGE UP (ref 0–0.5)
EOSINOPHIL # BLD AUTO: 0.03 K/UL — SIGNIFICANT CHANGE UP (ref 0–0.5)
EOSINOPHIL # BLD AUTO: 0.03 K/UL — SIGNIFICANT CHANGE UP (ref 0–0.5)
EOSINOPHIL # BLD AUTO: 0.04 K/UL — SIGNIFICANT CHANGE UP (ref 0–0.5)
EOSINOPHIL # BLD AUTO: 0.04 K/UL — SIGNIFICANT CHANGE UP (ref 0–0.5)
EOSINOPHIL NFR BLD AUTO: 0 % — SIGNIFICANT CHANGE UP (ref 0–6)
EOSINOPHIL NFR BLD AUTO: 0.2 % — SIGNIFICANT CHANGE UP (ref 0–6)
EOSINOPHIL NFR BLD AUTO: 0.2 % — SIGNIFICANT CHANGE UP (ref 0–6)
EOSINOPHIL NFR BLD AUTO: 0.4 % — SIGNIFICANT CHANGE UP (ref 0–6)
EOSINOPHIL NFR BLD AUTO: 0.4 % — SIGNIFICANT CHANGE UP (ref 0–6)
ESTIMATED AVERAGE GLUCOSE: 105 MG/DL — SIGNIFICANT CHANGE UP (ref 68–114)
ESTIMATED AVERAGE GLUCOSE: 105 MG/DL — SIGNIFICANT CHANGE UP (ref 68–114)
FLUAV AG NPH QL: SIGNIFICANT CHANGE UP
FLUAV AG NPH QL: SIGNIFICANT CHANGE UP
FLUBV AG NPH QL: SIGNIFICANT CHANGE UP
FLUBV AG NPH QL: SIGNIFICANT CHANGE UP
GLUCOSE BLDC GLUCOMTR-MCNC: 102 MG/DL — HIGH (ref 70–99)
GLUCOSE BLDC GLUCOMTR-MCNC: 102 MG/DL — HIGH (ref 70–99)
GLUCOSE BLDC GLUCOMTR-MCNC: 106 MG/DL — HIGH (ref 70–99)
GLUCOSE BLDC GLUCOMTR-MCNC: 109 MG/DL — HIGH (ref 70–99)
GLUCOSE BLDC GLUCOMTR-MCNC: 109 MG/DL — HIGH (ref 70–99)
GLUCOSE BLDC GLUCOMTR-MCNC: 113 MG/DL — HIGH (ref 70–99)
GLUCOSE BLDC GLUCOMTR-MCNC: 113 MG/DL — HIGH (ref 70–99)
GLUCOSE BLDC GLUCOMTR-MCNC: 118 MG/DL — HIGH (ref 70–99)
GLUCOSE BLDC GLUCOMTR-MCNC: 118 MG/DL — HIGH (ref 70–99)
GLUCOSE BLDC GLUCOMTR-MCNC: 128 MG/DL — HIGH (ref 70–99)
GLUCOSE BLDC GLUCOMTR-MCNC: 128 MG/DL — HIGH (ref 70–99)
GLUCOSE BLDC GLUCOMTR-MCNC: 138 MG/DL — HIGH (ref 70–99)
GLUCOSE BLDC GLUCOMTR-MCNC: 138 MG/DL — HIGH (ref 70–99)
GLUCOSE BLDC GLUCOMTR-MCNC: 84 MG/DL — SIGNIFICANT CHANGE UP (ref 70–99)
GLUCOSE BLDC GLUCOMTR-MCNC: 84 MG/DL — SIGNIFICANT CHANGE UP (ref 70–99)
GLUCOSE BLDC GLUCOMTR-MCNC: 90 MG/DL — SIGNIFICANT CHANGE UP (ref 70–99)
GLUCOSE BLDC GLUCOMTR-MCNC: 90 MG/DL — SIGNIFICANT CHANGE UP (ref 70–99)
GLUCOSE BLDC GLUCOMTR-MCNC: 97 MG/DL — SIGNIFICANT CHANGE UP (ref 70–99)
GLUCOSE BLDC GLUCOMTR-MCNC: 98 MG/DL — SIGNIFICANT CHANGE UP (ref 70–99)
GLUCOSE BLDC GLUCOMTR-MCNC: 98 MG/DL — SIGNIFICANT CHANGE UP (ref 70–99)
GLUCOSE SERPL-MCNC: 102 MG/DL — HIGH (ref 70–99)
GLUCOSE SERPL-MCNC: 103 MG/DL
GLUCOSE SERPL-MCNC: 107 MG/DL
GLUCOSE SERPL-MCNC: 108 MG/DL — HIGH (ref 70–99)
GLUCOSE SERPL-MCNC: 108 MG/DL — HIGH (ref 70–99)
GLUCOSE SERPL-MCNC: 109 MG/DL — HIGH (ref 70–99)
GLUCOSE SERPL-MCNC: 109 MG/DL — HIGH (ref 70–99)
GLUCOSE SERPL-MCNC: 114 MG/DL — HIGH (ref 70–99)
GLUCOSE SERPL-MCNC: 118 MG/DL — HIGH (ref 70–99)
GLUCOSE SERPL-MCNC: 118 MG/DL — HIGH (ref 70–99)
GLUCOSE SERPL-MCNC: 119 MG/DL — HIGH (ref 70–99)
GLUCOSE SERPL-MCNC: 119 MG/DL — HIGH (ref 70–99)
GLUCOSE SERPL-MCNC: 123 MG/DL — HIGH (ref 70–99)
GLUCOSE SERPL-MCNC: 123 MG/DL — HIGH (ref 70–99)
GLUCOSE SERPL-MCNC: 124 MG/DL — HIGH (ref 70–99)
GLUCOSE SERPL-MCNC: 125 MG/DL — HIGH (ref 70–99)
GLUCOSE SERPL-MCNC: 125 MG/DL — HIGH (ref 70–99)
GLUCOSE SERPL-MCNC: 131 MG/DL — HIGH (ref 70–99)
GLUCOSE SERPL-MCNC: 131 MG/DL — HIGH (ref 70–99)
GLUCOSE SERPL-MCNC: 134 MG/DL — HIGH (ref 70–99)
GLUCOSE SERPL-MCNC: 137 MG/DL — HIGH (ref 70–99)
GLUCOSE SERPL-MCNC: 91 MG/DL — SIGNIFICANT CHANGE UP (ref 70–99)
GLUCOSE SERPL-MCNC: 91 MG/DL — SIGNIFICANT CHANGE UP (ref 70–99)
GLUCOSE SERPL-MCNC: 92 MG/DL — SIGNIFICANT CHANGE UP (ref 70–99)
GLUCOSE SERPL-MCNC: 97 MG/DL — SIGNIFICANT CHANGE UP (ref 70–99)
HCT VFR BLD CALC: 36.2 % — LOW (ref 39–50)
HCT VFR BLD CALC: 37.8 % — LOW (ref 39–50)
HCT VFR BLD CALC: 37.8 % — LOW (ref 39–50)
HCT VFR BLD CALC: 38.5 % — LOW (ref 39–50)
HCT VFR BLD CALC: 38.5 % — LOW (ref 39–50)
HCT VFR BLD CALC: 38.7 % — LOW (ref 39–50)
HCT VFR BLD CALC: 39 % — SIGNIFICANT CHANGE UP (ref 39–50)
HCT VFR BLD CALC: 39 % — SIGNIFICANT CHANGE UP (ref 39–50)
HCT VFR BLD CALC: 39.1 % — SIGNIFICANT CHANGE UP (ref 39–50)
HCT VFR BLD CALC: 39.1 % — SIGNIFICANT CHANGE UP (ref 39–50)
HCT VFR BLD CALC: 40.2 % — SIGNIFICANT CHANGE UP (ref 39–50)
HCT VFR BLD CALC: 40.2 % — SIGNIFICANT CHANGE UP (ref 39–50)
HCT VFR BLD CALC: 40.7 % — SIGNIFICANT CHANGE UP (ref 39–50)
HCT VFR BLD CALC: 41.1 % — SIGNIFICANT CHANGE UP (ref 39–50)
HCT VFR BLD CALC: 42.5 % — SIGNIFICANT CHANGE UP (ref 39–50)
HCT VFR BLD CALC: 42.5 % — SIGNIFICANT CHANGE UP (ref 39–50)
HCT VFR BLD CALC: 42.6 % — SIGNIFICANT CHANGE UP (ref 39–50)
HCT VFR BLD CALC: 43 % — SIGNIFICANT CHANGE UP (ref 39–50)
HCT VFR BLD CALC: 45.2 % — SIGNIFICANT CHANGE UP (ref 39–50)
HCT VFR BLD CALC: 45.5 % — SIGNIFICANT CHANGE UP (ref 39–50)
HCT VFR BLD CALC: 45.5 % — SIGNIFICANT CHANGE UP (ref 39–50)
HCT VFR BLD CALC: 46.3 % — SIGNIFICANT CHANGE UP (ref 39–50)
HGB BLD-MCNC: 11.9 G/DL — LOW (ref 13–17)
HGB BLD-MCNC: 12.1 G/DL — LOW (ref 13–17)
HGB BLD-MCNC: 12.4 G/DL — LOW (ref 13–17)
HGB BLD-MCNC: 12.5 G/DL — LOW (ref 13–17)
HGB BLD-MCNC: 12.9 G/DL — LOW (ref 13–17)
HGB BLD-MCNC: 12.9 G/DL — LOW (ref 13–17)
HGB BLD-MCNC: 13 G/DL — SIGNIFICANT CHANGE UP (ref 13–17)
HGB BLD-MCNC: 13 G/DL — SIGNIFICANT CHANGE UP (ref 13–17)
HGB BLD-MCNC: 13.1 G/DL — SIGNIFICANT CHANGE UP (ref 13–17)
HGB BLD-MCNC: 13.2 G/DL — SIGNIFICANT CHANGE UP (ref 13–17)
HGB BLD-MCNC: 13.4 G/DL — SIGNIFICANT CHANGE UP (ref 13–17)
HGB BLD-MCNC: 13.4 G/DL — SIGNIFICANT CHANGE UP (ref 13–17)
HGB BLD-MCNC: 13.5 G/DL — SIGNIFICANT CHANGE UP (ref 13–17)
HGB BLD-MCNC: 13.7 G/DL — SIGNIFICANT CHANGE UP (ref 13–17)
HGB BLD-MCNC: 14.1 G/DL — SIGNIFICANT CHANGE UP (ref 13–17)
HGB BLD-MCNC: 14.5 G/DL — SIGNIFICANT CHANGE UP (ref 13–17)
HGB BLD-MCNC: 15.1 G/DL — SIGNIFICANT CHANGE UP (ref 13–17)
HYPOCHROMIA BLD QL: SLIGHT — SIGNIFICANT CHANGE UP
IMM GRANULOCYTES NFR BLD AUTO: 1.2 % — HIGH (ref 0–0.9)
IMM GRANULOCYTES NFR BLD AUTO: 1.2 % — HIGH (ref 0–0.9)
IMM GRANULOCYTES NFR BLD AUTO: 1.5 % — HIGH (ref 0–0.9)
IMM GRANULOCYTES NFR BLD AUTO: 1.5 % — HIGH (ref 0–0.9)
INR BLD: 0.86 — SIGNIFICANT CHANGE UP (ref 0.85–1.18)
INR BLD: 0.94 — SIGNIFICANT CHANGE UP (ref 0.85–1.18)
INR BLD: 0.94 — SIGNIFICANT CHANGE UP (ref 0.85–1.18)
INR BLD: 0.95 — SIGNIFICANT CHANGE UP (ref 0.85–1.18)
INR BLD: 0.95 — SIGNIFICANT CHANGE UP (ref 0.85–1.18)
INR BLD: 1.01 — SIGNIFICANT CHANGE UP (ref 0.85–1.18)
INR BLD: 1.05 — SIGNIFICANT CHANGE UP (ref 0.85–1.18)
INR BLD: 1.05 — SIGNIFICANT CHANGE UP (ref 0.85–1.18)
LACTATE SERPL-SCNC: 1.7 MMOL/L — SIGNIFICANT CHANGE UP (ref 0.5–2)
LACTATE SERPL-SCNC: 2.1 MMOL/L — HIGH (ref 0.5–2)
LACTATE SERPL-SCNC: 2.6 MMOL/L — HIGH (ref 0.5–2)
LACTATE SERPL-SCNC: 2.9 MMOL/L — HIGH (ref 0.5–2)
LDH SERPL L TO P-CCNC: 621 U/L — HIGH (ref 50–242)
LDH SERPL L TO P-CCNC: 645 U/L — HIGH (ref 50–242)
LDH SERPL L TO P-CCNC: 918 U/L — HIGH (ref 50–242)
LDH SERPL-CCNC: 612 U/L
LDH SERPL-CCNC: 715 U/L
LYMPHOCYTES # BLD AUTO: 0.18 K/UL — LOW (ref 1–3.3)
LYMPHOCYTES # BLD AUTO: 0.18 K/UL — LOW (ref 1–3.3)
LYMPHOCYTES # BLD AUTO: 0.39 K/UL — LOW (ref 1–3.3)
LYMPHOCYTES # BLD AUTO: 0.85 K/UL — LOW (ref 1–3.3)
LYMPHOCYTES # BLD AUTO: 0.85 K/UL — LOW (ref 1–3.3)
LYMPHOCYTES # BLD AUTO: 0.9 % — LOW (ref 13–44)
LYMPHOCYTES # BLD AUTO: 0.9 % — LOW (ref 13–44)
LYMPHOCYTES # BLD AUTO: 0.97 K/UL — LOW (ref 1–3.3)
LYMPHOCYTES # BLD AUTO: 1.26 K/UL — SIGNIFICANT CHANGE UP (ref 1–3.3)
LYMPHOCYTES # BLD AUTO: 1.26 K/UL — SIGNIFICANT CHANGE UP (ref 1–3.3)
LYMPHOCYTES # BLD AUTO: 1.3 K/UL — SIGNIFICANT CHANGE UP (ref 1–3.3)
LYMPHOCYTES # BLD AUTO: 11.2 % — LOW (ref 13–44)
LYMPHOCYTES # BLD AUTO: 11.2 % — LOW (ref 13–44)
LYMPHOCYTES # BLD AUTO: 3 % — LOW (ref 13–44)
LYMPHOCYTES # BLD AUTO: 5 % — LOW (ref 13–44)
LYMPHOCYTES # BLD AUTO: 6.2 % — LOW (ref 13–44)
LYMPHOCYTES # BLD AUTO: 6.2 % — LOW (ref 13–44)
LYMPHOCYTES # BLD AUTO: 6.9 % — LOW (ref 13–44)
MAGNESIUM SERPL-MCNC: 2.2 MG/DL — SIGNIFICANT CHANGE UP (ref 1.6–2.6)
MAGNESIUM SERPL-MCNC: 2.2 MG/DL — SIGNIFICANT CHANGE UP (ref 1.6–2.6)
MAGNESIUM SERPL-MCNC: 2.3 MG/DL — SIGNIFICANT CHANGE UP (ref 1.6–2.6)
MAGNESIUM SERPL-MCNC: 2.4 MG/DL — SIGNIFICANT CHANGE UP (ref 1.6–2.6)
MAGNESIUM SERPL-MCNC: 2.5 MG/DL — SIGNIFICANT CHANGE UP (ref 1.6–2.6)
MAGNESIUM SERPL-MCNC: 2.6 MG/DL — SIGNIFICANT CHANGE UP (ref 1.6–2.6)
MAGNESIUM SERPL-MCNC: 2.7 MG/DL — HIGH (ref 1.6–2.6)
MAGNESIUM SERPL-MCNC: 2.7 MG/DL — HIGH (ref 1.6–2.6)
MANUAL SMEAR VERIFICATION: SIGNIFICANT CHANGE UP
MCHC RBC-ENTMCNC: 27.3 PG — SIGNIFICANT CHANGE UP (ref 27–34)
MCHC RBC-ENTMCNC: 27.4 PG — SIGNIFICANT CHANGE UP (ref 27–34)
MCHC RBC-ENTMCNC: 27.4 PG — SIGNIFICANT CHANGE UP (ref 27–34)
MCHC RBC-ENTMCNC: 27.5 PG — SIGNIFICANT CHANGE UP (ref 27–34)
MCHC RBC-ENTMCNC: 27.7 PG — SIGNIFICANT CHANGE UP (ref 27–34)
MCHC RBC-ENTMCNC: 27.8 PG — SIGNIFICANT CHANGE UP (ref 27–34)
MCHC RBC-ENTMCNC: 27.9 PG — SIGNIFICANT CHANGE UP (ref 27–34)
MCHC RBC-ENTMCNC: 27.9 PG — SIGNIFICANT CHANGE UP (ref 27–34)
MCHC RBC-ENTMCNC: 28 PG — SIGNIFICANT CHANGE UP (ref 27–34)
MCHC RBC-ENTMCNC: 28.1 PG — SIGNIFICANT CHANGE UP (ref 27–34)
MCHC RBC-ENTMCNC: 28.3 PG — SIGNIFICANT CHANGE UP (ref 27–34)
MCHC RBC-ENTMCNC: 28.8 PG — SIGNIFICANT CHANGE UP (ref 27–34)
MCHC RBC-ENTMCNC: 29.1 PG — SIGNIFICANT CHANGE UP (ref 27–34)
MCHC RBC-ENTMCNC: 29.7 PG — SIGNIFICANT CHANGE UP (ref 27–34)
MCHC RBC-ENTMCNC: 30.2 PG — SIGNIFICANT CHANGE UP (ref 27–34)
MCHC RBC-ENTMCNC: 30.9 GM/DL — LOW (ref 32–36)
MCHC RBC-ENTMCNC: 30.9 GM/DL — LOW (ref 32–36)
MCHC RBC-ENTMCNC: 31.3 GM/DL — LOW (ref 32–36)
MCHC RBC-ENTMCNC: 31.3 GM/DL — LOW (ref 32–36)
MCHC RBC-ENTMCNC: 31.5 GM/DL — LOW (ref 32–36)
MCHC RBC-ENTMCNC: 31.5 GM/DL — LOW (ref 32–36)
MCHC RBC-ENTMCNC: 31.6 GM/DL — LOW (ref 32–36)
MCHC RBC-ENTMCNC: 31.6 GM/DL — LOW (ref 32–36)
MCHC RBC-ENTMCNC: 31.7 GM/DL — LOW (ref 32–36)
MCHC RBC-ENTMCNC: 31.9 GM/DL — LOW (ref 32–36)
MCHC RBC-ENTMCNC: 32 GM/DL — SIGNIFICANT CHANGE UP (ref 32–36)
MCHC RBC-ENTMCNC: 32.1 GM/DL — SIGNIFICANT CHANGE UP (ref 32–36)
MCHC RBC-ENTMCNC: 32.4 G/DL — SIGNIFICANT CHANGE UP (ref 32–36)
MCHC RBC-ENTMCNC: 32.5 GM/DL — SIGNIFICANT CHANGE UP (ref 32–36)
MCHC RBC-ENTMCNC: 32.5 GM/DL — SIGNIFICANT CHANGE UP (ref 32–36)
MCHC RBC-ENTMCNC: 32.6 G/DL — SIGNIFICANT CHANGE UP (ref 32–36)
MCHC RBC-ENTMCNC: 32.8 G/DL — SIGNIFICANT CHANGE UP (ref 32–36)
MCHC RBC-ENTMCNC: 32.8 GM/DL — SIGNIFICANT CHANGE UP (ref 32–36)
MCHC RBC-ENTMCNC: 32.8 GM/DL — SIGNIFICANT CHANGE UP (ref 32–36)
MCHC RBC-ENTMCNC: 32.9 G/DL — SIGNIFICANT CHANGE UP (ref 32–36)
MCHC RBC-ENTMCNC: 33.3 G/DL — SIGNIFICANT CHANGE UP (ref 32–36)
MCV RBC AUTO: 84.1 FL — SIGNIFICANT CHANGE UP (ref 80–100)
MCV RBC AUTO: 84.1 FL — SIGNIFICANT CHANGE UP (ref 80–100)
MCV RBC AUTO: 84.9 FL — SIGNIFICANT CHANGE UP (ref 80–100)
MCV RBC AUTO: 84.9 FL — SIGNIFICANT CHANGE UP (ref 80–100)
MCV RBC AUTO: 85.6 FL — SIGNIFICANT CHANGE UP (ref 80–100)
MCV RBC AUTO: 86.2 FL — SIGNIFICANT CHANGE UP (ref 80–100)
MCV RBC AUTO: 86.3 FL — SIGNIFICANT CHANGE UP (ref 80–100)
MCV RBC AUTO: 86.3 FL — SIGNIFICANT CHANGE UP (ref 80–100)
MCV RBC AUTO: 86.8 FL — SIGNIFICANT CHANGE UP (ref 80–100)
MCV RBC AUTO: 86.9 FL — SIGNIFICANT CHANGE UP (ref 80–100)
MCV RBC AUTO: 86.9 FL — SIGNIFICANT CHANGE UP (ref 80–100)
MCV RBC AUTO: 87.2 FL — SIGNIFICANT CHANGE UP (ref 80–100)
MCV RBC AUTO: 87.2 FL — SIGNIFICANT CHANGE UP (ref 80–100)
MCV RBC AUTO: 87.4 FL — SIGNIFICANT CHANGE UP (ref 80–100)
MCV RBC AUTO: 87.4 FL — SIGNIFICANT CHANGE UP (ref 80–100)
MCV RBC AUTO: 87.5 FL — SIGNIFICANT CHANGE UP (ref 80–100)
MCV RBC AUTO: 87.8 FL — SIGNIFICANT CHANGE UP (ref 80–100)
MCV RBC AUTO: 87.8 FL — SIGNIFICANT CHANGE UP (ref 80–100)
MCV RBC AUTO: 88 FL — SIGNIFICANT CHANGE UP (ref 80–100)
MCV RBC AUTO: 88 FL — SIGNIFICANT CHANGE UP (ref 80–100)
MCV RBC AUTO: 88.3 FL — SIGNIFICANT CHANGE UP (ref 80–100)
MCV RBC AUTO: 88.7 FL — SIGNIFICANT CHANGE UP (ref 80–100)
MCV RBC AUTO: 88.7 FL — SIGNIFICANT CHANGE UP (ref 80–100)
MCV RBC AUTO: 89.2 FL — SIGNIFICANT CHANGE UP (ref 80–100)
MCV RBC AUTO: 89.7 FL — SIGNIFICANT CHANGE UP (ref 80–100)
MCV RBC AUTO: 89.7 FL — SIGNIFICANT CHANGE UP (ref 80–100)
MCV RBC AUTO: 91.9 FL — SIGNIFICANT CHANGE UP (ref 80–100)
METAMYELOCYTES # FLD: 1 % — HIGH (ref 0–0)
METAMYELOCYTES # FLD: 2 % — HIGH (ref 0–0)
METAMYELOCYTES # FLD: 2.6 % — HIGH (ref 0–0)
MICROCYTES BLD QL: SLIGHT — SIGNIFICANT CHANGE UP
MONOCYTES # BLD AUTO: 0.26 K/UL — SIGNIFICANT CHANGE UP (ref 0–0.9)
MONOCYTES # BLD AUTO: 0.81 K/UL — SIGNIFICANT CHANGE UP (ref 0–0.9)
MONOCYTES # BLD AUTO: 0.81 K/UL — SIGNIFICANT CHANGE UP (ref 0–0.9)
MONOCYTES # BLD AUTO: 0.98 K/UL — HIGH (ref 0–0.9)
MONOCYTES # BLD AUTO: 1.11 K/UL — HIGH (ref 0–0.9)
MONOCYTES # BLD AUTO: 1.11 K/UL — HIGH (ref 0–0.9)
MONOCYTES # BLD AUTO: 1.36 K/UL — HIGH (ref 0–0.9)
MONOCYTES # BLD AUTO: 1.36 K/UL — HIGH (ref 0–0.9)
MONOCYTES # BLD AUTO: 2.07 K/UL — HIGH (ref 0–0.9)
MONOCYTES NFR BLD AUTO: 2 % — SIGNIFICANT CHANGE UP (ref 2–14)
MONOCYTES NFR BLD AUTO: 6.9 % — SIGNIFICANT CHANGE UP (ref 2–14)
MONOCYTES NFR BLD AUTO: 6.9 % — SIGNIFICANT CHANGE UP (ref 2–14)
MONOCYTES NFR BLD AUTO: 7 % — SIGNIFICANT CHANGE UP (ref 2–14)
MONOCYTES NFR BLD AUTO: 7.2 % — SIGNIFICANT CHANGE UP (ref 2–14)
MONOCYTES NFR BLD AUTO: 7.2 % — SIGNIFICANT CHANGE UP (ref 2–14)
MONOCYTES NFR BLD AUTO: 8 % — SIGNIFICANT CHANGE UP (ref 2–14)
MONOCYTES NFR BLD AUTO: 8.1 % — SIGNIFICANT CHANGE UP (ref 2–14)
MONOCYTES NFR BLD AUTO: 8.1 % — SIGNIFICANT CHANGE UP (ref 2–14)
MYELOCYTES NFR BLD: 0.9 % — HIGH (ref 0–0)
MYELOCYTES NFR BLD: 4 % — HIGH (ref 0–0)
MYELOCYTES NFR BLD: 6 % — HIGH (ref 0–0)
NEUTROPHILS # BLD AUTO: 11.33 K/UL — HIGH (ref 1.8–7.4)
NEUTROPHILS # BLD AUTO: 11.6 K/UL — HIGH (ref 1.8–7.4)
NEUTROPHILS # BLD AUTO: 11.6 K/UL — HIGH (ref 1.8–7.4)
NEUTROPHILS # BLD AUTO: 11.72 K/UL — HIGH (ref 1.8–7.4)
NEUTROPHILS # BLD AUTO: 18.04 K/UL — HIGH (ref 1.8–7.4)
NEUTROPHILS # BLD AUTO: 18.04 K/UL — HIGH (ref 1.8–7.4)
NEUTROPHILS # BLD AUTO: 20.48 K/UL — HIGH (ref 1.8–7.4)
NEUTROPHILS # BLD AUTO: 8.92 K/UL — HIGH (ref 1.8–7.4)
NEUTROPHILS # BLD AUTO: 8.92 K/UL — HIGH (ref 1.8–7.4)
NEUTROPHILS NFR BLD AUTO: 79 % — HIGH (ref 43–77)
NEUTROPHILS NFR BLD AUTO: 79.6 % — HIGH (ref 43–77)
NEUTROPHILS NFR BLD AUTO: 79.6 % — HIGH (ref 43–77)
NEUTROPHILS NFR BLD AUTO: 80.9 % — HIGH (ref 43–77)
NEUTROPHILS NFR BLD AUTO: 84.2 % — HIGH (ref 43–77)
NEUTROPHILS NFR BLD AUTO: 84.2 % — HIGH (ref 43–77)
NEUTROPHILS NFR BLD AUTO: 90 % — HIGH (ref 43–77)
NEUTROPHILS NFR BLD AUTO: 91.3 % — HIGH (ref 43–77)
NEUTROPHILS NFR BLD AUTO: 91.3 % — HIGH (ref 43–77)
NRBC # BLD: 0 /100 WBCS — SIGNIFICANT CHANGE UP (ref 0–0)
NRBC # BLD: SIGNIFICANT CHANGE UP /100 WBCS (ref 0–0)
NRBC # BLD: SIGNIFICANT CHANGE UP /100 WBCS (ref 0–0)
OVALOCYTES BLD QL SMEAR: SLIGHT — SIGNIFICANT CHANGE UP
PHOSPHATE SERPL-MCNC: 3.2 MG/DL — SIGNIFICANT CHANGE UP (ref 2.5–4.5)
PHOSPHATE SERPL-MCNC: 3.2 MG/DL — SIGNIFICANT CHANGE UP (ref 2.5–4.5)
PHOSPHATE SERPL-MCNC: 3.3 MG/DL — SIGNIFICANT CHANGE UP (ref 2.5–4.5)
PHOSPHATE SERPL-MCNC: 3.3 MG/DL — SIGNIFICANT CHANGE UP (ref 2.5–4.5)
PHOSPHATE SERPL-MCNC: 3.7 MG/DL — SIGNIFICANT CHANGE UP (ref 2.5–4.5)
PHOSPHATE SERPL-MCNC: 4.1 MG/DL — SIGNIFICANT CHANGE UP (ref 2.5–4.5)
PHOSPHATE SERPL-MCNC: 4.1 MG/DL — SIGNIFICANT CHANGE UP (ref 2.5–4.5)
PHOSPHATE SERPL-MCNC: 4.3 MG/DL — SIGNIFICANT CHANGE UP (ref 2.5–4.5)
PHOSPHATE SERPL-MCNC: 4.6 MG/DL — HIGH (ref 2.5–4.5)
PHOSPHATE SERPL-MCNC: 4.6 MG/DL — HIGH (ref 2.5–4.5)
PHOSPHATE SERPL-MCNC: 4.9 MG/DL — HIGH (ref 2.5–4.5)
PHOSPHATE SERPL-MCNC: 4.9 MG/DL — HIGH (ref 2.5–4.5)
PHOSPHATE SERPL-MCNC: 6.1 MG/DL — HIGH (ref 2.5–4.5)
PHOSPHATE SERPL-MCNC: 6.1 MG/DL — HIGH (ref 2.5–4.5)
PLAT MORPH BLD: NORMAL — SIGNIFICANT CHANGE UP
PLATELET # BLD AUTO: 116 K/UL — LOW (ref 150–400)
PLATELET # BLD AUTO: 121 K/UL — LOW (ref 150–400)
PLATELET # BLD AUTO: 121 K/UL — LOW (ref 150–400)
PLATELET # BLD AUTO: 124 K/UL — LOW (ref 150–400)
PLATELET # BLD AUTO: 134 K/UL — LOW (ref 150–400)
PLATELET # BLD AUTO: 137 K/UL — LOW (ref 150–400)
PLATELET # BLD AUTO: 137 K/UL — LOW (ref 150–400)
PLATELET # BLD AUTO: 138 K/UL — LOW (ref 150–400)
PLATELET # BLD AUTO: 138 K/UL — LOW (ref 150–400)
PLATELET # BLD AUTO: 141 K/UL — LOW (ref 150–400)
PLATELET # BLD AUTO: 143 K/UL — LOW (ref 150–400)
PLATELET # BLD AUTO: 145 K/UL — LOW (ref 150–400)
PLATELET # BLD AUTO: 162 K/UL — SIGNIFICANT CHANGE UP (ref 150–400)
PLATELET # BLD AUTO: 176 K/UL — SIGNIFICANT CHANGE UP (ref 150–400)
PLATELET # BLD AUTO: 176 K/UL — SIGNIFICANT CHANGE UP (ref 150–400)
PLATELET # BLD AUTO: 184 K/UL — SIGNIFICANT CHANGE UP (ref 150–400)
PLATELET # BLD AUTO: 184 K/UL — SIGNIFICANT CHANGE UP (ref 150–400)
PLATELET # BLD AUTO: 209 K/UL — SIGNIFICANT CHANGE UP (ref 150–400)
PLATELET # BLD AUTO: 232 K/UL — SIGNIFICANT CHANGE UP (ref 150–400)
PLATELET # BLD AUTO: 247 K/UL — SIGNIFICANT CHANGE UP (ref 150–400)
PLATELET # BLD AUTO: 264 K/UL — SIGNIFICANT CHANGE UP (ref 150–400)
POIKILOCYTOSIS BLD QL AUTO: SIGNIFICANT CHANGE UP
POIKILOCYTOSIS BLD QL AUTO: SIGNIFICANT CHANGE UP
POIKILOCYTOSIS BLD QL AUTO: SLIGHT — SIGNIFICANT CHANGE UP
POLYCHROMASIA BLD QL SMEAR: SLIGHT — SIGNIFICANT CHANGE UP
POTASSIUM SERPL-MCNC: 3.4 MMOL/L — LOW (ref 3.5–5.3)
POTASSIUM SERPL-MCNC: 3.6 MMOL/L — SIGNIFICANT CHANGE UP (ref 3.5–5.3)
POTASSIUM SERPL-MCNC: 3.8 MMOL/L — SIGNIFICANT CHANGE UP (ref 3.5–5.3)
POTASSIUM SERPL-MCNC: 3.8 MMOL/L — SIGNIFICANT CHANGE UP (ref 3.5–5.3)
POTASSIUM SERPL-MCNC: 3.9 MMOL/L — SIGNIFICANT CHANGE UP (ref 3.5–5.3)
POTASSIUM SERPL-MCNC: 4 MMOL/L — SIGNIFICANT CHANGE UP (ref 3.5–5.3)
POTASSIUM SERPL-MCNC: 4 MMOL/L — SIGNIFICANT CHANGE UP (ref 3.5–5.3)
POTASSIUM SERPL-MCNC: 4.2 MMOL/L — SIGNIFICANT CHANGE UP (ref 3.5–5.3)
POTASSIUM SERPL-MCNC: 4.2 MMOL/L — SIGNIFICANT CHANGE UP (ref 3.5–5.3)
POTASSIUM SERPL-MCNC: 4.5 MMOL/L — SIGNIFICANT CHANGE UP (ref 3.5–5.3)
POTASSIUM SERPL-MCNC: 4.5 MMOL/L — SIGNIFICANT CHANGE UP (ref 3.5–5.3)
POTASSIUM SERPL-MCNC: 4.6 MMOL/L — SIGNIFICANT CHANGE UP (ref 3.5–5.3)
POTASSIUM SERPL-MCNC: 4.9 MMOL/L — SIGNIFICANT CHANGE UP (ref 3.5–5.3)
POTASSIUM SERPL-MCNC: 5 MMOL/L — SIGNIFICANT CHANGE UP (ref 3.5–5.3)
POTASSIUM SERPL-MCNC: SIGNIFICANT CHANGE UP (ref 3.5–5.3)
POTASSIUM SERPL-SCNC: 3.4 MMOL/L — LOW (ref 3.5–5.3)
POTASSIUM SERPL-SCNC: 3.6 MMOL/L — SIGNIFICANT CHANGE UP (ref 3.5–5.3)
POTASSIUM SERPL-SCNC: 3.8 MMOL/L — SIGNIFICANT CHANGE UP (ref 3.5–5.3)
POTASSIUM SERPL-SCNC: 3.8 MMOL/L — SIGNIFICANT CHANGE UP (ref 3.5–5.3)
POTASSIUM SERPL-SCNC: 3.9 MMOL/L — SIGNIFICANT CHANGE UP (ref 3.5–5.3)
POTASSIUM SERPL-SCNC: 4 MMOL/L — SIGNIFICANT CHANGE UP (ref 3.5–5.3)
POTASSIUM SERPL-SCNC: 4 MMOL/L — SIGNIFICANT CHANGE UP (ref 3.5–5.3)
POTASSIUM SERPL-SCNC: 4.1 MMOL/L
POTASSIUM SERPL-SCNC: 4.2 MMOL/L — SIGNIFICANT CHANGE UP (ref 3.5–5.3)
POTASSIUM SERPL-SCNC: 4.2 MMOL/L — SIGNIFICANT CHANGE UP (ref 3.5–5.3)
POTASSIUM SERPL-SCNC: 4.5 MMOL/L
POTASSIUM SERPL-SCNC: 4.5 MMOL/L — SIGNIFICANT CHANGE UP (ref 3.5–5.3)
POTASSIUM SERPL-SCNC: 4.5 MMOL/L — SIGNIFICANT CHANGE UP (ref 3.5–5.3)
POTASSIUM SERPL-SCNC: 4.6 MMOL/L — SIGNIFICANT CHANGE UP (ref 3.5–5.3)
POTASSIUM SERPL-SCNC: 4.9 MMOL/L — SIGNIFICANT CHANGE UP (ref 3.5–5.3)
POTASSIUM SERPL-SCNC: 5 MMOL/L — SIGNIFICANT CHANGE UP (ref 3.5–5.3)
POTASSIUM SERPL-SCNC: SIGNIFICANT CHANGE UP (ref 3.5–5.3)
PROT SERPL-MCNC: 5.5 G/DL — LOW (ref 6–8.3)
PROT SERPL-MCNC: 5.5 G/DL — LOW (ref 6–8.3)
PROT SERPL-MCNC: 5.6 G/DL — LOW (ref 6–8.3)
PROT SERPL-MCNC: 5.7 G/DL — LOW (ref 6–8.3)
PROT SERPL-MCNC: 5.8 G/DL
PROT SERPL-MCNC: 5.8 G/DL — LOW (ref 6–8.3)
PROT SERPL-MCNC: 5.8 G/DL — LOW (ref 6–8.3)
PROT SERPL-MCNC: 6.1 G/DL
PROT SERPL-MCNC: 7 G/DL — SIGNIFICANT CHANGE UP (ref 6–8.3)
PROTHROM AB SERPL-ACNC: 10.7 SEC — SIGNIFICANT CHANGE UP (ref 9.5–13)
PROTHROM AB SERPL-ACNC: 10.7 SEC — SIGNIFICANT CHANGE UP (ref 9.5–13)
PROTHROM AB SERPL-ACNC: 10.9 SEC — SIGNIFICANT CHANGE UP (ref 9.5–13)
PROTHROM AB SERPL-ACNC: 10.9 SEC — SIGNIFICANT CHANGE UP (ref 9.5–13)
PROTHROM AB SERPL-ACNC: 11.5 SEC — SIGNIFICANT CHANGE UP (ref 9.5–13)
PROTHROM AB SERPL-ACNC: 12 SEC — SIGNIFICANT CHANGE UP (ref 9.5–13)
PROTHROM AB SERPL-ACNC: 12 SEC — SIGNIFICANT CHANGE UP (ref 9.5–13)
PROTHROM AB SERPL-ACNC: 9.9 SEC — SIGNIFICANT CHANGE UP (ref 9.5–13)
RBC # BLD: 3.94 M/UL — LOW (ref 4.2–5.8)
RBC # BLD: 4.36 M/UL — SIGNIFICANT CHANGE UP (ref 4.2–5.8)
RBC # BLD: 4.36 M/UL — SIGNIFICANT CHANGE UP (ref 4.2–5.8)
RBC # BLD: 4.4 M/UL — SIGNIFICANT CHANGE UP (ref 4.2–5.8)
RBC # BLD: 4.4 M/UL — SIGNIFICANT CHANGE UP (ref 4.2–5.8)
RBC # BLD: 4.45 M/UL — SIGNIFICANT CHANGE UP (ref 4.2–5.8)
RBC # BLD: 4.45 M/UL — SIGNIFICANT CHANGE UP (ref 4.2–5.8)
RBC # BLD: 4.46 M/UL — SIGNIFICANT CHANGE UP (ref 4.2–5.8)
RBC # BLD: 4.58 M/UL — SIGNIFICANT CHANGE UP (ref 4.2–5.8)
RBC # BLD: 4.58 M/UL — SIGNIFICANT CHANGE UP (ref 4.2–5.8)
RBC # BLD: 4.59 M/UL — SIGNIFICANT CHANGE UP (ref 4.2–5.8)
RBC # BLD: 4.61 M/UL — SIGNIFICANT CHANGE UP (ref 4.2–5.8)
RBC # BLD: 4.68 M/UL — SIGNIFICANT CHANGE UP (ref 4.2–5.8)
RBC # BLD: 4.68 M/UL — SIGNIFICANT CHANGE UP (ref 4.2–5.8)
RBC # BLD: 4.8 M/UL — SIGNIFICANT CHANGE UP (ref 4.2–5.8)
RBC # BLD: 4.85 M/UL — SIGNIFICANT CHANGE UP (ref 4.2–5.8)
RBC # BLD: 4.87 M/UL — SIGNIFICANT CHANGE UP (ref 4.2–5.8)
RBC # BLD: 4.89 M/UL — SIGNIFICANT CHANGE UP (ref 4.2–5.8)
RBC # BLD: 4.89 M/UL — SIGNIFICANT CHANGE UP (ref 4.2–5.8)
RBC # BLD: 5.12 M/UL — SIGNIFICANT CHANGE UP (ref 4.2–5.8)
RBC # BLD: 5.27 M/UL — SIGNIFICANT CHANGE UP (ref 4.2–5.8)
RBC # BLD: 5.27 M/UL — SIGNIFICANT CHANGE UP (ref 4.2–5.8)
RBC # BLD: 5.37 M/UL — SIGNIFICANT CHANGE UP (ref 4.2–5.8)
RBC # FLD: 15.1 % — HIGH (ref 10.3–14.5)
RBC # FLD: 15.1 % — HIGH (ref 10.3–14.5)
RBC # FLD: 15.3 % — HIGH (ref 10.3–14.5)
RBC # FLD: 15.3 % — HIGH (ref 10.3–14.5)
RBC # FLD: 15.4 % — HIGH (ref 10.3–14.5)
RBC # FLD: 15.4 % — HIGH (ref 10.3–14.5)
RBC # FLD: 15.6 % — HIGH (ref 10.3–14.5)
RBC # FLD: 15.6 % — HIGH (ref 10.3–14.5)
RBC # FLD: 15.9 % — HIGH (ref 10.3–14.5)
RBC # FLD: 16 % — HIGH (ref 10.3–14.5)
RBC # FLD: 16.1 % — HIGH (ref 10.3–14.5)
RBC # FLD: 16.1 % — HIGH (ref 10.3–14.5)
RBC # FLD: 16.2 % — HIGH (ref 10.3–14.5)
RBC # FLD: 16.2 % — HIGH (ref 10.3–14.5)
RBC # FLD: 16.3 % — HIGH (ref 10.3–14.5)
RBC # FLD: 16.3 % — HIGH (ref 10.3–14.5)
RBC # FLD: 16.4 % — HIGH (ref 10.3–14.5)
RBC # FLD: 16.4 % — HIGH (ref 10.3–14.5)
RBC # FLD: 17.2 % — HIGH (ref 10.3–14.5)
RBC # FLD: 17.5 % — HIGH (ref 10.3–14.5)
RBC # FLD: 18 % — HIGH (ref 10.3–14.5)
RBC # FLD: 18.1 % — HIGH (ref 10.3–14.5)
RBC # FLD: 18.5 % — HIGH (ref 10.3–14.5)
RBC # FLD: 18.9 % — HIGH (ref 10.3–14.5)
RBC BLD AUTO: ABNORMAL
RH IG SCN BLD-IMP: POSITIVE — SIGNIFICANT CHANGE UP
RSV RNA NPH QL NAA+NON-PROBE: SIGNIFICANT CHANGE UP
RSV RNA NPH QL NAA+NON-PROBE: SIGNIFICANT CHANGE UP
SARS-COV-2 RNA SPEC QL NAA+PROBE: SIGNIFICANT CHANGE UP
SARS-COV-2 RNA SPEC QL NAA+PROBE: SIGNIFICANT CHANGE UP
SODIUM SERPL-SCNC: 134 MMOL/L — LOW (ref 135–145)
SODIUM SERPL-SCNC: 134 MMOL/L — LOW (ref 135–145)
SODIUM SERPL-SCNC: 136 MMOL/L — SIGNIFICANT CHANGE UP (ref 135–145)
SODIUM SERPL-SCNC: 136 MMOL/L — SIGNIFICANT CHANGE UP (ref 135–145)
SODIUM SERPL-SCNC: 137 MMOL/L — SIGNIFICANT CHANGE UP (ref 135–145)
SODIUM SERPL-SCNC: 139 MMOL/L — SIGNIFICANT CHANGE UP (ref 135–145)
SODIUM SERPL-SCNC: 140 MMOL/L — SIGNIFICANT CHANGE UP (ref 135–145)
SODIUM SERPL-SCNC: 140 MMOL/L — SIGNIFICANT CHANGE UP (ref 135–145)
SODIUM SERPL-SCNC: 141 MMOL/L — SIGNIFICANT CHANGE UP (ref 135–145)
SODIUM SERPL-SCNC: 142 MMOL/L
SODIUM SERPL-SCNC: 142 MMOL/L
SODIUM SERPL-SCNC: 142 MMOL/L — SIGNIFICANT CHANGE UP (ref 135–145)
SODIUM SERPL-SCNC: 142 MMOL/L — SIGNIFICANT CHANGE UP (ref 135–145)
SODIUM SERPL-SCNC: 144 MMOL/L — SIGNIFICANT CHANGE UP (ref 135–145)
SURGICAL PATHOLOGY STUDY: SIGNIFICANT CHANGE UP
T4 FREE+ TSH PNL SERPL: 1.74 UIU/ML — SIGNIFICANT CHANGE UP (ref 0.27–4.2)
T4 FREE+ TSH PNL SERPL: 2.86 UIU/ML — SIGNIFICANT CHANGE UP (ref 0.27–4.2)
TSH SERPL-ACNC: 1.66 UIU/ML
TSH SERPL-MCNC: 1.05 UIU/ML — SIGNIFICANT CHANGE UP (ref 0.27–4.2)
VARIANT LYMPHS # BLD: 1.7 % — SIGNIFICANT CHANGE UP (ref 0–6)
WBC # BLD: 10.19 K/UL — SIGNIFICANT CHANGE UP (ref 3.8–10.5)
WBC # BLD: 10.32 K/UL — SIGNIFICANT CHANGE UP (ref 3.8–10.5)
WBC # BLD: 10.32 K/UL — SIGNIFICANT CHANGE UP (ref 3.8–10.5)
WBC # BLD: 10.59 K/UL — HIGH (ref 3.8–10.5)
WBC # BLD: 11.21 K/UL — HIGH (ref 3.8–10.5)
WBC # BLD: 11.21 K/UL — HIGH (ref 3.8–10.5)
WBC # BLD: 12.27 K/UL — HIGH (ref 3.8–10.5)
WBC # BLD: 12.57 K/UL — HIGH (ref 3.8–10.5)
WBC # BLD: 12.64 K/UL — HIGH (ref 3.8–10.5)
WBC # BLD: 12.64 K/UL — HIGH (ref 3.8–10.5)
WBC # BLD: 13.02 K/UL — HIGH (ref 3.8–10.5)
WBC # BLD: 13.21 K/UL — HIGH (ref 3.8–10.5)
WBC # BLD: 13.21 K/UL — HIGH (ref 3.8–10.5)
WBC # BLD: 13.65 K/UL — HIGH (ref 3.8–10.5)
WBC # BLD: 13.65 K/UL — HIGH (ref 3.8–10.5)
WBC # BLD: 13.76 K/UL — HIGH (ref 3.8–10.5)
WBC # BLD: 13.76 K/UL — HIGH (ref 3.8–10.5)
WBC # BLD: 14.01 K/UL — HIGH (ref 3.8–10.5)
WBC # BLD: 15.37 K/UL — HIGH (ref 3.8–10.5)
WBC # BLD: 15.37 K/UL — HIGH (ref 3.8–10.5)
WBC # BLD: 16.1 K/UL — HIGH (ref 3.8–10.5)
WBC # BLD: 16.1 K/UL — HIGH (ref 3.8–10.5)
WBC # BLD: 19.76 K/UL — HIGH (ref 3.8–10.5)
WBC # BLD: 19.76 K/UL — HIGH (ref 3.8–10.5)
WBC # BLD: 25.92 K/UL — HIGH (ref 3.8–10.5)
WBC # BLD: 7.73 K/UL — SIGNIFICANT CHANGE UP (ref 3.8–10.5)
WBC # BLD: 7.83 K/UL — SIGNIFICANT CHANGE UP (ref 3.8–10.5)
WBC # FLD AUTO: 10.19 K/UL — SIGNIFICANT CHANGE UP (ref 3.8–10.5)
WBC # FLD AUTO: 10.32 K/UL — SIGNIFICANT CHANGE UP (ref 3.8–10.5)
WBC # FLD AUTO: 10.32 K/UL — SIGNIFICANT CHANGE UP (ref 3.8–10.5)
WBC # FLD AUTO: 10.59 K/UL — HIGH (ref 3.8–10.5)
WBC # FLD AUTO: 11.21 K/UL — HIGH (ref 3.8–10.5)
WBC # FLD AUTO: 11.21 K/UL — HIGH (ref 3.8–10.5)
WBC # FLD AUTO: 12.27 K/UL — HIGH (ref 3.8–10.5)
WBC # FLD AUTO: 12.57 K/UL — HIGH (ref 3.8–10.5)
WBC # FLD AUTO: 12.64 K/UL — HIGH (ref 3.8–10.5)
WBC # FLD AUTO: 12.64 K/UL — HIGH (ref 3.8–10.5)
WBC # FLD AUTO: 13.02 K/UL — HIGH (ref 3.8–10.5)
WBC # FLD AUTO: 13.21 K/UL — HIGH (ref 3.8–10.5)
WBC # FLD AUTO: 13.21 K/UL — HIGH (ref 3.8–10.5)
WBC # FLD AUTO: 13.65 K/UL — HIGH (ref 3.8–10.5)
WBC # FLD AUTO: 13.65 K/UL — HIGH (ref 3.8–10.5)
WBC # FLD AUTO: 13.76 K/UL — HIGH (ref 3.8–10.5)
WBC # FLD AUTO: 13.76 K/UL — HIGH (ref 3.8–10.5)
WBC # FLD AUTO: 14.01 K/UL — HIGH (ref 3.8–10.5)
WBC # FLD AUTO: 15.37 K/UL — HIGH (ref 3.8–10.5)
WBC # FLD AUTO: 15.37 K/UL — HIGH (ref 3.8–10.5)
WBC # FLD AUTO: 16.1 K/UL — HIGH (ref 3.8–10.5)
WBC # FLD AUTO: 16.1 K/UL — HIGH (ref 3.8–10.5)
WBC # FLD AUTO: 19.76 K/UL — HIGH (ref 3.8–10.5)
WBC # FLD AUTO: 19.76 K/UL — HIGH (ref 3.8–10.5)
WBC # FLD AUTO: 25.92 K/UL — HIGH (ref 3.8–10.5)
WBC # FLD AUTO: 7.73 K/UL — SIGNIFICANT CHANGE UP (ref 3.8–10.5)
WBC # FLD AUTO: 7.83 K/UL — SIGNIFICANT CHANGE UP (ref 3.8–10.5)

## 2024-01-01 PROCEDURE — 97163 PT EVAL HIGH COMPLEX 45 MIN: CPT

## 2024-01-01 PROCEDURE — 61608 RESECT/EXCISE CRANIAL LESION: CPT

## 2024-01-01 PROCEDURE — 99233 SBSQ HOSP IP/OBS HIGH 50: CPT

## 2024-01-01 PROCEDURE — 71045 X-RAY EXAM CHEST 1 VIEW: CPT | Mod: 26

## 2024-01-01 PROCEDURE — 99214 OFFICE O/P EST MOD 30 MIN: CPT

## 2024-01-01 PROCEDURE — A9585: CPT

## 2024-01-01 PROCEDURE — 70496 CT ANGIOGRAPHY HEAD: CPT | Mod: 26

## 2024-01-01 PROCEDURE — 95700 EEG CONT REC W/VID EEG TECH: CPT

## 2024-01-01 PROCEDURE — 88342 IMHCHEM/IMCYTCHM 1ST ANTB: CPT | Mod: 26

## 2024-01-01 PROCEDURE — 71045 X-RAY EXAM CHEST 1 VIEW: CPT

## 2024-01-01 PROCEDURE — 99232 SBSQ HOSP IP/OBS MODERATE 35: CPT

## 2024-01-01 PROCEDURE — 97116 GAIT TRAINING THERAPY: CPT

## 2024-01-01 PROCEDURE — 82962 GLUCOSE BLOOD TEST: CPT

## 2024-01-01 PROCEDURE — 36415 COLL VENOUS BLD VENIPUNCTURE: CPT

## 2024-01-01 PROCEDURE — C1713: CPT

## 2024-01-01 PROCEDURE — 83605 ASSAY OF LACTIC ACID: CPT

## 2024-01-01 PROCEDURE — 85027 COMPLETE CBC AUTOMATED: CPT

## 2024-01-01 PROCEDURE — 80048 BASIC METABOLIC PNL TOTAL CA: CPT

## 2024-01-01 PROCEDURE — 95708 EEG WO VID EA 12-26HR UNMNTR: CPT

## 2024-01-01 PROCEDURE — 85025 COMPLETE CBC W/AUTO DIFF WBC: CPT

## 2024-01-01 PROCEDURE — 97167 OT EVAL HIGH COMPLEX 60 MIN: CPT

## 2024-01-01 PROCEDURE — 99213 OFFICE O/P EST LOW 20 MIN: CPT | Mod: 24

## 2024-01-01 PROCEDURE — 37193 REM ENDOVAS VENA CAVA FILTER: CPT | Mod: GC

## 2024-01-01 PROCEDURE — 99215 OFFICE O/P EST HI 40 MIN: CPT

## 2024-01-01 PROCEDURE — 99255 IP/OBS CONSLTJ NEW/EST HI 80: CPT

## 2024-01-01 PROCEDURE — 88300 SURGICAL PATH GROSS: CPT

## 2024-01-01 PROCEDURE — 81378 HLA I & II TYPING HR: CPT

## 2024-01-01 PROCEDURE — 86901 BLOOD TYPING SEROLOGIC RH(D): CPT

## 2024-01-01 PROCEDURE — 61592 ORBITOCRANIAL APPROACH/SKULL: CPT | Mod: RT

## 2024-01-01 PROCEDURE — 88307 TISSUE EXAM BY PATHOLOGIST: CPT

## 2024-01-01 PROCEDURE — C1725: CPT

## 2024-01-01 PROCEDURE — 61510 CRNEC TREPH EXC BRN TUM STTL: CPT

## 2024-01-01 PROCEDURE — 82550 ASSAY OF CK (CPK): CPT

## 2024-01-01 PROCEDURE — 93970 EXTREMITY STUDY: CPT

## 2024-01-01 PROCEDURE — 86900 BLOOD TYPING SEROLOGIC ABO: CPT

## 2024-01-01 PROCEDURE — 97530 THERAPEUTIC ACTIVITIES: CPT

## 2024-01-01 PROCEDURE — 99291 CRITICAL CARE FIRST HOUR: CPT | Mod: 24

## 2024-01-01 PROCEDURE — C1887: CPT

## 2024-01-01 PROCEDURE — 85730 THROMBOPLASTIN TIME PARTIAL: CPT

## 2024-01-01 PROCEDURE — 97161 PT EVAL LOW COMPLEX 20 MIN: CPT

## 2024-01-01 PROCEDURE — 95718 EEG PHYS/QHP 2-12 HR W/VEEG: CPT

## 2024-01-01 PROCEDURE — 83735 ASSAY OF MAGNESIUM: CPT

## 2024-01-01 PROCEDURE — C1889: CPT

## 2024-01-01 PROCEDURE — 99024 POSTOP FOLLOW-UP VISIT: CPT

## 2024-01-01 PROCEDURE — 70450 CT HEAD/BRAIN W/O DYE: CPT | Mod: 26

## 2024-01-01 PROCEDURE — 99239 HOSP IP/OBS DSCHRG MGMT >30: CPT

## 2024-01-01 PROCEDURE — C1769: CPT

## 2024-01-01 PROCEDURE — 86850 RBC ANTIBODY SCREEN: CPT

## 2024-01-01 PROCEDURE — 76000 FLUOROSCOPY <1 HR PHYS/QHP: CPT

## 2024-01-01 PROCEDURE — C1773: CPT

## 2024-01-01 PROCEDURE — 88300 SURGICAL PATH GROSS: CPT | Mod: 26

## 2024-01-01 PROCEDURE — 70496 CT ANGIOGRAPHY HEAD: CPT

## 2024-01-01 PROCEDURE — 92507 TX SP LANG VOICE COMM INDIV: CPT

## 2024-01-01 PROCEDURE — 85610 PROTHROMBIN TIME: CPT

## 2024-01-01 PROCEDURE — 70498 CT ANGIOGRAPHY NECK: CPT

## 2024-01-01 PROCEDURE — C1880: CPT

## 2024-01-01 PROCEDURE — 88341 IMHCHEM/IMCYTCHM EA ADD ANTB: CPT

## 2024-01-01 PROCEDURE — 70553 MRI BRAIN STEM W/O & W/DYE: CPT

## 2024-01-01 PROCEDURE — 99233 SBSQ HOSP IP/OBS HIGH 50: CPT | Mod: 25,57

## 2024-01-01 PROCEDURE — 84100 ASSAY OF PHOSPHORUS: CPT

## 2024-01-01 PROCEDURE — 92523 SPEECH SOUND LANG COMPREHEN: CPT

## 2024-01-01 PROCEDURE — 87637 SARSCOV2&INF A&B&RSV AMP PRB: CPT

## 2024-01-01 PROCEDURE — G2211 COMPLEX E/M VISIT ADD ON: CPT

## 2024-01-01 PROCEDURE — 70450 CT HEAD/BRAIN W/O DYE: CPT

## 2024-01-01 PROCEDURE — 88341 IMHCHEM/IMCYTCHM EA ADD ANTB: CPT | Mod: 26

## 2024-01-01 PROCEDURE — 80053 COMPREHEN METABOLIC PANEL: CPT

## 2024-01-01 PROCEDURE — 99222 1ST HOSP IP/OBS MODERATE 55: CPT | Mod: GC

## 2024-01-01 PROCEDURE — 81382 HLA II TYPING 1 LOC HR: CPT

## 2024-01-01 PROCEDURE — 95705 EEG W/O VID 2-12 HR UNMNTR: CPT

## 2024-01-01 PROCEDURE — 93010 ELECTROCARDIOGRAM REPORT: CPT

## 2024-01-01 PROCEDURE — 37193 REM ENDOVAS VENA CAVA FILTER: CPT

## 2024-01-01 PROCEDURE — 97166 OT EVAL MOD COMPLEX 45 MIN: CPT

## 2024-01-01 PROCEDURE — 37191 INS ENDOVAS VENA CAVA FILTR: CPT | Mod: GC

## 2024-01-01 PROCEDURE — 83036 HEMOGLOBIN GLYCOSYLATED A1C: CPT

## 2024-01-01 PROCEDURE — 99214 OFFICE O/P EST MOD 30 MIN: CPT | Mod: 95

## 2024-01-01 PROCEDURE — 97129 THER IVNTJ 1ST 15 MIN: CPT

## 2024-01-01 PROCEDURE — 92610 EVALUATE SWALLOWING FUNCTION: CPT

## 2024-01-01 PROCEDURE — 69990 MICROSURGERY ADD-ON: CPT | Mod: 59

## 2024-01-01 PROCEDURE — 99223 1ST HOSP IP/OBS HIGH 75: CPT

## 2024-01-01 PROCEDURE — 99215 OFFICE O/P EST HI 40 MIN: CPT | Mod: 25

## 2024-01-01 PROCEDURE — 88331 PATH CONSLTJ SURG 1 BLK 1SPC: CPT

## 2024-01-01 PROCEDURE — 97535 SELF CARE MNGMENT TRAINING: CPT

## 2024-01-01 PROCEDURE — 88300 SURGICAL PATH GROSS: CPT | Mod: 26,59

## 2024-01-01 PROCEDURE — 61781 SCAN PROC CRANIAL INTRA: CPT

## 2024-01-01 PROCEDURE — 88307 TISSUE EXAM BY PATHOLOGIST: CPT | Mod: 26

## 2024-01-01 PROCEDURE — 70450 CT HEAD/BRAIN W/O DYE: CPT | Mod: 26,59

## 2024-01-01 PROCEDURE — C1894: CPT

## 2024-01-01 PROCEDURE — 70553 MRI BRAIN STEM W/O & W/DYE: CPT | Mod: 26

## 2024-01-01 PROCEDURE — 95720 EEG PHY/QHP EA INCR W/VEEG: CPT

## 2024-01-01 PROCEDURE — 99231 SBSQ HOSP IP/OBS SF/LOW 25: CPT

## 2024-01-01 PROCEDURE — 88331 PATH CONSLTJ SURG 1 BLK 1SPC: CPT | Mod: 26

## 2024-01-01 PROCEDURE — 99222 1ST HOSP IP/OBS MODERATE 55: CPT

## 2024-01-01 PROCEDURE — 97110 THERAPEUTIC EXERCISES: CPT

## 2024-01-01 PROCEDURE — 0042T: CPT

## 2024-01-01 PROCEDURE — 97112 NEUROMUSCULAR REEDUCATION: CPT

## 2024-01-01 PROCEDURE — 99233 SBSQ HOSP IP/OBS HIGH 50: CPT | Mod: GC,25

## 2024-01-01 PROCEDURE — 99024 POSTOP FOLLOW-UP VISIT: CPT | Mod: 24

## 2024-01-01 PROCEDURE — 70498 CT ANGIOGRAPHY NECK: CPT | Mod: 26

## 2024-01-01 DEVICE — GWIRE FC ST BENT 0.035INX180CM: Type: IMPLANTABLE DEVICE | Site: RIGHT | Status: FUNCTIONAL

## 2024-01-01 DEVICE — IMPLANTABLE DEVICE: Type: IMPLANTABLE DEVICE | Site: RIGHT | Status: FUNCTIONAL

## 2024-01-01 DEVICE — CATH ANGIO GLIDECATH ANGLE TAPER 5FR X 65CM: Type: IMPLANTABLE DEVICE | Site: RIGHT | Status: FUNCTIONAL

## 2024-01-01 DEVICE — SURGIFLO HEMOSTATIC MATRIX KIT: Type: IMPLANTABLE DEVICE | Site: RIGHT | Status: FUNCTIONAL

## 2024-01-01 DEVICE — SURGIFOAM PAD 8CM X 12.5CM X 10MM (100): Type: IMPLANTABLE DEVICE | Site: RIGHT | Status: FUNCTIONAL

## 2024-01-01 DEVICE — SURGCEL 4 X 8": Type: IMPLANTABLE DEVICE | Site: RIGHT | Status: FUNCTIONAL

## 2024-01-01 DEVICE — INTRO MICROPUNC 5FRX10CM SS: Type: IMPLANTABLE DEVICE | Site: RIGHT | Status: FUNCTIONAL

## 2024-01-01 DEVICE — SCREW UN3 AXS SELF DRILL 1.5X4MM: Type: IMPLANTABLE DEVICE | Site: RIGHT | Status: FUNCTIONAL

## 2024-01-01 DEVICE — SHEATH INTRODUCER TERUMO PINNACLE CORONARY 5FR X 10CM X 0.038" MINI WIRE: Type: IMPLANTABLE DEVICE | Site: RIGHT | Status: FUNCTIONAL

## 2024-01-01 DEVICE — PLATE UN3 2 HOLE: Type: IMPLANTABLE DEVICE | Site: RIGHT | Status: FUNCTIONAL

## 2024-01-01 DEVICE — GWIRE BENT STRT 0.035INX150CM: Type: IMPLANTABLE DEVICE | Site: RIGHT | Status: FUNCTIONAL

## 2024-01-01 DEVICE — PLATE COVER BURRHOLE UN3 W/TAB 14MM: Type: IMPLANTABLE DEVICE | Site: RIGHT | Status: FUNCTIONAL

## 2024-01-01 DEVICE — CATH ANG BERENSTN 5FRX65CM: Type: IMPLANTABLE DEVICE | Site: RIGHT | Status: FUNCTIONAL

## 2024-01-01 RX ORDER — ONDANSETRON 8 MG/1
4 TABLET, FILM COATED ORAL EVERY 6 HOURS
Refills: 0 | Status: DISCONTINUED | OUTPATIENT
Start: 2024-01-01 | End: 2024-01-01

## 2024-01-01 RX ORDER — PANTOPRAZOLE SODIUM 20 MG/1
1 TABLET, DELAYED RELEASE ORAL
Qty: 30 | Refills: 0
Start: 2024-01-01 | End: 2024-01-01

## 2024-01-01 RX ORDER — DEXAMETHASONE 0.5 MG/5ML
2 ELIXIR ORAL DAILY
Refills: 0 | Status: COMPLETED | OUTPATIENT
Start: 2024-01-01 | End: 2024-01-01

## 2024-01-01 RX ORDER — APIXABAN 2.5 MG/1
5 TABLET, FILM COATED ORAL EVERY 12 HOURS
Refills: 0 | Status: DISCONTINUED | OUTPATIENT
Start: 2024-01-01 | End: 2024-01-01

## 2024-01-01 RX ORDER — SODIUM CHLORIDE 9 MG/ML
1 INJECTION INTRAMUSCULAR; INTRAVENOUS; SUBCUTANEOUS
Qty: 0 | Refills: 0 | DISCHARGE
Start: 2024-01-01

## 2024-01-01 RX ORDER — GLUCAGON INJECTION, SOLUTION 0.5 MG/.1ML
1 INJECTION, SOLUTION SUBCUTANEOUS ONCE
Refills: 0 | Status: DISCONTINUED | OUTPATIENT
Start: 2024-01-01 | End: 2024-01-01

## 2024-01-01 RX ORDER — SENNA PLUS 8.6 MG/1
2 TABLET ORAL AT BEDTIME
Refills: 0 | Status: DISCONTINUED | OUTPATIENT
Start: 2024-01-01 | End: 2024-01-01

## 2024-01-01 RX ORDER — SODIUM CHLORIDE 9 MG/ML
1 INJECTION INTRAMUSCULAR; INTRAVENOUS; SUBCUTANEOUS
Qty: 60 | Refills: 0
Start: 2024-01-01 | End: 2024-01-01

## 2024-01-01 RX ORDER — DEXTROSE 50 % IN WATER 50 %
12.5 SYRINGE (ML) INTRAVENOUS ONCE
Refills: 0 | Status: DISCONTINUED | OUTPATIENT
Start: 2024-01-01 | End: 2024-01-01

## 2024-01-01 RX ORDER — LEVETIRACETAM 250 MG/1
2 TABLET, FILM COATED ORAL
Qty: 120 | Refills: 0
Start: 2024-01-01 | End: 2024-01-01

## 2024-01-01 RX ORDER — SODIUM CHLORIDE 9 MG/ML
500 INJECTION INTRAMUSCULAR; INTRAVENOUS; SUBCUTANEOUS ONCE
Refills: 0 | Status: COMPLETED | OUTPATIENT
Start: 2024-01-01 | End: 2024-01-01

## 2024-01-01 RX ORDER — DEXAMETHASONE 0.5 MG/5ML
ELIXIR ORAL
Refills: 0 | Status: DISCONTINUED | OUTPATIENT
Start: 2024-01-01 | End: 2024-01-01

## 2024-01-01 RX ORDER — LABETALOL HCL 100 MG
10 TABLET ORAL
Refills: 0 | Status: DISCONTINUED | OUTPATIENT
Start: 2024-01-01 | End: 2024-01-01

## 2024-01-01 RX ORDER — DEXTROSE 50 % IN WATER 50 %
25 SYRINGE (ML) INTRAVENOUS ONCE
Refills: 0 | Status: DISCONTINUED | OUTPATIENT
Start: 2024-01-01 | End: 2024-01-01

## 2024-01-01 RX ORDER — SODIUM CHLORIDE 9 MG/ML
1000 INJECTION, SOLUTION INTRAVENOUS
Refills: 0 | Status: DISCONTINUED | OUTPATIENT
Start: 2024-01-01 | End: 2024-01-01

## 2024-01-01 RX ORDER — ENOXAPARIN SODIUM 80 MG/.8ML
80 INJECTION, SOLUTION SUBCUTANEOUS
Qty: 30 | Refills: 5 | Status: DISCONTINUED | COMMUNITY
Start: 2023-01-01 | End: 2024-01-01

## 2024-01-01 RX ORDER — DEXAMETHASONE 0.5 MG/5ML
ELIXIR ORAL
Refills: 0 | Status: COMPLETED | OUTPATIENT
Start: 2024-01-01 | End: 2024-01-01

## 2024-01-01 RX ORDER — NIFEDIPINE 60 MG/1
60 TABLET, FILM COATED, EXTENDED RELEASE ORAL DAILY
Qty: 30 | Refills: 6 | Status: ACTIVE | COMMUNITY
Start: 2024-01-01 | End: 1900-01-01

## 2024-01-01 RX ORDER — OXYCODONE 5 MG/1
5 TABLET ORAL
Qty: 84 | Refills: 0 | Status: ACTIVE | COMMUNITY
Start: 2024-01-01 | End: 1900-01-01

## 2024-01-01 RX ORDER — SODIUM CHLORIDE 9 MG/ML
1000 INJECTION INTRAMUSCULAR; INTRAVENOUS; SUBCUTANEOUS
Refills: 0 | Status: DISCONTINUED | OUTPATIENT
Start: 2024-01-01 | End: 2024-01-01

## 2024-01-01 RX ORDER — SENNA PLUS 8.6 MG/1
2 TABLET ORAL
Qty: 0 | Refills: 0 | DISCHARGE
Start: 2024-01-01

## 2024-01-01 RX ORDER — SODIUM CHLORIDE 9 MG/ML
1000 INJECTION INTRAMUSCULAR; INTRAVENOUS; SUBCUTANEOUS ONCE
Refills: 0 | Status: COMPLETED | OUTPATIENT
Start: 2024-01-01 | End: 2024-01-01

## 2024-01-01 RX ORDER — SODIUM CHLORIDE 9 MG/ML
2 INJECTION INTRAMUSCULAR; INTRAVENOUS; SUBCUTANEOUS EVERY 8 HOURS
Refills: 0 | Status: DISCONTINUED | OUTPATIENT
Start: 2024-01-01 | End: 2024-01-01

## 2024-01-01 RX ORDER — ACETAMINOPHEN 500 MG
2 TABLET ORAL
Qty: 0 | Refills: 0 | DISCHARGE
Start: 2024-01-01

## 2024-01-01 RX ORDER — LEVETIRACETAM 500 MG/1
500 TABLET, FILM COATED ORAL
Refills: 0 | Status: DISCONTINUED | COMMUNITY
Start: 2024-01-01 | End: 2024-01-01

## 2024-01-01 RX ORDER — SULFAMETHOXAZOLE AND TRIMETHOPRIM 800; 160 MG/1; MG/1
800-160 TABLET ORAL
Qty: 60 | Refills: 0 | Status: DISCONTINUED | COMMUNITY
Start: 2023-01-01 | End: 2024-01-01

## 2024-01-01 RX ORDER — POLYETHYLENE GLYCOL 3350 17 G/17G
17 POWDER, FOR SOLUTION ORAL DAILY
Refills: 0 | Status: DISCONTINUED | OUTPATIENT
Start: 2024-01-01 | End: 2024-01-01

## 2024-01-01 RX ORDER — NIFEDIPINE 30 MG
1 TABLET, EXTENDED RELEASE 24 HR ORAL
Qty: 0 | Refills: 0 | DISCHARGE
Start: 2024-01-01

## 2024-01-01 RX ORDER — TRIAMCINOLONE ACETONIDE 0.25 MG/G
0.03 CREAM TOPICAL
Qty: 1 | Refills: 0 | Status: DISCONTINUED | COMMUNITY
Start: 2023-01-01 | End: 2024-01-01

## 2024-01-01 RX ORDER — DEXAMETHASONE 0.5 MG/5ML
4 ELIXIR ORAL EVERY 6 HOURS
Refills: 0 | Status: DISCONTINUED | OUTPATIENT
Start: 2024-01-01 | End: 2024-01-01

## 2024-01-01 RX ORDER — LEVETIRACETAM 250 MG/1
1500 TABLET, FILM COATED ORAL
Refills: 0 | Status: DISCONTINUED | OUTPATIENT
Start: 2024-01-01 | End: 2024-01-01

## 2024-01-01 RX ORDER — KETOROLAC TROMETHAMINE 30 MG/ML
15 SYRINGE (ML) INJECTION EVERY 8 HOURS
Refills: 0 | Status: DISCONTINUED | OUTPATIENT
Start: 2024-01-01 | End: 2024-01-01

## 2024-01-01 RX ORDER — LORAZEPAM 1 MG/1
1 TABLET ORAL
Qty: 60 | Refills: 0 | Status: ACTIVE | COMMUNITY
Start: 2024-01-01 | End: 1900-01-01

## 2024-01-01 RX ORDER — NIFEDIPINE 30 MG
60 TABLET, EXTENDED RELEASE 24 HR ORAL DAILY
Refills: 0 | Status: DISCONTINUED | OUTPATIENT
Start: 2024-01-01 | End: 2024-01-01

## 2024-01-01 RX ORDER — DEXAMETHASONE 0.5 MG/5ML
4 ELIXIR ORAL EVERY 6 HOURS
Refills: 0 | Status: COMPLETED | OUTPATIENT
Start: 2024-01-01 | End: 2024-01-01

## 2024-01-01 RX ORDER — CHLORHEXIDINE GLUCONATE 213 G/1000ML
1 SOLUTION TOPICAL ONCE
Refills: 0 | Status: DISCONTINUED | OUTPATIENT
Start: 2024-01-01 | End: 2024-01-01

## 2024-01-01 RX ORDER — ACETAMINOPHEN 500 MG
1000 TABLET ORAL EVERY 8 HOURS
Refills: 0 | Status: COMPLETED | OUTPATIENT
Start: 2024-01-01 | End: 2024-01-01

## 2024-01-01 RX ORDER — ACETAMINOPHEN 500 MG
650 TABLET ORAL EVERY 6 HOURS
Refills: 0 | Status: DISCONTINUED | OUTPATIENT
Start: 2024-01-01 | End: 2024-01-01

## 2024-01-01 RX ORDER — VALACYCLOVIR 500 MG/1
500 TABLET, FILM COATED ORAL TWICE DAILY
Qty: 60 | Refills: 6 | Status: ACTIVE | COMMUNITY
Start: 2024-01-01 | End: 1900-01-01

## 2024-01-01 RX ORDER — DEXAMETHASONE 2 MG/1
2 TABLET ORAL TWICE DAILY
Qty: 60 | Refills: 4 | Status: ACTIVE | COMMUNITY
Start: 2023-01-01 | End: 1900-01-01

## 2024-01-01 RX ORDER — LEVETIRACETAM 250 MG/1
1 TABLET, FILM COATED ORAL
Qty: 0 | Refills: 0 | DISCHARGE
Start: 2024-01-01

## 2024-01-01 RX ORDER — HYDROCODONE BITARTRATE AND HOMATROPINE METHYLBROMIDE 1.5; 5 MG/5ML; MG/5ML
5-1.5 SOLUTION ORAL EVERY 4 HOURS
Qty: 600 | Refills: 0 | Status: DISCONTINUED | COMMUNITY
Start: 2023-01-01 | End: 2024-01-01

## 2024-01-01 RX ORDER — POLYETHYLENE GLYCOL 3350 17 G/17G
17 POWDER, FOR SOLUTION ORAL
Qty: 0 | Refills: 0 | DISCHARGE
Start: 2024-01-01

## 2024-01-01 RX ORDER — MINOCYCLINE HYDROCHLORIDE 45 MG/1
1 TABLET, EXTENDED RELEASE ORAL
Qty: 0 | Refills: 0 | DISCHARGE
Start: 2024-01-01

## 2024-01-01 RX ORDER — APIXABAN 5 MG/1
5 TABLET, FILM COATED ORAL
Qty: 60 | Refills: 4 | Status: ACTIVE | COMMUNITY
Start: 2024-01-01 | End: 1900-01-01

## 2024-01-01 RX ORDER — PANTOPRAZOLE 40 MG/1
40 TABLET, DELAYED RELEASE ORAL
Qty: 30 | Refills: 3 | Status: ACTIVE | COMMUNITY
Start: 2023-01-01

## 2024-01-01 RX ORDER — POVIDONE-IODINE 5 %
1 AEROSOL (ML) TOPICAL ONCE
Refills: 0 | Status: COMPLETED | OUTPATIENT
Start: 2024-01-01 | End: 2024-01-01

## 2024-01-01 RX ORDER — SODIUM CHLORIDE 9 MG/ML
1 INJECTION INTRAMUSCULAR; INTRAVENOUS; SUBCUTANEOUS
Refills: 0 | Status: DISCONTINUED | OUTPATIENT
Start: 2024-01-01 | End: 2024-01-01

## 2024-01-01 RX ORDER — NIFEDIPINE 30 MG
90 TABLET, EXTENDED RELEASE 24 HR ORAL DAILY
Refills: 0 | Status: DISCONTINUED | OUTPATIENT
Start: 2024-01-01 | End: 2024-01-01

## 2024-01-01 RX ORDER — SODIUM CHLORIDE 9 MG/ML
1 INJECTION INTRAMUSCULAR; INTRAVENOUS; SUBCUTANEOUS EVERY 8 HOURS
Refills: 0 | Status: DISCONTINUED | OUTPATIENT
Start: 2024-01-01 | End: 2024-01-01

## 2024-01-01 RX ORDER — CLONAZEPAM 0.5 MG/1
0.5 TABLET, ORALLY DISINTEGRATING ORAL DAILY
Qty: 3 | Refills: 0 | Status: ACTIVE | COMMUNITY
Start: 2024-01-01 | End: 1900-01-01

## 2024-01-01 RX ORDER — MEMANTINE HYDROCHLORIDE 5 MG/1
5 TABLET, FILM COATED ORAL
Qty: 70 | Refills: 0 | Status: ACTIVE | COMMUNITY
Start: 2024-01-01 | End: 1900-01-01

## 2024-01-01 RX ORDER — OXYCODONE HYDROCHLORIDE 5 MG/1
1 TABLET ORAL
Qty: 0 | Refills: 0 | DISCHARGE
Start: 2024-01-01

## 2024-01-01 RX ORDER — DEXAMETHASONE 0.5 MG/5ML
2 ELIXIR ORAL ONCE
Refills: 0 | Status: CANCELLED | OUTPATIENT
Start: 2024-01-01 | End: 2024-01-01

## 2024-01-01 RX ORDER — HYDRALAZINE HCL 50 MG
10 TABLET ORAL
Refills: 0 | Status: DISCONTINUED | OUTPATIENT
Start: 2024-01-01 | End: 2024-01-01

## 2024-01-01 RX ORDER — OXYCODONE HYDROCHLORIDE 5 MG/1
5 TABLET ORAL EVERY 4 HOURS
Refills: 0 | Status: DISCONTINUED | OUTPATIENT
Start: 2024-01-01 | End: 2024-01-01

## 2024-01-01 RX ORDER — APIXABAN 2.5 MG/1
1 TABLET, FILM COATED ORAL
Qty: 60 | Refills: 0
Start: 2024-01-01 | End: 2024-01-01

## 2024-01-01 RX ORDER — DEXAMETHASONE 0.5 MG/5ML
4 ELIXIR ORAL EVERY 12 HOURS
Refills: 0 | Status: DISCONTINUED | OUTPATIENT
Start: 2024-01-01 | End: 2024-01-01

## 2024-01-01 RX ORDER — LEVETIRACETAM 250 MG/1
1 TABLET, FILM COATED ORAL
Qty: 60 | Refills: 0
Start: 2024-01-01 | End: 2024-01-01

## 2024-01-01 RX ORDER — CLINDAMYCIN PHOSPHATE 1 G/10ML
1 GEL TOPICAL TWICE DAILY
Qty: 1 | Refills: 0 | Status: DISCONTINUED | COMMUNITY
Start: 2023-01-01 | End: 2024-01-01

## 2024-01-01 RX ORDER — CHLORHEXIDINE GLUCONATE 213 G/1000ML
1 SOLUTION TOPICAL
Refills: 0 | Status: DISCONTINUED | OUTPATIENT
Start: 2024-01-01 | End: 2024-01-01

## 2024-01-01 RX ORDER — APIXABAN 2.5 MG/1
1 TABLET, FILM COATED ORAL
Qty: 0 | Refills: 0 | DISCHARGE
Start: 2024-01-01

## 2024-01-01 RX ORDER — INSULIN LISPRO 100/ML
VIAL (ML) SUBCUTANEOUS
Refills: 0 | Status: DISCONTINUED | OUTPATIENT
Start: 2024-01-01 | End: 2024-01-01

## 2024-01-01 RX ORDER — ACETAMINOPHEN 500 MG
1000 TABLET ORAL ONCE
Refills: 0 | Status: COMPLETED | OUTPATIENT
Start: 2024-01-01 | End: 2024-01-01

## 2024-01-01 RX ORDER — DEXTROSE 50 % IN WATER 50 %
15 SYRINGE (ML) INTRAVENOUS ONCE
Refills: 0 | Status: DISCONTINUED | OUTPATIENT
Start: 2024-01-01 | End: 2024-01-01

## 2024-01-01 RX ORDER — MINOCYCLINE HYDROCHLORIDE 100 MG/1
100 TABLET ORAL TWICE DAILY
Qty: 60 | Refills: 4 | Status: ACTIVE | COMMUNITY
Start: 2023-01-01 | End: 1900-01-01

## 2024-01-01 RX ORDER — POTASSIUM CHLORIDE 20 MEQ
20 PACKET (EA) ORAL ONCE
Refills: 0 | Status: COMPLETED | OUTPATIENT
Start: 2024-01-01 | End: 2024-01-01

## 2024-01-01 RX ORDER — DEXAMETHASONE 0.5 MG/5ML
1 ELIXIR ORAL
Qty: 0 | Refills: 0 | DISCHARGE

## 2024-01-01 RX ORDER — DEXAMETHASONE 0.5 MG/5ML
2 ELIXIR ORAL EVERY 12 HOURS
Refills: 0 | Status: COMPLETED | OUTPATIENT
Start: 2024-01-01 | End: 2024-01-01

## 2024-01-01 RX ORDER — LEVETIRACETAM 750 MG/1
750 TABLET, FILM COATED ORAL TWICE DAILY
Qty: 120 | Refills: 4 | Status: ACTIVE | COMMUNITY
Start: 2023-01-01 | End: 1900-01-01

## 2024-01-01 RX ORDER — LEVOFLOXACIN 500 MG/1
500 TABLET, FILM COATED ORAL DAILY
Qty: 10 | Refills: 0 | Status: DISCONTINUED | COMMUNITY
Start: 2023-01-01 | End: 2024-01-01

## 2024-01-01 RX ORDER — DEXAMETHASONE 0.5 MG/5ML
4 ELIXIR ORAL
Refills: 0 | Status: DISCONTINUED | OUTPATIENT
Start: 2024-01-01 | End: 2024-01-01

## 2024-01-01 RX ORDER — NORMAL SALT TABLETS 1 G/G
1 TABLET ORAL
Qty: 60 | Refills: 0 | Status: DISCONTINUED | COMMUNITY
Start: 2024-01-01 | End: 2024-01-01

## 2024-01-01 RX ORDER — GABAPENTIN 300 MG/1
300 CAPSULE ORAL
Qty: 90 | Refills: 2 | Status: ACTIVE | COMMUNITY
Start: 2024-01-01 | End: 1900-01-01

## 2024-01-01 RX ORDER — ENOXAPARIN SODIUM 100 MG/ML
40 INJECTION SUBCUTANEOUS
Refills: 0 | Status: DISCONTINUED | OUTPATIENT
Start: 2024-01-01 | End: 2024-01-01

## 2024-01-01 RX ORDER — LEVETIRACETAM 1000 MG/1
1000 TABLET, FILM COATED ORAL
Refills: 0 | Status: DISCONTINUED | COMMUNITY
Start: 2024-01-01 | End: 2024-01-01

## 2024-01-01 RX ORDER — BINIMETINIB 15 MG/1
15 TABLET, FILM COATED ORAL TWICE DAILY
Qty: 180 | Refills: 6 | Status: ACTIVE | COMMUNITY
Start: 2024-01-01 | End: 1900-01-01

## 2024-01-01 RX ORDER — DEXAMETHASONE 2 MG/1
2 TABLET ORAL TWICE DAILY
Qty: 2 | Refills: 0 | Status: ACTIVE | COMMUNITY
Start: 2024-01-01

## 2024-01-01 RX ORDER — DEXAMETHASONE 0.5 MG/5ML
2 ELIXIR ORAL EVERY 8 HOURS
Refills: 0 | Status: COMPLETED | OUTPATIENT
Start: 2024-01-01 | End: 2024-01-01

## 2024-01-01 RX ORDER — LEVETIRACETAM 250 MG/1
500 TABLET, FILM COATED ORAL EVERY 12 HOURS
Refills: 0 | Status: DISCONTINUED | OUTPATIENT
Start: 2024-01-01 | End: 2024-01-01

## 2024-01-01 RX ORDER — SODIUM CHLORIDE 9 MG/ML
3 INJECTION INTRAMUSCULAR; INTRAVENOUS; SUBCUTANEOUS EVERY 6 HOURS
Refills: 0 | Status: DISCONTINUED | OUTPATIENT
Start: 2024-01-01 | End: 2024-01-01

## 2024-01-01 RX ORDER — ENOXAPARIN SODIUM 100 MG/ML
40 INJECTION SUBCUTANEOUS
Qty: 0 | Refills: 0 | DISCHARGE
Start: 2024-01-01

## 2024-01-01 RX ORDER — BENZONATATE 200 MG/1
200 CAPSULE ORAL 3 TIMES DAILY
Qty: 90 | Refills: 3 | Status: DISCONTINUED | COMMUNITY
Start: 2023-01-01 | End: 2024-01-01

## 2024-01-01 RX ORDER — LACTULOSE 10 G/15ML
10 SOLUTION ORAL ONCE
Refills: 0 | Status: COMPLETED | OUTPATIENT
Start: 2024-01-01 | End: 2024-01-01

## 2024-01-01 RX ORDER — NIFEDIPINE 30 MG
30 TABLET, EXTENDED RELEASE 24 HR ORAL DAILY
Refills: 0 | Status: DISCONTINUED | OUTPATIENT
Start: 2024-01-01 | End: 2024-01-01

## 2024-01-01 RX ORDER — PANTOPRAZOLE SODIUM 20 MG/1
40 TABLET, DELAYED RELEASE ORAL
Refills: 0 | Status: DISCONTINUED | OUTPATIENT
Start: 2024-01-01 | End: 2024-01-01

## 2024-01-01 RX ORDER — DEXAMETHASONE 0.5 MG/5ML
2 ELIXIR ORAL
Qty: 120 | Refills: 0
Start: 2024-01-01 | End: 2024-01-01

## 2024-01-01 RX ORDER — CHLORHEXIDINE GLUCONATE 213 G/1000ML
1 SOLUTION TOPICAL ONCE
Refills: 0 | Status: COMPLETED | OUTPATIENT
Start: 2024-01-01 | End: 2024-01-01

## 2024-01-01 RX ORDER — PANTOPRAZOLE SODIUM 20 MG/1
1 TABLET, DELAYED RELEASE ORAL
Qty: 0 | Refills: 0 | DISCHARGE
Start: 2024-01-01

## 2024-01-01 RX ORDER — CEFAZOLIN SODIUM 1 G
2000 VIAL (EA) INJECTION EVERY 8 HOURS
Refills: 0 | Status: COMPLETED | OUTPATIENT
Start: 2024-01-01 | End: 2024-01-01

## 2024-01-01 RX ORDER — NIFEDIPINE 30 MG
30 TABLET, EXTENDED RELEASE 24 HR ORAL ONCE
Refills: 0 | Status: COMPLETED | OUTPATIENT
Start: 2024-01-01 | End: 2024-01-01

## 2024-01-01 RX ORDER — NIFEDIPINE 30 MG
1 TABLET, EXTENDED RELEASE 24 HR ORAL
Qty: 30 | Refills: 0
Start: 2024-01-01 | End: 2024-01-01

## 2024-01-01 RX ORDER — PANTOPRAZOLE SODIUM 20 MG/1
40 TABLET, DELAYED RELEASE ORAL DAILY
Refills: 0 | Status: DISCONTINUED | OUTPATIENT
Start: 2024-01-01 | End: 2024-01-01

## 2024-01-01 RX ORDER — INSULIN LISPRO 100/ML
VIAL (ML) SUBCUTANEOUS AT BEDTIME
Refills: 0 | Status: DISCONTINUED | OUTPATIENT
Start: 2024-01-01 | End: 2024-01-01

## 2024-01-01 RX ORDER — DEXAMETHASONE 0.5 MG/5ML
3 ELIXIR ORAL EVERY 8 HOURS
Refills: 0 | Status: DISCONTINUED | OUTPATIENT
Start: 2024-01-01 | End: 2024-01-01

## 2024-01-01 RX ORDER — DEXAMETHASONE 0.5 MG/5ML
2 ELIXIR ORAL EVERY 12 HOURS
Refills: 0 | Status: CANCELLED | OUTPATIENT
Start: 2024-01-01 | End: 2024-01-01

## 2024-01-01 RX ORDER — SODIUM CHLORIDE 9 MG/ML
2 INJECTION INTRAMUSCULAR; INTRAVENOUS; SUBCUTANEOUS EVERY 6 HOURS
Refills: 0 | Status: DISCONTINUED | OUTPATIENT
Start: 2024-01-01 | End: 2024-01-01

## 2024-01-01 RX ORDER — MINOCYCLINE HYDROCHLORIDE 45 MG/1
100 TABLET, EXTENDED RELEASE ORAL
Refills: 0 | Status: DISCONTINUED | OUTPATIENT
Start: 2024-01-01 | End: 2024-01-01

## 2024-01-01 RX ORDER — METHYLPREDNISOLONE 4 MG/1
4 TABLET ORAL
Qty: 1 | Refills: 0 | Status: DISCONTINUED | COMMUNITY
Start: 2023-01-01 | End: 2024-01-01

## 2024-01-01 RX ADMIN — POLYETHYLENE GLYCOL 3350 17 GRAM(S): 17 POWDER, FOR SOLUTION ORAL at 11:18

## 2024-01-01 RX ADMIN — APIXABAN 5 MILLIGRAM(S): 2.5 TABLET, FILM COATED ORAL at 18:23

## 2024-01-01 RX ADMIN — Medication 1000 MILLIGRAM(S): at 06:59

## 2024-01-01 RX ADMIN — Medication 1000 MILLIGRAM(S): at 06:00

## 2024-01-01 RX ADMIN — PANTOPRAZOLE SODIUM 40 MILLIGRAM(S): 20 TABLET, DELAYED RELEASE ORAL at 12:55

## 2024-01-01 RX ADMIN — LEVETIRACETAM 500 MILLIGRAM(S): 250 TABLET, FILM COATED ORAL at 05:30

## 2024-01-01 RX ADMIN — POLYETHYLENE GLYCOL 3350 17 GRAM(S): 17 POWDER, FOR SOLUTION ORAL at 11:43

## 2024-01-01 RX ADMIN — SODIUM CHLORIDE 1 GRAM(S): 9 INJECTION INTRAMUSCULAR; INTRAVENOUS; SUBCUTANEOUS at 17:17

## 2024-01-01 RX ADMIN — Medication 650 MILLIGRAM(S): at 20:22

## 2024-01-01 RX ADMIN — Medication 1000 MILLIGRAM(S): at 21:42

## 2024-01-01 RX ADMIN — Medication 4 MILLIGRAM(S): at 05:59

## 2024-01-01 RX ADMIN — SODIUM CHLORIDE 3 GRAM(S): 9 INJECTION INTRAMUSCULAR; INTRAVENOUS; SUBCUTANEOUS at 07:48

## 2024-01-01 RX ADMIN — LEVETIRACETAM 400 MILLIGRAM(S): 250 TABLET, FILM COATED ORAL at 18:12

## 2024-01-01 RX ADMIN — LEVETIRACETAM 500 MILLIGRAM(S): 250 TABLET, FILM COATED ORAL at 17:17

## 2024-01-01 RX ADMIN — Medication 4 MILLIGRAM(S): at 00:02

## 2024-01-01 RX ADMIN — Medication 20 MILLIEQUIVALENT(S): at 10:53

## 2024-01-01 RX ADMIN — OXYCODONE HYDROCHLORIDE 5 MILLIGRAM(S): 5 TABLET ORAL at 08:00

## 2024-01-01 RX ADMIN — PANTOPRAZOLE SODIUM 40 MILLIGRAM(S): 20 TABLET, DELAYED RELEASE ORAL at 06:08

## 2024-01-01 RX ADMIN — Medication 2 MILLIGRAM(S): at 15:02

## 2024-01-01 RX ADMIN — SODIUM CHLORIDE 1 GRAM(S): 9 INJECTION INTRAMUSCULAR; INTRAVENOUS; SUBCUTANEOUS at 04:31

## 2024-01-01 RX ADMIN — Medication 60 MILLIGRAM(S): at 04:31

## 2024-01-01 RX ADMIN — Medication 1000 MILLIGRAM(S): at 22:42

## 2024-01-01 RX ADMIN — MINOCYCLINE HYDROCHLORIDE 100 MILLIGRAM(S): 45 TABLET, EXTENDED RELEASE ORAL at 17:10

## 2024-01-01 RX ADMIN — PANTOPRAZOLE SODIUM 40 MILLIGRAM(S): 20 TABLET, DELAYED RELEASE ORAL at 05:50

## 2024-01-01 RX ADMIN — OXYCODONE HYDROCHLORIDE 5 MILLIGRAM(S): 5 TABLET ORAL at 07:29

## 2024-01-01 RX ADMIN — Medication 1000 MILLIGRAM(S): at 05:59

## 2024-01-01 RX ADMIN — Medication 4 MILLIGRAM(S): at 06:21

## 2024-01-01 RX ADMIN — SODIUM CHLORIDE 75 MILLILITER(S): 9 INJECTION INTRAMUSCULAR; INTRAVENOUS; SUBCUTANEOUS at 14:50

## 2024-01-01 RX ADMIN — SODIUM CHLORIDE 80 MILLILITER(S): 9 INJECTION INTRAMUSCULAR; INTRAVENOUS; SUBCUTANEOUS at 01:27

## 2024-01-01 RX ADMIN — PANTOPRAZOLE SODIUM 40 MILLIGRAM(S): 20 TABLET, DELAYED RELEASE ORAL at 06:07

## 2024-01-01 RX ADMIN — LEVETIRACETAM 500 MILLIGRAM(S): 250 TABLET, FILM COATED ORAL at 05:59

## 2024-01-01 RX ADMIN — SODIUM CHLORIDE 3 GRAM(S): 9 INJECTION INTRAMUSCULAR; INTRAVENOUS; SUBCUTANEOUS at 02:58

## 2024-01-01 RX ADMIN — SODIUM CHLORIDE 1 GRAM(S): 9 INJECTION INTRAMUSCULAR; INTRAVENOUS; SUBCUTANEOUS at 06:15

## 2024-01-01 RX ADMIN — SODIUM CHLORIDE 1000 MILLILITER(S): 9 INJECTION INTRAMUSCULAR; INTRAVENOUS; SUBCUTANEOUS at 10:01

## 2024-01-01 RX ADMIN — LEVETIRACETAM 500 MILLIGRAM(S): 250 TABLET, FILM COATED ORAL at 05:56

## 2024-01-01 RX ADMIN — SODIUM CHLORIDE 80 MILLILITER(S): 9 INJECTION INTRAMUSCULAR; INTRAVENOUS; SUBCUTANEOUS at 00:24

## 2024-01-01 RX ADMIN — SODIUM CHLORIDE 1 GRAM(S): 9 INJECTION INTRAMUSCULAR; INTRAVENOUS; SUBCUTANEOUS at 06:21

## 2024-01-01 RX ADMIN — LEVETIRACETAM 500 MILLIGRAM(S): 250 TABLET, FILM COATED ORAL at 05:52

## 2024-01-01 RX ADMIN — APIXABAN 5 MILLIGRAM(S): 2.5 TABLET, FILM COATED ORAL at 18:12

## 2024-01-01 RX ADMIN — Medication 1000 MILLIGRAM(S): at 06:31

## 2024-01-01 RX ADMIN — Medication 60 MILLIGRAM(S): at 05:30

## 2024-01-01 RX ADMIN — LEVETIRACETAM 500 MILLIGRAM(S): 250 TABLET, FILM COATED ORAL at 06:15

## 2024-01-01 RX ADMIN — LEVETIRACETAM 500 MILLIGRAM(S): 250 TABLET, FILM COATED ORAL at 17:07

## 2024-01-01 RX ADMIN — LEVETIRACETAM 1500 MILLIGRAM(S): 250 TABLET, FILM COATED ORAL at 17:10

## 2024-01-01 RX ADMIN — Medication 2 MILLIGRAM(S): at 04:31

## 2024-01-01 RX ADMIN — APIXABAN 5 MILLIGRAM(S): 2.5 TABLET, FILM COATED ORAL at 18:04

## 2024-01-01 RX ADMIN — Medication 2 MILLIGRAM(S): at 05:52

## 2024-01-01 RX ADMIN — PANTOPRAZOLE SODIUM 40 MILLIGRAM(S): 20 TABLET, DELAYED RELEASE ORAL at 11:05

## 2024-01-01 RX ADMIN — PANTOPRAZOLE SODIUM 40 MILLIGRAM(S): 20 TABLET, DELAYED RELEASE ORAL at 06:16

## 2024-01-01 RX ADMIN — APIXABAN 5 MILLIGRAM(S): 2.5 TABLET, FILM COATED ORAL at 06:22

## 2024-01-01 RX ADMIN — MINOCYCLINE HYDROCHLORIDE 100 MILLIGRAM(S): 45 TABLET, EXTENDED RELEASE ORAL at 18:03

## 2024-01-01 RX ADMIN — SODIUM CHLORIDE 1 GRAM(S): 9 INJECTION INTRAMUSCULAR; INTRAVENOUS; SUBCUTANEOUS at 18:03

## 2024-01-01 RX ADMIN — SENNA PLUS 2 TABLET(S): 8.6 TABLET ORAL at 21:36

## 2024-01-01 RX ADMIN — LEVETIRACETAM 500 MILLIGRAM(S): 250 TABLET, FILM COATED ORAL at 06:35

## 2024-01-01 RX ADMIN — Medication 4 MILLIGRAM(S): at 11:43

## 2024-01-01 RX ADMIN — Medication 4 MILLIGRAM(S): at 05:46

## 2024-01-01 RX ADMIN — PANTOPRAZOLE SODIUM 40 MILLIGRAM(S): 20 TABLET, DELAYED RELEASE ORAL at 05:02

## 2024-01-01 RX ADMIN — Medication 4 MILLIGRAM(S): at 06:36

## 2024-01-01 RX ADMIN — MINOCYCLINE HYDROCHLORIDE 100 MILLIGRAM(S): 45 TABLET, EXTENDED RELEASE ORAL at 06:48

## 2024-01-01 RX ADMIN — APIXABAN 5 MILLIGRAM(S): 2.5 TABLET, FILM COATED ORAL at 17:10

## 2024-01-01 RX ADMIN — Medication 650 MILLIGRAM(S): at 19:22

## 2024-01-01 RX ADMIN — LEVETIRACETAM 500 MILLIGRAM(S): 250 TABLET, FILM COATED ORAL at 06:56

## 2024-01-01 RX ADMIN — SODIUM CHLORIDE 100 MILLILITER(S): 9 INJECTION INTRAMUSCULAR; INTRAVENOUS; SUBCUTANEOUS at 05:50

## 2024-01-01 RX ADMIN — Medication 3 MILLIGRAM(S): at 05:57

## 2024-01-01 RX ADMIN — POLYETHYLENE GLYCOL 3350 17 GRAM(S): 17 POWDER, FOR SOLUTION ORAL at 15:02

## 2024-01-01 RX ADMIN — SODIUM CHLORIDE 2 GRAM(S): 9 INJECTION INTRAMUSCULAR; INTRAVENOUS; SUBCUTANEOUS at 13:00

## 2024-01-01 RX ADMIN — LEVETIRACETAM 500 MILLIGRAM(S): 250 TABLET, FILM COATED ORAL at 18:23

## 2024-01-01 RX ADMIN — Medication 4 MILLIGRAM(S): at 17:10

## 2024-01-01 RX ADMIN — SODIUM CHLORIDE 1 GRAM(S): 9 INJECTION INTRAMUSCULAR; INTRAVENOUS; SUBCUTANEOUS at 21:41

## 2024-01-01 RX ADMIN — Medication 2 MILLIGRAM(S): at 20:53

## 2024-01-01 RX ADMIN — SODIUM CHLORIDE 1 GRAM(S): 9 INJECTION INTRAMUSCULAR; INTRAVENOUS; SUBCUTANEOUS at 21:53

## 2024-01-01 RX ADMIN — Medication 2 MILLIGRAM(S): at 21:53

## 2024-01-01 RX ADMIN — OXYCODONE HYDROCHLORIDE 5 MILLIGRAM(S): 5 TABLET ORAL at 18:09

## 2024-01-01 RX ADMIN — Medication 60 MILLIGRAM(S): at 05:46

## 2024-01-01 RX ADMIN — Medication 60 MILLIGRAM(S): at 06:39

## 2024-01-01 RX ADMIN — ENOXAPARIN SODIUM 40 MILLIGRAM(S): 100 INJECTION SUBCUTANEOUS at 21:38

## 2024-01-01 RX ADMIN — Medication 4 MILLIGRAM(S): at 19:43

## 2024-01-01 RX ADMIN — Medication 2 MILLIGRAM(S): at 13:22

## 2024-01-01 RX ADMIN — Medication 4 MILLIGRAM(S): at 12:58

## 2024-01-01 RX ADMIN — SENNA PLUS 2 TABLET(S): 8.6 TABLET ORAL at 21:11

## 2024-01-01 RX ADMIN — Medication 30 MILLIGRAM(S): at 13:30

## 2024-01-01 RX ADMIN — MINOCYCLINE HYDROCHLORIDE 100 MILLIGRAM(S): 45 TABLET, EXTENDED RELEASE ORAL at 06:21

## 2024-01-01 RX ADMIN — SODIUM CHLORIDE 1 GRAM(S): 9 INJECTION INTRAMUSCULAR; INTRAVENOUS; SUBCUTANEOUS at 06:09

## 2024-01-01 RX ADMIN — Medication 400 MILLIGRAM(S): at 19:50

## 2024-01-01 RX ADMIN — Medication 15 MILLIGRAM(S): at 06:31

## 2024-01-01 RX ADMIN — APIXABAN 5 MILLIGRAM(S): 2.5 TABLET, FILM COATED ORAL at 06:16

## 2024-01-01 RX ADMIN — CHLORHEXIDINE GLUCONATE 1 APPLICATION(S): 213 SOLUTION TOPICAL at 05:59

## 2024-01-01 RX ADMIN — SODIUM CHLORIDE 1 GRAM(S): 9 INJECTION INTRAMUSCULAR; INTRAVENOUS; SUBCUTANEOUS at 05:02

## 2024-01-01 RX ADMIN — LEVETIRACETAM 400 MILLIGRAM(S): 250 TABLET, FILM COATED ORAL at 10:52

## 2024-01-01 RX ADMIN — CHLORHEXIDINE GLUCONATE 1 APPLICATION(S): 213 SOLUTION TOPICAL at 05:20

## 2024-01-01 RX ADMIN — Medication 10 MILLIGRAM(S): at 08:20

## 2024-01-01 RX ADMIN — SODIUM CHLORIDE 1 GRAM(S): 9 INJECTION INTRAMUSCULAR; INTRAVENOUS; SUBCUTANEOUS at 05:49

## 2024-01-01 RX ADMIN — OXYCODONE HYDROCHLORIDE 5 MILLIGRAM(S): 5 TABLET ORAL at 17:09

## 2024-01-01 RX ADMIN — SODIUM CHLORIDE 3 GRAM(S): 9 INJECTION INTRAMUSCULAR; INTRAVENOUS; SUBCUTANEOUS at 13:05

## 2024-01-01 RX ADMIN — SODIUM CHLORIDE 1 GRAM(S): 9 INJECTION INTRAMUSCULAR; INTRAVENOUS; SUBCUTANEOUS at 20:54

## 2024-01-01 RX ADMIN — CHLORHEXIDINE GLUCONATE 1 APPLICATION(S): 213 SOLUTION TOPICAL at 06:07

## 2024-01-01 RX ADMIN — PANTOPRAZOLE SODIUM 40 MILLIGRAM(S): 20 TABLET, DELAYED RELEASE ORAL at 05:31

## 2024-01-01 RX ADMIN — Medication 90 MILLIGRAM(S): at 06:22

## 2024-01-01 RX ADMIN — Medication 4 MILLIGRAM(S): at 06:00

## 2024-01-01 RX ADMIN — LEVETIRACETAM 500 MILLIGRAM(S): 250 TABLET, FILM COATED ORAL at 19:43

## 2024-01-01 RX ADMIN — SODIUM CHLORIDE 1 GRAM(S): 9 INJECTION INTRAMUSCULAR; INTRAVENOUS; SUBCUTANEOUS at 18:04

## 2024-01-01 RX ADMIN — SODIUM CHLORIDE 1 GRAM(S): 9 INJECTION INTRAMUSCULAR; INTRAVENOUS; SUBCUTANEOUS at 13:22

## 2024-01-01 RX ADMIN — APIXABAN 5 MILLIGRAM(S): 2.5 TABLET, FILM COATED ORAL at 05:03

## 2024-01-01 RX ADMIN — Medication 1000 MILLIGRAM(S): at 13:00

## 2024-01-01 RX ADMIN — LEVETIRACETAM 500 MILLIGRAM(S): 250 TABLET, FILM COATED ORAL at 17:48

## 2024-01-01 RX ADMIN — Medication 30 MILLIGRAM(S): at 12:05

## 2024-01-01 RX ADMIN — SODIUM CHLORIDE 1 GRAM(S): 9 INJECTION INTRAMUSCULAR; INTRAVENOUS; SUBCUTANEOUS at 05:53

## 2024-01-01 RX ADMIN — ENOXAPARIN SODIUM 40 MILLIGRAM(S): 100 INJECTION SUBCUTANEOUS at 21:39

## 2024-01-01 RX ADMIN — Medication 2 MILLIGRAM(S): at 06:07

## 2024-01-01 RX ADMIN — Medication 2 MILLIGRAM(S): at 18:08

## 2024-01-01 RX ADMIN — Medication 4 MILLIGRAM(S): at 17:07

## 2024-01-01 RX ADMIN — Medication 4 MILLIGRAM(S): at 13:42

## 2024-01-01 RX ADMIN — SODIUM CHLORIDE 1 GRAM(S): 9 INJECTION INTRAMUSCULAR; INTRAVENOUS; SUBCUTANEOUS at 15:02

## 2024-01-01 RX ADMIN — SENNA PLUS 2 TABLET(S): 8.6 TABLET ORAL at 21:53

## 2024-01-01 RX ADMIN — Medication 4 MILLIGRAM(S): at 00:25

## 2024-01-01 RX ADMIN — LEVETIRACETAM 500 MILLIGRAM(S): 250 TABLET, FILM COATED ORAL at 18:31

## 2024-01-01 RX ADMIN — LACTULOSE 10 GRAM(S): 10 SOLUTION ORAL at 10:47

## 2024-01-01 RX ADMIN — PANTOPRAZOLE SODIUM 40 MILLIGRAM(S): 20 TABLET, DELAYED RELEASE ORAL at 05:56

## 2024-01-01 RX ADMIN — ENOXAPARIN SODIUM 40 MILLIGRAM(S): 100 INJECTION SUBCUTANEOUS at 21:35

## 2024-01-01 RX ADMIN — LEVETIRACETAM 500 MILLIGRAM(S): 250 TABLET, FILM COATED ORAL at 04:31

## 2024-01-01 RX ADMIN — LEVETIRACETAM 500 MILLIGRAM(S): 250 TABLET, FILM COATED ORAL at 17:36

## 2024-01-01 RX ADMIN — Medication 4 MILLIGRAM(S): at 00:39

## 2024-01-01 RX ADMIN — Medication 100 MILLIGRAM(S): at 21:51

## 2024-01-01 RX ADMIN — Medication 1 APPLICATION(S): at 07:00

## 2024-01-01 RX ADMIN — SODIUM CHLORIDE 3 GRAM(S): 9 INJECTION INTRAMUSCULAR; INTRAVENOUS; SUBCUTANEOUS at 19:02

## 2024-01-01 RX ADMIN — SODIUM CHLORIDE 1 GRAM(S): 9 INJECTION INTRAMUSCULAR; INTRAVENOUS; SUBCUTANEOUS at 05:31

## 2024-01-01 RX ADMIN — Medication 3 MILLIGRAM(S): at 21:36

## 2024-01-01 RX ADMIN — LEVETIRACETAM 500 MILLIGRAM(S): 250 TABLET, FILM COATED ORAL at 05:02

## 2024-01-01 RX ADMIN — Medication 15 MILLIGRAM(S): at 17:05

## 2024-01-01 RX ADMIN — Medication 2 MILLIGRAM(S): at 05:30

## 2024-01-01 RX ADMIN — Medication 4 MILLIGRAM(S): at 00:43

## 2024-01-01 RX ADMIN — SODIUM CHLORIDE 1 GRAM(S): 9 INJECTION INTRAMUSCULAR; INTRAVENOUS; SUBCUTANEOUS at 18:23

## 2024-01-01 RX ADMIN — Medication 30 MILLIGRAM(S): at 05:56

## 2024-01-01 RX ADMIN — Medication 4 MILLIGRAM(S): at 18:31

## 2024-01-01 RX ADMIN — Medication 1000 MILLIGRAM(S): at 21:00

## 2024-01-01 RX ADMIN — Medication 60 MILLIGRAM(S): at 05:52

## 2024-01-01 RX ADMIN — SODIUM CHLORIDE 500 MILLILITER(S): 9 INJECTION INTRAMUSCULAR; INTRAVENOUS; SUBCUTANEOUS at 13:31

## 2024-01-01 RX ADMIN — Medication 4 MILLIGRAM(S): at 06:56

## 2024-01-01 RX ADMIN — LEVETIRACETAM 500 MILLIGRAM(S): 250 TABLET, FILM COATED ORAL at 18:08

## 2024-01-01 RX ADMIN — SENNA PLUS 2 TABLET(S): 8.6 TABLET ORAL at 21:51

## 2024-01-01 RX ADMIN — Medication 10 MILLIGRAM(S): at 15:59

## 2024-01-01 RX ADMIN — Medication 2 MILLIGRAM(S): at 05:02

## 2024-01-01 RX ADMIN — Medication 4 MILLIGRAM(S): at 18:03

## 2024-01-01 RX ADMIN — Medication 15 MILLIGRAM(S): at 06:00

## 2024-01-01 RX ADMIN — LEVETIRACETAM 500 MILLIGRAM(S): 250 TABLET, FILM COATED ORAL at 06:07

## 2024-01-01 RX ADMIN — LEVETIRACETAM 400 MILLIGRAM(S): 250 TABLET, FILM COATED ORAL at 06:20

## 2024-01-01 RX ADMIN — Medication 15 MILLIGRAM(S): at 15:32

## 2024-01-01 RX ADMIN — LEVETIRACETAM 500 MILLIGRAM(S): 250 TABLET, FILM COATED ORAL at 05:50

## 2024-01-01 RX ADMIN — SODIUM CHLORIDE 2 GRAM(S): 9 INJECTION INTRAMUSCULAR; INTRAVENOUS; SUBCUTANEOUS at 08:48

## 2024-01-01 RX ADMIN — SENNA PLUS 2 TABLET(S): 8.6 TABLET ORAL at 21:42

## 2024-01-01 RX ADMIN — POLYETHYLENE GLYCOL 3350 17 GRAM(S): 17 POWDER, FOR SOLUTION ORAL at 13:35

## 2024-01-01 RX ADMIN — SODIUM CHLORIDE 1 GRAM(S): 9 INJECTION INTRAMUSCULAR; INTRAVENOUS; SUBCUTANEOUS at 15:08

## 2024-01-01 RX ADMIN — Medication 30 MILLIGRAM(S): at 17:13

## 2024-01-01 RX ADMIN — Medication 10 MILLIGRAM(S): at 14:50

## 2024-01-01 RX ADMIN — SENNA PLUS 2 TABLET(S): 8.6 TABLET ORAL at 19:44

## 2024-01-01 RX ADMIN — PANTOPRAZOLE SODIUM 40 MILLIGRAM(S): 20 TABLET, DELAYED RELEASE ORAL at 04:31

## 2024-01-01 RX ADMIN — PANTOPRAZOLE SODIUM 40 MILLIGRAM(S): 20 TABLET, DELAYED RELEASE ORAL at 06:00

## 2024-01-01 RX ADMIN — SODIUM CHLORIDE 1 GRAM(S): 9 INJECTION INTRAMUSCULAR; INTRAVENOUS; SUBCUTANEOUS at 21:39

## 2024-01-01 RX ADMIN — Medication 10 MILLIGRAM(S): at 03:32

## 2024-01-01 RX ADMIN — SODIUM CHLORIDE 1 GRAM(S): 9 INJECTION INTRAMUSCULAR; INTRAVENOUS; SUBCUTANEOUS at 05:57

## 2024-01-01 RX ADMIN — PANTOPRAZOLE SODIUM 40 MILLIGRAM(S): 20 TABLET, DELAYED RELEASE ORAL at 05:51

## 2024-01-01 RX ADMIN — Medication 60 MILLIGRAM(S): at 05:50

## 2024-01-01 RX ADMIN — APIXABAN 5 MILLIGRAM(S): 2.5 TABLET, FILM COATED ORAL at 04:31

## 2024-01-01 RX ADMIN — Medication 1000 MILLIGRAM(S): at 13:06

## 2024-01-01 RX ADMIN — PANTOPRAZOLE SODIUM 40 MILLIGRAM(S): 20 TABLET, DELAYED RELEASE ORAL at 06:36

## 2024-01-01 RX ADMIN — LEVETIRACETAM 500 MILLIGRAM(S): 250 TABLET, FILM COATED ORAL at 18:04

## 2024-01-01 RX ADMIN — ENOXAPARIN SODIUM 40 MILLIGRAM(S): 100 INJECTION SUBCUTANEOUS at 21:53

## 2024-01-01 RX ADMIN — Medication 2 MILLIGRAM(S): at 18:04

## 2024-01-01 RX ADMIN — Medication 2 MILLIGRAM(S): at 05:50

## 2024-01-01 RX ADMIN — LEVETIRACETAM 500 MILLIGRAM(S): 250 TABLET, FILM COATED ORAL at 17:43

## 2024-01-01 RX ADMIN — Medication 10 MILLIGRAM(S): at 10:17

## 2024-01-01 RX ADMIN — Medication 100 MILLIGRAM(S): at 06:00

## 2024-01-01 RX ADMIN — ENOXAPARIN SODIUM 40 MILLIGRAM(S): 100 INJECTION SUBCUTANEOUS at 20:52

## 2024-01-01 RX ADMIN — Medication 60 MILLIGRAM(S): at 06:17

## 2024-01-01 RX ADMIN — APIXABAN 5 MILLIGRAM(S): 2.5 TABLET, FILM COATED ORAL at 17:16

## 2024-01-01 RX ADMIN — SODIUM CHLORIDE 2 GRAM(S): 9 INJECTION INTRAMUSCULAR; INTRAVENOUS; SUBCUTANEOUS at 21:36

## 2024-01-01 RX ADMIN — SODIUM CHLORIDE 2 GRAM(S): 9 INJECTION INTRAMUSCULAR; INTRAVENOUS; SUBCUTANEOUS at 05:56

## 2024-01-01 RX ADMIN — LEVETIRACETAM 500 MILLIGRAM(S): 250 TABLET, FILM COATED ORAL at 17:13

## 2024-01-01 RX ADMIN — Medication 60 MILLIGRAM(S): at 06:07

## 2024-01-01 RX ADMIN — PANTOPRAZOLE SODIUM 40 MILLIGRAM(S): 20 TABLET, DELAYED RELEASE ORAL at 06:57

## 2024-01-01 NOTE — PROGRESS NOTE ADULT - SUBJECTIVE AND OBJECTIVE BOX
Pre-op Diagnosis: DVT  Procedure: Inferior Vena Cava Filter Placement  Surgeon: Dr. Faulkner    Consent: Yes                          13.4   12.64 )-----------( 176      ( 01 Jan 2024 05:30 )             42.5     01-01    136  |  102  |  28<H>  ----------------------------<  118<H>  4.9   |  26  |  1.03    Ca    8.5      01 Jan 2024 05:30  Phos  4.6     01-01  Mg     2.6     01-01      PT/INR - ( 01 Jan 2024 05:30 )   PT: 10.9 sec;   INR: 0.95          PTT - ( 01 Jan 2024 05:30 )  PTT:21.3 sec  Urinalysis Basic - ( 01 Jan 2024 05:30 )    Color: x / Appearance: x / SG: x / pH: x  Gluc: 118 mg/dL / Ketone: x  / Bili: x / Urobili: x   Blood: x / Protein: x / Nitrite: x   Leuk Esterase: x / RBC: x / WBC x   Sq Epi: x / Non Sq Epi: x / Bacteria: x        Type & Screen: 1/1/24-B+  CXR: yes  EKG: yes          Is patient on ACE/ARB? [X ]No [ ]Yes   *If yes, please hold any ACE/ARB the day of surgery    Is patient on Lantus at bedtime?  [ X]No [ ]Yes   *If yes, please half the dose the night before OR since patient will be NPO    Does patient have a contrast allergy? [ X]No [ ]Yes  *If yes, please pre-medicate per protocol    Is patient on anticoagulation? [X ]No [ ] Yes  *If yes, please discuss with team when to hold it    Is the patient Female and <54yo [X]No [ ] Yes  If yes, pregnancy test must be documented in the chart    Is patient on dialysis? [X ]No [ ]Yes  *If yes, please obtain all labs including K level EARLY the day of surgery   *Also, will NOT require IVF past midnight    A/P: 63yMale pre-op for above procedure  1. NPO past midnight, except medications  2. IVF at midnight:    Pre-op Diagnosis: DVT  Procedure: Inferior Vena Cava Filter Placement  Surgeon: Dr. Faulkner    Consent: Yes                          13.4   12.64 )-----------( 176      ( 01 Jan 2024 05:30 )             42.5     01-01    136  |  102  |  28<H>  ----------------------------<  118<H>  4.9   |  26  |  1.03    Ca    8.5      01 Jan 2024 05:30  Phos  4.6     01-01  Mg     2.6     01-01      PT/INR - ( 01 Jan 2024 05:30 )   PT: 10.9 sec;   INR: 0.95          PTT - ( 01 Jan 2024 05:30 )  PTT:21.3 sec  Urinalysis Basic - ( 01 Jan 2024 05:30 )    Color: x / Appearance: x / SG: x / pH: x  Gluc: 118 mg/dL / Ketone: x  / Bili: x / Urobili: x   Blood: x / Protein: x / Nitrite: x   Leuk Esterase: x / RBC: x / WBC x   Sq Epi: x / Non Sq Epi: x / Bacteria: x        Type & Screen: 1/1/24-B+  CXR: yes  EKG: yes          Is patient on ACE/ARB? [X ]No [ ]Yes   *If yes, please hold any ACE/ARB the day of surgery    Is patient on Lantus at bedtime?  [ X]No [ ]Yes   *If yes, please half the dose the night before OR since patient will be NPO    Does patient have a contrast allergy? [ X]No [ ]Yes  *If yes, please pre-medicate per protocol    Is patient on anticoagulation? [X ]No [ ] Yes  *If yes, please discuss with team when to hold it    Is the patient Female and <56yo [X]No [ ] Yes  If yes, pregnancy test must be documented in the chart    Is patient on dialysis? [X ]No [ ]Yes  *If yes, please obtain all labs including K level EARLY the day of surgery   *Also, will NOT require IVF past midnight    A/P: 63yMale pre-op for above procedure  1. NPO past midnight, except medications  2. IVF at midnight:

## 2024-01-01 NOTE — PROGRESS NOTE ADULT - SUBJECTIVE AND OBJECTIVE BOX
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 Worthy, s/p SRS (completed 5 rounds 8/15/23), on immunotherapy monthly (last dose 12/15/23, next dose 1/15/2023), drug induced neutropenia (likely bactrim), presents for resection of brain tumor. Patient was initially diagnosed after he was found to be leaving the car door opened, putting his shoes on the wrong feet, and being forgetful of his usual daily routine. Patient reports on 12/20/23 he fell and hit his head (no LOC) d/t developing left sided weakness. Within 2 days, the symptoms of forgetfulness returned. MRI o/p 12/22/23 showed progression of right parietal lobe lesion w/ worsening mass effect, edema, and 1.2cm MLS. Admitted to [facility] for further management.     Heme-onc: Dr. Richards  Rad-onc: Dr. Warren  (31 Dec 2023 15:12)    OVERNIGHT EVENTS: MADAI overnight. Neuro stable. Pending MR w/ cheko.     Hospital Course:  12/31: , Given hydralazine 10mg IVP.   1/1: MADAI overnight. Neuro stable. Pending MR w/ cheko.     Vital Signs Last 24 Hrs  T(C): 36.8 (31 Dec 2023 20:13), Max: 36.9 (31 Dec 2023 16:00)  T(F): 98.3 (31 Dec 2023 20:13), Max: 98.4 (31 Dec 2023 16:00)  HR: 72 (31 Dec 2023 20:13) (62 - 75)  BP: 152/87 (31 Dec 2023 20:13) (152/87 - 181/118)  BP(mean): --  RR: 17 (31 Dec 2023 20:13) (17 - 18)  SpO2: 98% (31 Dec 2023 20:13) (98% - 100%)    Parameters below as of 31 Dec 2023 20:13  Patient On (Oxygen Delivery Method): room air        I&O's Summary      PHYSICAL EXAM:  Constitutional: Patient is resting comfortably in stretcher, in no acute distress.   Respiratory: breathing non-labored, symmetrical chest wall movement  Cardiovascuar: RRR, no murmurs  Gastrointestinal: abdomen soft, non tender  Genitourinary: exam deffered  Neurological:  AAOX3. Verbal function intact  Cranial Nerves: II-XII grossly intact  Motor: 5/5 power in RUE and B/L LE. LUE 4+/5.  Sensation: intact to light touch in all extremities  Pronator Drift: none.      TUBES/LINES:  [] Chamberlain  [] Lumbar Drain  [] Wound Drains  [] Others      DIET:  [] NPO  [x] Mechanical  [] Tube feeds    LABS:                        13.5   14.09 )-----------( 172      ( 31 Dec 2023 14:48 )             41.7     12-31    136  |  103  |  28<H>  ----------------------------<  102<H>  4.2   |  23  |  0.89    Ca    8.8      31 Dec 2023 14:48  Phos  3.8     12-31  Mg     2.6     12-31      PT/INR - ( 31 Dec 2023 14:48 )   PT: 10.2 sec;   INR: 0.89          PTT - ( 31 Dec 2023 14:48 )  PTT:18.6 sec  Urinalysis Basic - ( 31 Dec 2023 14:48 )    Color: x / Appearance: x / SG: x / pH: x  Gluc: 102 mg/dL / Ketone: x  / Bili: x / Urobili: x   Blood: x / Protein: x / Nitrite: x   Leuk Esterase: x / RBC: x / WBC x   Sq Epi: x / Non Sq Epi: x / Bacteria: x          CAPILLARY BLOOD GLUCOSE      POCT Blood Glucose.: 98 mg/dL (31 Dec 2023 17:04)      Drug Levels: [] N/A    CSF Analysis: [] N/A      Allergies    No Known Allergies    Intolerances      MEDICATIONS:  Antibiotics:    Neuro:  acetaminophen     Tablet .. 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 Patient was initially diagnosed after he was found to be forgetful with gait instability, and these symptoms returned 12/20 after he fell d/t developing left sided weakness. MRI o/p 12/22/23 showed progression of right parietal lobe lesion w/ worsening mass effect, edema, and 1.2cm MLS. Admitted to [facility] for further management.     Plan:   Neuro:   - neuro q8h/vitals q6h   - pain control prn  - h/o seizure: c/w keppra 500mg BID   - c/w decadron 4q6 for cerebral edema  - pending MRI cheko   - pending med clearance for OR tuesday    Cardio:  - SBP<160    Pulm:  - RA    GI:  - regular diet  - bowel regimen   - PPI while on steroids    Renal:  - IVL  - voiding     Endo:  - ISS  - f/u A1C    Heme:   - no SCDs d/t b/l DVTs  - LE dopplers 12/31: acute B/L popliteal DVTs  - Hx DVT/PE: therapeutic SQL held   - Vascular consulted for IVCF     ID:  - afebrile    Dispo: full code, regional, pending OR Tues     Discussed w/ Dr. 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 Worthy, s/p SRS (completed 5 rounds 8/15/23), on immunotherapy monthly (last dose 12/15/23, next dose 1/15/2023), drug induced neutropenia (likely bactrim), presents for resection of brain tumor. Patient was initially diagnosed after he was found to be leaving the car door opened, putting his shoes on the wrong feet, and being forgetful of his usual daily routine. Patient reports on 12/20/23 he fell and hit his head (no LOC) d/t developing left sided weakness. Within 2 days, the symptoms of forgetfulness returned. MRI o/p 12/22/23 showed progression of right parietal lobe lesion w/ worsening mass effect, edema, and 1.2cm MLS. Admitted to [facility] for further management.     Heme-onc: Dr. Richards  Rad-onc: Dr. Warren  (31 Dec 2023 15:12)    OVERNIGHT EVENTS: MADAI overnight. Neuro stable. Pending MR w/ cheko.     Hospital Course:  12/31: , Given hydralazine 10mg IVP.   1/1: MADAI overnight. Neuro stable. Pending MR w/ cheko.     Vital Signs Last 24 Hrs  T(C): 36.8 (31 Dec 2023 20:13), Max: 36.9 (31 Dec 2023 16:00)  T(F): 98.3 (31 Dec 2023 20:13), Max: 98.4 (31 Dec 2023 16:00)  HR: 72 (31 Dec 2023 20:13) (62 - 75)  BP: 152/87 (31 Dec 2023 20:13) (152/87 - 181/118)  BP(mean): --  RR: 17 (31 Dec 2023 20:13) (17 - 18)  SpO2: 98% (31 Dec 2023 20:13) (98% - 100%)    Parameters below as of 31 Dec 2023 20:13  Patient On (Oxygen Delivery Method): room air        I&O's Summary      PHYSICAL EXAM:  Constitutional: Patient is resting comfortably in stretcher, in no acute distress.   Respiratory: breathing non-labored, symmetrical chest wall movement  Cardiovascuar: RRR, no murmurs  Gastrointestinal: abdomen soft, non tender  Genitourinary: exam deffered  Neurological:  AAOX3. Verbal function intact  Cranial Nerves: II-XII grossly intact  Motor: 5/5 power in RUE and B/L LE. LUE 4+/5.  Sensation: intact to light touch in all extremities  Pronator Drift: none.      TUBES/LINES:  [] Chamberlain  [] Lumbar Drain  [] Wound Drains  [] Others      DIET:  [] NPO  [x] Mechanical  [] Tube feeds    LABS:                        13.5   14.09 )-----------( 172      ( 31 Dec 2023 14:48 )             41.7     12-31    136  |  103  |  28<H>  ----------------------------<  102<H>  4.2   |  23  |  0.89    Ca    8.8      31 Dec 2023 14:48  Phos  3.8     12-31  Mg     2.6     12-31      PT/INR - ( 31 Dec 2023 14:48 )   PT: 10.2 sec;   INR: 0.89          PTT - ( 31 Dec 2023 14:48 )  PTT:18.6 sec  Urinalysis Basic - ( 31 Dec 2023 14:48 )    Color: x / Appearance: x / SG: x / pH: x  Gluc: 102 mg/dL / Ketone: x  / Bili: x / Urobili: x   Blood: x / Protein: x / Nitrite: x   Leuk Esterase: x / RBC: x / WBC x   Sq Epi: x / Non Sq Epi: x / Bacteria: x          CAPILLARY BLOOD GLUCOSE      POCT Blood Glucose.: 98 mg/dL (31 Dec 2023 17:04)      Drug Levels: [] N/A    CSF Analysis: [] N/A      Allergies    No Known Allergies    Intolerances      MEDICATIONS:  Antibiotics:    Neuro:  acetaminophen     Tablet .. 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 Patient was initially diagnosed after he was found to be forgetful with gait instability, and these symptoms returned 12/20 after he fell d/t developing left sided weakness. MRI o/p 12/22/23 showed progression of right parietal lobe lesion w/ worsening mass effect, edema, and 1.2cm MLS. Admitted to [facility] for further management.     Plan:   Neuro:   - neuro q8h/vitals q6h   - pain control prn  - h/o seizure: c/w keppra 500mg BID   - c/w decadron 4q6 for cerebral edema  - pending MRI cheko   - pending med clearance for OR tuesday    Cardio:  - SBP<160    Pulm:  - RA    GI:  - regular diet  - bowel regimen   - PPI while on steroids    Renal:  - IVL  - voiding     Endo:  - ISS  - f/u A1C    Heme:   - no SCDs d/t b/l DVTs  - LE dopplers 12/31: acute B/L popliteal DVTs  - Hx DVT/PE: therapeutic SQL held   - Vascular consulted for IVCF     ID:  - afebrile    Dispo: full code, regional, pending OR Tues     Discussed w/ Dr. Tony

## 2024-01-01 NOTE — CONSULT NOTE ADULT - REASON FOR ADMISSION
Metastatic melanoma, brain tumor resection
Pre op evaluation for right parietal lobe tumor resection

## 2024-01-01 NOTE — CONSULT NOTE ADULT - SUBJECTIVE AND OBJECTIVE BOX
64 yo male PMH DVT/PE 8/2023 on therapeutic SQL (last dose 12/30/23 AM), melanoma on face s/p MOHs 15 yrs ago, melanoma brain mets dx'd 6/2023 s/p right crani for resection at [facility] with Dr. Worthy, s/p SRS (completed 5 rounds 8/15/23), on immunotherapy monthly (last dose 12/15/23, next dose 1/15/2023), drug induced neutropenia (likely bactrim), presents for resection of brain tumor. Pt is s/p an outpatient MRI on 12/22/23 showed progression of right parietal lobe lesion w/ worsening mass effect, edema, and 1.2cm MLS. Admitted to [facility] for further management.     Heme-onc: Dr. Richards  Rad-onc: Dr. Warren  (31 Dec 2023 15:12)      PAST MEDICAL & SURGICAL HISTORY:  Melanoma with mets to liver and brain  H/O brain surgery    Home Medications:  acetaminophen 325 mg oral tablet: 2 tab(s) orally every 6 hours As needed Temp greater or equal to 38C (100.4F), Mild Pain (1 - 3) (31 Dec 2023 15:56)  Lovenox last dose taken on 12/30/23       Allergies: No Known Allergies    FAMILY HISTORY:  No pertinent family history in first degree relatives    Social History:  Lives alone with wife.  Stairs in home to get to bedroom.  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 Endorses drinking alcohol socially, though not recently. (31 Dec 2023 15:12)    VITAL SIGNS, INS/OUTS (last 24 hours):  Vital Signs Last 24 Hrs  T(C): 36.6 (01 Jan 2024 08:35), Max: 36.9 (31 Dec 2023 16:00)  T(F): 97.8 (01 Jan 2024 08:35), Max: 98.4 (31 Dec 2023 16:00)  HR: 60 (01 Jan 2024 08:35) (60 - 75)  BP: 154/91 (01 Jan 2024 08:35) (152/87 - 181/118)  RR: 17 (01 Jan 2024 08:35) (17 - 18)  SpO2: 98% (01 Jan 2024 08:35) (97% - 100%)      PHYSICAL EXAM:  NAD laying in bed  CTA b/l no wheezing or crackles  NL S1,S2 no mumurs   soft NT/ND + BS no rebound or guarding                     13.4   12.64 )-----------( 176      ( 01 Jan 2024 05:30 )             42.5       136  |  102  |  28<H>  ----------------------------<  118<H>  4.9   |  26  |  1.03    Ca    8.5      01 Jan 2024 05:30  Phos  4.6     01-01  Mg     2.6     01-01    PT/INR - ( 01 Jan 2024 05:30 )   PT: 10.9 sec;   INR: 0.95   PTT:21.3 sec    CAPILLARY BLOOD GLUCOSE  POCT Blood Glucose.: 106 mg/dL (01 Jan 2024 05:51)  POCT Blood Glucose.: 98 mg/dL (31 Dec 2023 17:04)    12/31 LE Doppler: Acute deep venous thrombosis: above the knee.  Acute DVTs in the bilateral popliteal veins.    12/22 MRI [facility] w/wo contrast:Mild interval enlargement of a dominant metastatic melanotic   lesion within the right parietal lobe with worsening surrounding mass   effect and vasogenic edema. Worsening shift of midline structures from   right to left currently measuring up to 1.2 cm, previously measured up to   1 cm. Unchanged borderline right-sided uncal herniation.    Additional melanotic metastatic disease looks roughly similar in   comparison to the most recent prior contrast enhanced brain MRI study    CURRENT MEDICATIONS:   acetaminophen     Tablet .. 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 Zaynab, s/p SRS (completed 5 rounds 8/15/23), on immunotherapy monthly (last dose 12/15/23, next dose 1/15/2023), drug induced neutropenia (likely bactrim), presents for resection of right parietal lobe tumor.     #Right parietal lobe tumor for resection   -Management as per primary team   - Will continue dexamethasone   - Continue ISS and PPI as pt is on steroids  - Continue Levetiracetam   -Seizure Precautions     # Pre-operative evaluation   METS >4 , able to walk up 1 flight of stairs without stopping.   EKG: NSR @64bpm   RCRI Class I Risk  Using the MCKINLEY umair-operative risk index, patient has a <1% risk of myocardial infarction or cardiac arrest, intraoperatively or up to 30 days post-op     Assuming brain surgery, patient is at low risk for an intermediate risk procedure.   The pt is medically optimized with above specified risk factors  Patient may proceed to surgery without further cardiac evaluation if surgery is indicated.   Please notify co-management hospitalist if patient's clinical status changes.     #DVT  #Pulmonary emboli  -Pt was on Lovenox at home last dose was 12/30/23 in the AM   - Pt s/p LE Doppler on 12/31 which showed Acute DVTs in the bilateral popliteal veins.  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 with Dr. Worthy, s/p SRS (completed 5 rounds 8/15/23), on immunotherapy monthly (last dose 12/15/23, next dose 1/15/2023), drug induced neutropenia (likely bactrim), presents for resection of brain tumor. Pt is s/p an outpatient MRI on 12/22/23 showed progression of right parietal lobe lesion w/ worsening mass effect, edema, and 1.2cm MLS. Admitted to [facility] for further management.     Heme-onc: Dr. Richards  Rad-onc: Dr. Warren  (31 Dec 2023 15:12)      PAST MEDICAL & SURGICAL HISTORY:  Melanoma with mets to liver and brain  H/O brain surgery    Home Medications:  acetaminophen 325 mg oral tablet: 2 tab(s) orally every 6 hours As needed Temp greater or equal to 38C (100.4F), Mild Pain (1 - 3) (31 Dec 2023 15:56)  Lovenox last dose taken on 12/30/23       Allergies: No Known Allergies    FAMILY HISTORY:  No pertinent family history in first degree relatives    Social History:  Lives alone with wife.  Stairs in home to get to bedroom.  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 Endorses drinking alcohol socially, though not recently. (31 Dec 2023 15:12)    VITAL SIGNS, INS/OUTS (last 24 hours):  Vital Signs Last 24 Hrs  T(C): 36.6 (01 Jan 2024 08:35), Max: 36.9 (31 Dec 2023 16:00)  T(F): 97.8 (01 Jan 2024 08:35), Max: 98.4 (31 Dec 2023 16:00)  HR: 60 (01 Jan 2024 08:35) (60 - 75)  BP: 154/91 (01 Jan 2024 08:35) (152/87 - 181/118)  RR: 17 (01 Jan 2024 08:35) (17 - 18)  SpO2: 98% (01 Jan 2024 08:35) (97% - 100%)      PHYSICAL EXAM:  NAD laying in bed  CTA b/l no wheezing or crackles  NL S1,S2 no mumurs   soft NT/ND + BS no rebound or guarding                     13.4   12.64 )-----------( 176      ( 01 Jan 2024 05:30 )             42.5       136  |  102  |  28<H>  ----------------------------<  118<H>  4.9   |  26  |  1.03    Ca    8.5      01 Jan 2024 05:30  Phos  4.6     01-01  Mg     2.6     01-01    PT/INR - ( 01 Jan 2024 05:30 )   PT: 10.9 sec;   INR: 0.95   PTT:21.3 sec    CAPILLARY BLOOD GLUCOSE  POCT Blood Glucose.: 106 mg/dL (01 Jan 2024 05:51)  POCT Blood Glucose.: 98 mg/dL (31 Dec 2023 17:04)    12/31 LE Doppler: Acute deep venous thrombosis: above the knee.  Acute DVTs in the bilateral popliteal veins.    12/22 MRI [facility] w/wo contrast:Mild interval enlargement of a dominant metastatic melanotic   lesion within the right parietal lobe with worsening surrounding mass   effect and vasogenic edema. Worsening shift of midline structures from   right to left currently measuring up to 1.2 cm, previously measured up to   1 cm. Unchanged borderline right-sided uncal herniation.    Additional melanotic metastatic disease looks roughly similar in   comparison to the most recent prior contrast enhanced brain MRI study    CURRENT MEDICATIONS:   acetaminophen     Tablet .. 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 with Dr. Zaynab, s/p SRS (completed 5 rounds 8/15/23), on immunotherapy monthly (last dose 12/15/23, next dose 1/15/2023), drug induced neutropenia (likely bactrim), presents for resection of right parietal lobe tumor.     #Right parietal lobe tumor for resection   -Management as per primary team   - Will continue dexamethasone   - Continue ISS and PPI as pt is on steroids  - Continue Levetiracetam   -Seizure Precautions     # Pre-operative evaluation   METS >4 , able to walk up 1 flight of stairs without stopping.   EKG: NSR @64bpm   RCRI Class I Risk  Using the MCKINLEY umair-operative risk index, patient has a <1% risk of myocardial infarction or cardiac arrest, intraoperatively or up to 30 days post-op     Assuming brain surgery, patient is at low risk for an intermediate risk procedure.   The pt is medically optimized with above specified risk factors  Patient may proceed to surgery without further cardiac evaluation if surgery is indicated.   Please notify co-management hospitalist if patient's clinical status changes.     #DVT  #Pulmonary emboli  -Pt was on Lovenox at home last dose was 12/30/23 in the AM   - Pt s/p LE Doppler on 12/31 which showed Acute DVTs in the bilateral popliteal veins.  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 Worthy, s/p SRS (completed 5 rounds 8/15/23), on immunotherapy monthly (last dose 12/15/23, next dose 1/15/2023), drug induced neutropenia (likely bactrim), presents for resection of brain tumor. Pt is s/p an outpatient MRI on 12/22/23 showed progression of right parietal lobe lesion w/ worsening mass effect, edema, and 1.2cm MLS. Admitted to [facility] for further management.     Heme-onc: Dr. Richards  Rad-onc: Dr. Warren  (31 Dec 2023 15:12)      PAST MEDICAL & SURGICAL HISTORY:  Melanoma with mets to liver and brain  H/O brain surgery    Home Medications:  acetaminophen 325 mg oral tablet: 2 tab(s) orally every 6 hours As needed Temp greater or equal to 38C (100.4F), Mild Pain (1 - 3) (31 Dec 2023 15:56)  Lovenox last dose taken on 12/30/23       Allergies: No Known Allergies    FAMILY HISTORY:  No pertinent family history in first degree relatives    Social History:  Lives alone with wife.  Stairs in home to get to bedroom.  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 Endorses drinking alcohol socially, though not recently. (31 Dec 2023 15:12)    VITAL SIGNS, INS/OUTS (last 24 hours):  Vital Signs Last 24 Hrs  T(C): 36.6 (01 Jan 2024 08:35), Max: 36.9 (31 Dec 2023 16:00)  T(F): 97.8 (01 Jan 2024 08:35), Max: 98.4 (31 Dec 2023 16:00)  HR: 60 (01 Jan 2024 08:35) (60 - 75)  BP: 154/91 (01 Jan 2024 08:35) (152/87 - 181/118)  RR: 17 (01 Jan 2024 08:35) (17 - 18)  SpO2: 98% (01 Jan 2024 08:35) (97% - 100%)      PHYSICAL EXAM:  NAD laying in bed  CTA b/l no wheezing or crackles  NL S1,S2 no mumurs   soft NT/ND + BS no rebound or guarding                     13.4   12.64 )-----------( 176      ( 01 Jan 2024 05:30 )             42.5       136  |  102  |  28<H>  ----------------------------<  118<H>  4.9   |  26  |  1.03    Ca    8.5      01 Jan 2024 05:30  Phos  4.6     01-01  Mg     2.6     01-01    PT/INR - ( 01 Jan 2024 05:30 )   PT: 10.9 sec;   INR: 0.95   PTT:21.3 sec    CAPILLARY BLOOD GLUCOSE  POCT Blood Glucose.: 106 mg/dL (01 Jan 2024 05:51)  POCT Blood Glucose.: 98 mg/dL (31 Dec 2023 17:04)    12/31 LE Doppler: Acute deep venous thrombosis: above the knee.  Acute DVTs in the bilateral popliteal veins.    12/22 MRI [facility] w/wo contrast:Mild interval enlargement of a dominant metastatic melanotic   lesion within the right parietal lobe with worsening surrounding mass   effect and vasogenic edema. Worsening shift of midline structures from   right to left currently measuring up to 1.2 cm, previously measured up to   1 cm. Unchanged borderline right-sided uncal herniation.    Additional melanotic metastatic disease looks roughly similar in   comparison to the most recent prior contrast enhanced brain MRI study    CURRENT MEDICATIONS:   acetaminophen     Tablet .. 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 Zaynab, s/p SRS (completed 5 rounds 8/15/23), on immunotherapy monthly (last dose 12/15/23, next dose 1/15/2023), drug induced neutropenia (likely bactrim), presents for resection of right parietal lobe tumor.     #Right parietal lobe tumor for resection   -Management as per primary team   - Will continue dexamethasone   - Continue ISS and PPI as pt is on steroids  - Continue Levetiracetam   -Seizure Precautions     # Pre-operative evaluation   METS >4 , able to walk up 1 flight of stairs without stopping.   EKG: NSR @64bpm   RCRI Class I Risk  Using the MCKINLEY umair-operative risk index, patient has a <1% risk of myocardial infarction or cardiac arrest, intraoperatively or up to 30 days post-op     Assuming brain surgery, patient is at low to intermediate risk for an intermediate risk procedure.   The pt is medically optimized with above specified risk factors  Patient may proceed to surgery without further cardiac evaluation if surgery is indicated.   Please notify co-management hospitalist if patient's clinical status changes.     #DVT  #Pulmonary emboli  -Pt was on Lovenox at home last dose was 12/30/23 in the AM   - Pt s/p LE Doppler on 12/31 which showed Acute DVTs in the bilateral popliteal veins.  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 Worthy, s/p SRS (completed 5 rounds 8/15/23), on immunotherapy monthly (last dose 12/15/23, next dose 1/15/2023), drug induced neutropenia (likely bactrim), presents for resection of brain tumor. Pt is s/p an outpatient MRI on 12/22/23 showed progression of right parietal lobe lesion w/ worsening mass effect, edema, and 1.2cm MLS. Admitted to [facility] for further management.     Heme-onc: Dr. Richards  Rad-onc: Dr. Warren  (31 Dec 2023 15:12)      PAST MEDICAL & SURGICAL HISTORY:  Melanoma with mets to liver and brain  H/O brain surgery    Home Medications:  acetaminophen 325 mg oral tablet: 2 tab(s) orally every 6 hours As needed Temp greater or equal to 38C (100.4F), Mild Pain (1 - 3) (31 Dec 2023 15:56)  Lovenox last dose taken on 12/30/23       Allergies: No Known Allergies    FAMILY HISTORY:  No pertinent family history in first degree relatives    Social History:  Lives alone with wife.  Stairs in home to get to bedroom.  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 Endorses drinking alcohol socially, though not recently. (31 Dec 2023 15:12)    VITAL SIGNS, INS/OUTS (last 24 hours):  Vital Signs Last 24 Hrs  T(C): 36.6 (01 Jan 2024 08:35), Max: 36.9 (31 Dec 2023 16:00)  T(F): 97.8 (01 Jan 2024 08:35), Max: 98.4 (31 Dec 2023 16:00)  HR: 60 (01 Jan 2024 08:35) (60 - 75)  BP: 154/91 (01 Jan 2024 08:35) (152/87 - 181/118)  RR: 17 (01 Jan 2024 08:35) (17 - 18)  SpO2: 98% (01 Jan 2024 08:35) (97% - 100%)      PHYSICAL EXAM:  NAD laying in bed  CTA b/l no wheezing or crackles  NL S1,S2 no mumurs   soft NT/ND + BS no rebound or guarding                     13.4   12.64 )-----------( 176      ( 01 Jan 2024 05:30 )             42.5       136  |  102  |  28<H>  ----------------------------<  118<H>  4.9   |  26  |  1.03    Ca    8.5      01 Jan 2024 05:30  Phos  4.6     01-01  Mg     2.6     01-01    PT/INR - ( 01 Jan 2024 05:30 )   PT: 10.9 sec;   INR: 0.95   PTT:21.3 sec    CAPILLARY BLOOD GLUCOSE  POCT Blood Glucose.: 106 mg/dL (01 Jan 2024 05:51)  POCT Blood Glucose.: 98 mg/dL (31 Dec 2023 17:04)    12/31 LE Doppler: Acute deep venous thrombosis: above the knee.  Acute DVTs in the bilateral popliteal veins.    12/22 MRI [facility] w/wo contrast:Mild interval enlargement of a dominant metastatic melanotic   lesion within the right parietal lobe with worsening surrounding mass   effect and vasogenic edema. Worsening shift of midline structures from   right to left currently measuring up to 1.2 cm, previously measured up to   1 cm. Unchanged borderline right-sided uncal herniation.    Additional melanotic metastatic disease looks roughly similar in   comparison to the most recent prior contrast enhanced brain MRI study    CURRENT MEDICATIONS:   acetaminophen     Tablet .. 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 Zaynab, s/p SRS (completed 5 rounds 8/15/23), on immunotherapy monthly (last dose 12/15/23, next dose 1/15/2023), drug induced neutropenia (likely bactrim), presents for resection of right parietal lobe tumor.     #Right parietal lobe tumor for resection   -Management as per primary team   - Will continue dexamethasone   - Continue ISS and PPI as pt is on steroids  - Continue Levetiracetam   -Seizure Precautions     # Pre-operative evaluation   METS >4 , able to walk up 1 flight of stairs without stopping.   EKG: NSR @64bpm   RCRI Class I Risk  Using the MCKINLEY umair-operative risk index, patient has a <1% risk of myocardial infarction or cardiac arrest, intraoperatively or up to 30 days post-op     Assuming brain surgery, patient is at low to intermediate risk for an intermediate risk procedure.   The pt is medically optimized with above specified risk factors  Patient may proceed to surgery without further cardiac evaluation if surgery is indicated.   Please notify co-management hospitalist if patient's clinical status changes.     #DVT  #Pulmonary emboli  -Pt was on Lovenox at home last dose was 12/30/23 in the AM   - Pt s/p LE Doppler on 12/31 which showed Acute DVTs in the bilateral popliteal veins.  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 Worthy, s/p SRS (completed 5 rounds 8/15/23), on immunotherapy monthly (last dose 12/15/23, next dose 1/15/2023), drug induced neutropenia (likely bactrim), presents for resection of brain tumor. Pt is s/p an outpatient MRI on 12/22/23 showed progression of right parietal lobe lesion w/ worsening mass effect, edema, and 1.2cm MLS. Admitted to [facility] for further management.     Heme-onc: Dr. Richards  Rad-onc: Dr. Warren  (31 Dec 2023 15:12)      PAST MEDICAL & SURGICAL HISTORY:  Melanoma with mets to liver and brain  H/O brain surgery    Home Medications:  acetaminophen 325 mg oral tablet: 2 tab(s) orally every 6 hours As needed Temp greater or equal to 38C (100.4F), Mild Pain (1 - 3) (31 Dec 2023 15:56)  Lovenox last dose taken on 12/30/23       Allergies: No Known Allergies    FAMILY HISTORY:  No pertinent family history in first degree relatives    Social History:  Lives alone with wife.  Stairs in home to get to bedroom.  Patient currently works - combination of [employer] and office  Denies any hx of smoking or illicit drug use. Endorses drinking alcohol socially, though not recently. (31 Dec 2023 15:12)    VITAL SIGNS, INS/OUTS (last 24 hours):  Vital Signs Last 24 Hrs  T(C): 36.6 (01 Jan 2024 08:35), Max: 36.9 (31 Dec 2023 16:00)  T(F): 97.8 (01 Jan 2024 08:35), Max: 98.4 (31 Dec 2023 16:00)  HR: 60 (01 Jan 2024 08:35) (60 - 75)  BP: 154/91 (01 Jan 2024 08:35) (152/87 - 181/118)  RR: 17 (01 Jan 2024 08:35) (17 - 18)  SpO2: 98% (01 Jan 2024 08:35) (97% - 100%)      PHYSICAL EXAM:  NAD laying in bed  CTA b/l no wheezing or crackles  NL S1,S2 no mumurs   soft NT/ND + BS no rebound or guarding                     13.4   12.64 )-----------( 176      ( 01 Jan 2024 05:30 )             42.5       136  |  102  |  28<H>  ----------------------------<  118<H>  4.9   |  26  |  1.03    Ca    8.5      01 Jan 2024 05:30  Phos  4.6     01-01  Mg     2.6     01-01    PT/INR - ( 01 Jan 2024 05:30 )   PT: 10.9 sec;   INR: 0.95   PTT:21.3 sec    CAPILLARY BLOOD GLUCOSE  POCT Blood Glucose.: 106 mg/dL (01 Jan 2024 05:51)  POCT Blood Glucose.: 98 mg/dL (31 Dec 2023 17:04)    12/31 LE Doppler: Acute deep venous thrombosis: above the knee.  Acute DVTs in the bilateral popliteal veins.    12/22 MRI [facility] w/wo contrast:Mild interval enlargement of a dominant metastatic melanotic   lesion within the right parietal lobe with worsening surrounding mass   effect and vasogenic edema. Worsening shift of midline structures from   right to left currently measuring up to 1.2 cm, previously measured up to   1 cm. Unchanged borderline right-sided uncal herniation.    Additional melanotic metastatic disease looks roughly similar in   comparison to the most recent prior contrast enhanced brain MRI study    CURRENT MEDICATIONS:   acetaminophen     Tablet .. 650 milliGRAM(s) Oral every 6 hours PRN  dexAMETHasone     Tablet 4 milliGRAM(s) Oral every 6 hours  insulin lispro (ADMELOG) corrective regimen sliding scale   SubCutaneous three times a day before meals  levETIRAcetam 500 milliGRAM(s) Oral every 12 hours  pantoprazole    Tablet 40 milliGRAM(s) Oral before breakfast  senna 2 Tablet(s) Oral at bedtime    A/P: 64 yo male PMH DVT/PE 8/2023 on therapeutic SQL (last dose 12/30/23 AM), melanoma on face s/p MOHs 15 yrs ago, melanoma brain mets dx'd 6/2023 s/p right crani for resection at [facility] with Dr. Zaynab, s/p SRS (completed 5 rounds 8/15/23), on immunotherapy monthly (last dose 12/15/23, next dose 1/15/2023), drug induced neutropenia (likely bactrim), presents for resection of right parietal lobe tumor.     #Right parietal lobe tumor for resection   -Management as per primary team   - Will continue dexamethasone   - Continue ISS and PPI as pt is on steroids  - Continue Levetiracetam   -Seizure Precautions     # Pre-operative evaluation   METS >4 , able to walk up 1 flight of stairs without stopping.   EKG: NSR @64bpm   RCRI Class I Risk  Using the MCKINLEY umair-operative risk index, patient has a <1% risk of myocardial infarction or cardiac arrest, intraoperatively or up to 30 days post-op     Assuming brain surgery, patient is at low to intermediate risk for an intermediate risk procedure.   The pt is medically optimized with above specified risk factors  Patient may proceed to surgery without further cardiac evaluation if surgery is indicated.   Please notify co-management hospitalist if patient's clinical status changes.     #DVT  #Pulmonary emboli  -Pt was on Lovenox at home last dose was 12/30/23 in the AM   - Pt s/p LE Doppler on 12/31 which showed Acute DVTs in the bilateral popliteal veins.  -Pt was seen by vascular surgery and pt is for retrievable IVC filter placement  on 1/2/24 prior to Neurosurgery procedure   -Pt to restart therapeutic anticoagulation once deemed safe by Neurosurgery team     #DISPO  - As per Neurosurgery team    62 yo male PMH DVT/PE 8/2023 on therapeutic SQL (last dose 12/30/23 AM), melanoma on face s/p MOHs 15 yrs ago, melanoma brain mets dx'd 6/2023 s/p right crani for resection at [facility] with Dr. Worthy, s/p SRS (completed 5 rounds 8/15/23), on immunotherapy monthly (last dose 12/15/23, next dose 1/15/2023), drug induced neutropenia (likely bactrim), presents for resection of brain tumor. Pt is s/p an outpatient MRI on 12/22/23 showed progression of right parietal lobe lesion w/ worsening mass effect, edema, and 1.2cm MLS. Admitted to [facility] for further management.     Heme-onc: Dr. Richards  Rad-onc: Dr. Warren  (31 Dec 2023 15:12)      PAST MEDICAL & SURGICAL HISTORY:  Melanoma with mets to liver and brain  H/O brain surgery    Home Medications:  acetaminophen 325 mg oral tablet: 2 tab(s) orally every 6 hours As needed Temp greater or equal to 38C (100.4F), Mild Pain (1 - 3) (31 Dec 2023 15:56)  Lovenox last dose taken on 12/30/23       Allergies: No Known Allergies    FAMILY HISTORY:  No pertinent family history in first degree relatives    Social History:  Lives alone with wife.  Stairs in home to get to bedroom.  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 Endorses drinking alcohol socially, though not recently. (31 Dec 2023 15:12)    VITAL SIGNS, INS/OUTS (last 24 hours):  Vital Signs Last 24 Hrs  T(C): 36.6 (01 Jan 2024 08:35), Max: 36.9 (31 Dec 2023 16:00)  T(F): 97.8 (01 Jan 2024 08:35), Max: 98.4 (31 Dec 2023 16:00)  HR: 60 (01 Jan 2024 08:35) (60 - 75)  BP: 154/91 (01 Jan 2024 08:35) (152/87 - 181/118)  RR: 17 (01 Jan 2024 08:35) (17 - 18)  SpO2: 98% (01 Jan 2024 08:35) (97% - 100%)      PHYSICAL EXAM:  NAD laying in bed  CTA b/l no wheezing or crackles  NL S1,S2 no mumurs   soft NT/ND + BS no rebound or guarding                     13.4   12.64 )-----------( 176      ( 01 Jan 2024 05:30 )             42.5       136  |  102  |  28<H>  ----------------------------<  118<H>  4.9   |  26  |  1.03    Ca    8.5      01 Jan 2024 05:30  Phos  4.6     01-01  Mg     2.6     01-01    PT/INR - ( 01 Jan 2024 05:30 )   PT: 10.9 sec;   INR: 0.95   PTT:21.3 sec    CAPILLARY BLOOD GLUCOSE  POCT Blood Glucose.: 106 mg/dL (01 Jan 2024 05:51)  POCT Blood Glucose.: 98 mg/dL (31 Dec 2023 17:04)    12/31 LE Doppler: Acute deep venous thrombosis: above the knee.  Acute DVTs in the bilateral popliteal veins.    12/22 MRI Brain w/wo contrast:Mild interval enlargement of a dominant metastatic melanotic   lesion within the right parietal lobe with worsening surrounding mass   effect and vasogenic edema. Worsening shift of midline structures from   right to left currently measuring up to 1.2 cm, previously measured up to   1 cm. Unchanged borderline right-sided uncal herniation.    Additional melanotic metastatic disease looks roughly similar in   comparison to the most recent prior contrast enhanced brain MRI study    CURRENT MEDICATIONS:   acetaminophen     Tablet .. 650 milliGRAM(s) Oral every 6 hours PRN  dexAMETHasone     Tablet 4 milliGRAM(s) Oral every 6 hours  insulin lispro (ADMELOG) corrective regimen sliding scale   SubCutaneous three times a day before meals  levETIRAcetam 500 milliGRAM(s) Oral every 12 hours  pantoprazole    Tablet 40 milliGRAM(s) Oral before breakfast  senna 2 Tablet(s) Oral at bedtime    A/P: 62 yo male PMH DVT/PE 8/2023 on therapeutic SQL (last dose 12/30/23 AM), melanoma on face s/p MOHs 15 yrs ago, melanoma brain mets dx'd 6/2023 s/p right crani for resection at [facility] with Dr. Zaynab, s/p SRS (completed 5 rounds 8/15/23), on immunotherapy monthly (last dose 12/15/23, next dose 1/15/2023), drug induced neutropenia (likely bactrim), presents for resection of right parietal lobe tumor.     #Right parietal lobe tumor for resection   -Management as per primary team   - Will continue dexamethasone   - Continue ISS and PPI as pt is on steroids  - Continue Levetiracetam   -Seizure Precautions     # Pre-operative evaluation   METS >4 , able to walk up 1 flight of stairs without stopping.   EKG: NSR @64bpm   RCRI Class I Risk  Using the MCKINLEY umair-operative risk index, patient has a <1% risk of myocardial infarction or cardiac arrest, intraoperatively or up to 30 days post-op     Assuming brain surgery, patient is at low to intermediate risk for an intermediate risk procedure.   The pt is medically optimized with above specified risk factors  Patient may proceed to surgery without further cardiac evaluation if surgery is indicated.   Please notify co-management hospitalist if patient's clinical status changes.     #DVT  #Pulmonary emboli  -Pt was on Lovenox at home last dose was 12/30/23 in the AM   - Pt s/p LE Doppler on 12/31 which showed Acute DVTs in the bilateral popliteal veins.  -Pt was seen by vascular surgery and pt is for retrievable IVC filter placement  on 1/2/24 prior to Neurosurgery procedure   -Pt to restart therapeutic anticoagulation once deemed safe by Neurosurgery team     #DISPO  - As per Neurosurgery team

## 2024-01-01 NOTE — PROGRESS NOTE ADULT - ASSESSMENT
ASSESSMENT  - Bilateral acute popliteal DVT and hx of recent PE, awaiting NSGY procedure for metastatic melanoma to brain w/ progression --> therapeutic AC is being held perioperatively by primary team      PLAN & RECOMMENDATIONS  - Can place retrievable IVC filter on Tuesday 1/2/24 right before nsgy procedure. I have discussed the risks, benefits and alternatives with the patient. He agrees to proceed  - Restart therapeutic anticoagulation once deemed safe by nsgy team  - Can plan to remove IVC filter once consistently back on therapeutic AC, likely in a few weeks as an elective procedure.     Thank you,    Willi Faulkner MD  Attending Vascular Surgeon  [facility] at [address] [place] [address]  Office: [phone]; Fax: [phone]  [email or web address] [email or web address]  - Bilateral acute popliteal DVT and hx of recent PE, awaiting NSGY procedure for metastatic melanoma to brain w/ progression --> therapeutic AC is being held perioperatively by primary team      PLAN & RECOMMENDATIONS  - Can place retrievable IVC filter on Tuesday 1/2/24 right before nsgy procedure. I have discussed the risks, benefits and alternatives with the patient. He agrees to proceed  - Restart therapeutic anticoagulation once deemed safe by nsgy team  - Can plan to remove IVC filter once consistently back on therapeutic AC, likely in a few weeks as an elective procedure.     Thank you,    Willi Faulkner MD  Attending Vascular Surgeon  [facility] at [address], [address] [place] [address]  Office: [phone]; Fax: [phone]  [email or web address]

## 2024-01-01 NOTE — PROGRESS NOTE ADULT - SUBJECTIVE AND OBJECTIVE BOX
Mr. Pacheco Nogueira is a 63M w/ prior DVT after RLE vein stripping yeas ago and  melanoma on face s/p MOHS c/b brain metasasis s/p right craniotomy (6/2023) an dinitiation of immunotherapy c/b PE (8/2023) and has since been on therapuetic lovenox, now admitted for progression of right parietal lobe lesion and awaiting brain surgery on Tuesday 1/2/24. Vascular surgery was consulted for IVC filter placement given inability to therapeutically anticoagulate for PE/DVT history during perioperative period. BLE venous duplex US here show bilateral acute popliteal vein DVTs.     On exam, he is A&Ox3 in NAD. Normal resp effort. Abdomen soft and nontender. has mild bilateral LE swelling. Both feet has some chronic purpling of skin. Faintly palpable DP pulses bilaterally.     He denies any allergies. no prior IVC filter placement.

## 2024-01-01 NOTE — PROGRESS NOTE ADULT - SUBJECTIVE AND OBJECTIVE BOX
Surgery: Right frontal-parietal craniotomy for resection of brain tumor   Consent: Signed by patient in chart                    NAME/NUMBER of HCP: Corinne Kalenka [phone]     No Known Allergies      OVERNIGHT EVENTS:    T(C): 36.5 (01-01-24 @ 15:54), Max: 36.8 (12-31-23 @ 20:13)  HR: 66 (01-01-24 @ 15:54) (60 - 72)  BP: 147/89 (01-01-24 @ 15:54) (147/89 - 161/99)  RR: 16 (01-01-24 @ 15:54) (16 - 18)  SpO2: 100% (01-01-24 @ 15:54) (97% - 100%)  Wt(kg): --    EXAM:  Constitutional: Patient is resting comfortably in stretcher, in no acute distress.   Respiratory: breathing non-labored, symmetrical chest wall movement  Cardiovascuar: RRR, no murmurs  Gastrointestinal: abdomen soft, non tender  Genitourinary: exam deffered  Neurological:  AAOX3. Verbal function intact  Cranial Nerves: II-XII grossly intact  Motor: 5/5 power in RUE and B/L LE. LUE 4+/5.  Sensation: intact to light touch in all extremities  Pronator Drift: none.      01-01    136  |  102  |  28<H>  ----------------------------<  118<H>  4.9   |  26  |  1.03    Ca    8.5      01 Jan 2024 05:30  Phos  4.6     01-01  Mg     2.6     01-01      CBC Full  -  ( 01 Jan 2024 05:30 )  WBC Count : 12.64 K/uL  RBC Count : 4.89 M/uL  Hemoglobin : 13.4 g/dL  Hematocrit : 42.5 %  Platelet Count - Automated : 176 K/uL  Mean Cell Volume : 86.9 fl  Mean Cell Hemoglobin : 27.4 pg  Mean Cell Hemoglobin Concentration : 31.5 gm/dL  Auto Neutrophil # : x  Auto Lymphocyte # : x  Auto Monocyte # : x  Auto Eosinophil # : x  Auto Basophil # : x  Auto Neutrophil % : x  Auto Lymphocyte % : x  Auto Monocyte % : x  Auto Eosinophil % : x  Auto Basophil % : x    PT/INR - ( 01 Jan 2024 05:30 )   PT: 10.9 sec;   INR: 0.95          PTT - ( 01 Jan 2024 05:30 )  PTT:21.3 sec    Pregnancy test: N/A   Type & Screen (in past 72hrs):  Type + Screen (12.31.23 @ 15:47)   ABO Interpretation: B  Rh Interpretation: Positive  Antibody Screen: Negative    CXR: pend   EKG: NSR   ECHO: N/A   Medical Clearances: medically optimized for OR; Using the MCKINLEY umair-operative risk index, patient has a <1% risk of myocardial infarction or cardiac arrest, intraoperatively or up to 30 days post-op, patient is at low to intermediate risk for an intermediate risk procedure.     Other Clearances: N/A      Last dose of antiplatelet/anticoagulation drug: N/A     Implanted Devices (pacemaker, drug pump...etc):  []YES   [X] NO                  If yes --> EPS consulted to interrogate/adjust device:    3M nasal swab ordered? X_Y  _N  Cranial surgery: Order written for hair to be shampooed night before surgery  [X] yes   []no                 Assessment:  64 yo male 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 Patient was initially diagnosed after he was found to be forgetful with gait instability, and these symptoms returned 12/20 after he fell d/t developing left sided weakness. MRI o/p 12/22/23 showed progression of right parietal lobe lesion w/ worsening mass effect, edema, and 1.2cm MLS. Admitted to [facility] for further management.       Plan:  - NPO after midnight, IVF while NPO   - 2U PRBC on hold for OR   - type and screen and coags repeat in am   - consent signed and in chart  - confirmed IVC filter placement with vascular surgery during OR tomorrow   - shampoo patient's scalp night prior to OR and place shampoo cap on patient's scalp on call to OR   - 3M and chg ordered  - cranial ERAS ordered   - MRI ADAL completed       Case discussed with Dr. Boockvar     Assessment:  Present when checked    []  GCS  E   V  M     Heart Failure: []Acute, [] acute on chronic , []chronic  Heart Failure:  [] Diastolic (HFpEF), [] Systolic (HFrEF), []Combined (HFpEF and HFrEF), [] RHF, [] Pulm HTN, [] Other    [] BASHIR, [] ATN, [] AIN, [] other  [] CKD1, [] CKD2, [] CKD 3, [] CKD 4, [] CKD 5, []ESRD    Encephalopathy: [] Metabolic, [] Hepatic, [] toxic, [] Neurological, [] Other    Abnormal Nurtitional Status: [] malnurtition (see nutrition note), [ ]underweight: BMI < 19, [] morbid obesity: BMI >40, [] Cachexia    [] Sepsis  [] hypovolemic shock,[] cardiogenic shock, [] hemorrhagic shock, [] neuogenic shock  [] Acute Respiratory Failure  []Cerebral edema, [] Brain compression/ herniation,   [] Functional quadriplegia  [] Acute blood loss anemia   Surgery: Right frontal-parietal craniotomy for resection of brain tumor   Consent: Signed by patient in chart                    NAME/NUMBER of HCP: Corinne Kalenka [phone]     No Known Allergies      OVERNIGHT EVENTS:    T(C): 36.5 (01-01-24 @ 15:54), Max: 36.8 (12-31-23 @ 20:13)  HR: 66 (01-01-24 @ 15:54) (60 - 72)  BP: 147/89 (01-01-24 @ 15:54) (147/89 - 161/99)  RR: 16 (01-01-24 @ 15:54) (16 - 18)  SpO2: 100% (01-01-24 @ 15:54) (97% - 100%)  Wt(kg): --    EXAM:  Constitutional: Patient is resting comfortably in stretcher, in no acute distress.   Respiratory: breathing non-labored, symmetrical chest wall movement  Cardiovascuar: RRR, no murmurs  Gastrointestinal: abdomen soft, non tender  Genitourinary: exam deffered  Neurological:  AAOX3. Verbal function intact  Cranial Nerves: II-XII grossly intact  Motor: 5/5 power in RUE and B/L LE. LUE 4+/5.  Sensation: intact to light touch in all extremities  Pronator Drift: none.      01-01    136  |  102  |  28<H>  ----------------------------<  118<H>  4.9   |  26  |  1.03    Ca    8.5      01 Jan 2024 05:30  Phos  4.6     01-01  Mg     2.6     01-01      CBC Full  -  ( 01 Jan 2024 05:30 )  WBC Count : 12.64 K/uL  RBC Count : 4.89 M/uL  Hemoglobin : 13.4 g/dL  Hematocrit : 42.5 %  Platelet Count - Automated : 176 K/uL  Mean Cell Volume : 86.9 fl  Mean Cell Hemoglobin : 27.4 pg  Mean Cell Hemoglobin Concentration : 31.5 gm/dL  Auto Neutrophil # : x  Auto Lymphocyte # : x  Auto Monocyte # : x  Auto Eosinophil # : x  Auto Basophil # : x  Auto Neutrophil % : x  Auto Lymphocyte % : x  Auto Monocyte % : x  Auto Eosinophil % : x  Auto Basophil % : x    PT/INR - ( 01 Jan 2024 05:30 )   PT: 10.9 sec;   INR: 0.95          PTT - ( 01 Jan 2024 05:30 )  PTT:21.3 sec    Pregnancy test: N/A   Type & Screen (in past 72hrs):  Type + Screen (12.31.23 @ 15:47)   ABO Interpretation: B  Rh Interpretation: Positive  Antibody Screen: Negative    CXR: pend   EKG: NSR   ECHO: N/A   Medical Clearances: medically optimized for OR; Using the MCKINLEY umair-operative risk index, patient has a <1% risk of myocardial infarction or cardiac arrest, intraoperatively or up to 30 days post-op, patient is at low to intermediate risk for an intermediate risk procedure.     Other Clearances: N/A      Last dose of antiplatelet/anticoagulation drug: N/A     Implanted Devices (pacemaker, drug pump...etc):  []YES   [X] NO                  If yes --> EPS consulted to interrogate/adjust device:    3M nasal swab ordered? X_Y  _N  Cranial surgery: Order written for hair to be shampooed night before surgery  [X] yes   []no                 Assessment:  62 yo male 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 Patient was initially diagnosed after he was found to be forgetful with gait instability, and these symptoms returned 12/20 after he fell d/t developing left sided weakness. MRI o/p 12/22/23 showed progression of right parietal lobe lesion w/ worsening mass effect, edema, and 1.2cm MLS. Admitted to [facility] for further management.       Plan:  - NPO after midnight, IVF while NPO   - 2U PRBC on hold for OR   - type and screen and coags repeat in am   - consent signed and in chart  - confirmed IVC filter placement with vascular surgery during OR tomorrow   - shampoo patient's scalp night prior to OR and place shampoo cap on patient's scalp on call to OR   - 3M and chg ordered  - cranial ERAS ordered   - MRI ADAL completed       Case discussed with Dr. Boockvar     Assessment:  Present when checked    []  GCS  E   V  M     Heart Failure: []Acute, [] acute on chronic , []chronic  Heart Failure:  [] Diastolic (HFpEF), [] Systolic (HFrEF), []Combined (HFpEF and HFrEF), [] RHF, [] Pulm HTN, [] Other    [] BASHIR, [] ATN, [] AIN, [] other  [] CKD1, [] CKD2, [] CKD 3, [] CKD 4, [] CKD 5, []ESRD    Encephalopathy: [] Metabolic, [] Hepatic, [] toxic, [] Neurological, [] Other    Abnormal Nurtitional Status: [] malnurtition (see nutrition note), [ ]underweight: BMI < 19, [] morbid obesity: BMI >40, [] Cachexia    [] Sepsis  [] hypovolemic shock,[] cardiogenic shock, [] hemorrhagic shock, [] neuogenic shock  [] Acute Respiratory Failure  []Cerebral edema, [] Brain compression/ herniation,   [] Functional quadriplegia  [] Acute blood loss anemia

## 2024-01-02 NOTE — PROGRESS NOTE ADULT - REASON FOR ADMISSION
R. temporoparietal crani for resection of brain tumor R. temporoparietal crani for resection of brain tumor  IVC filter placement

## 2024-01-02 NOTE — PROGRESS NOTE ADULT - ASSESSMENT
ASSESSMENT/PLAN:    NEURO:  Activity: [] mobilize as tolerated [] Bedrest [] PT [] OT [] PMNR    PULM:    CV:  SBP goal    RENAL:  Fluids:    GI:  Diet:  GI prophylaxis [] not indicated [] PPI [] other:  Bowel regimen [] colace [] senna [] other:    ENDO:   Goal euglycemia (-180)    HEME/ONC:  VTE prophylaxis: [] SCDs [] chemoprophylaxis [] hold chemoprophylaxis due to: [] high risk of DVT/PE on admission due to:    ID:    MISC:    SOCIAL/FAMILY:  [] awaiting [] updated at bedside [] family meeting    CODE STATUS:  [] Full Code [] DNR [] DNI [] Palliative/Comfort Care    DISPOSITION:  [] ICU [] Stroke Unit [] Floor [] EMU [] RCU [] PCU    Time spent: ___ [] critical care minutes     ASSESSMENT/PLAN:  POD 0 status post right parietal and temporal craniotomy for resection of brain tumor  Frozen: metastatic melanoma    NEURO:   Q1 neurochecks  Decadron with taper per NSGY  MRI 72 hours post op  Keppra 500mg bid  Analgesia prn  Activity: [] mobilize as tolerated [x] Bedrest [] PT [] OT [] PMNR      PULM: Large RUL mass  CXR 1/1/24 Mass 10.8 cm (enlarged from 9.3 cm)  Incentive spirometry, mobilize as tolerated    CV:  -140mmHg, d/c a-line in AM    RENAL: S/P mild BASHIR, mild hyperkalemia/hyperphophatemia  IVF until good PO intake, d/c eden this pm  Monitor BMPs; check CPK levels    GI:  Diet: Dysphagia screen and then advance diet as tolerated  GI prophylaxis [] not indicated [x] PPI while on steroids [] other:  Bowel regimen [] colace [x] senna [] other:    ENDO:   Goal euglycemia (-180)    HEME/ONC: DVT/PE 8/2023  S/P therapeutic SQL (last dose 12/30/23 AM)  LE Doppler on 12/31: Acute DVTs in the bilateral popliteal veins.  VTE prophylaxis: No SCDs as B/L DVTs.  Retrievable IVC filter placed 1/2. Vascular surgery following  [x] hold chemoprophylaxis due to: fresh post op. Will need full AC once cleared from NSGY perspective.   [id number]/ H/H 14.5-> 12.4. Monitor H/H, plts    ID: History of drug induced neutropenia (likely bactrim)  WBC currently 13. Monitor CBC with diff  Afebrile. Monitor    MISC:    SOCIAL/FAMILY:  [] awaiting [x] updated at bedside [] family meeting    CODE STATUS:  [x] Full Code [] DNR [] DNI [] Palliative/Comfort Care    DISPOSITION:  [x] ICU [] Stroke Unit [] Floor [] EMU [] RCU [] PCU    Time spent: 60 critical care minutes     ASSESSMENT/PLAN:  POD 0 status post right parietal and temporal craniotomy for resection of brain tumor  Frozen: metastatic melanoma    NEURO:   Q1 neurochecks  Decadron with taper per NSGY  MRI 72 hours post op  Keppra 500mg bid  Analgesia prn  Activity: [] mobilize as tolerated [x] Bedrest [] PT [] OT [] PMNR      PULM: Large RUL mass  CXR 1/1/24 Mass 10.8 cm (enlarged from 9.3 cm)  Incentive spirometry, mobilize as tolerated    CV:  -140mmHg, d/c a-line in AM    RENAL: S/P mild BASHIR, mild hyperkalemia/hyperphophatemia  IVF until good PO intake, d/c eden this pm  Monitor BMPs; check CPK levels    GI:  Diet: Dysphagia screen and then advance diet as tolerated  GI prophylaxis [] not indicated [x] PPI while on steroids [] other:  Bowel regimen [] colace [x] senna [] other:    ENDO:   Goal euglycemia (-180)    HEME/ONC: DVT/PE 8/2023  S/P therapeutic SQL (last dose 12/30/23 AM)  LE Doppler on 12/31: Acute DVTs in the bilateral popliteal veins.  VTE prophylaxis: No SCDs as B/L DVTs.  Retrievable IVC filter placed 1/2. Vascular surgery following  [x] hold chemoprophylaxis due to: fresh post op. Will need full AC once cleared from NSGY perspective.   [id number]/ H/H 14.5-> 12.4. Monitor H/H, plts    ID: History of drug induced neutropenia (likely bactrim)  WBC currently 13. Monitor CBC with diff  Afebrile. Monitor    MISC:    SOCIAL/FAMILY:  [] awaiting [x] updated at bedside [] family meeting    CODE STATUS:  [x] Full Code [] DNR [] DNI [] Palliative/Comfort Care    DISPOSITION:  [x] ICU [] Stroke Unit [] Floor [] EMU [] RCU [] PCU    Time spent: 60 critical care minutes     ASSESSMENT/PLAN:  POD 0 status post right parietal and temporal craniotomy for resection of brain tumor  Frozen: metastatic melanoma    NEURO:   Q1 neurochecks  Decadron with taper per NSGY  MRI 72 hours post op  Keppra 500mg bid  Analgesia prn  Activity: [] mobilize as tolerated [x] Bedrest [] PT [] OT [] PMNR      PULM: Large RUL mass  CXR 1/1/24 Mass 10.8 cm (enlarged from 9.3 cm)  Incentive spirometry, mobilize as tolerated    CV:  -140mmHg, d/c a-line in AM  Pulse checks/vitals Q1    RENAL: S/P mild BASHIR, mild hyperkalemia/hyperphosphatemia  IVF until good PO intake, d/c eden this pm  Monitor BMPs; check CPK levels    GI:  Diet: Dysphagia screen and then advance diet as tolerated  GI prophylaxis [] not indicated [x] PPI while on steroids [] other:  Bowel regimen [] colace [x] senna [] other:    ENDO:   Goal euglycemia (-180)    HEME/ONC: DVT/PE 8/2023  S/P therapeutic SQL (last dose 12/30/23 AM)  LE Doppler on 12/31: Acute DVTs in the bilateral popliteal veins.  VTE prophylaxis: No SCDs as B/L DVTs.  Retrievable IVC filter placed 1/2. Vascular surgery following  [x] hold chemoprophylaxis due to: fresh post op. Will need full AC once cleared from NSGY perspective; recommend 1 week post op  [id number]/ H/H 14.5-> 12.4. Monitor H/H, plts    ID: History of drug induced neutropenia (likely bactrim)  WBC currently 13. Monitor CBC with diff  Afebrile. Monitor    MISC:    SOCIAL/FAMILY:  [] awaiting [x] updated at bedside [] family meeting    CODE STATUS:  [x] Full Code [] DNR [] DNI [] Palliative/Comfort Care    DISPOSITION:  [x] ICU [] Stroke Unit [] Floor [] EMU [] RCU [] PCU    Time spent: 60 critical care minutes     ASSESSMENT/PLAN:  POD 0 status post right parietal and temporal craniotomy for resection of brain tumor  Frozen: metastatic melanoma    NEURO:   Q1 neurochecks  Decadron with taper per NSGY  MRI 72 hours post op  Keppra 500mg bid  Analgesia prn  Activity: [] mobilize as tolerated [x] Bedrest [] PT [] OT [] PMNR      PULM: Large RUL mass  CXR 1/1/24 Mass 10.8 cm (enlarged from 9.3 cm)  Incentive spirometry, mobilize as tolerated    CV:  -140mmHg, d/c a-line in AM  Pulse checks/vitals Q1    RENAL: S/P mild BASHIR, mild hyperkalemia/hyperphosphatemia  IVF until good PO intake, d/c eden this pm  Monitor BMPs; check CPK levels    GI:  Diet: Dysphagia screen and then advance diet as tolerated  GI prophylaxis [] not indicated [x] PPI while on steroids [] other:  Bowel regimen [] colace [x] senna [] other:    ENDO:   Goal euglycemia (-180)    HEME/ONC: DVT/PE 8/2023  S/P therapeutic SQL (last dose 12/30/23 AM)  LE Doppler on 12/31: Acute DVTs in the bilateral popliteal veins.  VTE prophylaxis: No SCDs as B/L DVTs.  Retrievable IVC filter placed 1/2. Vascular surgery following  [x] hold chemoprophylaxis due to: fresh post op. Will need full AC once cleared from NSGY perspective; recommend 1 week post op  [id number]/ H/H 14.5-> 12.4. Monitor H/H, plts    ID: History of drug induced neutropenia (likely bactrim)  WBC currently 13. Monitor CBC with diff  Afebrile. Monitor    MISC:    SOCIAL/FAMILY:  [] awaiting [x] updated at bedside [] family meeting    CODE STATUS:  [x] Full Code [] DNR [] DNI [] Palliative/Comfort Care    DISPOSITION:  [x] ICU [] Stroke Unit [] Floor [] EMU [] RCU [] PCU    Time spent: 60 critical care minutes

## 2024-01-02 NOTE — PROGRESS NOTE ADULT - SUBJECTIVE AND OBJECTIVE BOX
PM EVENTS: no acute overnight events as of documentation time of this note.    ROS: negative except as mentioned above.    T(C): 36.4 (01-02-24 @ 17:56), Max: 36.7 (01-01-24 @ 20:20)  HR: 70 (01-02-24 @ 18:00) (61 - 72)  BP: 137/84 (01-02-24 @ 16:00) (137/84 - 163/108)  RR: 12 (01-02-24 @ 18:00) (12 - 20)  SpO2: 100% (01-02-24 @ 18:00) (97% - 100%)        I&O's Summary    01 Jan 2024 07:01  -  02 Jan 2024 07:00  --------------------------------------------------------  IN: 720 mL / OUT: 200 mL / NET: 520 mL    02 Jan 2024 07:01  -  02 Jan 2024 19:17  --------------------------------------------------------  IN: 225 mL / OUT: 470 mL / NET: -245 mL        EXAM:     [place] Coma Scale:     General: normocephalic, atraumatic, laying in bed, in no distress  Neuro     MS: A/Ox3, cooperative, normal attention, no neglect, comprehension intact, speech with preserved fluency, naming, and repetition    CN: PERRL, VF FTC, EOMI and no ptosis bilaterally, sensation intact to crude touch V1-V3, face symmetric, hearing grossly intact    Mot: bulk normal, tone normal, power 5/5 in bilateral upper and lower proximal extremities    Sens: intact to crude touch in bilateral upper and lower extremities    Reflexes: deferred    Coord: no dysmetria or ataxia on finger to nose/heel to shin, respectively, no focal bradykinetic movements    Gait: deferred  Chest: nonlabored respirations, no adventitious lung sounds bilaterally, heart regular rate/rhythm, present S1/S2, no murmurs or rubs  Abdomen: nondistended, soft and nontender without peritoneal signs, normoactive bowel sounds  Extremities: no clubbing, well-perfused, no edema                              12.4   13.65 )-----------( 124      ( 02 Jan 2024 14:21 )             37.8     01-02    139  |  107  |  28<H>  ----------------------------<  131<H>  5.0   |  20<L>  |  0.86    Ca    8.3<L>      02 Jan 2024 14:22  Phos  6.1     01-02  Mg     2.5     01-02        MEDICATIONS  (STANDING):  acetaminophen     Tablet .. 1000 milliGRAM(s) Oral every 8 hours  ceFAZolin   IVPB 2000 milliGRAM(s) IV Intermittent every 8 hours  chlorhexidine 2% Cloths 1 Application(s) Topical <User Schedule>  dexAMETHasone     Tablet 4 milliGRAM(s) Oral every 6 hours  dexAMETHasone     Tablet   Oral   insulin lispro (ADMELOG) corrective regimen sliding scale   SubCutaneous Before meals and at bedtime  levETIRAcetam 500 milliGRAM(s) Oral every 12 hours  pantoprazole    Tablet 40 milliGRAM(s) Oral before breakfast  polyethylene glycol 3350 17 Gram(s) Oral daily  senna 2 Tablet(s) Oral at bedtime  sodium chloride 0.9%. 1000 milliLiter(s) (75 mL/Hr) IV Continuous <Continuous>    MEDICATIONS  (PRN):  hydrALAZINE Injectable 10 milliGRAM(s) IV Push every 2 hours PRN SBP >140 second line  ketorolac   Injectable 15 milliGRAM(s) IV Push every 8 hours PRN Moderate Pain (4 - 6)  labetalol Injectable 10 milliGRAM(s) IV Push every 2 hours PRN Systolic blood pressure > 140  ondansetron Injectable 4 milliGRAM(s) IV Push every 6 hours PRN Nausea and/or Vomiting      Please see the day's note documented by  *** for detailed ongoing assessment and plan.  Additions to plan are as follows:    - None.         PM EVENTS:   - S/p R. temporoparietal crani for resection brain metastasis using stereotaxy, POD#0  - S/p placement of IVCF, POD#0    ROS: negative except as mentioned above.    T(C): 36.4 (01-02-24 @ 17:56), Max: 36.7 (01-01-24 @ 20:20)  HR: 70 (01-02-24 @ 18:00) (61 - 72)  BP: 137/84 (01-02-24 @ 16:00) (137/84 - 163/108)  RR: 12 (01-02-24 @ 18:00) (12 - 20)  SpO2: 100% (01-02-24 @ 18:00) (97% - 100%)        I&O's Summary    01 Jan 2024 07:01  -  02 Jan 2024 07:00  --------------------------------------------------------  IN: 720 mL / OUT: 200 mL / NET: 520 mL    02 Jan 2024 07:01  -  02 Jan 2024 19:17  --------------------------------------------------------  IN: 225 mL / OUT: 470 mL / NET: -245 mL        EXAM:     [place] Coma Scale: 15    General: normocephalic, atraumatic, laying in bed, in no distress  Neuro     MS: A/Ox3, cooperative, normal attention, no neglect, comprehension intact, speech with preserved fluency, naming, and repetition    CN: PERRL, VF FTC, EOMI and no ptosis bilaterally, sensation intact to crude touch V1-V3, face symmetric, hearing grossly intact    Mot: bulk normal, tone normal, power 5/5 in bilateral upper and lower proximal extremities (+)except proximal LUE, which is a 4+ to 5-/5    Sens: intact to crude touch in bilateral upper and lower extremities    Reflexes: deferred    Coord: no dysmetria or ataxia on finger to nose/heel to shin, respectively, no focal bradykinetic movements    Gait: deferred  Chest: nonlabored respirations, no adventitious lung sounds bilaterally, heart regular rate/rhythm, present S1/S2, no murmurs or rubs  Abdomen: nondistended, soft and nontender without peritoneal signs, normoactive bowel sounds  Extremities: no clubbing, well-perfused, no edema                              12.4   13.65 )-----------( 124      ( 02 Jan 2024 14:21 )             37.8     01-02    139  |  107  |  28<H>  ----------------------------<  131<H>  5.0   |  20<L>  |  0.86    Ca    8.3<L>      02 Jan 2024 14:22  Phos  6.1     01-02  Mg     2.5     01-02        MEDICATIONS  (STANDING):  acetaminophen     Tablet .. 1000 milliGRAM(s) Oral every 8 hours  ceFAZolin   IVPB 2000 milliGRAM(s) IV Intermittent every 8 hours  chlorhexidine 2% Cloths 1 Application(s) Topical <User Schedule>  dexAMETHasone     Tablet 4 milliGRAM(s) Oral every 6 hours  dexAMETHasone     Tablet   Oral   insulin lispro (ADMELOG) corrective regimen sliding scale   SubCutaneous Before meals and at bedtime  levETIRAcetam 500 milliGRAM(s) Oral every 12 hours  pantoprazole    Tablet 40 milliGRAM(s) Oral before breakfast  polyethylene glycol 3350 17 Gram(s) Oral daily  senna 2 Tablet(s) Oral at bedtime  sodium chloride 0.9%. 1000 milliLiter(s) (75 mL/Hr) IV Continuous <Continuous>    MEDICATIONS  (PRN):  hydrALAZINE Injectable 10 milliGRAM(s) IV Push every 2 hours PRN SBP >140 second line  ketorolac   Injectable 15 milliGRAM(s) IV Push every 8 hours PRN Moderate Pain (4 - 6)  labetalol Injectable 10 milliGRAM(s) IV Push every 2 hours PRN Systolic blood pressure > 140  ondansetron Injectable 4 milliGRAM(s) IV Push every 6 hours PRN Nausea and/or Vomiting      Please see the day's note documented by Dr. Gallego for detailed ongoing assessment and plan.     Admit NSICU, Q1 neurovitals, dex taper, MRI within 72 hours post op, /500  NABOR 10.8cm mass  < 140  D/c eden, check CPK  Dysphagia screen  Euglycemia  Holding VTE chemoppx as patient is fresh post op, IVCF placed on 1/2/24     PM EVENTS:   - S/p R. temporoparietal crani for resection brain metastasis using stereotaxy, POD#0  - S/p placement of IVCF, POD#0    ROS: negative except as mentioned above.    T(C): 36.4 (01-02-24 @ 17:56), Max: 36.7 (01-01-24 @ 20:20)  HR: 70 (01-02-24 @ 18:00) (61 - 72)  BP: 137/84 (01-02-24 @ 16:00) (137/84 - 163/108)  RR: 12 (01-02-24 @ 18:00) (12 - 20)  SpO2: 100% (01-02-24 @ 18:00) (97% - 100%)        I&O's Summary    01 Jan 2024 07:01  -  02 Jan 2024 07:00  --------------------------------------------------------  IN: 720 mL / OUT: 200 mL / NET: 520 mL    02 Jan 2024 07:01  -  02 Jan 2024 19:17  --------------------------------------------------------  IN: 225 mL / OUT: 470 mL / NET: -245 mL        EXAM:     [place] Coma Scale: 15    General: normocephalic, atraumatic, laying in bed, in no distress  Neuro     MS: A/Ox3, cooperative, normal attention, no neglect, comprehension intact, speech with preserved fluency, naming, and repetition    CN: PERRL, VF FTC, EOMI and no ptosis bilaterally, sensation intact to crude touch V1-V3, face symmetric, hearing grossly intact    Mot: bulk normal, tone normal, power 5/5 in bilateral upper and lower proximal extremities (+)except proximal LUE, which is a 4+ to 5-/5    Sens: intact to crude touch in bilateral upper and lower extremities    Reflexes: deferred    Coord: no dysmetria or ataxia on finger to nose/heel to shin, respectively, no focal bradykinetic movements    Gait: deferred  Chest: nonlabored respirations, no adventitious lung sounds bilaterally, heart regular rate/rhythm, present S1/S2, no murmurs or rubs  Abdomen: nondistended, soft and nontender without peritoneal signs, normoactive bowel sounds  Extremities: no clubbing, well-perfused, no edema                              12.4   13.65 )-----------( 124      ( 02 Jan 2024 14:21 )             37.8     01-02    139  |  107  |  28<H>  ----------------------------<  131<H>  5.0   |  20<L>  |  0.86    Ca    8.3<L>      02 Jan 2024 14:22  Phos  6.1     01-02  Mg     2.5     01-02        MEDICATIONS  (STANDING):  acetaminophen     Tablet .. 1000 milliGRAM(s) Oral every 8 hours  ceFAZolin   IVPB 2000 milliGRAM(s) IV Intermittent every 8 hours  chlorhexidine 2% Cloths 1 Application(s) Topical <User Schedule>  dexAMETHasone     Tablet 4 milliGRAM(s) Oral every 6 hours  dexAMETHasone     Tablet   Oral   insulin lispro (ADMELOG) corrective regimen sliding scale   SubCutaneous Before meals and at bedtime  levETIRAcetam 500 milliGRAM(s) Oral every 12 hours  pantoprazole    Tablet 40 milliGRAM(s) Oral before breakfast  polyethylene glycol 3350 17 Gram(s) Oral daily  senna 2 Tablet(s) Oral at bedtime  sodium chloride 0.9%. 1000 milliLiter(s) (75 mL/Hr) IV Continuous <Continuous>    MEDICATIONS  (PRN):  hydrALAZINE Injectable 10 milliGRAM(s) IV Push every 2 hours PRN SBP >140 second line  ketorolac   Injectable 15 milliGRAM(s) IV Push every 8 hours PRN Moderate Pain (4 - 6)  labetalol Injectable 10 milliGRAM(s) IV Push every 2 hours PRN Systolic blood pressure > 140  ondansetron Injectable 4 milliGRAM(s) IV Push every 6 hours PRN Nausea and/or Vomiting      Please see the day's note documented by Dr. Gallego for detailed ongoing assessment and plan.     Admit NSICU, Q1 neurovitals, dex taper, MRI within 72 hours post op, /500  NABOR 10.8cm mass  < 140  D/c eden, check CPK  Dysphagia screen  Euglycemia  Holding VTE chemoppx as patient is fresh post op, IVCF placed on 1/2/24

## 2024-01-02 NOTE — BRIEF OPERATIVE NOTE - OPERATION/FINDINGS
R groin prepped and draped. Clamp placed on femoral head with confirmation under fluoroscopy. R CFV accessed with micropuncture technique under ultrasound.  Microcatether exchanged for 5 Maltese sheath and diagnostic venogram shot to visualize IVC and renal veins. Under roadmap [employer] Celect 7 Maltese introducer system inserted into vessel over Bentson wire.  Device positioned under L2 at the level of the lowest renal vein(left renal vein) and deployed. Confirmatory venogram shot through device sheath with satisfactory results. Device sheath and wire pulled with 15 minutes of manual pressure for hemostasis.    Blood Loss: 2 ml  Contrast used: 45 ml R groin prepped and draped. Clamp placed on femoral head with confirmation under fluoroscopy. R CFV accessed with micropuncture technique under ultrasound.  Microcatether exchanged for 5 Romansh sheath and diagnostic venogram shot to visualize IVC and renal veins. Under roadmap [employer] Celect 7 Romansh introducer system inserted into vessel over Bentson wire.  Device positioned under L2 at the level of the lowest renal vein(left renal vein) and deployed. Confirmatory venogram shot through device sheath with satisfactory results. Device sheath and wire pulled with 15 minutes of manual pressure for hemostasis.    Blood Loss: 2 ml  Contrast used: 45 ml

## 2024-01-02 NOTE — PRE-ANESTHESIA EVALUATION ADULT - NSANTHPMHFT_GEN_ALL_CORE
62 yo male PMH DVT/PE 8/2023 on therapeutic SQL (last dose 12/30/23 AM), melanoma on face s/p MOHs 15 yrs ago, melanoma brain mets dx'd 6/2023 s/p right crani for resection at [facility] with Dr. Worthy, s/p SRS (completed 5 rounds 8/15/23), on immunotherapy monthly (last dose 12/15/23, next dose 1/15/2023), drug induced neutropenia (likely bactrim), presents for resection of right parietal lobe tumor 64 yo male PMH DVT/PE 8/2023 on therapeutic SQL (last dose 12/30/23 AM), melanoma on face s/p MOHs 15 yrs ago, melanoma brain mets dx'd 6/2023 s/p right crani for resection at [facility] with Dr. 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 Worthy, s/p SRS (completed 5 rounds 8/15/23), on immunotherapy monthly (last dose 12/15/23, next dose 1/15/2023), drug induced neutropenia (likely bactrim), presents for resection of right parietal lobe tumor. Found to have to bilateral acute popliteal DVTs. Vascular surgery planning for IVC filter. 63M PMH DVT/PE 8/2023 on therapeutic SQL (last dose 12/30/23 AM), melanoma on face with mets to brain, lung and liver s/p MOHs 15 yrs ago, melanoma brain mets dx'd 6/2023 s/p right crani for resection at [facility] with Dr. Worthy, s/p SRS (completed 5 rounds 8/15/23), on immunotherapy monthly (last dose 12/15/23, next dose 1/15/2023), drug induced neutropenia (likely bactrim), presents for resection of right parietal lobe tumor. Found to have to bilateral acute popliteal DVTs. Vascular surgery planning for IVC filter.

## 2024-01-02 NOTE — BRIEF OPERATIVE NOTE - NSICDXBRIEFPREOP_GEN_ALL_CORE_FT
PRE-OP DIAGNOSIS:  Deep vein thrombosis 02-Jan-2024 14:00:30  Flakito Santos  
PRE-OP DIAGNOSIS:  Brain tumor 02-Jan-2024 08:56:20  Donis Henry

## 2024-01-02 NOTE — PROGRESS NOTE ADULT - SUBJECTIVE AND OBJECTIVE BOX
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 Worthy, s/p SRS (completed 5 rounds 8/15/23), on immunotherapy monthly (last dose 12/15/23, next dose 1/15/2023), drug induced neutropenia (likely bactrim), presents for resection of brain tumor. Patient was initially diagnosed after he was found to be leaving the car door opened, putting his shoes on the wrong feet, and being forgetful of his usual daily routine. Patient reports on 12/20/23 he fell and hit his head (no LOC) d/t developing left sided weakness. Within 2 days, the symptoms of forgetfulness returned. MRI o/p 12/22/23 showed progression of right parietal lobe lesion w/ worsening mass effect, edema, and 1.2cm MLS. Admitted to [facility] for further management.   Heme-onc: Dr. Richards  Rad-onc: Dr. Warren  (31 Dec 2023 15:12)    On admission, the patient was:  GCS:  Taylor-Hernandez:  modified Carlson:  ICH score:  NIHSS:    *** HIGH RISK OF DVT PRESENT ON ADMISSION ***      ICU Vital Signs Last 24 Hrs  T(C): 36.3 (02 Jan 2024 08:03), Max: 36.7 (01 Jan 2024 20:20)  T(F): 97.3 (02 Jan 2024 05:15), Max: 98 (01 Jan 2024 20:20)  HR: 63 (02 Jan 2024 08:03) (63 - 69)  BP: 156/96 (02 Jan 2024 08:03) (147/89 - 163/108)  RR: 18 (02 Jan 2024 08:03) (16 - 18)  SpO2: 97% (02 Jan 2024 08:03) (97% - 100%)      01-01-24 @ 07:01  -  01-02-24 @ 07:00  --------------------------------------------------------  IN: 720 mL / OUT: 200 mL / NET: 520 mL      EXAMINATION:  General: Calm  HEENT: MMM  Neuro:    Cards: S1/S2,  RRR  Respiratory: Clear, no wheeze  Abdomen: Soft, non- tender  Extremities: No edema  Skin: warm/dry      LABS:                         14.5   13.21 )-----------( 184      ( 02 Jan 2024 05:30 )             45.5     01-02    137  |  102  |  32<H>  ----------------------------<  109<H>  5.0   |  26  |  1.03    Ca    8.6      02 Jan 2024 05:30  Phos  4.9     01-02  Mg     2.7     01-02           ===========================  NSICU ATTENDING PROGRESS NOTE  ===========================      HPI:  64 yo male 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 Worthy, s/p SRS (completed 5 rounds 8/15/23), on immunotherapy monthly (last dose 12/15/23, next dose 1/15/2023), drug induced neutropenia (likely bactrim), presents for resection of brain tumor. Patient was initially diagnosed after he was found to be leaving the car door opened, putting his shoes on the wrong feet, and being forgetful of his usual daily routine. Patient reports on 12/20/23 he fell and hit his head (no LOC) d/t developing left sided weakness. Within 2 days, the symptoms of forgetfulness returned. MRI o/p 12/22/23 showed progression of right parietal lobe lesion w/ worsening mass effect, edema, and 1.2cm MLS. Admitted to [facility] for further management.   Heme-onc: Dr. Richards  Rad-onc: Dr. Warren  (31 Dec 2023 15:12)    On admission, the patient was:  GCS:  Taylor-Hernandez:  modified Carlson:  ICH score:  NIHSS:    *** HIGH RISK OF DVT PRESENT ON ADMISSION ***      ICU Vital Signs Last 24 Hrs  T(C): 36.3 (02 Jan 2024 08:03), Max: 36.7 (01 Jan 2024 20:20)  T(F): 97.3 (02 Jan 2024 05:15), Max: 98 (01 Jan 2024 20:20)  HR: 63 (02 Jan 2024 08:03) (63 - 69)  BP: 156/96 (02 Jan 2024 08:03) (147/89 - 163/108)  RR: 18 (02 Jan 2024 08:03) (16 - 18)  SpO2: 97% (02 Jan 2024 08:03) (97% - 100%)      01-01-24 @ 07:01  -  01-02-24 @ 07:00  --------------------------------------------------------  IN: 720 mL / OUT: 200 mL / NET: 520 mL      EXAMINATION:  General: Calm  HEENT: MMM  Neuro:    Cards: S1/S2,  RRR  Respiratory: Clear, no wheeze  Abdomen: Soft, non- tender  Extremities: No edema  Skin: warm/dry      LABS:                         14.5   13.21 )-----------( 184      ( 02 Jan 2024 05:30 )             45.5     01-02    137  |  102  |  32<H>  ----------------------------<  109<H>  5.0   |  26  |  1.03    Ca    8.6      02 Jan 2024 05:30  Phos  4.9     01-02  Mg     2.7     01-02           ===========================  NSICU ATTENDING PROGRESS NOTE  ===========================    HPI:  64 yo male 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 Worthy, s/p SRS (completed 5 rounds 8/15/23), on immunotherapy monthly (last dose 12/15/23, next dose 1/15/2023), drug induced neutropenia (likely bactrim), presents for resection of brain tumor. Patient was initially diagnosed after he was found to be leaving the car door opened, putting his shoes on the wrong feet, and being forgetful of his usual daily routine. Patient reports on 12/20/23 he fell and hit his head (no LOC) d/t developing left sided weakness. Within 2 days, the symptoms of forgetfulness returned. MRI o/p 12/22/23 showed progression of right parietal lobe lesion w/ worsening mass effect, edema, and 1.2cm MLS. Admitted to [facility] for further management.   Heme-onc: Dr. Richards  Rad-onc: Dr. Warren  (31 Dec 2023 15:12)    On admission, the patient was:  GCS: 15  Hunt-Hernandez:  modified Carlson:  ICH score:  NIHSS:    *** HIGH RISK OF DVT PRESENT ON ADMISSION ***      ICU Vital Signs Last 24 Hrs  T(C): 35.6 (02 Jan 2024 14:06), Max: 36.7 (01 Jan 2024 20:20)  T(F): 96 (02 Jan 2024 14:06), Max: 98 (01 Jan 2024 20:20)  HR: 69 (02 Jan 2024 15:45) (61 - 72)  BP: 146/85 (02 Jan 2024 15:45) (144/91 - 163/108)  BP(mean): 112 (02 Jan 2024 14:36) (112 - 112)  ABP: 161/86 (02 Jan 2024 15:45) (156/85 - 161/88)  ABP(mean): 112 (02 Jan 2024 15:45) (112 - 116)  RR: 12 (02 Jan 2024 15:45) (12 - 20)  SpO2: 100% (02 Jan 2024 15:45) (97% - 100%)      01-01-24 @ 07:01  -  01-02-24 @ 07:00  --------------------------------------------------------  IN: 720 mL / OUT: 200 mL / NET: 520 mL      EXAMINATION:  General: Calm, seen immediately post operatively  HEENT: MMM  Neuro: Awake, alert, oriented x3, pupils 3mm and reactive, EOMI  Power 5/5 in all extremities though with mild LUE drift (baseline)  Cards: S1/S2,  RRR  Respiratory: Clear, no wheeze  Abdomen: Soft, non- tender  Extremities: No edema  Skin: warm/dry      MEDICATIONS:   acetaminophen     Tablet .. 1000 milliGRAM(s) Oral every 8 hours  ceFAZolin   IVPB 2000 milliGRAM(s) IV Intermittent every 8 hours  chlorhexidine 2% Cloths 1 Application(s) Topical <User Schedule>  dexAMETHasone     Tablet 4 milliGRAM(s) Oral every 6 hours  dexAMETHasone     Tablet   Oral   hydrALAZINE Injectable 10 milliGRAM(s) IV Push every 2 hours PRN  insulin lispro (ADMELOG) corrective regimen sliding scale   SubCutaneous Before meals and at bedtime  ketorolac   Injectable 15 milliGRAM(s) IV Push every 8 hours PRN  labetalol Injectable 10 milliGRAM(s) IV Push every 2 hours PRN  levETIRAcetam 500 milliGRAM(s) Oral every 12 hours  ondansetron Injectable 4 milliGRAM(s) IV Push every 6 hours PRN  pantoprazole    Tablet 40 milliGRAM(s) Oral before breakfast  polyethylene glycol 3350 17 Gram(s) Oral daily  senna 2 Tablet(s) Oral at bedtime  sodium chloride 0.9%. 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 with Dr. Worthy, s/p SRS (completed 5 rounds 8/15/23), on immunotherapy monthly (last dose 12/15/23, next dose 1/15/2023), drug induced neutropenia (likely bactrim), presents for resection of brain tumor. Patient was initially diagnosed after he was found to be leaving the car door opened, putting his shoes on the wrong feet, and being forgetful of his usual daily routine. Patient reports on 12/20/23 he fell and hit his head (no LOC) d/t developing left sided weakness. Within 2 days, the symptoms of forgetfulness returned. MRI o/p 12/22/23 showed progression of right parietal lobe lesion w/ worsening mass effect, edema, and 1.2cm MLS. Admitted to [facility] for further management.   Heme-onc: Dr. Richards  Rad-onc: Dr. Warren  (31 Dec 2023 15:12)    On admission, the patient was:  GCS: 15  Hunt-Hernandez:  modified Carlson:  ICH score:  NIHSS:    *** HIGH RISK OF DVT PRESENT ON ADMISSION ***      ICU Vital Signs Last 24 Hrs  T(C): 35.6 (02 Jan 2024 14:06), Max: 36.7 (01 Jan 2024 20:20)  T(F): 96 (02 Jan 2024 14:06), Max: 98 (01 Jan 2024 20:20)  HR: 69 (02 Jan 2024 15:45) (61 - 72)  BP: 146/85 (02 Jan 2024 15:45) (144/91 - 163/108)  BP(mean): 112 (02 Jan 2024 14:36) (112 - 112)  ABP: 161/86 (02 Jan 2024 15:45) (156/85 - 161/88)  ABP(mean): 112 (02 Jan 2024 15:45) (112 - 116)  RR: 12 (02 Jan 2024 15:45) (12 - 20)  SpO2: 100% (02 Jan 2024 15:45) (97% - 100%)      01-01-24 @ 07:01  -  01-02-24 @ 07:00  --------------------------------------------------------  IN: 720 mL / OUT: 200 mL / NET: 520 mL      EXAMINATION:  General: Calm, seen immediately post operatively  HEENT: MMM  Neuro: Awake, alert, oriented x3, pupils 3mm and reactive, EOMI  Power 5/5 in all extremities though with mild LUE drift (baseline)  Cards: S1/S2,  RRR  Respiratory: Clear, no wheeze  Abdomen: Soft, non- tender  Extremities: No edema  Skin: warm/dry      MEDICATIONS:   acetaminophen     Tablet .. 1000 milliGRAM(s) Oral every 8 hours  ceFAZolin   IVPB 2000 milliGRAM(s) IV Intermittent every 8 hours  chlorhexidine 2% Cloths 1 Application(s) Topical <User Schedule>  dexAMETHasone     Tablet 4 milliGRAM(s) Oral every 6 hours  dexAMETHasone     Tablet   Oral   hydrALAZINE Injectable 10 milliGRAM(s) IV Push every 2 hours PRN  insulin lispro (ADMELOG) corrective regimen sliding scale   SubCutaneous Before meals and at bedtime  ketorolac   Injectable 15 milliGRAM(s) IV Push every 8 hours PRN  labetalol Injectable 10 milliGRAM(s) IV Push every 2 hours PRN  levETIRAcetam 500 milliGRAM(s) Oral every 12 hours  ondansetron Injectable 4 milliGRAM(s) IV Push every 6 hours PRN  pantoprazole    Tablet 40 milliGRAM(s) Oral before breakfast  polyethylene glycol 3350 17 Gram(s) Oral daily  senna 2 Tablet(s) Oral at bedtime  sodium chloride 0.9%. 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 Worthy, s/p SRS (completed 5 rounds 8/15/23), on immunotherapy monthly (last dose 12/15/23, next dose 1/15/2023), drug induced neutropenia (likely bactrim), presents for resection of brain tumor. Patient was initially diagnosed after he was found to be leaving the car door opened, putting his shoes on the wrong feet, and being forgetful of his usual daily routine. Patient reports on 12/20/23 he fell and hit his head (no LOC) d/t developing left sided weakness. Within 2 days, the symptoms of forgetfulness returned. MRI o/p 12/22/23 showed progression of right parietal lobe lesion w/ worsening mass effect, edema, and 1.2cm MLS. Admitted to [facility] for further management.   Heme-onc: Dr. Richards  Rad-onc: Dr. Warren  (31 Dec 2023 15:12)    On admission, the patient was:  GCS: 15  Hunt-Hernandez:  modified Carlson:  ICH score:  NIHSS:    *** HIGH RISK OF DVT PRESENT ON ADMISSION ***      ICU Vital Signs Last 24 Hrs  T(C): 35.6 (02 Jan 2024 14:06), Max: 36.7 (01 Jan 2024 20:20)  T(F): 96 (02 Jan 2024 14:06), Max: 98 (01 Jan 2024 20:20)  HR: 69 (02 Jan 2024 15:45) (61 - 72)  BP: 146/85 (02 Jan 2024 15:45) (144/91 - 163/108)  BP(mean): 112 (02 Jan 2024 14:36) (112 - 112)  ABP: 161/86 (02 Jan 2024 15:45) (156/85 - 161/88)  ABP(mean): 112 (02 Jan 2024 15:45) (112 - 116)  RR: 12 (02 Jan 2024 15:45) (12 - 20)  SpO2: 100% (02 Jan 2024 15:45) (97% - 100%)      01-01-24 @ 07:01  -  01-02-24 @ 07:00  --------------------------------------------------------  IN: 720 mL / OUT: 200 mL / NET: 520 mL      EXAMINATION:  General: Calm, seen immediately post operatively  HEENT: MMM  Neuro: Awake, alert, oriented x3, pupils 3mm and reactive, EOMI, mild L facial  Power 5/5  in all extremities though with mild LUE pronator drift (baseline)  Cards: S1/S2,  RRR  Respiratory: Clear, no wheeze  Abdomen: Soft, non- tender  Extremities: No edema  Skin: warm/dry      MEDICATIONS:   acetaminophen     Tablet .. 1000 milliGRAM(s) Oral every 8 hours  ceFAZolin   IVPB 2000 milliGRAM(s) IV Intermittent every 8 hours  chlorhexidine 2% Cloths 1 Application(s) Topical <User Schedule>  dexAMETHasone     Tablet 4 milliGRAM(s) Oral every 6 hours  dexAMETHasone     Tablet   Oral   hydrALAZINE Injectable 10 milliGRAM(s) IV Push every 2 hours PRN  insulin lispro (ADMELOG) corrective regimen sliding scale   SubCutaneous Before meals and at bedtime  ketorolac   Injectable 15 milliGRAM(s) IV Push every 8 hours PRN  labetalol Injectable 10 milliGRAM(s) IV Push every 2 hours PRN  levETIRAcetam 500 milliGRAM(s) Oral every 12 hours  ondansetron Injectable 4 milliGRAM(s) IV Push every 6 hours PRN  pantoprazole    Tablet 40 milliGRAM(s) Oral before breakfast  polyethylene glycol 3350 17 Gram(s) Oral daily  senna 2 Tablet(s) Oral at bedtime  sodium chloride 0.9%. 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 Worthy, s/p SRS (completed 5 rounds 8/15/23), on immunotherapy monthly (last dose 12/15/23, next dose 1/15/2023), drug induced neutropenia (likely bactrim), presents for resection of brain tumor. Patient was initially diagnosed after he was found to be leaving the car door opened, putting his shoes on the wrong feet, and being forgetful of his usual daily routine. Patient reports on 12/20/23 he fell and hit his head (no LOC) d/t developing left sided weakness. Within 2 days, the symptoms of forgetfulness returned. MRI o/p 12/22/23 showed progression of right parietal lobe lesion w/ worsening mass effect, edema, and 1.2cm MLS. Admitted to [facility] for further management.   Heme-onc: Dr. Richards  Rad-onc: Dr. Warren  (31 Dec 2023 15:12)    On admission, the patient was:  GCS: 15  Hunt-Hernandez:  modified Carlson:  ICH score:  NIHSS:    *** HIGH RISK OF DVT PRESENT ON ADMISSION ***      ICU Vital Signs Last 24 Hrs  T(C): 35.6 (02 Jan 2024 14:06), Max: 36.7 (01 Jan 2024 20:20)  T(F): 96 (02 Jan 2024 14:06), Max: 98 (01 Jan 2024 20:20)  HR: 69 (02 Jan 2024 15:45) (61 - 72)  BP: 146/85 (02 Jan 2024 15:45) (144/91 - 163/108)  BP(mean): 112 (02 Jan 2024 14:36) (112 - 112)  ABP: 161/86 (02 Jan 2024 15:45) (156/85 - 161/88)  ABP(mean): 112 (02 Jan 2024 15:45) (112 - 116)  RR: 12 (02 Jan 2024 15:45) (12 - 20)  SpO2: 100% (02 Jan 2024 15:45) (97% - 100%)      01-01-24 @ 07:01  -  01-02-24 @ 07:00  --------------------------------------------------------  IN: 720 mL / OUT: 200 mL / NET: 520 mL      EXAMINATION:  General: Calm, seen immediately post operatively  HEENT: MMM  Neuro: Awake, alert, oriented x3, pupils 3mm and reactive, EOMI, mild L facial  Power 5/5  in all extremities though with mild LUE pronator drift (baseline)  Cards: S1/S2,  RRR  Respiratory: Clear, no wheeze  Abdomen: Soft, non- tender  Extremities: No edema  Skin: warm/dry      MEDICATIONS:   acetaminophen     Tablet .. 1000 milliGRAM(s) Oral every 8 hours  ceFAZolin   IVPB 2000 milliGRAM(s) IV Intermittent every 8 hours  chlorhexidine 2% Cloths 1 Application(s) Topical <User Schedule>  dexAMETHasone     Tablet 4 milliGRAM(s) Oral every 6 hours  dexAMETHasone     Tablet   Oral   hydrALAZINE Injectable 10 milliGRAM(s) IV Push every 2 hours PRN  insulin lispro (ADMELOG) corrective regimen sliding scale   SubCutaneous Before meals and at bedtime  ketorolac   Injectable 15 milliGRAM(s) IV Push every 8 hours PRN  labetalol Injectable 10 milliGRAM(s) IV Push every 2 hours PRN  levETIRAcetam 500 milliGRAM(s) Oral every 12 hours  ondansetron Injectable 4 milliGRAM(s) IV Push every 6 hours PRN  pantoprazole    Tablet 40 milliGRAM(s) Oral before breakfast  polyethylene glycol 3350 17 Gram(s) Oral daily  senna 2 Tablet(s) Oral at bedtime  sodium chloride 0.9%. 1000 milliLiter(s) (75 mL/Hr) IV Continuous <Continuous>      LABS:                      12.4   13.65 )-----------( 124      ( 02 Jan 2024 14:21 )             37.8     01-02    139  |  107  |  28<H>  ----------------------------<  131<H>  5.0   |  20<L>  |  0.86    Ca    8.3<L>      02 Jan 2024 14:22  Phos  6.1     01-02  Mg     2.5     01-02

## 2024-01-02 NOTE — PRE-ANESTHESIA EVALUATION ADULT - NSPROPOSEDPROCEDFT_GEN_ALL_CORE
craniotomy with resection of brain metastases right parietal craniotomy for resection of brain metastases

## 2024-01-02 NOTE — PROGRESS NOTE ADULT - SUBJECTIVE AND OBJECTIVE BOX
Mr. Pacheco Nogueira is a 63M w/ prior DVT after RLE vein stripping yeas ago and  melanoma on face s/p MOHS c/b brain metasasis s/p right craniotomy (6/2023) an dinitiation of immunotherapy c/b PE (8/2023) and has since been on therapuetic lovenox, now admitted for progression of right parietal lobe lesion and awaiting brain surgery on Tuesday 1/2/24. Vascular surgery was consulted for IVC filter placement given inability to therapeutically anticoagulate for PE/DVT history during perioperative period. BLE venous duplex US here show bilateral acute popliteal vein DVTs.     He feels fine today for his surgery. On exam, he is A&Ox3 in NAD. Normal resp effort. Abdomen soft and nontender. has mild bilateral LE swelling. Both feet has some chronic purpling of skin. Faintly palpable DP pulses bilaterally. He denies any allergies. no prior IVC filter placement. Dr. Tony is requesting to do  his craniotomy first and the IVC filter either right after today or tomorrow.

## 2024-01-02 NOTE — PROGRESS NOTE ADULT - ASSESSMENT
ASSESSMENT  - Bilateral acute popliteal DVT and hx of recent PE, awaiting NSGY procedure for metastatic melanoma to brain w/ progression --> therapeutic AC is being held perioperatively by primary team      PLAN & RECOMMENDATIONS  - Can place retrievable IVC filter on Tuesday 1/2/24 right after nsgy procedure today or tomorrow (this is the preference of Dr. Tony, the primary nsgy attending). I have discussed the risks, benefits and alternatives with the patient. He agrees to proceed  - Restart therapeutic anticoagulation once deemed safe by nsgy team  - Can plan to remove IVC filter once consistently back on therapeutic AC, likely in a few weeks as an elective procedure.     Thank you,    Willi Faulkner MD  Attending Vascular Surgeon  [facility] at [address] [place] [address]  Office: [phone]; Fax: [phone]  [email or web address] ASSESSMENT  - Bilateral acute popliteal DVT and hx of recent PE, awaiting NSGY procedure for metastatic melanoma to brain w/ progression --> therapeutic AC is being held perioperatively by primary team      PLAN & RECOMMENDATIONS  - Can place retrievable IVC filter on Tuesday 1/2/24 right after nsgy procedure today or tomorrow (this is the preference of Dr. Tony, the primary nsgy attending). I have discussed the risks, benefits and alternatives with the patient. He agrees to proceed  - Restart therapeutic anticoagulation once deemed safe by nsgy team  - Can plan to remove IVC filter once consistently back on therapeutic AC, likely in a few weeks as an elective procedure.     Thank you,    Willi Faulkner MD  Attending Vascular Surgeon  [facility] at [address], [address] [place] [address]  Office: [phone]; Fax: [phone]  [email or web address]

## 2024-01-02 NOTE — PROGRESS NOTE ADULT - SUBJECTIVE AND OBJECTIVE BOX
Vascular Surgery Post-Op Note    Procedure: IVC Filter placement    Diagnosis/Indication: DVT    Surgeon: Dr. Faulkner    S: Pt has no complaints. Denies CP, SOB, LOCKHART, groin pain.     O:  T(C): 35.6 (01-02-24 @ 14:06), Max: 35.6 (01-02-24 @ 14:06)  T(F): 96 (01-02-24 @ 14:06), Max: 96 (01-02-24 @ 14:06)  HR: 72 (01-02-24 @ 16:00) (61 - 72)  BP: 137/84 (01-02-24 @ 16:00) (137/84 - 149/93)  RR: 16 (01-02-24 @ 16:00) (12 - 20)  SpO2: 100% (01-02-24 @ 16:00) (98% - 100%)  Wt(kg): --                        12.4   13.65 )-----------( 124      ( 02 Jan 2024 14:21 )             37.8     01-02    139  |  107  |  28<H>  ----------------------------<  131<H>  5.0   |  20<L>  |  0.86    Ca    8.3<L>      02 Jan 2024 14:22  Phos  6.1     01-02  Mg     2.5     01-02        Gen: NAD, resting comfortably in bed  C/V: NSR  Pulm: Nonlabored breathing, no respiratory distress  Abd: groin soft, NT, no surrounding bruising, bleeding, or hematoma  Extrem: WWP, no significant edema, rashes, or bruises B/L      A/P: 63yMale s/p above procedure  - Will plan for filter retrieval in a few weeks if patient is able to resume AC  - Has follow-up with Dr. Faulkner on 1/25  - JANY per primary team  - Vascular will CTF

## 2024-01-02 NOTE — BRIEF OPERATIVE NOTE - NSICDXBRIEFPROCEDURE_GEN_ALL_CORE_FT
PROCEDURES:  IVC filter placement 02-Jan-2024 14:00:01  Flakito Santos  
PROCEDURES:  Craniotomy for tumor using frameless stereotaxy 02-Jan-2024 08:55:56 right parietal and temporal craniotomy for resection of brain tumor Donis Henry

## 2024-01-02 NOTE — BRIEF OPERATIVE NOTE - NSICDXBRIEFPOSTOP_GEN_ALL_CORE_FT
POST-OP DIAGNOSIS:  Brain tumor 02-Jan-2024 08:56:32  Donis Henry   POST-OP DIAGNOSIS:  Brain tumor 02-Jan-2024 08:56:32  Donis eHnry

## 2024-01-02 NOTE — PROGRESS NOTE ADULT - SUBJECTIVE AND OBJECTIVE BOX
NEUROSURGERY POST OP NOTE:    POD# 0 S/P right parietal and temporal craniotomy for resection for resection of brain tumor     S: Seen and examined in NSCU. Reports incisional pain, improving with meds. Denies HA, N/V, chest pain, shortness of breath, new weakness or numbness.       T(C): 35.6 (01-02-24 @ 14:06), Max: 36.7 (01-01-24 @ 20:20)  HR: 72 (01-02-24 @ 14:36) (63 - 72)  BP: 156/96 (01-02-24 @ 08:03) (147/89 - 163/108)  RR: 19 (01-02-24 @ 14:36) (16 - 20)  SpO2: 100% (01-02-24 @ 14:36) (97% - 100%)      01-01-24 @ 07:01  -  01-02-24 @ 07:00  --------------------------------------------------------  IN: 720 mL / OUT: 200 mL / NET: 520 mL        acetaminophen     Tablet .. 1000 milliGRAM(s) Oral every 8 hours  ceFAZolin   IVPB 2000 milliGRAM(s) IV Intermittent every 8 hours  dexAMETHasone     Tablet 4 milliGRAM(s) Oral every 6 hours  dexAMETHasone     Tablet   Oral   dextrose 5%. 1000 milliLiter(s) IV Continuous <Continuous>  dextrose 5%. 1000 milliLiter(s) IV Continuous <Continuous>  dextrose 50% Injectable 25 Gram(s) IV Push once  dextrose 50% Injectable 25 Gram(s) IV Push once  dextrose 50% Injectable 12.5 Gram(s) IV Push once  dextrose Oral Gel 15 Gram(s) Oral once PRN  glucagon  Injectable 1 milliGRAM(s) IntraMuscular once  insulin lispro (ADMELOG) corrective regimen sliding scale   SubCutaneous three times a day before meals  insulin lispro (ADMELOG) corrective regimen sliding scale   SubCutaneous at bedtime  ketorolac   Injectable 15 milliGRAM(s) IV Push every 8 hours PRN  labetalol Injectable 10 milliGRAM(s) IV Push every 2 hours PRN  levETIRAcetam 500 milliGRAM(s) Oral every 12 hours  ondansetron Injectable 4 milliGRAM(s) IV Push every 6 hours PRN  pantoprazole    Tablet 40 milliGRAM(s) Oral before breakfast  polyethylene glycol 3350 17 Gram(s) Oral daily  senna 2 Tablet(s) Oral at bedtime  sodium chloride 0.9%. 1000 milliLiter(s) IV Continuous <Continuous>      RADIOLOGY:   MR Head 1/1/24 IMPRESSION:    Since 12/22/23, there is interval improvement in vasogenic edema and mass effect from multiple brain metastasis- likely a response to steroids.    Largest masses and greatest mass effect is again at the right cerebral hemisphere. No significant change in lesional size nor visibly new brain metastasis or hemorrhagic focus at this time.    Exam:  General: NAD, pt is comfortably lying in bed, on room air  HEENT: CN II-XII intact, PERRL 3mm briskly reactive, EOMI b/l, (+) mild Left facial, tongue midline, neck FROM  Cardiovascular: RRR, normal S1 and S2   Respiratory: lungs CTAB, no wheezing, rhonchi, or crackles   GI: normoactive BS to auscultation, abd soft, NTND   Neuro: A&Ox3, No aphasia, speech clear, no dysmetria, (+) baseline Left pronator drift. Follows commands.  LAZARO x4 spontaneously, 5/5 strength in all extremities throughout. 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 Patient was initially diagnosed after he was found to be forgetful with gait instability, and these symptoms returned 12/20 after he fell d/t developing left sided weakness. MRI o/p 12/22/23 showed progression of right parietal lobe lesion w/ worsening mass effect, edema, and 1.2cm MLS. Now s/p Right parietal and temporal craniotomy for resection of brain mets 1/2, and s/p IVCF 1/2.      Plan:  Neuro:   - neuro q1/vitals q1   - pain control   - h/o seizure: c/w keppra 500mg BID   - decadron 4mg q6h taper over a week to off   - post op MRI pending    Cardio:  - SBP goal 100-140    Pulm:  - RA  - CXR: large RML mass    GI:  - regular diet  - bowel regimen   - PPI while on steroids    Renal:  - NS @ 80 cc/hr until adequate PO intake  - Eden 1/2    Endo:  - ISS  - A1C: 5.3    Heme:   - no SCDs d/t b/l DVTs  - LE dopplers 12/31: acute B/L popliteal DVTs  - Hx DVT/PE: therapeutic SQL held   - Vascular consulted for IVCF     ID:  - afebrile    Dispo: full code, NSICU    Discussed w/ Dr. Tony and Dr. Gallego         NEUROSURGERY POST OP NOTE:    POD# 0 S/P right parietal and temporal craniotomy for resection for resection of brain tumor     S: Seen and examined in NSCU. Denies HA, N/V, chest pain, shortness of breath, new weakness or numbness.       T(C): 35.6 (01-02-24 @ 14:06), Max: 36.7 (01-01-24 @ 20:20)  HR: 72 (01-02-24 @ 14:36) (63 - 72)  BP: 156/96 (01-02-24 @ 08:03) (147/89 - 163/108)  RR: 19 (01-02-24 @ 14:36) (16 - 20)  SpO2: 100% (01-02-24 @ 14:36) (97% - 100%)      01-01-24 @ 07:01  -  01-02-24 @ 07:00  --------------------------------------------------------  IN: 720 mL / OUT: 200 mL / NET: 520 mL        acetaminophen     Tablet .. 1000 milliGRAM(s) Oral every 8 hours  ceFAZolin   IVPB 2000 milliGRAM(s) IV Intermittent every 8 hours  dexAMETHasone     Tablet 4 milliGRAM(s) Oral every 6 hours  dexAMETHasone     Tablet   Oral   dextrose 5%. 1000 milliLiter(s) IV Continuous <Continuous>  dextrose 5%. 1000 milliLiter(s) IV Continuous <Continuous>  dextrose 50% Injectable 25 Gram(s) IV Push once  dextrose 50% Injectable 25 Gram(s) IV Push once  dextrose 50% Injectable 12.5 Gram(s) IV Push once  dextrose Oral Gel 15 Gram(s) Oral once PRN  glucagon  Injectable 1 milliGRAM(s) IntraMuscular once  insulin lispro (ADMELOG) corrective regimen sliding scale   SubCutaneous three times a day before meals  insulin lispro (ADMELOG) corrective regimen sliding scale   SubCutaneous at bedtime  ketorolac   Injectable 15 milliGRAM(s) IV Push every 8 hours PRN  labetalol Injectable 10 milliGRAM(s) IV Push every 2 hours PRN  levETIRAcetam 500 milliGRAM(s) Oral every 12 hours  ondansetron Injectable 4 milliGRAM(s) IV Push every 6 hours PRN  pantoprazole    Tablet 40 milliGRAM(s) Oral before breakfast  polyethylene glycol 3350 17 Gram(s) Oral daily  senna 2 Tablet(s) Oral at bedtime  sodium chloride 0.9%. 1000 milliLiter(s) IV Continuous <Continuous>      RADIOLOGY:   MR Head 1/1/24 IMPRESSION:    Since 12/22/23, there is interval improvement in vasogenic edema and mass effect from multiple brain metastasis- likely a response to steroids.    Largest masses and greatest mass effect is again at the right cerebral hemisphere. No significant change in lesional size nor visibly new brain metastasis or hemorrhagic focus at this time.    Exam:  General: NAD, pt is comfortably lying in bed, on room air  HEENT: CN II-XII intact, PERRL 3mm briskly reactive, EOMI b/l, (+) mild Left facial, tongue midline, neck FROM  Cardiovascular: RRR, S1, S2 appreciated   Respiratory: lungs CTAB, no wheezing, rhonchi, or crackles   GI: normoactive BS to auscultation, abd soft, (+) mild distention, non-tender  Neuro: A&Ox3, No aphasia, speech clear, no dysmetria, (+) slight baseline Left pronator drift. Follows commands.  LAZARO x4 spontaneously, 5/5 strength in b/l RUE/LUE/LLE. Did not assess RLE d/t IVCF placement. 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 Patient was initially diagnosed after he was found to be forgetful with gait instability, and these symptoms returned 12/20 after he fell d/t developing left sided weakness. MRI o/p 12/22/23 showed progression of right parietal lobe lesion w/ worsening mass effect, edema, and 1.2cm MLS. Now s/p Right parietal and temporal craniotomy for resection of brain mets 1/2, and s/p IVCF 1/2.      Plan:  Neuro:   - neuro q1/vitals q1   - pain control   - h/o seizure: c/w keppra 500mg BID   - decadron 4mg q6h taper over a week to off   - post op MRI pending    Cardio:  - SBP goal 100-140    Pulm:  - RA  - CXR: large RML mass    GI:  - regular diet  - bowel regimen   - PPI while on steroids    Renal:  - NS @ 80 cc/hr until adequate PO intake  - Eden 1/2    Endo:  - ISS  - A1C: 5.3    Heme:   - no SCDs d/t b/l DVTs  - LE dopplers 12/31: acute B/L popliteal DVTs  - Hx DVT/PE: therapeutic SQL held   - Vascular consulted for IVCF     ID:  - afebrile    Dispo: full code, NSICU    Discussed w/ Dr. Tony and Dr. Gallego

## 2024-01-03 NOTE — PHYSICAL THERAPY INITIAL EVALUATION ADULT - SENSORY TESTS
R hand dominant;  CN Testing: B/L Frontalis intact; B/L buccinator intact; smile symmetrical; tongue protrusion at midline; B/L eyes open/close intact; Shoulder elevation: intact bilaterally; Vision H-Test: bilateral tracking and smooth pursuit intact; Convergence/Divergence: intact;

## 2024-01-03 NOTE — PROGRESS NOTE ADULT - SUBJECTIVE AND OBJECTIVE BOX
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 Worthy, s/p SRS (completed 5 rounds 8/15/23), on immunotherapy monthly (last dose 12/15/23, next dose 1/15/2023), drug induced neutropenia (likely bactrim), presents for resection of brain tumor. Patient was initially diagnosed after he was found to be leaving the car door opened, putting his shoes on the wrong feet, and being forgetful of his usual daily routine. Patient reports on 12/20/23 he fell and hit his head (no LOC) d/t developing left sided weakness. Within 2 days, the symptoms of forgetfulness returned. MRI o/p 12/22/23 showed progression of right parietal lobe lesion w/ worsening mass effect, edema, and 1.2cm MLS. Admitted to [facility] for further management.   Heme-onc: Dr. Richards  Rad-onc: Dr. Warren  (31 Dec 2023 15:12)    On admission, the patient was:  GCS: 15  Hunt-Hernandez:  modified Carlson:  ICH score:  NIHSS:    *** HIGH RISK OF DVT PRESENT ON ADMISSION ***      ICU Vital Signs Last 24 Hrs  T(C): 36.7 (03 Jan 2024 05:33), Max: 36.7 (02 Jan 2024 22:12)  T(F): 98.1 (03 Jan 2024 05:33), Max: 98.1 (02 Jan 2024 22:12)  HR: 63 (03 Jan 2024 07:00) (57 - 82)  BP: 137/84 (02 Jan 2024 16:00) (137/84 - 149/93)  BP(mean): 105 (02 Jan 2024 16:00) (105 - 113)  ABP: 144/70 (03 Jan 2024 07:00) (130/64 - 161/88)  ABP(mean): 94 (03 Jan 2024 07:00) (86 - 116)  RR: 14 (03 Jan 2024 07:00) (12 - 20)  SpO2: 100% (03 Jan 2024 07:00) (98% - 100%)      01-02-24 @ 07:01  -  01-03-24 @ 07:00  --------------------------------------------------------  IN: 1725 mL / OUT: 970 mL / NET: 755 mL    01-03-24 @ 07:01  -  01-03-24 @ 08:12  --------------------------------------------------------  IN: 75 mL / OUT: 0 mL / NET: 75 mL      EXAMINATION:  General: Calm  HEENT: MMM  Neuro: Awake, alert, oriented x3, pupils 3mm and reactive, EOMI, mild L facial  Power 5/5  in all extremities though with mild LUE pronator drift (baseline)  Cards: S1/S2,  RRR, pulses 2+  Respiratory: Clear, no wheeze  Abdomen: Soft, non- tender  Extremities: No edema/ R groin puncture site intact  Skin: warm/dry      MEDICATIONS:   acetaminophen     Tablet .. 1000 milliGRAM(s) Oral every 8 hours  chlorhexidine 2% Cloths 1 Application(s) Topical <User Schedule>  dexAMETHasone     Tablet 4 milliGRAM(s) Oral every 6 hours  dexAMETHasone     Tablet   Oral   hydrALAZINE Injectable 10 milliGRAM(s) IV Push every 2 hours PRN  insulin lispro (ADMELOG) corrective regimen sliding scale   SubCutaneous Before meals and at bedtime  ketorolac   Injectable 15 milliGRAM(s) IV Push every 8 hours PRN  labetalol Injectable 10 milliGRAM(s) IV Push every 2 hours PRN  levETIRAcetam 500 milliGRAM(s) Oral every 12 hours  ondansetron Injectable 4 milliGRAM(s) IV Push every 6 hours PRN  oxyCODONE    IR 5 milliGRAM(s) Oral every 4 hours PRN  pantoprazole    Tablet 40 milliGRAM(s) Oral before breakfast  polyethylene glycol 3350 17 Gram(s) Oral daily  senna 2 Tablet(s) Oral at bedtime  sodium chloride 0.9%. 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 Worthy, s/p SRS (completed 5 rounds 8/15/23), on immunotherapy monthly (last dose 12/15/23, next dose 1/15/2023), drug induced neutropenia (likely bactrim), presents for resection of brain tumor. Patient was initially diagnosed after he was found to be leaving the car door opened, putting his shoes on the wrong feet, and being forgetful of his usual daily routine. Patient reports on 12/20/23 he fell and hit his head (no LOC) d/t developing left sided weakness. Within 2 days, the symptoms of forgetfulness returned. MRI o/p 12/22/23 showed progression of right parietal lobe lesion w/ worsening mass effect, edema, and 1.2cm MLS. Admitted to [facility] for further management.   Heme-onc: Dr. Richards  Rad-onc: Dr. Warren  (31 Dec 2023 15:12)    On admission, the patient was:  GCS: 15  Hunt-Hernandez:  modified Carlson:  ICH score:  NIHSS:    *** HIGH RISK OF DVT PRESENT ON ADMISSION ***      ICU Vital Signs Last 24 Hrs  T(C): 36.7 (03 Jan 2024 05:33), Max: 36.7 (02 Jan 2024 22:12)  T(F): 98.1 (03 Jan 2024 05:33), Max: 98.1 (02 Jan 2024 22:12)  HR: 63 (03 Jan 2024 07:00) (57 - 82)  BP: 137/84 (02 Jan 2024 16:00) (137/84 - 149/93)  BP(mean): 105 (02 Jan 2024 16:00) (105 - 113)  ABP: 144/70 (03 Jan 2024 07:00) (130/64 - 161/88)  ABP(mean): 94 (03 Jan 2024 07:00) (86 - 116)  RR: 14 (03 Jan 2024 07:00) (12 - 20)  SpO2: 100% (03 Jan 2024 07:00) (98% - 100%)      01-02-24 @ 07:01  -  01-03-24 @ 07:00  --------------------------------------------------------  IN: 1725 mL / OUT: 970 mL / NET: 755 mL    01-03-24 @ 07:01  -  01-03-24 @ 08:12  --------------------------------------------------------  IN: 75 mL / OUT: 0 mL / NET: 75 mL      EXAMINATION:  General: Calm  HEENT: MMM  Neuro: Awake, alert, oriented x3, pupils 3mm and reactive, EOMI, mild L facial  Power 5/5  in all extremities though with mild LUE pronator drift (baseline)  Cards: S1/S2,  RRR, pulses 2+  Respiratory: Clear, no wheeze  Abdomen: Soft, non- tender  Extremities: No edema/ R groin puncture site intact  Skin: warm/dry      MEDICATIONS:   acetaminophen     Tablet .. 1000 milliGRAM(s) Oral every 8 hours  chlorhexidine 2% Cloths 1 Application(s) Topical <User Schedule>  dexAMETHasone     Tablet 4 milliGRAM(s) Oral every 6 hours  dexAMETHasone     Tablet   Oral   hydrALAZINE Injectable 10 milliGRAM(s) IV Push every 2 hours PRN  insulin lispro (ADMELOG) corrective regimen sliding scale   SubCutaneous Before meals and at bedtime  ketorolac   Injectable 15 milliGRAM(s) IV Push every 8 hours PRN  labetalol Injectable 10 milliGRAM(s) IV Push every 2 hours PRN  levETIRAcetam 500 milliGRAM(s) Oral every 12 hours  ondansetron Injectable 4 milliGRAM(s) IV Push every 6 hours PRN  oxyCODONE    IR 5 milliGRAM(s) Oral every 4 hours PRN  pantoprazole    Tablet 40 milliGRAM(s) Oral before breakfast  polyethylene glycol 3350 17 Gram(s) Oral daily  senna 2 Tablet(s) Oral at bedtime  sodium chloride 0.9%. 1000 milliLiter(s) (75 mL/Hr) IV Continuous <Continuous>      LABS:                      12.5   19.76 )-----------( 141      ( 03 Jan 2024 05:30 )             38.5     01-03    134<L>  |  105  |  25<H>  ----------------------------<  119<H>  4.5   |  18<L>  |  0.79    Ca    8.0<L>      03 Jan 2024 05:30  Phos  4.1     01-03  Mg     2.2     01-03

## 2024-01-03 NOTE — PHYSICAL THERAPY INITIAL EVALUATION ADULT - GAIT DEVIATIONS NOTED, PT EVAL
significantly decreased gait speed, veering to R with RW, manual assist required for RW negotiation, stepping bilateral feet outside of RW/decreased ashlee/decreased step length/decreased stride length/decreased weight-shifting ability

## 2024-01-03 NOTE — PHYSICAL THERAPY INITIAL EVALUATION ADULT - MANUAL MUSCLE TESTING RESULTS, REHAB EVAL
L hip flexion 4+/5, R hip flexion 4/5, bilateral knee flex/ext 5/5, L ankle DF and PF 5/5, R ankle DF 3+/5, R ankle PF 5/5 L hip flexion 4+/5, R hip flexion 4/5, bilateral knee flex/ext 5/5, L ankle DF and PF 5/5, R ankle DF 3+/5, R ankle PF 5/5; r shoulder flex 4+/5, L shoulder flex 4/5, bilat elbow flex/ext 4+/5, L wrist flexion 5/5 L wrist extension 3+/5 R wrist flex/ext 5/5 gross grasp on L reduced

## 2024-01-03 NOTE — PHYSICAL THERAPY INITIAL EVALUATION ADULT - NSPTDISCHREC_GEN_A_CORE
Acute Rehabilitation Facility; If pt. were to go home would benefit from 1 person assist, TOMMIE and CHARLENE

## 2024-01-03 NOTE — PHYSICAL THERAPY INITIAL EVALUATION ADULT - FUNCTIONAL LIMITATIONS, PT EVAL
[facility] ED Follow Up Call    11/10/2020    Patient: Anais Brand Patient : 1972   MRN: [id number]  Reason for Admission: Hypoglycemia  Discharge Date: 10/31/2020    ACM attempted third and final call to patient for introduction to Associate Care Management. HIPAA compliant message left requesting a return phone call at patients convenience. ACM sent Final unable to contact letter via [address] ACM will sign off, if no return call. Deon Valadez RN BSN  Associate Care Manager  Phone: [phone]  Email: [email or web address] self-care/home management/work/community/leisure

## 2024-01-03 NOTE — PHYSICAL THERAPY INITIAL EVALUATION ADULT - CRITERIA FOR SKILLED THERAPEUTIC INTERVENTIONS
RT Inhaler-Nebulizer Bronchodilator Protocol Note    There is a bronchodilator order in the chart from a provider indicating to follow the RT Bronchodilator Protocol and there is an Initiate RT Inhaler-Nebulizer Bronchodilator Protocol order as well (see protocol at bottom of note). CXR Findings:  No results found. The findings from the last RT Protocol Assessment were as follows:   History Pulmonary Disease: Chronic pulmonary disease  Respiratory Pattern: Mild dyspnea at rest, irregular pattern, or RR 21-25 bpm  Breath Sounds: Slightly diminished and/or crackles  Cough: Strong, spontaneous, non-productive  Indication for Bronchodilator Therapy: Decreased or absent breath sounds  Bronchodilator Assessment Score: 8    Aerosolized bronchodilator medication orders have been revised according to the RT Inhaler-Nebulizer Bronchodilator Protocol below. Respiratory Therapist to perform RT Therapy Protocol Assessment initially then follow the protocol. Repeat RT Therapy Protocol Assessment PRN for score 0-3 or on second treatment, BID, and PRN for scores above 3. No Indications - adjust the frequency to every 6 hours PRN wheezing or bronchospasm, if no treatments needed after 48 hours then discontinue using Per Protocol order mode. If indication present, adjust the RT bronchodilator orders based on the Bronchodilator Assessment Score as indicated below. Use Inhaler orders unless patient has one or more of the following: on home nebulizer, not able to hold breath for 10 seconds, is not alert and oriented, cannot activate and use MDI correctly, or respiratory rate 25 breaths per minute or more, then use the equivalent nebulizer order(s) with same Frequency and PRN reasons based on the score. If a patient is on this medication at home then do not decrease Frequency below that used at home.     0-3 - enter or revise RT bronchodilator order(s) to equivalent RT Bronchodilator order with Frequency of every 4
impairments found/functional limitations in following categories/rehab potential/anticipated discharge recommendation

## 2024-01-03 NOTE — PHYSICAL THERAPY INITIAL EVALUATION ADULT - ADDITIONAL COMMENTS
Pt. lives with his wife in a house with 2 MARIANGEL and 1 FOS to reach the bedroom/bath. At baseline, ambulates and performs ADLs with his wife's assist. Pt and wife report that he ascends the stairs with his wife's supervision and descends the stairs on his bottom for safety, using wife's assist to stand from the bottom step. Pt. fell recently while stepping in to a pair of underwear.

## 2024-01-03 NOTE — OCCUPATIONAL THERAPY INITIAL EVALUATION ADULT - DIAGNOSIS, OT EVAL
Pt presents to [facility], now POD1 Pt presents to [facility], now POD1 Pt presents to [facility], now POD1 s/p R parietal temporal craniotomy 2/2 R parietal lobe lesion with worsening mass effect and midline shift. Pt alos s/p IVC filter placed 1/2/24. OT consulted 2/2 decreased strength, coordination, functional cognition impacting ADL and functional mob. Pt presents to [facility], now POD1 s/p R parietal temporal craniotomy 2/2 R parietal lobe lesion with worsening mass effect and midline shift. Pt alos s/p IVC filter placed 1/2/24. OT consulted 2/2 decreased strength, coordination, functional cognition impacting ADL and functional mob.

## 2024-01-03 NOTE — PROGRESS NOTE ADULT - SUBJECTIVE AND OBJECTIVE BOX
Subjective: Pt seen and examined bedside. Pt is POD1 from IVC filter placement. Denies any pain in groin or leg.     ROS:   Denies Headache, blurred vision, Chest Pain, SOB, Abdominal pain, nausea or vomiting     Social   hydrALAZINE Injectable 10 PRN  labetalol Injectable 10 PRN      Allergies    No Known Allergies    Intolerances        Vital Signs Last 24 Hrs  T(C): 36.7 (03 Jan 2024 05:33), Max: 36.7 (02 Jan 2024 22:12)  T(F): 98.1 (03 Jan 2024 05:33), Max: 98.1 (02 Jan 2024 22:12)  HR: 63 (03 Jan 2024 07:00) (57 - 82)  BP: 137/84 (02 Jan 2024 16:00) (137/84 - 156/96)  BP(mean): 105 (02 Jan 2024 16:00) (105 - 113)  RR: 14 (03 Jan 2024 07:00) (12 - 20)  SpO2: 100% (03 Jan 2024 07:00) (97% - 100%)    Parameters below as of 03 Jan 2024 07:00  Patient On (Oxygen Delivery Method): room air      I&O's Summary    02 Jan 2024 07:01  -  03 Jan 2024 07:00  --------------------------------------------------------  IN: 1725 mL / OUT: 970 mL / NET: 755 mL    03 Jan 2024 07:01  -  03 Jan 2024 07:45  --------------------------------------------------------  IN: 75 mL / OUT: 0 mL / NET: 75 mL        Physical Exam:  Gen: NAD, resting comfortably in bed  C/V: NSR  Pulm: Nonlabored breathing, no respiratory distress  Abd: groin soft, NT, no surrounding bruising, bleeding, or hematoma, no tenderness  Extrem: WWP, no significant edema, rashes, or bruises B/L      LABS:                        12.5   19.76 )-----------( 141      ( 03 Jan 2024 05:30 )             38.5     01-03    134<L>  |  105  |  25<H>  ----------------------------<  119<H>  4.5   |  18<L>  |  0.79    Ca    8.0<L>      03 Jan 2024 05:30  Phos  4.1     01-03  Mg     2.2     01-03      PT/INR - ( 02 Jan 2024 14:21 )   PT: 12.0 sec;   INR: 1.05          PTT - ( 02 Jan 2024 14:21 )  PTT:20.8 sec      A/P: 63y YO Male with history of Bilateral acute popliteal DVT and hx of recent PE, POD1 s/p IVC filter placement following NSGY procedure.     Plan:   - Restart therapeutic anticoagulation when primary team comfortable  - Will plan for filter retrieval in a few weeks if patient is able to resume AC  - Has follow-up with Dr. Faulkner on 1/25  - JANY per primary team    Vascular will sign off. Please re-consulted as needed.

## 2024-01-03 NOTE — OCCUPATIONAL THERAPY INITIAL EVALUATION ADULT - ADDITIONAL COMMENTS
Pt r handed and wears glasses. Pt lives in home with 2 MARIANGEL and 1 FOS within to access bedroom. Pt lives with wife who assists with medication management, meals, functional mobility within home and community.

## 2024-01-03 NOTE — PHYSICAL THERAPY INITIAL EVALUATION ADULT - DIAGNOSIS, PT EVAL
Impaired Joint Mobility, Motor Function, Muscle Performance, and Range of Motion Associated with Connective Tissue Disorder

## 2024-01-03 NOTE — PHYSICAL THERAPY INITIAL EVALUATION ADULT - GENERAL OBSERVATIONS, REHAB EVAL
Pt. received semi supine, +cranial bandage C/D/I, +telemetry, +pulse ox, +heplock, NAD, agreeable to PT.

## 2024-01-03 NOTE — PROGRESS NOTE ADULT - SUBJECTIVE AND OBJECTIVE BOX
S/Overnight events: MADAI overnight, neuro stable.       Hospital Course:   12/31: , Given hydralazine 10mg IVP.   1/1: MADAI overnight. Neuro stable. MR brain complete.   1/2: MADAI overnight. For OR today. POD0 from right parietal and temporal crani and resection (frozen: metastatic melanoma). Given 10 labetalol x2 for BP, toradol x1 for pain, hydral x1 for pressures. Chamberlain removed, f/u TOV.   1/3: POD0 right parietal and temporal crani for tumor resection. MADAI overnight, neuro stable.       Vital Signs Last 24 Hrs  T(C): 36.7 (02 Jan 2024 22:12), Max: 36.7 (02 Jan 2024 22:12)  T(F): 98.1 (02 Jan 2024 22:12), Max: 98.1 (02 Jan 2024 22:12)  HR: 64 (03 Jan 2024 00:00) (57 - 72)  BP: 137/84 (02 Jan 2024 16:00) (137/84 - 156/96)  BP(mean): 105 (02 Jan 2024 16:00) (105 - 113)  RR: 16 (03 Jan 2024 00:00) (12 - 20)  SpO2: 100% (03 Jan 2024 00:00) (97% - 100%)    Parameters below as of 03 Jan 2024 00:00  Patient On (Oxygen Delivery Method): room air        I&O's Detail    01 Jan 2024 07:01  -  02 Jan 2024 07:00  --------------------------------------------------------  IN:    Oral Fluid: 720 mL  Total IN: 720 mL    OUT:    Voided (mL): 200 mL  Total OUT: 200 mL    Total NET: 520 mL      02 Jan 2024 07:01  -  03 Jan 2024 00:55  --------------------------------------------------------  IN:    IV PiggyBack: 100 mL    Oral Fluid: 200 mL    sodium chloride 0.9%: 750 mL  Total IN: 1050 mL    OUT:    Indwelling Catheter - Urethral (mL): 620 mL  Total OUT: 620 mL    Total NET: 430 mL        I&O's Summary    01 Jan 2024 07:01  -  02 Jan 2024 07:00  --------------------------------------------------------  IN: 720 mL / OUT: 200 mL / NET: 520 mL    02 Jan 2024 07:01  -  03 Jan 2024 00:55  --------------------------------------------------------  IN: 1050 mL / OUT: 620 mL / NET: 430 mL          PHYSICAL EXAM:  Constitutional: awake, alert, sitting up in bed. NAD.   Respiratory: non-labored breathing. Normal chest rise.   Cardiovascular: Regular rate and rhythm  Gastrointestinal:  Soft, nontender, nondistended.  .  Vascular: Extremities warm, no ulcers, no discoloration of skin.   Neurological: Gen: AA&O x 3, conversant, appropriate.      CN II-XII grossly intact.    Motor: LAZARO x 4, LUE 4/5 otherwise 5/5 throughout UE/LE.    Sens: Sensation intact to light touch throughout.    Extremities: warm and well perfused     +LUE pronator drift.   Wound/incision: Right crani incision c/d/i with headwrap in place.     DEVICE/DRAIN DRESSING:    TUBES/LINES:  [] CVC  [x] A-line  [] Lumbar Drain  [] Ventriculostomy  [] Other    DIET:  [] NPO  [x] Mechanical  [] Tube feeds    LABS:                        12.4   13.65 )-----------( 124      ( 02 Jan 2024 14:21 )             37.8     01-02    139  |  107  |  28<H>  ----------------------------<  131<H>  5.0   |  20<L>  |  0.86    Ca    8.3<L>      02 Jan 2024 14:22  Phos  6.1     01-02  Mg     2.5     01-02      PT/INR - ( 02 Jan 2024 14:21 )   PT: 12.0 sec;   INR: 1.05          PTT - ( 02 Jan 2024 14:21 )  PTT:20.8 sec  Urinalysis Basic - ( 02 Jan 2024 14:22 )    Color: x / Appearance: x / SG: x / pH: x  Gluc: 131 mg/dL / Ketone: x  / Bili: x / Urobili: x   Blood: x / Protein: x / Nitrite: x   Leuk Esterase: x / RBC: x / WBC x   Sq Epi: x / Non Sq Epi: x / Bacteria: x      CARDIAC MARKERS ( 02 Jan 2024 14:22 )  x     / x     / 45 U/L / x     / x          CAPILLARY BLOOD GLUCOSE      POCT Blood Glucose.: 84 mg/dL (02 Jan 2024 21:48)  POCT Blood Glucose.: 97 mg/dL (02 Jan 2024 17:00)  POCT Blood Glucose.: 113 mg/dL (02 Jan 2024 06:08)      Drug Levels: [] N/A    CSF Analysis: [] N/A      Allergies    No Known Allergies    Intolerances      MEDICATIONS:  Antibiotics:  ceFAZolin   IVPB 2000 milliGRAM(s) IV Intermittent every 8 hours    Neuro:  acetaminophen     Tablet .. 1000 milliGRAM(s) Oral every 8 hours  ketorolac   Injectable 15 milliGRAM(s) IV Push every 8 hours PRN  levETIRAcetam 500 milliGRAM(s) Oral every 12 hours  ondansetron Injectable 4 milliGRAM(s) IV Push every 6 hours PRN    Anticoagulation:    OTHER:  chlorhexidine 2% Cloths 1 Application(s) Topical <User Schedule>  dexAMETHasone     Tablet   Oral   dexAMETHasone     Tablet 4 milliGRAM(s) Oral every 6 hours  hydrALAZINE Injectable 10 milliGRAM(s) IV Push every 2 hours PRN  insulin lispro (ADMELOG) corrective regimen sliding scale   SubCutaneous Before meals and at bedtime  labetalol Injectable 10 milliGRAM(s) IV Push every 2 hours PRN  pantoprazole    Tablet 40 milliGRAM(s) Oral before breakfast  polyethylene glycol 3350 17 Gram(s) Oral daily  senna 2 Tablet(s) Oral at bedtime    IVF:  sodium chloride 0.9%. 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 Patient was initially diagnosed after he was found to be forgetful with gait instability, and these symptoms returned 12/20 after he fell d/t developing left sided weakness. MRI o/p 12/22/23 showed progression of right parietal lobe lesion w/ worsening mass effect, edema, and 1.2cm MLS. Now s/p Right parietal and temporal craniotomies for resection of 2 brain mets 1/2, and s/p IVCF 1/2.      C79.31    No pertinent family history in first degree relatives    Handoff    MEWS Score    Melanoma    Brain tumor    Deep vein thrombosis    Brain tumor    Deep vein thrombosis    Suspected pulmonary embolism    Suspected deep vein thrombosis (DVT)    Pulmonary thromboembolism    Craniotomy for tumor using frameless stereotaxy    IVC filter placement    H/O brain surgery    SysAdmin_[email or web address]        PLAN:    Plan:   Neuro:   - neuro q1/vitals q1   - neurovasc checks as ordered   - HOB 30 degrees   - pain control   - h/o seizure: c/w keppra 500mg BID   - decadron 4mg q6h taper over a week to off   - post op MRI pending (72 hr)    Cardio:  - SBP goal 100-140  - prn hydralazine, labetalol  - EKG QTC 1/2 497    Pulm:  - RA  - CXR: large RML mass    GI:  - regular diet  - bowel regimen   - PPI while on steroids    Renal:  - NS @ 75 cc/hr until adequate PO intake  - TOV    Endo:  - ISS  - A1C: 5.3    Heme:   - no SCDs d/t b/l DVTs  - LE dopplers 12/31: acute B/L popliteal DVTs  - Hx DVT/PE: therapeutic SQL held   - s/p IVCF     ID:  - afebrile    Dispo: full code, NSICU    Discussed w/ Dr. Tony and Dr. Gallego   S/Overnight events: MADAI overnight, neuro stable.       Hospital Course:   12/31: , Given hydralazine 10mg IVP.   1/1: MADAI overnight. Neuro stable. MR brain complete.   1/2: MADAI overnight. For OR today. POD0 from right parietal and temporal crani and resection (frozen: metastatic melanoma). Given 10 labetalol x2 for BP, toradol x1 for pain, hydral x1 for pressures. Chamberlain removed, f/u TOV.   1/3: POD0 right parietal and temporal crani for tumor resection. MADAI overnight, neuro stable.       Vital Signs Last 24 Hrs  T(C): 36.7 (02 Jan 2024 22:12), Max: 36.7 (02 Jan 2024 22:12)  T(F): 98.1 (02 Jan 2024 22:12), Max: 98.1 (02 Jan 2024 22:12)  HR: 64 (03 Jan 2024 00:00) (57 - 72)  BP: 137/84 (02 Jan 2024 16:00) (137/84 - 156/96)  BP(mean): 105 (02 Jan 2024 16:00) (105 - 113)  RR: 16 (03 Jan 2024 00:00) (12 - 20)  SpO2: 100% (03 Jan 2024 00:00) (97% - 100%)    Parameters below as of 03 Jan 2024 00:00  Patient On (Oxygen Delivery Method): room air        I&O's Detail    01 Jan 2024 07:01  -  02 Jan 2024 07:00  --------------------------------------------------------  IN:    Oral Fluid: 720 mL  Total IN: 720 mL    OUT:    Voided (mL): 200 mL  Total OUT: 200 mL    Total NET: 520 mL      02 Jan 2024 07:01  -  03 Jan 2024 00:55  --------------------------------------------------------  IN:    IV PiggyBack: 100 mL    Oral Fluid: 200 mL    sodium chloride 0.9%: 750 mL  Total IN: 1050 mL    OUT:    Indwelling Catheter - Urethral (mL): 620 mL  Total OUT: 620 mL    Total NET: 430 mL        I&O's Summary    01 Jan 2024 07:01  -  02 Jan 2024 07:00  --------------------------------------------------------  IN: 720 mL / OUT: 200 mL / NET: 520 mL    02 Jan 2024 07:01  -  03 Jan 2024 00:55  --------------------------------------------------------  IN: 1050 mL / OUT: 620 mL / NET: 430 mL          PHYSICAL EXAM:  Constitutional: awake, alert, sitting up in bed. NAD.   Respiratory: non-labored breathing. Normal chest rise.   Cardiovascular: Regular rate and rhythm  Gastrointestinal:  Soft, nontender, nondistended.  .  Vascular: Extremities warm, no ulcers, no discoloration of skin.   Neurological: Gen: AA&O x 3, conversant, appropriate.      CN II-XII grossly intact.    Motor: LAZARO x 4, LUE 4/5 otherwise 5/5 throughout UE/LE.    Sens: Sensation intact to light touch throughout.    Extremities: warm and well perfused     +LUE pronator drift.   Wound/incision: Right crani incision c/d/i with headwrap in place.     DEVICE/DRAIN DRESSING:    TUBES/LINES:  [] CVC  [x] A-line  [] Lumbar Drain  [] Ventriculostomy  [] Other    DIET:  [] NPO  [x] Mechanical  [] Tube feeds    LABS:                        12.4   13.65 )-----------( 124      ( 02 Jan 2024 14:21 )             37.8     01-02    139  |  107  |  28<H>  ----------------------------<  131<H>  5.0   |  20<L>  |  0.86    Ca    8.3<L>      02 Jan 2024 14:22  Phos  6.1     01-02  Mg     2.5     01-02      PT/INR - ( 02 Jan 2024 14:21 )   PT: 12.0 sec;   INR: 1.05          PTT - ( 02 Jan 2024 14:21 )  PTT:20.8 sec  Urinalysis Basic - ( 02 Jan 2024 14:22 )    Color: x / Appearance: x / SG: x / pH: x  Gluc: 131 mg/dL / Ketone: x  / Bili: x / Urobili: x   Blood: x / Protein: x / Nitrite: x   Leuk Esterase: x / RBC: x / WBC x   Sq Epi: x / Non Sq Epi: x / Bacteria: x      CARDIAC MARKERS ( 02 Jan 2024 14:22 )  x     / x     / 45 U/L / x     / x          CAPILLARY BLOOD GLUCOSE      POCT Blood Glucose.: 84 mg/dL (02 Jan 2024 21:48)  POCT Blood Glucose.: 97 mg/dL (02 Jan 2024 17:00)  POCT Blood Glucose.: 113 mg/dL (02 Jan 2024 06:08)      Drug Levels: [] N/A    CSF Analysis: [] N/A      Allergies    No Known Allergies    Intolerances      MEDICATIONS:  Antibiotics:  ceFAZolin   IVPB 2000 milliGRAM(s) IV Intermittent every 8 hours    Neuro:  acetaminophen     Tablet .. 1000 milliGRAM(s) Oral every 8 hours  ketorolac   Injectable 15 milliGRAM(s) IV Push every 8 hours PRN  levETIRAcetam 500 milliGRAM(s) Oral every 12 hours  ondansetron Injectable 4 milliGRAM(s) IV Push every 6 hours PRN    Anticoagulation:    OTHER:  chlorhexidine 2% Cloths 1 Application(s) Topical <User Schedule>  dexAMETHasone     Tablet   Oral   dexAMETHasone     Tablet 4 milliGRAM(s) Oral every 6 hours  hydrALAZINE Injectable 10 milliGRAM(s) IV Push every 2 hours PRN  insulin lispro (ADMELOG) corrective regimen sliding scale   SubCutaneous Before meals and at bedtime  labetalol Injectable 10 milliGRAM(s) IV Push every 2 hours PRN  pantoprazole    Tablet 40 milliGRAM(s) Oral before breakfast  polyethylene glycol 3350 17 Gram(s) Oral daily  senna 2 Tablet(s) Oral at bedtime    IVF:  sodium chloride 0.9%. 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 Patient was initially diagnosed after he was found to be forgetful with gait instability, and these symptoms returned 12/20 after he fell d/t developing left sided weakness. MRI o/p 12/22/23 showed progression of right parietal lobe lesion w/ worsening mass effect, edema, and 1.2cm MLS. Now s/p Right parietal and temporal craniotomies for resection of 2 brain mets 1/2, and s/p IVCF 1/2.      C79.31    No pertinent family history in first degree relatives    Handoff    MEWS Score    Melanoma    Brain tumor    Deep vein thrombosis    Brain tumor    Deep vein thrombosis    Suspected pulmonary embolism    Suspected deep vein thrombosis (DVT)    Pulmonary thromboembolism    Craniotomy for tumor using frameless stereotaxy    IVC filter placement    H/O brain surgery    SysAdmin_VstLnk        PLAN:    Plan:   Neuro:   - neuro q1/vitals q1   - neurovasc checks as ordered   - HOB 30 degrees   - pain control   - h/o seizure: c/w keppra 500mg BID   - decadron 4mg q6h taper over a week to off   - post op MRI pending (72 hr)    Cardio:  - SBP goal 100-140  - prn hydralazine, labetalol  - EKG QTC 1/2 497    Pulm:  - RA  - CXR: large RML mass    GI:  - regular diet  - bowel regimen   - PPI while on steroids    Renal:  - NS @ 75 cc/hr until adequate PO intake  - TOV    Endo:  - ISS  - A1C: 5.3    Heme:   - no SCDs d/t b/l DVTs  - LE dopplers 12/31: acute B/L popliteal DVTs  - Hx DVT/PE: therapeutic SQL held   - s/p IVCF     ID:  - afebrile    Dispo: full code, NSICU    Discussed w/ Dr. Tony and Dr. Gallego

## 2024-01-03 NOTE — OCCUPATIONAL THERAPY INITIAL EVALUATION ADULT - MODIFIED CLINICAL TEST OF SENSORY INTEGRATION IN BALANCE TEST
Pt performed functional mob 25x2 with min A and consistently Rward steering of RW requiring cues to maintain straight path with RW and maintain self within scope of RW

## 2024-01-03 NOTE — OCCUPATIONAL THERAPY INITIAL EVALUATION ADULT - PERTINENT HX OF CURRENT PROBLEM, REHAB EVAL
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 Worthy, s/p SRS (completed 5 rounds 8/15/23), on immunotherapy monthly (last dose 12/15/23, next dose 1/15/2023), drug induced neutropenia (likely bactrim), presents for resection of brain tumor. Patient was initially diagnosed after he was found to be leaving the car door opened, putting his shoes on the wrong feet, and being forgetful of his usual daily routine. Patient reports on 12/20/23 he fell and hit his head (no LOC) d/t developing left sided weakness. Within 2 days, the symptoms of forgetfulness returned. MRI o/p 12/22/23 showed progression of right parietal lobe lesion w/ worsening mass effect, edema, and 1.2cm MLS. Admitted to [facility] for further management. 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 Worthy, s/p SRS (completed 5 rounds 8/15/23), on immunotherapy monthly (last dose 12/15/23, next dose 1/15/2023), drug induced neutropenia (likely bactrim), presents for resection of brain tumor. Patient was initially diagnosed after he was found to be leaving the car door opened, putting his shoes on the wrong feet, and being forgetful of his usual daily routine. Patient reports on 12/20/23 he fell and hit his head (no LOC) d/t developing left sided weakness. Within 2 days, the symptoms of forgetfulness returned. MRI o/p 12/22/23 showed progression of right parietal lobe lesion w/ worsening mass effect, edema, and 1.2cm MLS. Admitted to [facility] for further management.

## 2024-01-03 NOTE — PHYSICAL THERAPY INITIAL EVALUATION ADULT - PERTINENT HX OF CURRENT PROBLEM, REHAB EVAL
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 Patient was initially diagnosed after he was found to be forgetful with gait instability, and these symptoms returned 12/20 after he fell d/t developing left sided weakness. MRI o/p 12/22/23 showed progression of right parietal lobe lesion w/ worsening mass effect, edema, and 1.2cm MLS. Now s/p Right parietal and temporal craniotomies for resection of 2 brain mets 1/2, and s/p IVCF 1/2.

## 2024-01-03 NOTE — OCCUPATIONAL THERAPY INITIAL EVALUATION ADULT - MANUAL MUSCLE TESTING RESULTS, REHAB EVAL
r shoulder flex 4+/5, L shoulder flex 4/5, bilat elbow flex/ext 4+/5, L wrist flexion 5/5 L wrist extension 3+/5 R wrist flex/ext 5/5 gross grasp on L reduced

## 2024-01-03 NOTE — OCCUPATIONAL THERAPY INITIAL EVALUATION ADULT - GENERAL OBSERVATIONS, REHAB EVAL
Pt cleared by RN for OOB with OT. Pt received semisupine in bed +tele +craniwrap c/d/i +NAD; pt left seated in chair with all lines intact and needs met, RN aware.

## 2024-01-03 NOTE — PROGRESS NOTE ADULT - ATTENDING COMMENTS
Mr. Pacheco Nogueira is a 63M w/ prior DVT after RLE vein stripping yeas ago and  melanoma on face s/p MOHS c/b brain metasasis s/p right craniotomy (6/2023) an dinitiation of immunotherapy c/b PE (8/2023) and has since been on therapuetic lovenox, now admitted for progression of right parietal lobe lesion and awaiting brain surgery on Tuesday 1/2/24. Vascular surgery was consulted for IVC filter placement given inability to therapeutically anticoagulate for PE/DVT history during perioperative period. BLE venous duplex US here show bilateral acute popliteal vein DVTs.       Now POD1 s/p IVC filter placement via us guided R fem vein access (by my team) and craniotomy w/ resection of brain tumor by NSGY (1/2/24). He is extubated and recovering in the NSICU. No majro complaints. Denies any shortness of breath, chest pain, or leg pain. Groin soft, Has mild bilateral LE swelling. Both feet has some chronic purpling of skin.       ASSESSMENT  - Bilateral acute popliteal DVT and hx of recent PE, awaiting NSGY procedure for metastatic melanoma to brain w/ progression --> therapeutic AC is being held perioperatively by primary team, now s/p IVC filter placement (and craniotomy)      PLAN & RECOMMENDATIONS  - Restart therapeutic anticoagulation once deemed safe by nsgy team  - DVT ppx per primary NSGY team  - Can plan to remove IVC filter once consistently back on therapeutic AC, likely in a few weeks as an elective procedure. Will arrange outpatient follow up for 1/25/24    Thank you,    Willi Faulkner MD  Attending Vascular Surgeon  [facility] at [address] [place] [address]  Office: [phone]; Fax: [phone]  [email or web address] Mr. Pacheco Nogueira is a 63M w/ prior DVT after RLE vein stripping yeas ago and  melanoma on face s/p MOHS c/b brain metasasis s/p right craniotomy (6/2023) an dinitiation of immunotherapy c/b PE (8/2023) and has since been on therapuetic lovenox, now admitted for progression of right parietal lobe lesion and awaiting brain surgery on Tuesday 1/2/24. Vascular surgery was consulted for IVC filter placement given inability to therapeutically anticoagulate for PE/DVT history during perioperative period. BLE venous duplex US here show bilateral acute popliteal vein DVTs.       Now POD1 s/p IVC filter placement via us guided R fem vein access (by my team) and craniotomy w/ resection of brain tumor by NSGY (1/2/24). He is extubated and recovering in the NSICU. No majro complaints. Denies any shortness of breath, chest pain, or leg pain. Groin soft, Has mild bilateral LE swelling. Both feet has some chronic purpling of skin.       ASSESSMENT  - Bilateral acute popliteal DVT and hx of recent PE, awaiting NSGY procedure for metastatic melanoma to brain w/ progression --> therapeutic AC is being held perioperatively by primary team, now s/p IVC filter placement (and craniotomy)      PLAN & RECOMMENDATIONS  - Restart therapeutic anticoagulation once deemed safe by nsgy team  - DVT ppx per primary NSGY team  - Can plan to remove IVC filter once consistently back on therapeutic AC, likely in a few weeks as an elective procedure. Will arrange outpatient follow up for 1/25/24    Thank you,    Willi Faulkner MD  Attending Vascular Surgeon  [facility] at [address] [place] [address]  Office: [phone]; Fax: [phone]  [email or web address]

## 2024-01-03 NOTE — PROGRESS NOTE ADULT - ASSESSMENT
ASSESSMENT/PLAN:  POD 1 status post right parietal and temporal craniotomy for resection of brain tumor  Frozen: metastatic melanoma    NEURO:   Q2 neurochecks  Decadron with taper per NSGY  MRI 72 hours post op  Keppra 500mg bid  Analgesia prn  Activity: [] mobilize as tolerated [] Bedrest [] PT [] OT [] PMNR [x] HOB 30 degrees      PULM: Large RUL mass  CXR 1/1/24 Mass 10.8 cm (enlarged from 9.3 cm)  Incentive spirometry, mobilize as tolerated    CV:  -140mmHg  DC [place]    RENAL: S/P mild BASHIR, mild hyperkalemia/hyperphosphatemia; resolved  IVF until good PO intake  Na goal 135-145  Start salt tabs. Repeat BMP    GI:  Diet: As tolerated  GI prophylaxis [] not indicated [x] PPI while on steroids [] other:  Bowel regimen [] colace [x] senna [] other:  LBM:    ENDO:   Goal euglycemia (-180)    HEME/ONC: DVT/PE 8/2023, mild anemia. S/P therapeutic SQL (last dose 12/30/23 AM)  LE Doppler on 12/31: Acute DVTs in the bilateral popliteal veins. Retrievable IVC filter placed 1/2.   VTE prophylaxis: No SCDs as B/L DVTs. [x] Chemoprophylaxis: lovenox   Will need full AC once cleared from NSGY perspective; recommend at least 1 week post op  [id number]/ H/H 14.5-> 12.4. Monitor H/H, plts (currently Hb >12g/dL)  Vascular surgery following    ID: History of drug induced neutropenia (likely bactrim)  WBC 13-> 19. Monitor CBC with diff  Afebrile. Monitor for signs or symptoms of infection    MISC:    SOCIAL/FAMILY:  [] awaiting [x] updated at bedside [] family meeting    CODE STATUS:  [x] Full Code [] DNR [] DNI [] Palliative/Comfort Care    DISPOSITION:  [] ICU [] Stroke Unit [] Floor [] EMU [] RCU [] PCU         ASSESSMENT/PLAN:  POD 1 status post right parietal and temporal craniotomy for resection of brain tumor  Frozen: metastatic melanoma    NEURO:   Q2 neurochecks  Decadron with taper per NSGY  MRI 72 hours post op  Keppra 500mg bid  Analgesia prn  Activity: [] mobilize as tolerated [] Bedrest [] PT [] OT [] PMNR [x] HOB 30 degrees      PULM: Large RUL mass  CXR 1/1/24 Mass 10.8 cm (enlarged from 9.3 cm)  Incentive spirometry, mobilize as tolerated    CV:  -140mmHg  DC [place]    RENAL: S/P mild BASHIR, mild hyperkalemia/hyperphosphatemia; resolved  IVF until good PO intake  Na goal 135-145  Start salt tabs. Repeat BMP    GI:  Diet: As tolerated  GI prophylaxis [] not indicated [x] PPI while on steroids [] other:  Bowel regimen [] colace [x] senna [] other:  LBM:    ENDO:   Goal euglycemia (-180)    HEME/ONC: DVT/PE 8/2023, mild anemia. S/P therapeutic SQL (last dose 12/30/23 AM)  LE Doppler on 12/31: Acute DVTs in the bilateral popliteal veins. Retrievable IVC filter placed 1/2.   VTE prophylaxis: No SCDs as B/L DVTs. [x] Chemoprophylaxis: lovenox   Will need full AC once cleared from NSGY perspective; recommend at least 1 week post op  [id number]/ H/H 14.5-> 12.4. Monitor H/H, plts (currently Hb >12g/dL)  Vascular surgery following    ID: History of drug induced neutropenia (likely bactrim)  WBC 13-> 19. Monitor CBC with diff  Afebrile. Monitor for signs or symptoms of infection    MISC:    SOCIAL/FAMILY:  [] awaiting [x] updated at bedside [] family meeting    CODE STATUS:  [x] Full Code [] DNR [] DNI [] Palliative/Comfort Care    DISPOSITION:  [] ICU [] Stroke Unit [] Floor [] EMU [] RCU [] PCU         ASSESSMENT/PLAN:  POD 1 status post right parietal and temporal craniotomy for resection of brain tumor  Frozen: metastatic melanoma    NEURO:   Q4 neurochecks  Decadron with taper per NSGY  MRI 72 hours post op  Keppra 500mg bid (home dosing)  Analgesia prn with cranial ERAS  Activity: [] mobilize as tolerated [] Bedrest [x] PT [x] OT [x] OOB [] PMNR [x] HOB 30 degrees      PULM: Large RUL mass  CXR 1/1/24 Mass 10.8 cm (enlarged from 9.3 cm)  Incentive spirometry, mobilize as tolerated    CV:  -140mmHg  DC [place]    RENAL: S/P mild BASHIR, mild hyperkalemia/hyperphosphatemia; resolved  IVL. Na goal 135-145  Start salt tabs. Repeat BMP 4pm.     GI:  Diet: As tolerated  GI prophylaxis [] not indicated [x] PPI while on steroids [] other:  Bowel regimen [] colace [x] senna [] other:  LBM: 1/1    ENDO:   Goal euglycemia (-180)    HEME/ONC: DVT/PE 8/2023, mild anemia. S/P therapeutic SQL (last dose 12/30/23 AM)  LE Doppler on 12/31: Acute DVTs in the bilateral popliteal veins. Retrievable IVC filter placed 1/2.   VTE prophylaxis: No SCDs as B/L DVTs. [x] Chemoprophylaxis: lovenox resume 1/4/2023  Will need full AC once cleared from NSGY perspective; recommend 1 week post op  [id number]/ H/H 14.5-> 12.4. Monitor H/H, plts (currently Hb >12g/dL)  Vascular surgery following    ID: History of drug induced neutropenia (likely bactrim)  WBC 13-> 19. Monitor CBC with diff 1/3  Afebrile. Monitor for signs or symptoms of infection    MISC:    SOCIAL/FAMILY:  [] awaiting [x] updated at bedside [] family meeting    CODE STATUS:  [x] Full Code [] DNR [] DNI [] Palliative/Comfort Care    DISPOSITION:  [] ICU [] Stroke Unit [] Floor [] EMU [] RCU [] PCU    ICU stepdown Checklist:    Completed: 01-03 @ 09:59    [X] hemodynamically stable – VS WNL and stable x 24hours, UO adequate  [n/a ] if  previously on HDA - off pressors x 24h with stable neuro exam    [X] no new symptoms x 24h (i.e. new fever, new-onset nausea/vomiting)  [X] stable labs: (i.e. WBC not rising, sodium not dropping)  [X] patient not at high risk for aspiration, if high risk then:                  [ ] should have definitive plans for trach/PEG (alternative option is to discharge from ICU to facilty)                  [ ] stepdown to bed close to nurse’s station  [n/a] low suctioning requirements (i.e. q4h or less)  [X] sign-off from primary RN*  [X] drains do not require ICU level of care  [X] if patient previously agitated or with behavioral issues – controlled   [X] pain controlled       ASSESSMENT/PLAN:  POD 1 status post right parietal and temporal craniotomy for resection of brain tumor  Frozen: metastatic melanoma    NEURO:   Q4 neurochecks  Decadron with taper per NSGY  MRI 72 hours post op  Keppra 500mg bid (home dosing)  Analgesia prn with cranial ERAS  Activity: [] mobilize as tolerated [] Bedrest [x] PT [x] OT [x] OOB [] PMNR [x] HOB 30 degrees      PULM: Large RUL mass  CXR 1/1/24 Mass 10.8 cm (enlarged from 9.3 cm)  Incentive spirometry, mobilize as tolerated    CV:  -140mmHg  DC [place]    RENAL: S/P mild BASHIR, mild hyperkalemia/hyperphosphatemia; resolved  IVL. Na goal 135-145  Start salt tabs. Repeat BMP 4pm.     GI:  Diet: As tolerated  GI prophylaxis [] not indicated [x] PPI while on steroids [] other:  Bowel regimen [] colace [x] senna [] other:  LBM: 1/1    ENDO:   Goal euglycemia (-180)    HEME/ONC: DVT/PE 8/2023, mild anemia. S/P therapeutic SQL (last dose 12/30/23 AM)  LE Doppler on 12/31: Acute DVTs in the bilateral popliteal veins. Retrievable IVC filter placed 1/2.   VTE prophylaxis: No SCDs as B/L DVTs. [x] Chemoprophylaxis: lovenox resume 1/4/2023  Will need full AC once cleared from NSGY perspective; recommend 1 week post op  [id number]/ H/H 14.5-> 12.4. Monitor H/H, plts (currently Hb >12g/dL)  Vascular surgery following    ID: History of drug induced neutropenia (likely bactrim)  WBC 13-> 19. Monitor CBC with diff 1/3  Afebrile. Monitor for signs or symptoms of infection    MISC:    SOCIAL/FAMILY:  [] awaiting [x] updated at bedside [] family meeting    CODE STATUS:  [x] Full Code [] DNR [] DNI [] Palliative/Comfort Care    DISPOSITION:  [] ICU [] Stroke Unit [] Floor [] EMU [] RCU [] PCU    ICU stepdown Checklist:    Completed: 01-03 @ 09:59    [X] hemodynamically stable – VS WNL and stable x 24hours, UO adequate  [n/a ] if  previously on HDA - off pressors x 24h with stable neuro exam    [X] no new symptoms x 24h (i.e. new fever, new-onset nausea/vomiting)  [X] stable labs: (i.e. WBC not rising, sodium not dropping)  [X] patient not at high risk for aspiration, if high risk then:                  [ ] should have definitive plans for trach/PEG (alternative option is to discharge from ICU to facilty)                  [ ] stepdown to bed close to nurse’s station  [n/a] low suctioning requirements (i.e. q4h or less)  [X] sign-off from primary RN*  [X] drains do not require ICU level of care  [X] if patient previously agitated or with behavioral issues – controlled   [X] pain controlled

## 2024-01-04 NOTE — PROGRESS NOTE ADULT - SUBJECTIVE AND OBJECTIVE BOX
SUBJECTIVE: Hypertensive this morning requiring IVP of labetalol followed by hydralazine. Otherwise NAEON. Patient states he is not in pain and does not feel anxious. Reports voiding without difficulty and having regular BM. No history of HTN. Denies any acute complaints.     MEDICATIONS:  MEDICATIONS  (STANDING):  chlorhexidine 2% Cloths 1 Application(s) Topical <User Schedule>  dexAMETHasone     Tablet 3 milliGRAM(s) Oral every 8 hours  dexAMETHasone     Tablet   Oral   enoxaparin Injectable 40 milliGRAM(s) SubCutaneous <User Schedule>  levETIRAcetam 500 milliGRAM(s) Oral every 12 hours  NIFEdipine XL 30 milliGRAM(s) Oral daily  pantoprazole    Tablet 40 milliGRAM(s) Oral before breakfast  polyethylene glycol 3350 17 Gram(s) Oral daily  senna 2 Tablet(s) Oral at bedtime  sodium chloride 2 Gram(s) Oral every 8 hours    MEDICATIONS  (PRN):  hydrALAZINE Injectable 10 milliGRAM(s) IV Push every 2 hours PRN SBP >140 second line  ketorolac   Injectable 15 milliGRAM(s) IV Push every 8 hours PRN Moderate Pain (4 - 6)  labetalol Injectable 10 milliGRAM(s) IV Push every 2 hours PRN Systolic blood pressure > 140  ondansetron Injectable 4 milliGRAM(s) IV Push every 6 hours PRN Nausea and/or Vomiting  oxyCODONE    IR 5 milliGRAM(s) Oral every 4 hours PRN Severe Pain (7 - 10)      Allergies    No Known Allergies    Intolerances        OBJECTIVE:  Vital Signs Last 24 Hrs  T(C): 36.3 (04 Jan 2024 08:10), Max: 36.8 (04 Jan 2024 00:52)  T(F): 97.4 (04 Jan 2024 08:10), Max: 98.3 (04 Jan 2024 00:52)  HR: 74 (04 Jan 2024 08:10) (63 - 96)  BP: 160/101 (04 Jan 2024 08:10) (134/84 - 160/101)  BP(mean): --  RR: 18 (04 Jan 2024 08:10) (16 - 18)  SpO2: 99% (04 Jan 2024 08:10) (98% - 99%)    Parameters below as of 04 Jan 2024 08:10  Patient On (Oxygen Delivery Method): room air      I&O's Summary    03 Jan 2024 07:01  -  04 Jan 2024 07:00  --------------------------------------------------------  IN: 75 mL / OUT: 1200 mL / NET: -1125 mL        PHYSICAL EXAM:  Gen: appears stated age, resting comfortably, NAD  HEENT: R crani incision, MMM, clear OP  Neck: supple  CV: RRR, no m/r/g, peripheral pulses 2+  Pulm: CTAB, no increased work of breathing, no rales/rhonchi  Abd: soft, ND, NT, no rebound or guarding  Skin: warm and dry  Ext: non-tender, no edema  Neuro: AOx3, speaking in full sentences  Psych: affect and behavior appropriate, pleasant at time of interview    LABS:                        12.1   16.10 )-----------( 121      ( 04 Jan 2024 06:38 )             38.7     01-04    139  |  110<H>  |  32<H>  ----------------------------<  125<H>  4.9   |  22  |  0.84    Ca    8.4      04 Jan 2024 06:38  Phos  3.2     01-04  Mg     2.6     01-04          CAPILLARY BLOOD GLUCOSE      POCT Blood Glucose.: 98 mg/dL (04 Jan 2024 11:33)  POCT Blood Glucose.: 106 mg/dL (04 Jan 2024 07:28)  POCT Blood Glucose.: 138 mg/dL (03 Jan 2024 21:38)  POCT Blood Glucose.: 97 mg/dL (03 Jan 2024 17:03)    Urinalysis Basic - ( 04 Jan 2024 06:38 )    Color: x / Appearance: x / SG: x / pH: x  Gluc: 125 mg/dL / Ketone: x  / Bili: x / Urobili: x   Blood: x / Protein: x / Nitrite: x   Leuk Esterase: x / RBC: x / WBC x   Sq Epi: x / Non Sq Epi: x / Bacteria: x        MICRODATA:      RADIOLOGY/OTHER STUDIES:

## 2024-01-04 NOTE — CONSULT NOTE ADULT - CONSULT REASON
Evaluation of rehab needs
Pre op evaluation for right parietal lobe tumor resection
IVC filter placement

## 2024-01-04 NOTE — PROGRESS NOTE ADULT - SUBJECTIVE AND OBJECTIVE BOX
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 Worthy, s/p SRS (completed 5 rounds 8/15/23), on immunotherapy monthly (last dose 12/15/23, next dose 1/15/2023), drug induced neutropenia (likely bactrim), presents for resection of brain tumor. Patient was initially diagnosed after he was found to be leaving the car door opened, putting his shoes on the wrong feet, and being forgetful of his usual daily routine. Patient reports on 12/20/23 he fell and hit his head (no LOC) d/t developing left sided weakness. Within 2 days, the symptoms of forgetfulness returned. MRI o/p 12/22/23 showed progression of right parietal lobe lesion w/ worsening mass effect, edema, and 1.2cm MLS. Admitted to [facility] for further management.     Heme-onc: Dr. Richards  Rad-onc: Dr. Warren  (31 Dec 2023 15:12)    OVERNIGHT EVENTS: No events overnight. No complaints of pain.,    Hospital Course:  12/31: , Given hydralazine 10mg IVP.   1/1: MADAI overnight. Neuro stable. MR brain complete.   1/2: MADAI overnight. For OR today. POD0 from right parietal and temporal crani and resection (frozen: metastatic melanoma). Given 10 labetalol x2 for BP, toradol x1 for pain, hydral x1 for pressures. Chamberlain removed, f/u TOV.   1/3: POD1 right parietal and temporal crani for tumor resection. MADAI overnight, neuro stable. Step down from ICU. Pending MRI Brain.      Vital Signs Last 24 Hrs  T(C): 36.8 (04 Jan 2024 00:52), Max: 36.8 (04 Jan 2024 00:52)  T(F): 98.3 (04 Jan 2024 00:52), Max: 98.3 (04 Jan 2024 00:52)  HR: 72 (04 Jan 2024 00:52) (58 - 122)  BP: 138/90 (04 Jan 2024 00:52) (134/84 - 150/88)  BP(mean): 108 (03 Jan 2024 11:36) (108 - 114)  RR: 16 (04 Jan 2024 00:52) (12 - 27)  SpO2: 98% (04 Jan 2024 00:52) (98% - 100%)    Parameters below as of 04 Jan 2024 00:52  Patient On (Oxygen Delivery Method): room air        I&O's Summary    02 Jan 2024 07:01  -  03 Jan 2024 07:00  --------------------------------------------------------  IN: 1725 mL / OUT: 970 mL / NET: 755 mL    03 Jan 2024 07:01  -  04 Jan 2024 00:57  --------------------------------------------------------  IN: 75 mL / OUT: 900 mL / NET: -825 mL        PHYSICAL EXAM:  Constitutional: NAD, AAOx3  HEENT: PERRL. EOMI. VF intact.  Respiratory: non-labored breathing. Normal chest rise. CTA b/l  Cardiovascular: Regular rate and rhythm. S1, S2.  Gastrointestinal:  Soft, nontender, nondistended. +BS  Vascular: Extremities warm, no ulcers, no discoloration of skin. Pulses 2+ throughout.  Neurological: Left facial weakness o/w CNs II-XII intact. LAZARO x 4, LUE 4/5 with pronator drift, otherwise 5/5 throughout UE/LE. Sensation intact to light touch throughout.  Wound/incision: Right crani incision c/d/i.      DIET:  [] NPO  [x] Mechanical  [] Tube feeds    LABS:                        12.5   19.76 )-----------( 141      ( 03 Jan 2024 05:30 )             38.5     01-03    137  |  106  |  32<H>  ----------------------------<  123<H>  4.6   |  22  |  0.99    Ca    8.4      03 Jan 2024 14:51  Phos  4.1     01-03  Mg     2.2     01-03      PT/INR - ( 02 Jan 2024 14:21 )   PT: 12.0 sec;   INR: 1.05          PTT - ( 02 Jan 2024 14:21 )  PTT:20.8 sec  Urinalysis Basic - ( 03 Jan 2024 14:51 )    Color: x / Appearance: x / SG: x / pH: x  Gluc: 123 mg/dL / Ketone: x  / Bili: x / Urobili: x   Blood: x / Protein: x / Nitrite: x   Leuk Esterase: x / RBC: x / WBC x   Sq Epi: x / Non Sq Epi: x / Bacteria: x      CARDIAC MARKERS ( 02 Jan 2024 14:22 )  x     / x     / 45 U/L / x     / x          CAPILLARY BLOOD GLUCOSE      POCT Blood Glucose.: 138 mg/dL (03 Jan 2024 21:38)  POCT Blood Glucose.: 97 mg/dL (03 Jan 2024 17:03)  POCT Blood Glucose.: 128 mg/dL (03 Jan 2024 10:55)  POCT Blood Glucose.: 109 mg/dL (03 Jan 2024 05:52)      Drug Levels: [] N/A    CSF Analysis: [] N/A      Allergies    No Known Allergies    Intolerances      MEDICATIONS:  Antibiotics:    Neuro:  acetaminophen     Tablet .. 1000 milliGRAM(s) Oral every 8 hours  ketorolac   Injectable 15 milliGRAM(s) IV Push every 8 hours PRN  levETIRAcetam 500 milliGRAM(s) Oral every 12 hours  ondansetron Injectable 4 milliGRAM(s) IV Push every 6 hours PRN  oxyCODONE    IR 5 milliGRAM(s) Oral every 4 hours PRN    Anticoagulation:  enoxaparin Injectable 40 milliGRAM(s) SubCutaneous <User Schedule>    OTHER:  chlorhexidine 2% Cloths 1 Application(s) Topical <User Schedule>  dexAMETHasone     Tablet 4 milliGRAM(s) Oral every 6 hours  dexAMETHasone     Tablet 3 milliGRAM(s) Oral every 8 hours  dexAMETHasone     Tablet   Oral   hydrALAZINE Injectable 10 milliGRAM(s) IV Push every 2 hours PRN  insulin lispro (ADMELOG) corrective regimen sliding scale   SubCutaneous Before meals and at bedtime  labetalol Injectable 10 milliGRAM(s) IV Push every 2 hours PRN  pantoprazole    Tablet 40 milliGRAM(s) Oral before breakfast  polyethylene glycol 3350 17 Gram(s) Oral daily  senna 2 Tablet(s) Oral at bedtime    IVF:  sodium chloride 3 Gram(s) Oral every 6 hours    CULTURES:    RADIOLOGY & ADDITIONAL TESTS:      ASSESSMENT:  63M 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 MRI o/p 12/22/23 showed progression of right parietal lobe lesion w/ worsening mass effect, edema, and 1.2cm MLS. Now s/p Right parietal and temporal craniotomies for resection of 2 brain mets 1/2/24, and s/p IVCF 1/2/24.    Plan:   Neuro:   - neuro/vitals q4   - pain control   - h/o seizure: c/w Keppra 500mg BID   - Decadron 4mg q6h taper over a week to off   - post op MRI pending    Cardio:  - Normotensive BP goals  - prn hydralazine, labetalol  - EKG QTC 1/2 497    Pulm:  - RA  - CXR: large RML mass  - Incentive spirometry    GI:  - regular diet  - bowel regimen, last BM 1/1   - PPI while on steroids    Renal:  - voiding  - NA tabs 3g q6h for hyponatremia, f/u AM BMP    Endo:  - A1C: 5.3    Heme:   - no SCDs d/t b/l DVTs, SQL   - POD7- begin therapeutic AC  - LE dopplers 12/31: acute B/L popliteal DVTs  - Hx DVT/PE: therapeutic SQL held   - s/p IVCF     ID:  - afebrile    Dispo:   - full code, PT/OT evaluation, OOB,  - Discussed w/ Dr. 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 with Dr. Worthy, s/p SRS (completed 5 rounds 8/15/23), on immunotherapy monthly (last dose 12/15/23, next dose 1/15/2023), drug induced neutropenia (likely bactrim), presents for resection of brain tumor. Patient was initially diagnosed after he was found to be leaving the car door opened, putting his shoes on the wrong feet, and being forgetful of his usual daily routine. Patient reports on 12/20/23 he fell and hit his head (no LOC) d/t developing left sided weakness. Within 2 days, the symptoms of forgetfulness returned. MRI o/p 12/22/23 showed progression of right parietal lobe lesion w/ worsening mass effect, edema, and 1.2cm MLS. Admitted to [facility] for further management.     Heme-onc: Dr. Richards  Rad-onc: Dr. Warren  (31 Dec 2023 15:12)    OVERNIGHT EVENTS: No events overnight. No complaints of pain.,    Hospital Course:  12/31: , Given hydralazine 10mg IVP.   1/1: MADAI overnight. Neuro stable. MR brain complete.   1/2: MADAI overnight. For OR today. POD0 from right parietal and temporal crani and resection (frozen: metastatic melanoma). Given 10 labetalol x2 for BP, toradol x1 for pain, hydral x1 for pressures. Chamberlain removed, f/u TOV.   1/3: POD1 right parietal and temporal crani for tumor resection. MADAI overnight, neuro stable. Step down from ICU. Pending MRI Brain.      Vital Signs Last 24 Hrs  T(C): 36.8 (04 Jan 2024 00:52), Max: 36.8 (04 Jan 2024 00:52)  T(F): 98.3 (04 Jan 2024 00:52), Max: 98.3 (04 Jan 2024 00:52)  HR: 72 (04 Jan 2024 00:52) (58 - 122)  BP: 138/90 (04 Jan 2024 00:52) (134/84 - 150/88)  BP(mean): 108 (03 Jan 2024 11:36) (108 - 114)  RR: 16 (04 Jan 2024 00:52) (12 - 27)  SpO2: 98% (04 Jan 2024 00:52) (98% - 100%)    Parameters below as of 04 Jan 2024 00:52  Patient On (Oxygen Delivery Method): room air        I&O's Summary    02 Jan 2024 07:01  -  03 Jan 2024 07:00  --------------------------------------------------------  IN: 1725 mL / OUT: 970 mL / NET: 755 mL    03 Jan 2024 07:01  -  04 Jan 2024 00:57  --------------------------------------------------------  IN: 75 mL / OUT: 900 mL / NET: -825 mL        PHYSICAL EXAM:  Constitutional: NAD, AAOx3  HEENT: PERRL. EOMI. VF intact.  Respiratory: non-labored breathing. Normal chest rise. CTA b/l  Cardiovascular: Regular rate and rhythm. S1, S2.  Gastrointestinal:  Soft, nontender, nondistended. +BS  Vascular: Extremities warm, no ulcers, no discoloration of skin. Pulses 2+ throughout.  Neurological: Left facial weakness o/w CNs II-XII intact. LAZARO x 4, LUE 4/5 with pronator drift, otherwise 5/5 throughout UE/LE. Sensation intact to light touch throughout.  Wound/incision: Right crani incision c/d/i.      DIET:  [] NPO  [x] Mechanical  [] Tube feeds    LABS:                        12.5   19.76 )-----------( 141      ( 03 Jan 2024 05:30 )             38.5     01-03    137  |  106  |  32<H>  ----------------------------<  123<H>  4.6   |  22  |  0.99    Ca    8.4      03 Jan 2024 14:51  Phos  4.1     01-03  Mg     2.2     01-03      PT/INR - ( 02 Jan 2024 14:21 )   PT: 12.0 sec;   INR: 1.05          PTT - ( 02 Jan 2024 14:21 )  PTT:20.8 sec  Urinalysis Basic - ( 03 Jan 2024 14:51 )    Color: x / Appearance: x / SG: x / pH: x  Gluc: 123 mg/dL / Ketone: x  / Bili: x / Urobili: x   Blood: x / Protein: x / Nitrite: x   Leuk Esterase: x / RBC: x / WBC x   Sq Epi: x / Non Sq Epi: x / Bacteria: x      CARDIAC MARKERS ( 02 Jan 2024 14:22 )  x     / x     / 45 U/L / x     / x          CAPILLARY BLOOD GLUCOSE      POCT Blood Glucose.: 138 mg/dL (03 Jan 2024 21:38)  POCT Blood Glucose.: 97 mg/dL (03 Jan 2024 17:03)  POCT Blood Glucose.: 128 mg/dL (03 Jan 2024 10:55)  POCT Blood Glucose.: 109 mg/dL (03 Jan 2024 05:52)      Drug Levels: [] N/A    CSF Analysis: [] N/A      Allergies    No Known Allergies    Intolerances      MEDICATIONS:  Antibiotics:    Neuro:  acetaminophen     Tablet .. 1000 milliGRAM(s) Oral every 8 hours  ketorolac   Injectable 15 milliGRAM(s) IV Push every 8 hours PRN  levETIRAcetam 500 milliGRAM(s) Oral every 12 hours  ondansetron Injectable 4 milliGRAM(s) IV Push every 6 hours PRN  oxyCODONE    IR 5 milliGRAM(s) Oral every 4 hours PRN    Anticoagulation:  enoxaparin Injectable 40 milliGRAM(s) SubCutaneous <User Schedule>    OTHER:  chlorhexidine 2% Cloths 1 Application(s) Topical <User Schedule>  dexAMETHasone     Tablet 4 milliGRAM(s) Oral every 6 hours  dexAMETHasone     Tablet 3 milliGRAM(s) Oral every 8 hours  dexAMETHasone     Tablet   Oral   hydrALAZINE Injectable 10 milliGRAM(s) IV Push every 2 hours PRN  insulin lispro (ADMELOG) corrective regimen sliding scale   SubCutaneous Before meals and at bedtime  labetalol Injectable 10 milliGRAM(s) IV Push every 2 hours PRN  pantoprazole    Tablet 40 milliGRAM(s) Oral before breakfast  polyethylene glycol 3350 17 Gram(s) Oral daily  senna 2 Tablet(s) Oral at bedtime    IVF:  sodium chloride 3 Gram(s) Oral every 6 hours    CULTURES:    RADIOLOGY & ADDITIONAL TESTS:      ASSESSMENT:  63M 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 MRI o/p 12/22/23 showed progression of right parietal lobe lesion w/ worsening mass effect, edema, and 1.2cm MLS. Now s/p Right parietal and temporal craniotomies for resection of 2 brain mets 1/2/24, and s/p IVCF 1/2/24.    Plan:   Neuro:   - neuro/vitals q4   - pain control   - h/o seizure: c/w Keppra 500mg BID   - Decadron 4mg q6h taper over a week to off   - post op MRI pending    Cardio:  - Normotensive BP goals  - prn hydralazine, labetalol  - EKG QTC 1/2 497    Pulm:  - RA  - CXR: large RML mass  - Incentive spirometry    GI:  - regular diet  - bowel regimen, last BM 1/1   - PPI while on steroids    Renal:  - voiding  - NA tabs 3g q6h for hyponatremia, f/u AM BMP    Endo:  - A1C: 5.3    Heme:   - no SCDs d/t b/l DVTs, SQL   - POD7- begin therapeutic AC  - LE dopplers 12/31: acute B/L popliteal DVTs  - Hx DVT/PE: therapeutic SQL held   - s/p IVCF     ID:  - afebrile    Dispo:   - full code, PT/OT evaluation, OOB,  - Discussed w/ Dr. Tony

## 2024-01-04 NOTE — CONSULT NOTE ADULT - SUBJECTIVE AND OBJECTIVE BOX
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 Worthy, s/p SRS (completed 5 rounds 8/15/23), on immunotherapy monthly (last dose 12/15/23, next dose 1/15/2023), drug induced neutropenia (likely bactrim), presents for resection of brain tumor. Presents s/p fall on 12/20 with head strike and no LOC due to developing left sided weakness and forgetfulness.  Outpatient MRI 12/22/23 showed progression of right parietal lobe lesion w/ worsening mass effect, edema, and 1.2cm MLS. Admitted to [facility] for further management on 12/31. Underwent right parietal and temporal craniotomies for resection of 2 brain mets on 1/2 with Dr. Tony. Underwent IVCF placement on 1/2 for popliteal DVTs. Rehab medicine consulted for evaluation of rehab needs and disposition recommendations.  -----------------------------------------------------------------------  INTERVAL COURSE / SUBJECTIVE:      -----------------------------------------------------------------------  REVIEW OF SYSTEMS  Constitutional - No fever,  +fatigue  Neurological -   Pain -   Bowel -   Bladder -   -----------------------------------------------------------------------  FUNCTIONAL HISTORY:      CURRENT FUNCTIONAL STATUS:    Physical Therapy: 1/3 initial eval  Transfer: Stand to Sit:   · Level of Butts	contact guard  · Physical Assist/Nonphysical Assist	verbal cues; 1 person assist  · Weight-Bearing Restrictions	weight-bearing as tolerated  · Assistive Device	rolling walker    Sit/Stand Transfer Safety Analysis:   · Transfer Safety Concerns Noted	decreased safety awareness  · Impairments Contributing to Impaired Transfers	impaired postural control; decreased strength; impaired balance; cognition    Gait Skills:   · Level of Butts	minAx1 with RW, bilateral handheld assist x2 with no DME  · Physical Assist/Nonphysical Assist	verbal cues  · Weight-Bearing Restrictions	weight-bearing as tolerated  · Assistive Device	RW x50 feet, no DME x10 feet  · Gait Distance	60 feet "    Occupational Therapy: 1/3 initial eval  · Balance Skills	Pt performed functional mob 25x2 with min A and consistently Rward steering of RW requiring cues to maintain straight path with RW and maintain self within scope of RW  · Coordination Assessed	finger to nose; impaired bilat    -----------------------------------------------------------------------  PAST MEDICAL & SURGICAL HISTORY  Melanoma  H/O brain surgery    FAMILY HISTORY   No pertinent family history in first degree relatives    SOCIAL HISTORY  as above  -----------------------------------------------------------------------  VITALS  T(C): 36.3 (01-04-24 @ 08:10), Max: 36.8 (01-04-24 @ 00:52)  HR: 74 (01-04-24 @ 08:10) (63 - 96)  BP: 160/101 (01-04-24 @ 08:10) (134/84 - 160/101)  RR: 18 (01-04-24 @ 08:10) (16 - 18)  SpO2: 99% (01-04-24 @ 08:10) (98% - 100%)  Wt(kg): --    PHYSICAL EXAM    -----------------------------------------------------------------------  RECENT LABS  CBC Full  -  ( 04 Jan 2024 06:38 )  WBC Count : 16.10 K/uL  RBC Count : 4.40 M/uL  Hemoglobin : 12.1 g/dL  Hematocrit : 38.7 %  Platelet Count - Automated : 121 K/uL  Mean Cell Volume : 88.0 fl  Mean Cell Hemoglobin : 27.5 pg  Mean Cell Hemoglobin Concentration : 31.3 gm/dL  Auto Neutrophil # : x  Auto Lymphocyte # : x  Auto Monocyte # : x  Auto Eosinophil # : x  Auto Basophil # : x  Auto Neutrophil % : x  Auto Lymphocyte % : x  Auto Monocyte % : x  Auto Eosinophil % : x  Auto Basophil % : x    01-04    139  |  110<H>  |  32<H>  ----------------------------<  125<H>  4.9   |  22  |  0.84    Ca    8.4      04 Jan 2024 06:38  Phos  3.2     01-04  Mg     2.6     01-04      Urinalysis Basic - ( 04 Jan 2024 06:38 )    Color: x / Appearance: x / SG: x / pH: x  Gluc: 125 mg/dL / Ketone: x  / Bili: x / Urobili: x   Blood: x / Protein: x / Nitrite: x   Leuk Esterase: x / RBC: x / WBC x   Sq Epi: x / Non Sq Epi: x / Bacteria: x      -----------------------------------------------------------------------  IMAGING:  < from: MR Brain Stereotactic w/wo IV Cont (01.01.24 @ 11:42) >  IMPRESSION:    Since 12/22/23, there is interval improvement in vasogenic edema and mass   effect from multiple brain metastasis- likely a response to steroids.    Largest masses and greatest mass effect is again at the right cerebral   hemisphere. No significant change in lesional size nor visibly new brain   metastasis or hemorrhagic focus at this time.    < end of copied text >      < from: US Duplex Venous Lower Ext Complete, Bilateral (12.31.23 @ 19:07) >  IMPRESSION:  Acute deep venous thrombosis: above the knee.    Acute DVTs in the bilateral popliteal veins.    < end of copied text >    < from: NM PET/CT Oncology FDG WB, Subsq (12.05.23 @ 12:35) >  BONES/SOFT TISSUES: Diffuse, mildly increased FDG activity in the axial   skeleton, without corresponding abnormalities on CT. For reference,   posterior left iliac bone demonstrates SUV 3.7 (image [id number]). In addition,   there is heterogeneous, increased FDG activity in the bilateral humeri   and femurs and right tibia, also without corresponding abnormalities on   CT.    IMPRESSION: Abnormal skull-to-thigh FDG-PET/CT scan.    1. Partially FDG-avid brain metastases and postsurgical changes as   described above. Please correlate with recently performed pending brain   MRI.    2. Intensely FDG-avid, partially necrotic right upper lobe lung mass,   similar in size and appearance as compared to CT dated 9/9/2023, is   compatible with malignancy.    3. Nonspecific FDG-avid skin thickening involving the bilateral nose,   face, and external ears. Please correlate clinically and with direct   visualization.    4. Nonspecific bone marrow hypermetabolism. Please correlate clinically   and with laboratory studies.    < end of copied text >    -----------------------------------------------------------------------  ALLERGIES  No Known Allergies      MEDICATIONS   acetaminophen     Tablet .. 1000 milliGRAM(s) Oral every 8 hours  chlorhexidine 2% Cloths 1 Application(s) Topical <User Schedule>  dexAMETHasone     Tablet   Oral   dexAMETHasone     Tablet 4 milliGRAM(s) Oral every 6 hours  dexAMETHasone     Tablet 3 milliGRAM(s) Oral every 8 hours  enoxaparin Injectable 40 milliGRAM(s) SubCutaneous <User Schedule>  hydrALAZINE Injectable 10 milliGRAM(s) IV Push every 2 hours PRN  ketorolac   Injectable 15 milliGRAM(s) IV Push every 8 hours PRN  labetalol Injectable 10 milliGRAM(s) IV Push every 2 hours PRN  levETIRAcetam 500 milliGRAM(s) Oral every 12 hours  ondansetron Injectable 4 milliGRAM(s) IV Push every 6 hours PRN  oxyCODONE    IR 5 milliGRAM(s) Oral every 4 hours PRN  pantoprazole    Tablet 40 milliGRAM(s) Oral before breakfast  polyethylene glycol 3350 17 Gram(s) Oral daily  senna 2 Tablet(s) Oral at bedtime  sodium chloride 2 Gram(s) Oral every 8 hours    -----------------------------------------------------------------------   64 YO male with 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 Worthy, s/p SRS (completed 5 rounds 8/15/23), on immunotherapy monthly (last dose 12/15/23, next dose 1/15/2023), drug induced neutropenia (likely bactrim), presents for resection of brain tumor. Presents s/p fall on 12/20 with head strike and no LOC due to developing left sided weakness and forgetfulness.  Outpatient MRI 12/22/23 showed progression of right parietal lobe lesion w/ worsening mass effect, edema, and 1.2cm MLS. Admitted to [facility] for further management on 12/31. Underwent right parietal and temporal craniotomies for resection of 2 brain mets on 1/2 with Dr. Tony. Underwent IVCF placement on 1/2 for popliteal DVTs. Rehab medicine consulted for evaluation of rehab needs and disposition recommendations.  -----------------------------------------------------------------------  INTERVAL COURSE / SUBJECTIVE:      -----------------------------------------------------------------------  REVIEW OF SYSTEMS  Constitutional - No fever,  +fatigue  Neurological -   Pain -   Bowel -   Bladder -   -----------------------------------------------------------------------  FUNCTIONAL HISTORY:      CURRENT FUNCTIONAL STATUS:    Physical Therapy: 1/3 initial eval  Transfer: Stand to Sit:   · Level of [place]	contact guard  · Physical Assist/Nonphysical Assist	verbal cues; 1 person assist  · Weight-Bearing Restrictions	weight-bearing as tolerated  · Assistive Device	rolling walker    Sit/Stand Transfer Safety Analysis:   · Transfer Safety Concerns Noted	decreased safety awareness  · Impairments Contributing to Impaired Transfers	impaired postural control; decreased strength; impaired balance; cognition    Gait Skills:   · Level of [place]	minAx1 with RW, bilateral handheld assist x2 with no DME  · Physical Assist/Nonphysical Assist	verbal cues  · Weight-Bearing Restrictions	weight-bearing as tolerated  · Assistive Device	RW x50 feet, no DME x10 feet  · Gait Distance	60 feet "    Occupational Therapy: 1/3 initial eval  · Balance Skills	Pt performed functional mob 25x2 with min A and consistently Rward steering of RW requiring cues to maintain straight path with RW and maintain self within scope of RW  · Coordination Assessed	finger to nose; impaired bilat    -----------------------------------------------------------------------  PAST MEDICAL & SURGICAL HISTORY  Melanoma  H/O brain surgery    FAMILY HISTORY   No pertinent family history in first degree relatives    SOCIAL HISTORY  as above  -----------------------------------------------------------------------  VITALS  T(C): 36.3 (01-04-24 @ 08:10), Max: 36.8 (01-04-24 @ 00:52)  HR: 74 (01-04-24 @ 08:10) (63 - 96)  BP: 160/101 (01-04-24 @ 08:10) (134/84 - 160/101)  RR: 18 (01-04-24 @ 08:10) (16 - 18)  SpO2: 99% (01-04-24 @ 08:10) (98% - 100%)  Wt(kg): --    PHYSICAL EXAM    -----------------------------------------------------------------------  RECENT LABS  CBC Full  -  ( 04 Jan 2024 06:38 )  WBC Count : 16.10 K/uL  RBC Count : 4.40 M/uL  Hemoglobin : 12.1 g/dL  Hematocrit : 38.7 %  Platelet Count - Automated : 121 K/uL  Mean Cell Volume : 88.0 fl  Mean Cell Hemoglobin : 27.5 pg  Mean Cell Hemoglobin Concentration : 31.3 gm/dL  Auto Neutrophil # : x  Auto Lymphocyte # : x  Auto Monocyte # : x  Auto Eosinophil # : x  Auto Basophil # : x  Auto Neutrophil % : x  Auto Lymphocyte % : x  Auto Monocyte % : x  Auto Eosinophil % : x  Auto Basophil % : x    01-04    139  |  110<H>  |  32<H>  ----------------------------<  125<H>  4.9   |  22  |  0.84    Ca    8.4      04 Jan 2024 06:38  Phos  3.2     01-04  Mg     2.6     01-04      Urinalysis Basic - ( 04 Jan 2024 06:38 )    Color: x / Appearance: x / SG: x / pH: x  Gluc: 125 mg/dL / Ketone: x  / Bili: x / Urobili: x   Blood: x / Protein: x / Nitrite: x   Leuk Esterase: x / RBC: x / WBC x   Sq Epi: x / Non Sq Epi: x / Bacteria: x      -----------------------------------------------------------------------  IMAGING:  < from: MR Brain Stereotactic w/wo IV Cont (01.01.24 @ 11:42) >  IMPRESSION:    Since 12/22/23, there is interval improvement in vasogenic edema and mass   effect from multiple brain metastasis- likely a response to steroids.    Largest masses and greatest mass effect is again at the right cerebral   hemisphere. No significant change in lesional size nor visibly new brain   metastasis or hemorrhagic focus at this time.    < end of copied text >      < from: US Duplex Venous Lower Ext Complete, Bilateral (12.31.23 @ 19:07) >  IMPRESSION:  Acute deep venous thrombosis: above the knee.    Acute DVTs in the bilateral popliteal veins.    < end of copied text >    < from: NM PET/CT Oncology FDG WB, Subsq (12.05.23 @ 12:35) >  BONES/SOFT TISSUES: Diffuse, mildly increased FDG activity in the axial   skeleton, without corresponding abnormalities on CT. For reference,   posterior left iliac bone demonstrates SUV 3.7 (image 246). In addition,   there is heterogeneous, increased FDG activity in the bilateral humeri   and femurs and right tibia, also without corresponding abnormalities on   CT.    IMPRESSION: Abnormal skull-to-thigh FDG-PET/CT scan.    1. Partially FDG-avid brain metastases and postsurgical changes as   described above. Please correlate with recently performed pending brain   MRI.    2. Intensely FDG-avid, partially necrotic right upper lobe lung mass,   similar in size and appearance as compared to CT dated 9/9/2023, is   compatible with malignancy.    3. Nonspecific FDG-avid skin thickening involving the bilateral nose,   face, and external ears. Please correlate clinically and with direct   visualization.    4. Nonspecific bone marrow hypermetabolism. Please correlate clinically   and with laboratory studies.    < end of copied text >    -----------------------------------------------------------------------  ALLERGIES  No Known Allergies      MEDICATIONS   acetaminophen     Tablet .. 1000 milliGRAM(s) Oral every 8 hours  chlorhexidine 2% Cloths 1 Application(s) Topical <User Schedule>  dexAMETHasone     Tablet   Oral   dexAMETHasone     Tablet 4 milliGRAM(s) Oral every 6 hours  dexAMETHasone     Tablet 3 milliGRAM(s) Oral every 8 hours  enoxaparin Injectable 40 milliGRAM(s) SubCutaneous <User Schedule>  hydrALAZINE Injectable 10 milliGRAM(s) IV Push every 2 hours PRN  ketorolac   Injectable 15 milliGRAM(s) IV Push every 8 hours PRN  labetalol Injectable 10 milliGRAM(s) IV Push every 2 hours PRN  levETIRAcetam 500 milliGRAM(s) Oral every 12 hours  ondansetron Injectable 4 milliGRAM(s) IV Push every 6 hours PRN  oxyCODONE    IR 5 milliGRAM(s) Oral every 4 hours PRN  pantoprazole    Tablet 40 milliGRAM(s) Oral before breakfast  polyethylene glycol 3350 17 Gram(s) Oral daily  senna 2 Tablet(s) Oral at bedtime  sodium chloride 2 Gram(s) Oral every 8 hours    -----------------------------------------------------------------------   62 YO male with 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 Worthy, s/p SRS (completed 5 rounds 8/15/23), on immunotherapy monthly (last dose 12/15/23, next dose 1/15/2023), drug induced neutropenia (likely bactrim), presents for resection of brain tumor. Presents s/p fall on 12/20 with head strike and no LOC due to developing left sided weakness and forgetfulness.  Outpatient MRI 12/22/23 showed progression of right parietal lobe lesion w/ worsening mass effect, edema, and 1.2cm MLS. Admitted to [facility] for further management on 12/31. Underwent right parietal and temporal craniotomies for resection of 2 brain mets on 1/2 with Dr. Tony. Underwent IVCF placement on 1/2 for popliteal DVTs. Rehab medicine consulted for evaluation of rehab needs and disposition recommendations.  -----------------------------------------------------------------------  INTERVAL COURSE / SUBJECTIVE:  Seen and evaluated at bedside this afternoon, accompanied by his wife. Patient reports he is doing well post-op. No acute issues overnight.   Required IV meds for BP earlier today, but improved. Started PO regimen.  Patient and wife are interested in acute rehab. He states he worked with therapy without any issues. Feels his balance is a barrier.   Following his first resection he had home PT/OT for a few sessions. Up until recent events he was independent. Reports being very active, played golf and was a former athlete (basketball).   Denies cognitive changes or issues with memory.   -----------------------------------------------------------------------  REVIEW OF SYSTEMS  Constitutional - No fever,  No fatigue  Neurological - Improved strength. R foot drop was present before admission.   Pain - well controlled  Bowel - denies issues  Bladder - voiding  -----------------------------------------------------------------------  FUNCTIONAL HISTORY:  PTA independent with ADLs and mobility.  Lives on [address] with his supportive wife in a private home with stairs in home.     CURRENT FUNCTIONAL STATUS:    Physical Therapy: 1/3 initial eval  Transfer: Stand to Sit:   · Level of [place]	contact guard  · Physical Assist/Nonphysical Assist	verbal cues; 1 person assist  · Weight-Bearing Restrictions	weight-bearing as tolerated  · Assistive Device	rolling walker    Sit/Stand Transfer Safety Analysis:   · Transfer Safety Concerns Noted	decreased safety awareness  · Impairments Contributing to Impaired Transfers	impaired postural control; decreased strength; impaired balance; cognition    Gait Skills:   · Level of [place]	minAx1 with RW, bilateral handheld assist x2 with no DME  · Physical Assist/Nonphysical Assist	verbal cues  · Weight-Bearing Restrictions	weight-bearing as tolerated  · Assistive Device	RW x50 feet, no DME x10 feet  · Gait Distance	60 feet "    Occupational Therapy: 1/3 initial eval  · Balance Skills	Pt performed functional mob 25x2 with min A and consistently Rward steering of RW requiring cues to maintain straight path with RW and maintain self within scope of RW  · Coordination Assessed	finger to nose; impaired bilat    -----------------------------------------------------------------------  PAST MEDICAL & SURGICAL HISTORY  Melanoma  H/O brain surgery    FAMILY HISTORY   No pertinent family history in first degree relatives    SOCIAL HISTORY  as above  -----------------------------------------------------------------------  VITALS  T(C): 36.3 (01-04-24 @ 08:10), Max: 36.8 (01-04-24 @ 00:52)  HR: 74 (01-04-24 @ 08:10) (63 - 96)  BP: 160/101 (01-04-24 @ 08:10) (134/84 - 160/101)  RR: 18 (01-04-24 @ 08:10) (16 - 18)  SpO2: 99% (01-04-24 @ 08:10) (98% - 100%)  Wt(kg): --    PHYSICAL EXAM  Constitutional - NAD, Comfortable  HEENT - s/p right craniotomy  Neck - Supple, No limited ROM  Chest - Breathing comfortably, No Respiratory distress  Cardiovascular - Regular pulse  Abdomen - No visible abdominal distension  Extremities - No deformities of upper or lower limbs. No calf tenderness   Neurologic Exam -                    Cognitive - Awake, Alert, AAO to self, place, date, year, situation; follows commands     Communication - Fluent, No dysarthria, repetition intact, attention/concentration intact     Cranial Nerves -  EOMI, No facial asymmetry     Motor - No focal deficits                    LEFT    UE - ShAB 5/5, EF 5/5, EE 5/5, WE 5/5,  5/5                    LEFT    LE - HF 5/5, KE 5/5, DF 5/5, PF 5/5                    RIGHT UE - ShAB 5/5, EF 5/5, EE 5/5, WE 5/5,  5/5                    RIGHT LE - HF 4/5, KE 5/5, DF 4/5, PF 5/5   (inversion 5/5, eversion 4/5)                    No pronator drift     Sensory - Intact to LT throughout upper and lower extremity dermatomes     Reflexes - no clonus     Coordination - FTN intact     OculoVestibular - No nystagmus  Psychiatric - Mood stable, Affect WNL   -----------------------------------------------------------------------  RECENT LABS  CBC Full  -  ( 04 Jan 2024 06:38 )  WBC Count : 16.10 K/uL  RBC Count : 4.40 M/uL  Hemoglobin : 12.1 g/dL  Hematocrit : 38.7 %  Platelet Count - Automated : 121 K/uL  Mean Cell Volume : 88.0 fl  Mean Cell Hemoglobin : 27.5 pg  Mean Cell Hemoglobin Concentration : 31.3 gm/dL  Auto Neutrophil # : x  Auto Lymphocyte # : x  Auto Monocyte # : x  Auto Eosinophil # : x  Auto Basophil # : x  Auto Neutrophil % : x  Auto Lymphocyte % : x  Auto Monocyte % : x  Auto Eosinophil % : x  Auto Basophil % : x    01-04    139  |  110<H>  |  32<H>  ----------------------------<  125<H>  4.9   |  22  |  0.84    Ca    8.4      04 Jan 2024 06:38  Phos  3.2     01-04  Mg     2.6     01-04      Urinalysis Basic - ( 04 Jan 2024 06:38 )    Color: x / Appearance: x / SG: x / pH: x  Gluc: 125 mg/dL / Ketone: x  / Bili: x / Urobili: x   Blood: x / Protein: x / Nitrite: x   Leuk Esterase: x / RBC: x / WBC x   Sq Epi: x / Non Sq Epi: x / Bacteria: x      -----------------------------------------------------------------------  IMAGING:  < from: MR Brain Stereotactic w/wo IV Cont (01.01.24 @ 11:42) >  IMPRESSION:    Since 12/22/23, there is interval improvement in vasogenic edema and mass   effect from multiple brain metastasis- likely a response to steroids.    Largest masses and greatest mass effect is again at the right cerebral   hemisphere. No significant change in lesional size nor visibly new brain   metastasis or hemorrhagic focus at this time.    < end of copied text >      < from: US Duplex Venous Lower Ext Complete, Bilateral (12.31.23 @ 19:07) >  IMPRESSION:  Acute deep venous thrombosis: above the knee.    Acute DVTs in the bilateral popliteal veins.    < end of copied text >    < from: NM PET/CT Oncology FDG WB, Subsq (12.05.23 @ 12:35) >  BONES/SOFT TISSUES: Diffuse, mildly increased FDG activity in the axial   skeleton, without corresponding abnormalities on CT. For reference,   posterior left iliac bone demonstrates SUV 3.7 (image 246). In addition,   there is heterogeneous, increased FDG activity in the bilateral humeri   and femurs and right tibia, also without corresponding abnormalities on   CT.    IMPRESSION: Abnormal skull-to-thigh FDG-PET/CT scan.    1. Partially FDG-avid brain metastases and postsurgical changes as   described above. Please correlate with recently performed pending brain   MRI.    2. Intensely FDG-avid, partially necrotic right upper lobe lung mass,   similar in size and appearance as compared to CT dated 9/9/2023, is   compatible with malignancy.    3. Nonspecific FDG-avid skin thickening involving the bilateral nose,   face, and external ears. Please correlate clinically and with direct   visualization.    4. Nonspecific bone marrow hypermetabolism. Please correlate clinically   and with laboratory studies.    < end of copied text >    -----------------------------------------------------------------------  ALLERGIES  No Known Allergies      MEDICATIONS   acetaminophen     Tablet .. 1000 milliGRAM(s) Oral every 8 hours  chlorhexidine 2% Cloths 1 Application(s) Topical <User Schedule>  dexAMETHasone     Tablet   Oral   dexAMETHasone     Tablet 4 milliGRAM(s) Oral every 6 hours  dexAMETHasone     Tablet 3 milliGRAM(s) Oral every 8 hours  enoxaparin Injectable 40 milliGRAM(s) SubCutaneous <User Schedule>  hydrALAZINE Injectable 10 milliGRAM(s) IV Push every 2 hours PRN  ketorolac   Injectable 15 milliGRAM(s) IV Push every 8 hours PRN  labetalol Injectable 10 milliGRAM(s) IV Push every 2 hours PRN  levETIRAcetam 500 milliGRAM(s) Oral every 12 hours  ondansetron Injectable 4 milliGRAM(s) IV Push every 6 hours PRN  oxyCODONE    IR 5 milliGRAM(s) Oral every 4 hours PRN  pantoprazole    Tablet 40 milliGRAM(s) Oral before breakfast  polyethylene glycol 3350 17 Gram(s) Oral daily  senna 2 Tablet(s) Oral at bedtime  sodium chloride 2 Gram(s) Oral every 8 hours    -----------------------------------------------------------------------   62 YO male with 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 Worthy, s/p SRS (completed 5 rounds 8/15/23), on immunotherapy monthly (last dose 12/15/23, next dose 1/15/2023), drug induced neutropenia (likely bactrim), presents for resection of brain tumor. Presents s/p fall on 12/20 with head strike and no LOC due to developing left sided weakness and forgetfulness.  Outpatient MRI 12/22/23 showed progression of right parietal lobe lesion w/ worsening mass effect, edema, and 1.2cm MLS. Admitted to [facility] for further management on 12/31. Underwent right parietal and temporal craniotomies for resection of 2 brain mets on 1/2 with Dr. Tony. Underwent IVCF placement on 1/2 for popliteal DVTs. Rehab medicine consulted for evaluation of rehab needs and disposition recommendations.  -----------------------------------------------------------------------  INTERVAL COURSE / SUBJECTIVE:  Seen and evaluated at bedside this afternoon, accompanied by his wife. Patient reports he is doing well post-op. No acute issues overnight.   Required IV meds for BP earlier today, but improved. Started PO regimen.  Patient and wife are interested in acute rehab. He states he worked with therapy without any issues. Feels his balance is a barrier.   Following his first resection he had home PT/OT for a few sessions. Up until recent events he was independent. Reports being very active, played golf and was a former athlete (basketball).   Denies cognitive changes or issues with memory.   -----------------------------------------------------------------------  REVIEW OF SYSTEMS  Constitutional - No fever,  No fatigue  Neurological - Improved strength. R foot drop was present before admission.   Pain - well controlled  Bowel - denies issues  Bladder - voiding  -----------------------------------------------------------------------  FUNCTIONAL HISTORY:  PTA independent with ADLs and mobility.  Lives on [place] with his supportive wife in a private home with stairs in home.     CURRENT FUNCTIONAL STATUS:    Physical Therapy: 1/3 initial eval  Transfer: Stand to Sit:   · Level of [place]	contact guard  · Physical Assist/Nonphysical Assist	verbal cues; 1 person assist  · Weight-Bearing Restrictions	weight-bearing as tolerated  · Assistive Device	rolling walker    Sit/Stand Transfer Safety Analysis:   · Transfer Safety Concerns Noted	decreased safety awareness  · Impairments Contributing to Impaired Transfers	impaired postural control; decreased strength; impaired balance; cognition    Gait Skills:   · Level of [place]	minAx1 with RW, bilateral handheld assist x2 with no DME  · Physical Assist/Nonphysical Assist	verbal cues  · Weight-Bearing Restrictions	weight-bearing as tolerated  · Assistive Device	RW x50 feet, no DME x10 feet  · Gait Distance	60 feet "    Occupational Therapy: 1/3 initial eval  · Balance Skills	Pt performed functional mob 25x2 with min A and consistently Rward steering of RW requiring cues to maintain straight path with RW and maintain self within scope of RW  · Coordination Assessed	finger to nose; impaired bilat    -----------------------------------------------------------------------  PAST MEDICAL & SURGICAL HISTORY  Melanoma  H/O brain surgery    FAMILY HISTORY   No pertinent family history in first degree relatives    SOCIAL HISTORY  as above  -----------------------------------------------------------------------  VITALS  T(C): 36.3 (01-04-24 @ 08:10), Max: 36.8 (01-04-24 @ 00:52)  HR: 74 (01-04-24 @ 08:10) (63 - 96)  BP: 160/101 (01-04-24 @ 08:10) (134/84 - 160/101)  RR: 18 (01-04-24 @ 08:10) (16 - 18)  SpO2: 99% (01-04-24 @ 08:10) (98% - 100%)  Wt(kg): --    PHYSICAL EXAM  Constitutional - NAD, Comfortable  HEENT - s/p right craniotomy  Neck - Supple, No limited ROM  Chest - Breathing comfortably, No Respiratory distress  Cardiovascular - Regular pulse  Abdomen - No visible abdominal distension  Extremities - No deformities of upper or lower limbs. No calf tenderness   Neurologic Exam -                    Cognitive - Awake, Alert, AAO to self, place, date, year, situation; follows commands     Communication - Fluent, No dysarthria, repetition intact, attention/concentration intact     Cranial Nerves -  EOMI, No facial asymmetry     Motor - No focal deficits                    LEFT    UE - ShAB 5/5, EF 5/5, EE 5/5, WE 5/5,  5/5                    LEFT    LE - HF 5/5, KE 5/5, DF 5/5, PF 5/5                    RIGHT UE - ShAB 5/5, EF 5/5, EE 5/5, WE 5/5,  5/5                    RIGHT LE - HF 4/5, KE 5/5, DF 4/5, PF 5/5   (inversion 5/5, eversion 4/5)                    No pronator drift     Sensory - Intact to LT throughout upper and lower extremity dermatomes     Reflexes - no clonus     Coordination - FTN intact     OculoVestibular - No nystagmus  Psychiatric - Mood stable, Affect WNL   -----------------------------------------------------------------------  RECENT LABS  CBC Full  -  ( 04 Jan 2024 06:38 )  WBC Count : 16.10 K/uL  RBC Count : 4.40 M/uL  Hemoglobin : 12.1 g/dL  Hematocrit : 38.7 %  Platelet Count - Automated : 121 K/uL  Mean Cell Volume : 88.0 fl  Mean Cell Hemoglobin : 27.5 pg  Mean Cell Hemoglobin Concentration : 31.3 gm/dL  Auto Neutrophil # : x  Auto Lymphocyte # : x  Auto Monocyte # : x  Auto Eosinophil # : x  Auto Basophil # : x  Auto Neutrophil % : x  Auto Lymphocyte % : x  Auto Monocyte % : x  Auto Eosinophil % : x  Auto Basophil % : x    01-04    139  |  110<H>  |  32<H>  ----------------------------<  125<H>  4.9   |  22  |  0.84    Ca    8.4      04 Jan 2024 06:38  Phos  3.2     01-04  Mg     2.6     01-04      Urinalysis Basic - ( 04 Jan 2024 06:38 )    Color: x / Appearance: x / SG: x / pH: x  Gluc: 125 mg/dL / Ketone: x  / Bili: x / Urobili: x   Blood: x / Protein: x / Nitrite: x   Leuk Esterase: x / RBC: x / WBC x   Sq Epi: x / Non Sq Epi: x / Bacteria: x      -----------------------------------------------------------------------  IMAGING:  < from: MR Brain Stereotactic w/wo IV Cont (01.01.24 @ 11:42) >  IMPRESSION:    Since 12/22/23, there is interval improvement in vasogenic edema and mass   effect from multiple brain metastasis- likely a response to steroids.    Largest masses and greatest mass effect is again at the right cerebral   hemisphere. No significant change in lesional size nor visibly new brain   metastasis or hemorrhagic focus at this time.    < end of copied text >      < from: US Duplex Venous Lower Ext Complete, Bilateral (12.31.23 @ 19:07) >  IMPRESSION:  Acute deep venous thrombosis: above the knee.    Acute DVTs in the bilateral popliteal veins.    < end of copied text >    < from: NM PET/CT Oncology FDG WB, Subsq (12.05.23 @ 12:35) >  BONES/SOFT TISSUES: Diffuse, mildly increased FDG activity in the axial   skeleton, without corresponding abnormalities on CT. For reference,   posterior left iliac bone demonstrates SUV 3.7 (image 246). In addition,   there is heterogeneous, increased FDG activity in the bilateral humeri   and femurs and right tibia, also without corresponding abnormalities on   CT.    IMPRESSION: Abnormal skull-to-thigh FDG-PET/CT scan.    1. Partially FDG-avid brain metastases and postsurgical changes as   described above. Please correlate with recently performed pending brain   MRI.    2. Intensely FDG-avid, partially necrotic right upper lobe lung mass,   similar in size and appearance as compared to CT dated 9/9/2023, is   compatible with malignancy.    3. Nonspecific FDG-avid skin thickening involving the bilateral nose,   face, and external ears. Please correlate clinically and with direct   visualization.    4. Nonspecific bone marrow hypermetabolism. Please correlate clinically   and with laboratory studies.    < end of copied text >    -----------------------------------------------------------------------  ALLERGIES  No Known Allergies      MEDICATIONS   acetaminophen     Tablet .. 1000 milliGRAM(s) Oral every 8 hours  chlorhexidine 2% Cloths 1 Application(s) Topical <User Schedule>  dexAMETHasone     Tablet   Oral   dexAMETHasone     Tablet 4 milliGRAM(s) Oral every 6 hours  dexAMETHasone     Tablet 3 milliGRAM(s) Oral every 8 hours  enoxaparin Injectable 40 milliGRAM(s) SubCutaneous <User Schedule>  hydrALAZINE Injectable 10 milliGRAM(s) IV Push every 2 hours PRN  ketorolac   Injectable 15 milliGRAM(s) IV Push every 8 hours PRN  labetalol Injectable 10 milliGRAM(s) IV Push every 2 hours PRN  levETIRAcetam 500 milliGRAM(s) Oral every 12 hours  ondansetron Injectable 4 milliGRAM(s) IV Push every 6 hours PRN  oxyCODONE    IR 5 milliGRAM(s) Oral every 4 hours PRN  pantoprazole    Tablet 40 milliGRAM(s) Oral before breakfast  polyethylene glycol 3350 17 Gram(s) Oral daily  senna 2 Tablet(s) Oral at bedtime  sodium chloride 2 Gram(s) Oral every 8 hours    -----------------------------------------------------------------------

## 2024-01-04 NOTE — PROGRESS NOTE ADULT - TIME BILLING
More than 50 percent of which was spent on counseling and coordination of care.
chart review including outpatient records, patient interview/exam, review of labs/imaging, management of medical conditions, counseling/educating patient, discussion with consultants, documentation
More than 50 percent of which was spent on counseling and coordination of care.
More than 50 percent of which was spent on counseling and coordination of care.

## 2024-01-04 NOTE — CHART NOTE - NSCHARTNOTEFT_GEN_A_CORE
No plan for chemotherapy or radiation therapy while at rehab. Can follow up with Oncology after rehab
Patient can tolerate 3 hours of therapy at rehab.

## 2024-01-04 NOTE — PROGRESS NOTE ADULT - ASSESSMENT
63 YOM with PMH of DVT/PT (8/2023 on enoxaparin), remote history of MOHs resection of melanoma with development of brain mets (melanoma) in 6/2023 s/p R crani for resection followed by 5-cycles SRS (end 8/2023) currently on immunotherapy (last dose 12/15/23, next dose 1/15/23), drug induced neutropenia (likely TMP-SMX) who had outpatient MRI showing progression of R parietal lobe lesion with mass effect (also noted to have enlarging right lung mass). He is now s/p right parietal and temporal craniotomies for resection of two brain lesions as well as IVC filter placement. Course c/by hyponatremia and hypertension.     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 Tony on 1/2  - on steroid taper and receiving levetiracetam 500mg BID for seizure ppx  - pain control with bowel regimen  - frequent perioperative incentive spirometer use  - early ambulation and OOBTC as tolerated    DVT/PE  - diagnosed 8/2023 and has been on therapeutic enoxaparin   - venous duplex 12/31/23 with acute proximal DVT   - s/p IVC filter placement 1/2  - ideally should re-start therapeutic enoxaparin once cleared by NSGY (and removal of IVC)    Hyponatremia  - resolved, goal is normal sodium  - salt tabs reduced from 3g to 2g q8hr  - can continue to titrate down, may be contributing to HTN    Hypertension  - normotensive BP goals  - denies prior history of HTN, not on any home meds  - potential contributors: salt tabs, steroids, pain  - recommend starting nifedipine 30mg ER with holding parameters  - can cont IVP labetalol PRN    Metastatic melanoma  Right lung mass  - CXR with 10.8cm RUL mass (enlarged from 9.3cm), cont pulmonary hygiene   - receiving immunotherapy monthly (last dose 12/15/23, next dose 1/15/2023)    Leukocytosis  - afebrile, no s/s infection  - likely reactive 2/2 surgery, steroids, VTE  - monitor off antibiotics    Thrombocytopenia   - -140s  - no s/s active bleeding  - trend daily     Dispo: acute rehab pending auth    Plan discussed with primary team.

## 2024-01-04 NOTE — CONSULT NOTE ADULT - ASSESSMENT
62 YO male with metastatic melanoma with known brain metastasis s/p right craniotomy at OSH, [facility], on immunotherapy who presented s/p fall due to worsening left hemiparesis, found to have progression of disease now s/p right temporal and parietal craniotomies for resection of brain mets, now with ADL and functional impairments.    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    PROBLEMS / IMPAIRMENTS:  Brain metastasis s/p re-resection craniotomy - decadron taper, keppra for seizure history  Metastatic melanoma - on immunotherapy outpatient  DVT/PEs - s/p IVCF 1/2  HTN - nifedepine  Hyponatremia - Na tabs    PLAN / RECOMMENDATIONS:     Rehab / Mobilization:   - Initial therapy assessment: [X] PT  [X] OT   - Continue skilled therapy while admitted to prevent secondary complications of immobility focus on transfer training, bed mobility, progressive ambulation, and equipment evaluation.   - Educated patient and family members on post-acute rehabilitation. Discussed anticipated rehab course.  - Therapy precautions: craniotomy, seizures, monitor BP    Bracing/Splinting:   - Would benefit from right foot brace (AFO) for foot drop given history of falls and gait imbalance. Will defer to AR for assessment by orthotist.     Pain Management:   - Currently well controlled on current regimen    - Avoid/ monitor use sedating medications that may interfere with cognitive recovery; monitor opioid use    Speech/ Swallow:   - Dysphagia screen [X]  - Diet:  regular + thins    GI/ Bowel: at risk for constipation  - Continue to monitor bowel patterns.   - Agree with current bowel regimen.     / Bladder: Voiding independently  - Continue to monitor bladder patterns, avoid constipation.       DVT Prophylaxis:   -SCDs, chemoprophylaxis per primary team      Disposition:  Acute Inpatient Rehabilitation  - Patient has significant functional impairments and would benefit from continued rehabilitation in the ACUTE inpatient rehab setting. He has both medical and functional needs appropriate for acute inpatient rehabilitation and would benefit from comprehensive interdisciplinary care, including a physiatrist, rehabilitation nursing, PT, OT, SLT and case management/social work. We anticipate that he would be able to tolerate 3 hours of PT/OT/SLT per day for 5 to 6 days per week to focus on mobility, transfers, gait training with assistive devices, ADL training, speech, swallow, cognition, and patient education/safety. Medical necessity includes close monitoring of neurologic status, medication management, wound care.  - Discussed with team to clarify oncologic treatment plan prior to discharge. 62 YO male with metastatic melanoma with known brain metastasis s/p right craniotomy at OS, [facility], on immunotherapy who presented s/p fall due to worsening left hemiparesis, found to have progression of disease now s/p right temporal and parietal craniotomies for resection of brain mets, now with ADL and functional impairments.    PROBLEMS / IMPAIRMENTS:  Brain metastasis s/p re-resection craniotomy - decadron taper, keppra for seizure history  Metastatic melanoma - on immunotherapy outpatient  DVT/PEs - s/p IVCF 1/2  HTN - nifedepine  Hyponatremia - Na tabs    PLAN / RECOMMENDATIONS:     Rehab / Mobilization:   - Initial therapy assessment: [X] PT  [X] OT   - Continue skilled therapy while admitted to prevent secondary complications of immobility focus on transfer training, bed mobility, progressive ambulation, and equipment evaluation.   - Educated patient and family members on post-acute rehabilitation. Discussed anticipated rehab course.  - Therapy precautions: craniotomy, seizures, monitor BP    Bracing/Splinting:   - Would benefit from right foot brace (AFO) for foot drop given history of falls and gait imbalance. Will defer to AR for assessment by orthotist.     Pain Management:   - Currently well controlled on current regimen    - Avoid/ monitor use sedating medications that may interfere with cognitive recovery; monitor opioid use    Speech/ Swallow:   - Dysphagia screen [X]  - Diet:  regular + thins    GI/ Bowel: at risk for constipation  - Continue to monitor bowel patterns.   - Agree with current bowel regimen.     / Bladder: Voiding independently  - Continue to monitor bladder patterns, avoid constipation.       DVT Prophylaxis:   -SCDs, chemoprophylaxis per primary team      Disposition:  Acute Inpatient Rehabilitation  - Patient has significant functional impairments and would benefit from continued rehabilitation in the ACUTE inpatient rehab setting. He has both medical and functional needs appropriate for acute inpatient rehabilitation and would benefit from comprehensive interdisciplinary care, including a physiatrist, rehabilitation nursing, PT, OT, SLT and case management/social work. We anticipate that he would be able to tolerate 3 hours of PT/OT/SLT per day for 5 to 6 days per week to focus on mobility, transfers, gait training with assistive devices, ADL training, speech, swallow, cognition, and patient education/safety. Medical necessity includes close monitoring of neurologic status, medication management, wound care.  - Discussed with team to clarify oncologic treatment plan prior to discharge. 64 YO male with metastatic melanoma with known brain metastasis s/p right craniotomy at OS, [facility], on immunotherapy who presented s/p fall due to worsening left hemiparesis, found to have progression of disease now s/p right temporal and parietal craniotomies for resection of brain mets, now with ADL and functional impairments.    PROBLEMS / IMPAIRMENTS:  Brain metastasis s/p re-resection craniotomy - decadron taper, keppra for seizure history  Metastatic melanoma - on immunotherapy outpatient  Chronic right foot drop - unclear etiology; does not correlate with right brain mets. ?peroneal neuropathy (preserved inversion but weak ankle eversion; hx of habitual leg crossing)  DVT/PEs - s/p IVCF 1/2  HTN - nifedepine  Hyponatremia - Na tabs    PLAN / RECOMMENDATIONS:     Rehab / Mobilization:   - Initial therapy assessment: [X] PT  [X] OT   - Continue skilled therapy while admitted to prevent secondary complications of immobility focus on transfer training, bed mobility, progressive ambulation, and equipment evaluation.   - Educated patient and family members on post-acute rehabilitation. Discussed anticipated rehab course.  - Encouraged patient to avoid crossing legs in the event that right foot drop is due to a peroneal neuropathy  - Therapy precautions: craniotomy, seizures, monitor BP    Bracing/Splinting:   - Would benefit from right foot brace (AFO) for foot drop given history of falls and gait imbalance. Will defer to AR for assessment by orthotist.     Pain Management:   - Currently well controlled on current regimen    - Avoid/ monitor use sedating medications that may interfere with cognitive recovery; monitor opioid use    Speech/ Swallow:   - Dysphagia screen [X]  - Diet:  regular + thins    GI/ Bowel: at risk for constipation  - Continue to monitor bowel patterns.   - Agree with current bowel regimen.     / Bladder: Voiding independently  - Continue to monitor bladder patterns, avoid constipation.       DVT Prophylaxis:   -SCDs, chemoprophylaxis per primary team      Disposition:  Acute Inpatient Rehabilitation  - Patient has significant functional impairments and would benefit from continued rehabilitation in the ACUTE inpatient rehab setting. He has both medical and functional needs appropriate for acute inpatient rehabilitation and would benefit from comprehensive interdisciplinary care, including a physiatrist, rehabilitation nursing, PT, OT, SLT and case management/social work. We anticipate that he would be able to tolerate 3 hours of PT/OT/SLT per day for 5 to 6 days per week to focus on mobility, transfers, gait training with assistive devices, ADL training, speech, swallow, cognition, and patient education/safety. Medical necessity includes close monitoring of neurologic status, medication management, wound care.  - Discussed with team to clarify oncologic treatment plan prior to discharge. 62 YO male with metastatic melanoma with known brain metastasis s/p right craniotomy at OS, [facility], on immunotherapy who presented s/p fall due to worsening left hemiparesis, found to have progression of disease now s/p right temporal and parietal craniotomies for resection of brain mets, now with ADL and functional impairments.    PROBLEMS / IMPAIRMENTS:  Brain metastasis s/p re-resection craniotomy - decadron taper, keppra for seizure history  Metastatic melanoma - on immunotherapy outpatient  Chronic right foot drop - unclear etiology; does not correlate with right brain mets. ?peroneal neuropathy (preserved inversion but weak ankle eversion; hx of habitual leg crossing)  DVT/PEs - s/p IVCF 1/2  HTN - nifedepine  Hyponatremia - Na tabs    PLAN / RECOMMENDATIONS:     Rehab / Mobilization:   - Initial therapy assessment: [X] PT  [X] OT   - Continue skilled therapy while admitted to prevent secondary complications of immobility focus on transfer training, bed mobility, progressive ambulation, and equipment evaluation.   - Educated patient and family members on post-acute rehabilitation. Discussed anticipated rehab course.  - Encouraged patient to avoid crossing legs in the event that right foot drop is due to a peroneal neuropathy  - Therapy precautions: craniotomy, seizures, monitor BP    Bracing/Splinting:   - Would benefit from right foot brace (AFO) for foot drop given history of falls and gait imbalance. Will defer to AR for assessment by orthotist.     Pain Management:   - Currently well controlled on current regimen    - Avoid/ monitor use sedating medications that may interfere with cognitive recovery; monitor opioid use    Speech/ Swallow:   - Dysphagia screen [X]  - Diet:  regular + thins    GI/ Bowel: at risk for constipation  - Continue to monitor bowel patterns.   - Agree with current bowel regimen.     / Bladder: Voiding independently  - Continue to monitor bladder patterns, avoid constipation.       DVT Prophylaxis:   -SCDs, chemoprophylaxis per primary team      Disposition:  Acute Inpatient Rehabilitation  - Patient has significant functional impairments and would benefit from continued rehabilitation in the ACUTE inpatient rehab setting. He has both medical and functional needs appropriate for acute inpatient rehabilitation and would benefit from comprehensive interdisciplinary care, including a physiatrist, rehabilitation nursing, PT, OT, SLT and case management/social work. We anticipate that he would be able to tolerate 3 hours of PT/OT/SLT per day for 5 to 6 days per week to focus on mobility, transfers, gait training with assistive devices, ADL training, speech, swallow, cognition, and patient education/safety. Medical necessity includes close monitoring of neurologic status, medication management, wound care.  - Discussed with team to clarify oncologic treatment plan prior to discharge.

## 2024-01-05 NOTE — DISCHARGE NOTE PROVIDER - CARE PROVIDERS DIRECT ADDRESSES
,[email or web address],DirectAddress_Unknown,DirectAddress_Unknown ,[email or web address],DirectAddress_Unknown,[email or web address] ,[email or web address],DirectAddress_Unknown,DirectAddress_Unknown

## 2024-01-05 NOTE — H&P ADULT - NSHPPHYSICALEXAM_GEN_ALL_CORE
PHYSICAL EXAM  VITALS  T(C): 36.8 (01-05-24 @ 14:48), Max: 37 (01-04-24 @ 16:51)  HR: 91 (01-05-24 @ 14:48) (66 - 91)  BP: 146/99 (01-05-24 @ 14:48) (123/89 - 156/100)  RR: 16 (01-05-24 @ 14:48) (16 - 19)  SpO2: 99% (01-05-24 @ 14:48) (98% - 99%)    Gen - NAD, Comfortable  HEENT - NCAT, EOMI, MMM  Neck - Supple, No limited ROM  Pulm - CTAB, No wheeze, No rhonchi, No crackles  Cardiovascular - RRR, S1S2  Chest - good chest expansion, good respiratory effort  Abdomen - Soft, NT/ND, +BS  Extremities - No Cyanosis, no clubbing, 1-2+ edema to BLE,  trace edema to right hand, no calf tenderness  Neuro-     Cognitive - awake, alert, oriented to person, place, date, year, and situation.  Able to follow command, impulsive     Communication - Fluent, Comprehensible     Attention: Intact     Memory: memory intact     Cranial Nerves - CN 2-12 grossly intact     Motor -                     LEFT    UE - 4+/5                    RIGHT UE - 4+/5                    LEFT    LE - 4+/5                    RIGHT LE - 4+/5        Sensory - Intact  to LT      Reflexes - DTR Intact     Coordination - FTN intact      Tone - normal  Psychiatric - Mood stable, Affect WNL  Skin:  right cranial incision with staples TARA with some dried old blood, right groin puncture site TARA, non blanchable erythema to  back 2/2 immunotherapy- per wife.

## 2024-01-05 NOTE — H&P ADULT - NS ATTEND AMEND GEN_ALL_CORE FT
I have personally seen and examined the patient with the NP. Medical records reviewed. I have made amendments to the documentation where necessary and adjusted the history, physical examination, and plan as documented by the NP.    Metastatic melanoma with Brain metastatic disease, s/p craniotomy for right parietal lesion resection.    Medically complex case  Multiple functional limitations  Admit to neurorehabilitation program  Resume all medications  Cerebral edema- - decadron taper  [facility]  [facility] and Hematology/Oncology input   Safety, fall and seizure precautions I have personally seen and examined the patient with the NP. Medical records reviewed. I have made amendments to the documentation where necessary and adjusted the history, physical examination, and plan as documented by the NP.    Metastatic melanoma with Brain metastatic disease, s/p craniotomy for right parietal lesion resection.    Medically complex case  Multiple functional limitations  Admit to neurorehabilitation program  Resume all medications  Cerebral edema- - decadron taper  [facility]  Medicine and Hematology/Oncology input   Safety, fall and seizure precautions

## 2024-01-05 NOTE — H&P ADULT - NSHPREVIEWOFSYSTEMS_GEN_ALL_CORE
REVIEW OF SYSTEMS  Constitutional: No fever, No Chills, No fatigue  HEENT: No eye pain, No visual disturbances, No difficulty hearing  Pulm: No cough,  No shortness of breath  Cardio: No chest pain, No palpitations  GI:  No abdominal pain, No nausea, No vomiting, No diarrhea, No constipation  : No dysuria, No frequency, No hematuria  Neuro: No headaches, No memory loss, no loss of strength, no numbness, No tremors  Skin: No itching, No rashes, No lesions, + rash   Endo: No temperature intolerance  MSK: No joint pain, No joint swelling, No muscle pain, No Neck pain,  No back pain  Psych:  No depression, No anxiety

## 2024-01-05 NOTE — PROGRESS NOTE ADULT - THIS PATIENT HAS THE FOLLOWING CONDITION(S)/DIAGNOSES ON THIS ADMISSION:
None
None
Cerebral Edema/Brain Compression / Herniation
None
Cerebral Edema/Brain Compression / Herniation

## 2024-01-05 NOTE — DISCHARGE NOTE PROVIDER - CARE PROVIDER_API CALL
Johan Tony  Neurosurgery  [address] [place] [address]  Phone: [phone]  Fax: [phone]  Follow Up Time:     Willi Faulkner  Vascular Surgery  [address] [address]  Phone: [phone]  Fax: [phone]  Follow Up Time:     Tano Richards  Medical Oncology  [address] [place] [address]  Phone: [phone]  Fax: [phone]  Follow Up Time:    Johan Tony  Neurosurgery  [address], [address] [place] [address]  Phone: [phone]  Fax: [phone]  Follow Up Time:     Willi Faulkner  Vascular Surgery  [address] [address]  Phone: [phone]  Fax: [phone]  Follow Up Time:     Tano Richards  Medical Oncology  [address] [place] [address]  Phone: [phone]  Fax: [phone]  Follow Up Time:    Johan Tony  Neurosurgery  [address], [address] [place] [address]  Phone: [phone]  Fax: [phone]  Follow Up Time:     Willi Faulkner  Vascular Surgery  [address] [address]  Phone: [phone]  Fax: [phone]  Follow Up Time:     Tano Richards  Medical Oncology  [address] [place] [address]  Phone: [phone]  Fax: [phone]  Follow Up Time:

## 2024-01-05 NOTE — DISCHARGE NOTE PROVIDER - NSDCMRMEDTOKEN_GEN_ALL_CORE_FT
acetaminophen 325 mg oral tablet: 2 tab(s) orally every 6 hours As needed Temp greater or equal to 38C (100.4F), Mild Pain (1 - 3)  dexAMETHasone 2 mg oral tablet: 1 tab(s) orally every 8 hours 1 tab every 8 hours (1/5-1/6)  1 tab every 12 hours (1/7-1/8)  1 tab every 24 hours (1/9-1/10)  enoxaparin: 40 milligram(s) subcutaneous once a day can transition to home dosing for DVT/PE on  POD 7 = 1/9/24  levETIRAcetam 500 mg oral tablet: 1 tab(s) orally every 12 hours  NIFEdipine 60 mg oral tablet, extended release: 1 tab(s) orally once a day  oxyCODONE 5 mg oral tablet: 1 tab(s) orally every 4 hours As needed Severe Pain (7 - 10)  pantoprazole 40 mg oral delayed release tablet: 1 tab(s) orally once a day (before a meal) continue while on steroids  polyethylene glycol 3350 oral powder for reconstitution: 17 gram(s) orally once a day  sodium chloride 1 g oral tablet: 1 tab(s) orally every 8 hours

## 2024-01-05 NOTE — DISCHARGE NOTE PROVIDER - PROVIDER TOKENS
PROVIDER:[TOKEN:[[id number]]],PROVIDER:[TOKEN:[[id number]]],PROVIDER:[TOKEN:[[id number]]] PROVIDER:[TOKEN:[[id number]]],PROVIDER:[TOKEN:[[id number]]],PROVIDER:[TOKEN:[[id number]]] PROVIDER:[TOKEN:[[id number]]],PROVIDER:[TOKEN:[[id number]]],PROVIDER:[TOKEN:[[id number]]]

## 2024-01-05 NOTE — PATIENT PROFILE ADULT - FALL HARM RISK - HARM RISK INTERVENTIONS
Assistance with ambulation/Assistance OOB with selected safe patient handling equipment/Communicate Risk of Fall with Harm to all staff/Discuss with provider need for PT consult/Monitor gait and stability/Reinforce activity limits and safety measures with patient and family/Tailored Fall Risk Interventions/Visual Cue: Yellow wristband and red socks/Bed in lowest position, wheels locked, appropriate side rails in place/Call bell, personal items and telephone in reach/Instruct patient to call for assistance before getting out of bed or chair/Non-slip footwear when patient is out of bed/[place] to call system/Physically safe environment - no spills, clutter or unnecessary equipment/Purposeful Proactive Rounding/Room/bathroom lighting operational, light cord in reach Assistance with ambulation/Assistance OOB with selected safe patient handling equipment/Communicate Risk of Fall with Harm to all staff/Discuss with provider need for PT consult/Monitor gait and stability/Reinforce activity limits and safety measures with patient and family/Tailored Fall Risk Interventions/Visual Cue: Yellow wristband and red socks/Bed in lowest position, wheels locked, appropriate side rails in place/Call bell, personal items and telephone in reach/Instruct patient to call for assistance before getting out of bed or chair/Non-slip footwear when patient is out of bed/[place] to call system/Physically safe environment - no spills, clutter or unnecessary equipment/Purposeful Proactive Rounding/Room/bathroom lighting operational, light cord in reach

## 2024-01-05 NOTE — DISCHARGE NOTE NURSING/CASE MANAGEMENT/SOCIAL WORK - NSDPFAC_GEN_ALL_CORE
[facility] Acute Rehab/ [address] [place] [address]/ General Phone: [phone]/ Admissions Phone: [phone] [facility] Acute Rehab/ [address] [place] [address]/ General Phone: [phone]/ Admissions Phone: [phone]

## 2024-01-05 NOTE — DISCHARGE NOTE PROVIDER - NPI NUMBER (FOR SYSADMIN USE ONLY) :
[[id number]],[[id number]] [[id number]],[[id number]] [[id number]],[[id number]],[[id number]]

## 2024-01-05 NOTE — DISCHARGE NOTE NURSING/CASE MANAGEMENT/SOCIAL WORK - NSDCPEFALRISK_GEN_ALL_CORE
For information on Fall & Injury Prevention, visit: [email or web address] OR  [email or web address] OR  https://www.cdc.gov/steadi/patient.html For information on Fall & Injury Prevention, visit: [email or web address]/[email or web address]-mobility OR  [email or web address]://[email or web address].[email or web address]/[email or web address]-prevention-tips-to-avoid-injury OR  https://www.cdc.gov/steadi/patient.html

## 2024-01-05 NOTE — DISCHARGE NOTE NURSING/CASE MANAGEMENT/SOCIAL WORK - PATIENT PORTAL LINK FT
You can access the FollowMyHealth Patient Portal offered by [facility] by registering at the following website: [email or web address]. By joining [facility]’s FollowMyHealth portal, you will also be able to view your health information using other applications (apps) compatible with our system. You can access the FollowMyHealth Patient Portal offered by [facility] by registering at the following website: [email or web address]. By joining [facility]’s FollowMyHealth portal, you will also be able to view your health information using other applications (apps) compatible with our system.

## 2024-01-05 NOTE — DISCHARGE NOTE PROVIDER - NSDCCPTREATMENT_GEN_ALL_CORE_FT
PRINCIPAL PROCEDURE  Procedure: Craniotomy for tumor using frameless stereotaxy  Findings and Treatment: right parietal and temporal craniotomy for resection of brain tumor      SECONDARY PROCEDURE  Procedure: IVC filter placement  Findings and Treatment:

## 2024-01-05 NOTE — PROGRESS NOTE ADULT - SUBJECTIVE AND OBJECTIVE BOX
SUBJECTIVE: NAEON. Patient [facility]ates [facility] Denies pain, chest discomfort, coughing, SOB. Voiding spontaneously, last bm reports two days ago.    MEDICATIONS:  MEDICATIONS  (STANDING):  dexAMETHasone     Tablet   Oral   dexAMETHasone     Tablet 3 milliGRAM(s) Oral every 8 hours  enoxaparin Injectable 40 milliGRAM(s) SubCutaneous <User Schedule>  levETIRAcetam 500 milliGRAM(s) Oral every 12 hours  pantoprazole    Tablet 40 milliGRAM(s) Oral before breakfast  polyethylene glycol 3350 17 Gram(s) Oral daily  senna 2 Tablet(s) Oral at bedtime  sodium chloride 1 Gram(s) Oral every 8 hours    MEDICATIONS  (PRN):  ketorolac   Injectable 15 milliGRAM(s) IV Push every 8 hours PRN Moderate Pain (4 - 6)  ondansetron Injectable 4 milliGRAM(s) IV Push every 6 hours PRN Nausea and/or Vomiting  oxyCODONE    IR 5 milliGRAM(s) Oral every 4 hours PRN Severe Pain (7 - 10)      Allergies    No Known Allergies    Intolerances        OBJECTIVE:  Vital Signs Last 24 Hrs  T(C): 36.6 (05 Jan 2024 09:30), Max: 37 (04 Jan 2024 16:51)  T(F): 97.8 (05 Jan 2024 09:30), Max: 98.6 (04 Jan 2024 16:51)  HR: 73 (05 Jan 2024 09:30) (66 - 75)  BP: 143/88 (05 Jan 2024 09:30) (143/88 - 156/100)  BP(mean): --  RR: 18 (05 Jan 2024 09:30) (16 - 19)  SpO2: 99% (05 Jan 2024 09:30) (98% - 99%)    Parameters below as of 05 Jan 2024 09:30  Patient On (Oxygen Delivery Method): room air      I&O's Summary    04 Jan 2024 07:01  -  05 Jan 2024 07:00  --------------------------------------------------------  IN: 0 mL / OUT: 1850 mL / NET: -1850 mL        PHYSICAL EXAM:  Gen: appears stated age, resting comfortably, NAD  HEENT: R crani incision, MMM, clear OP  Neck: supple  CV: RRR, no m/r/g, peripheral pulses 2+  Pulm: CTAB, no increased work of breathing, no rales/rhonchi  Abd: soft, ND, NT, no rebound or guarding  Skin: warm and dry  Ext: non-tender, no edema  Neuro: AOx3, speaking in full sentences  Psych: affect and behavior appropriate, pleasant at time of interview    LABS:                        13.0   15.37 )-----------( 137      ( 05 Jan 2024 05:30 )             41.1     01-05    137  |  105  |  31<H>  ----------------------------<  108<H>  4.6   |  23  |  0.92    Ca    8.6      05 Jan 2024 05:30  Phos  3.3     01-05  Mg     2.5     01-05          CAPILLARY BLOOD GLUCOSE        Urinalysis Basic - ( 05 Jan 2024 05:30 )    Color: x / Appearance: x / SG: x / pH: x  Gluc: 108 mg/dL / Ketone: x  / Bili: x / Urobili: x   Blood: x / Protein: x / Nitrite: x   Leuk Esterase: x / RBC: x / WBC x   Sq Epi: x / Non Sq Epi: x / Bacteria: x        MICRODATA:      RADIOLOGY/OTHER STUDIES: SUBJECTIVE: NAEON. [facility] Denies pain, chest discomfort, coughing, SOB. Voiding spontaneously, last bm reports two days ago.    MEDICATIONS:  MEDICATIONS  (STANDING):  dexAMETHasone     Tablet   Oral   dexAMETHasone     Tablet 3 milliGRAM(s) Oral every 8 hours  enoxaparin Injectable 40 milliGRAM(s) SubCutaneous <User Schedule>  levETIRAcetam 500 milliGRAM(s) Oral every 12 hours  pantoprazole    Tablet 40 milliGRAM(s) Oral before breakfast  polyethylene glycol 3350 17 Gram(s) Oral daily  senna 2 Tablet(s) Oral at bedtime  sodium chloride 1 Gram(s) Oral every 8 hours    MEDICATIONS  (PRN):  ketorolac   Injectable 15 milliGRAM(s) IV Push every 8 hours PRN Moderate Pain (4 - 6)  ondansetron Injectable 4 milliGRAM(s) IV Push every 6 hours PRN Nausea and/or Vomiting  oxyCODONE    IR 5 milliGRAM(s) Oral every 4 hours PRN Severe Pain (7 - 10)      Allergies    No Known Allergies    Intolerances        OBJECTIVE:  Vital Signs Last 24 Hrs  T(C): 36.6 (05 Jan 2024 09:30), Max: 37 (04 Jan 2024 16:51)  T(F): 97.8 (05 Jan 2024 09:30), Max: 98.6 (04 Jan 2024 16:51)  HR: 73 (05 Jan 2024 09:30) (66 - 75)  BP: 143/88 (05 Jan 2024 09:30) (143/88 - 156/100)  BP(mean): --  RR: 18 (05 Jan 2024 09:30) (16 - 19)  SpO2: 99% (05 Jan 2024 09:30) (98% - 99%)    Parameters below as of 05 Jan 2024 09:30  Patient On (Oxygen Delivery Method): room air      I&O's Summary    04 Jan 2024 07:01  -  05 Jan 2024 07:00  --------------------------------------------------------  IN: 0 mL / OUT: 1850 mL / NET: -1850 mL        PHYSICAL EXAM:  Gen: appears stated age, resting comfortably, NAD  HEENT: R crani incision, MMM, clear OP  Neck: supple  CV: RRR, no m/r/g, peripheral pulses 2+  Pulm: CTAB, no increased work of breathing, no rales/rhonchi  Abd: soft, ND, NT, no rebound or guarding  Skin: warm and dry  Ext: non-tender, no edema  Neuro: AOx3, speaking in full sentences  Psych: affect and behavior appropriate, pleasant at time of interview    LABS:                        13.0   15.37 )-----------( 137      ( 05 Jan 2024 05:30 )             41.1     01-05    137  |  105  |  31<H>  ----------------------------<  108<H>  4.6   |  23  |  0.92    Ca    8.6      05 Jan 2024 05:30  Phos  3.3     01-05  Mg     2.5     01-05          CAPILLARY BLOOD GLUCOSE        Urinalysis Basic - ( 05 Jan 2024 05:30 )    Color: x / Appearance: x / SG: x / pH: x  Gluc: 108 mg/dL / Ketone: x  / Bili: x / Urobili: x   Blood: x / Protein: x / Nitrite: x   Leuk Esterase: x / RBC: x / WBC x   Sq Epi: x / Non Sq Epi: x / Bacteria: x        MICRODATA:      RADIOLOGY/OTHER STUDIES:

## 2024-01-05 NOTE — DISCHARGE NOTE PROVIDER - NSDCFUADDAPPT_GEN_ALL_CORE_FT
Please follow up with Cherry (NP with Dr. George) on 1/18/24 at 11AM, call the office to confirm appointment at [phone]    Please follow up with Dr. Simon from Vascular Surgery outpatient for IVC filter management    Please follow up with Dr. Richards from Oncology outpatient    Please follow up with your primary care doctor, especially for management of hypertension Please follow up with Cherry (NP with Dr. George) on 1/18/24 at 11AM, call the office to confirm appointment at [phone]    Please follow up with Dr. Simon from Vascular Surgery outpatient for IVC filter management    Please follow up with Dr. Richards from Oncology outpatient    Please follow up with your primary care doctor, especially for management of hypertension Please follow up with Cherry (NP with Dr. George) on 1/18/24 at 11AM, call the office to confirm appointment at [phone]    Please follow up with Dr. Simon from Vascular Surgery outpatient for IVC filter management    Please follow up with Dr. Richards from Oncology outpatient    Please follow up with your primary care doctor, especially for management of hypertension Please follow up with Cherry (NP with Dr. George) on 1/18/24 at 11AM, call the office to confirm appointment at [phone]. You will need a post op MRI before your next appointment    Please follow up with Dr. Simon from Vascular Surgery outpatient for IVC filter management    Please follow up with Dr. Richards from Oncology outpatient    Please follow up with your primary care doctor, especially for management of hypertension Please follow up with Cherry (NP with Dr. George) on 1/18/24 at 11AM, call the office to confirm appointment at [phone]. You will need a post op MRI before your next appointment    Please follow up with Dr. Simon from Vascular Surgery outpatient for IVC filter management    Please follow up with Dr. Richards from Oncology outpatient    Please follow up with your primary care doctor, especially for management of hypertension Please follow up with Cherry (NP with Dr. George) on 1/18/24 at 11AM, call the office to confirm appointment at [phone]. You will need a post op MRI before your next appointment    Please follow up with Dr. Simon from Vascular Surgery outpatient for IVC filter management    Please follow up with Dr. Richards from Oncology outpatient    Please follow up with your primary care doctor, especially for management of hypertension

## 2024-01-05 NOTE — H&P ADULT - ASSESSMENT
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 Worthy, s/p SRS (completed 5 rounds 8/15/23), on immunotherapy monthly (last dose 12/15/23, next dose 1/15/2023), drug induced neutropenia (likely bactrim), who presented to [facility] on 12/31/23 for resection of brain tumor. Patient was initially diagnosed after he was found to be leaving the car door open, putting his shoes on the wrong feet, and being forgetful of his usual daily routine. Patient reported on 12/20/23 he fell and hit his head (no LOC) with left sided weakness. Within 2 days, the symptoms of forgetfulness returned. MRI as outpatient on 12/22/23 showed progression of right parietal lobe lesion with worsening mass effect, edema, and 1.2cm MLS. Hospital Course included episodes of hypertension which were controlled with medication management. He underwent a Right parietal and temporal crani and resection (frozen: metastatic melanoma) on 1/2. He tolerated the procedure well and was started on nifedipine post operatively for BP management. He remains on keppra for seizure prophylaxis and on decadron. His lovenox is held presently but is s/p IVC filter on 1/2 with noted LE duplex on 12/31 with proximal DVT. Plan for 1 week post operative resumption of full dose lovenox. He had post op hyponatremia and was started on salt tabs. He was evaluated for admission to acute inpatient rehab and admitted to [facility] on 1/5/24.       #Metastatic melanoma to the brain s/p craniotomy for resection now with Gait Instability, ADL impairments and Functional impairments  - Start Comprehensive Rehab Program of PT/OT/SLP  -Continue keppra 500mg BID for seizure prophylaxis  -Continue dexamethasone [id number] TID until 1/6, then 2mg BID until 1/8. 2mg daily until 1/10 then stop   -Staples to craniotomy incision (s/p craniotomy on 1/2)   -May shower  - will need a post op MRI before your next appointment with neurosurgery   #Right lung mass  - CXR with 10.8cm RUL mass (enlarged from 9.3cm), cont pulmonary hygiene   - receiving immunotherapy monthly (last dose 12/15/23, next dose 1/15/2023)    #Hyponatremia  -Last   -Monitor labs  -Continue salt tabs 1G TID  -wean off as tolerated for a normal sodium goal (135-145)    #HTN  -Continue nifedipine 50mg daily    #DVT  - diagnosed 8/2023 and has been on therapeutic enoxaparin at home - stopped prior to surgery   - venous duplex 12/31/23 with acute proximal DVT   -IVC filter placed 1/2/24  -As per neurosurgery note, may resume full dose lovenox on 1/9 (POD #7)  -To follow up with vascular surgery as outpatient for IVC filter management     #Pain control  - Tylenol PRN  -Oxycodone PRn     #GI/Bowel Mgmt   - Continue Senna at bedtime   - Miralax     #Bladder management  - Continue to monitor PVR q 8 hours (SC if > 400)  -Monitor UO    #Skin:  -***    FEN   - Diet - Regular with thins    - Dysphagia  SLP - evaluation and treatment    Precautions / PROPHYLAXIS:   - Falls, Cardiac, Seizure   - ortho: Weight bearing status: WBAT   - Lungs: Aspiration, Incentive Spirometer   - Pressure injury/Skin: Turn Q2hrs while in bed, OOB to Chair, PT/OT      Johan Tony  Neurosurgery  [address] [place] [address]  Phone: [phone]  Fax: [phone]  Follow Up Time:     Willi Faulkner  Vascular Surgery  [address] [address]  Phone: [phone]  Fax: [phone]  Follow Up Time:     Tano Richards  Medical Oncology  [address] [place] [address]  Phone: [phone]  Fax: [phone]  Follow Up Time:        MEDICAL PROGNOSIS: GOOD              REHAB POTENTIAL: GOOD              ESTIMATED DISPOSITION: HOME WITH HOME CARE              ELOS: 10-14 Days   EXPECTED THERAPY:     P.T. 1hr/day       O.T. 1hr/day      S.L.P. 1hr/day       P&O Unnecessary       EXP FREQUENCY: 5 days per 7 day period     PRESCREEN COMPARISON:   I have reviewed the prescreen information and I have found no relevant changes between the preadmission screening and my post admission evaluation     RATIONALE FOR INPATIENT ADMISSION - Patient demonstrates the following: (check all that apply)  [X] Medically appropriate for rehabilitation admission  [X] Has attainable rehab goals with an appropriate initial discharge plan  [X] Has rehabilitation potential (expected to make a significant improvement within a reasonable period of time)   [X] Requires close medical management by a rehab physician, rehab nursing care, Hospitalist and comprehensive interdisciplinary team (including PT, OT, & or SLP, Prosthetics and Orthotics)   ASSESSMENT/PLAN  63 year old male with 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 Worthy, s/p SRS (completed 5 rounds 8/15/23), on immunotherapy monthly (last dose 12/15/23, next dose 1/15/2023), drug induced neutropenia (likely bactrim), who presented to [facility] on 12/31/23 for resection of brain tumor. Patient was initially diagnosed after he was found to be leaving the car door open, putting his shoes on the wrong feet, and being forgetful of his usual daily routine. Patient reported on 12/20/23 he fell and hit his head (no LOC) with left sided weakness. Within 2 days, the symptoms of forgetfulness returned. MRI as outpatient on 12/22/23 showed progression of right parietal lobe lesion with worsening mass effect, edema, and 1.2cm MLS. Hospital Course included episodes of hypertension which were controlled with medication management. He underwent a Right parietal and temporal crani and resection (frozen: metastatic melanoma) on 1/2. He tolerated the procedure well and was started on nifedipine post operatively for BP management. He remains on keppra for seizure prophylaxis and on decadron. His lovenox is held presently but is s/p IVC filter on 1/2 with noted LE duplex on 12/31 with proximal DVT. Plan for 1 week post operative resumption of full dose lovenox. He had post op hyponatremia and was started on salt tabs. He was evaluated for admission to acute inpatient rehab and admitted to [facility] on 1/5/24.       #Metastatic melanoma to the brain s/p craniotomy for resection now with Gait Instability, ADL impairments and Functional impairments  - Start Comprehensive Rehab Program of PT/OT/SLP  -Continue keppra 500mg BID for seizure prophylaxis  -Continue dexamethasone [id number] TID until 1/6, then 2mg BID until 1/8. 2mg daily until 1/10 then stop   -Staples to craniotomy incision (s/p craniotomy on 1/2)   -May shower  - will need a post op MRI before your next appointment with neurosurgery   #Right lung mass  - CXR with 10.8cm RUL mass (enlarged from 9.3cm), cont pulmonary hygiene   - receiving immunotherapy monthly (last dose 12/15/23, next dose 1/15/2023)    #Hyponatremia  -Last   -Monitor labs  -Continue salt tabs 1G TID  -wean off as tolerated for a normal sodium goal (135-145)    #HTN  -Continue nifedipine 50mg daily    #DVT  - diagnosed 8/2023 and has been on therapeutic enoxaparin at home - stopped prior to surgery   - venous duplex 12/31/23 with acute proximal DVT   -IVC filter placed 1/2/24  -As per neurosurgery note, may resume full dose lovenox on 1/9 (POD #7)  -To follow up with vascular surgery as outpatient for IVC filter management     #Pain control  - Tylenol PRN  -Oxycodone PRn     #GI/Bowel Mgmt   - Continue Senna at bedtime   - Miralax     #Bladder management  - Continue to monitor PVR q 8 hours (SC if > 400)  -Monitor UO    #Skin:  -***    FEN   - Diet - Regular with thins    - Dysphagia  SLP - evaluation and treatment    Precautions / PROPHYLAXIS:   - Falls, Cardiac, Seizure   - ortho: Weight bearing status: WBAT   - Lungs: Aspiration, Incentive Spirometer   - Pressure injury/Skin: Turn Q2hrs while in bed, OOB to Chair, PT/OT      Johan Tony  Neurosurgery  [address] [place] [address]  Phone: [phone]  Fax: [phone]  Follow Up Time:     Willi Faulkner  Vascular Surgery  [address] [address]  Phone: [phone]  Fax: [phone]  Follow Up Time:     Tano Richards  Medical Oncology  [address] [place] [address]  Phone: [phone]  Fax: [phone]  Follow Up Time:        MEDICAL PROGNOSIS: GOOD              REHAB POTENTIAL: GOOD              ESTIMATED DISPOSITION: HOME WITH HOME CARE              ELOS: 10-14 Days   EXPECTED THERAPY:     P.T. 1hr/day       O.T. 1hr/day      S.L.P. 1hr/day       P&O Unnecessary       EXP FREQUENCY: 5 days per 7 day period     PRESCREEN COMPARISON:   I have reviewed the prescreen information and I have found no relevant changes between the preadmission screening and my post admission evaluation     RATIONALE FOR INPATIENT ADMISSION - Patient demonstrates the following: (check all that apply)  [X] Medically appropriate for rehabilitation admission  [X] Has attainable rehab goals with an appropriate initial discharge plan  [X] Has rehabilitation potential (expected to make a significant improvement within a reasonable period of time)   [X] Requires close medical management by a rehab physician, rehab nursing care, Hospitalist and comprehensive interdisciplinary team (including PT, OT, & or SLP, Prosthetics and Orthotics)   ASSESSMENT/PLAN  63 year old male with 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 Worthy, s/p SRS (completed 5 rounds 8/15/23), on immunotherapy monthly (last dose 12/15/23, next dose 1/15/2023), drug induced neutropenia (likely bactrim), who presented to [facility] on 12/31/23 for resection of brain tumor. Patient was initially diagnosed after he was found to be leaving the car door open, putting his shoes on the wrong feet, and being forgetful of his usual daily routine. Patient reported on 12/20/23 he fell and hit his head (no LOC) with left sided weakness. Within 2 days, the symptoms of forgetfulness returned. MRI as outpatient on 12/22/23 showed progression of right parietal lobe lesion with worsening mass effect, edema, and 1.2cm MLS. Hospital Course included episodes of hypertension which were controlled with medication management. He underwent a Right parietal and temporal crani and resection (frozen: metastatic melanoma) on 1/2. He tolerated the procedure well and was started on nifedipine post operatively for BP management. He remains on keppra for seizure prophylaxis and on decadron. His lovenox is held presently but is s/p IVC filter on 1/2 with noted LE duplex on 12/31 with proximal DVT. Plan for 1 week post operative resumption of full dose lovenox. He had post op hyponatremia and was started on salt tabs. He was evaluated for admission to acute inpatient rehab and admitted to [facility] on 1/5/24.       #Metastatic melanoma to the brain s/p craniotomy for resection now with Gait Instability, ADL impairments and Functional impairments  - Start Comprehensive Rehab Program of PT/OT/SLP  -Continue keppra 500mg BID for seizure prophylaxis  -Continue dexamethasone [id number] TID until 1/6, then 2mg BID until 1/8. 2mg daily until 1/10 then stop   -Staples to craniotomy incision (s/p craniotomy on 1/2)   -May shower  - will need a post op MRI before your next appointment with neurosurgery   #Right lung mass  - CXR with 10.8cm RUL mass (enlarged from 9.3cm), cont pulmonary hygiene   - receiving immunotherapy monthly (last dose 12/15/23, next dose 1/15/2023)    #Hyponatremia  -Last   -Monitor labs  -Continue salt tabs 1G TID  -wean off as tolerated for a normal sodium goal (135-145)    #HTN  -Continue nifedipine 50mg daily    #DVT  - diagnosed 8/2023 and has been on therapeutic enoxaparin at home - stopped prior to surgery   - venous duplex 12/31/23 with acute proximal DVT   -IVC filter placed 1/2/24  -As per neurosurgery note, may resume full dose lovenox on 1/9 (POD #7)  -To follow up with vascular surgery as outpatient for IVC filter management     #Pain control  - Tylenol PRN  -Oxycodone PRn     #GI/Bowel Mgmt   - Continue Senna at bedtime   - Miralax     #Bladder management  - Continue to monitor PVR q 8 hours (SC if > 400)  -Monitor UO    #Skin:  -  right cranial incision with staples TARA with some dried old blood, right groin puncture site TARA, non blanchable erythema to  back 2/2 immunotherapy- per wife.    FEN   - Diet - Regular with thins    - Dysphagia  SLP - evaluation and treatment    Precautions / PROPHYLAXIS:   - Falls, Cardiac, Seizure, aspiratoin  - ortho: Weight bearing status: WBAT   - Lungs: Aspiration, Incentive Spirometer   - Pressure injury/Skin: Turn Q2hrs while in bed, OOB to Chair, PT/OT      Johan Tony  Neurosurgery  [address] [place] [address]  Phone: [phone]  Fax: [phone]  Follow Up Time:     Willi Faulkner  Vascular Surgery  [address] [address]  Phone: [phone]  Fax: [phone]  Follow Up Time:     Tano Richards  Medical Oncology  [address] [place] [address]  Phone: [phone]  Fax: [phone]  Follow Up Time:    MEDICAL PROGNOSIS: GOOD              REHAB POTENTIAL: GOOD              ESTIMATED DISPOSITION: HOME WITH HOME CARE              ELOS: 10-14 Days   EXPECTED THERAPY:     P.T. 1hr/day       O.T. 1hr/day      S.L.P. 1hr/day       P&O Unnecessary       EXP FREQUENCY: 5 days per 7 day period     PRESCREEN COMPARISON:   I have reviewed the prescreen information and I have found no relevant changes between the preadmission screening and my post admission evaluation     RATIONALE FOR INPATIENT ADMISSION - Patient demonstrates the following: (check all that apply)  [X] Medically appropriate for rehabilitation admission  [X] Has attainable rehab goals with an appropriate initial discharge plan  [X] Has rehabilitation potential (expected to make a significant improvement within a reasonable period of time)   [X] Requires close medical management by a rehab physician, rehab nursing care, Hospitalist and comprehensive interdisciplinary team (including PT, OT, & or SLP, Prosthetics and Orthotics)   ASSESSMENT/PLAN  63 year old male with 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 Worthy, s/p SRS (completed 5 rounds 8/15/23), on immunotherapy monthly (last dose 12/15/23, next dose 1/15/2023), drug induced neutropenia (likely bactrim), who presented to [facility] on 12/31/23 for resection of brain tumor. Patient was initially diagnosed after he was found to be leaving the car door open, putting his shoes on the wrong feet, and being forgetful of his usual daily routine. Patient reported on 12/20/23 he fell and hit his head (no LOC) with left sided weakness. Within 2 days, the symptoms of forgetfulness returned. MRI as outpatient on 12/22/23 showed progression of right parietal lobe lesion with worsening mass effect, edema, and 1.2cm MLS. Hospital Course included episodes of hypertension which were controlled with medication management. He underwent a Right parietal and temporal crani and resection (frozen: metastatic melanoma) on 1/2. He tolerated the procedure well and was started on nifedipine post operatively for BP management. He remains on keppra for seizure prophylaxis and on decadron. His lovenox is held presently but is s/p IVC filter on 1/2 with noted LE duplex on 12/31 with proximal DVT. Plan for 1 week post operative resumption of full dose lovenox. He had post op hyponatremia and was started on salt tabs. He was evaluated for admission to acute inpatient rehab and admitted to [facility] on 1/5/24.       #Metastatic melanoma to the brain s/p craniotomy for resection now with Gait Instability, ADL impairments and Functional impairments  - Start Comprehensive Rehab Program of PT/OT/SLP  -Continue keppra 500mg BID for seizure prophylaxis  -Continue dexamethasone [id number] TID until 1/6, then 2mg BID until 1/8. 2mg daily until 1/10 then stop   -Staples to craniotomy incision (s/p craniotomy on 1/2)   -May shower  - will need a post op MRI before your next appointment with neurosurgery   #Right lung mass  - CXR with 10.8cm RUL mass (enlarged from 9.3cm), cont pulmonary hygiene   - receiving immunotherapy monthly (last dose 12/15/23, next dose 1/15/2023)    #Hyponatremia  -Last   -Monitor labs  -Continue salt tabs 1G TID  -wean off as tolerated for a normal sodium goal (135-145)    #HTN  -Continue nifedipine 50mg daily    #DVT  - diagnosed 8/2023 and has been on therapeutic enoxaparin at home - stopped prior to surgery   - venous duplex 12/31/23 with acute proximal DVT   -IVC filter placed 1/2/24  -As per neurosurgery note, may resume full dose lovenox on 1/9 (POD #7)  -To follow up with vascular surgery as outpatient for IVC filter management     #Pain control  - Tylenol PRN  -Oxycodone PRn     #GI/Bowel Mgmt   - Continue Senna at bedtime   - Miralax     #Bladder management  - Continue to monitor PVR q 8 hours (SC if > 400)  -Monitor UO    #Skin:  -  right cranial incision with staples TARA with some dried old blood, right groin puncture site TARA, non blanchable erythema to  back 2/2 immunotherapy- per wife.    FEN   - Diet - Regular with thins    - Dysphagia  SLP - evaluation and treatment    Precautions / PROPHYLAXIS:   - Falls, Cardiac, Seizure, aspiratoin  - ortho: Weight bearing status: WBAT   - Lungs: Aspiration, Incentive Spirometer   - Pressure injury/Skin: Turn Q2hrs while in bed, OOB to Chair, PT/OT      Johan Tony  Neurosurgery  [address] [place] [address]  Phone: [phone]  Fax: [phone]  Follow Up Time:     Willi Faulkner  Vascular Surgery  [address] [address]  Phone: [phone]  Fax: [phone]  Follow Up Time:     Tano Richards  Medical Oncology  [address] [place] [address]  Phone: [phone]  Fax: [phone]  Follow Up Time:    MEDICAL PROGNOSIS: GOOD              REHAB POTENTIAL: GOOD              ESTIMATED DISPOSITION: HOME WITH HOME CARE              ELOS: 10-14 Days   EXPECTED THERAPY:     P.T. 1hr/day       O.T. 1hr/day      S.L.P. 1hr/day       P&O Unnecessary       EXP FREQUENCY: 5 days per 7 day period     PRESCREEN COMPARISON:   I have reviewed the prescreen information and I have found no relevant changes between the preadmission screening and my post admission evaluation     RATIONALE FOR INPATIENT ADMISSION - Patient demonstrates the following: (check all that apply)  [X] Medically appropriate for rehabilitation admission  [X] Has attainable rehab goals with an appropriate initial discharge plan  [X] Has rehabilitation potential (expected to make a significant improvement within a reasonable period of time)   [X] Requires close medical management by a rehab physician, rehab nursing care, Hospitalist and comprehensive interdisciplinary team (including PT, OT, & or SLP, Prosthetics and Orthotics)

## 2024-01-05 NOTE — H&P ADULT - NSICDXPASTMEDICALHX_GEN_ALL_CORE_FT
PAST MEDICAL HISTORY:  DVT, lower extremity     HTN (hypertension)     Melanoma with mets to liver and brain

## 2024-01-05 NOTE — PROGRESS NOTE ADULT - ASSESSMENT
63 YOM with PMH of DVT/PT (8/2023 on enoxaparin), remote history of MOHs resection of melanoma with development of brain mets (melanoma) in 6/2023 s/p R crani for resection followed by 5-cycles SRS (end 8/2023) currently on immunotherapy (last dose 12/15/23, next dose 1/15/23), drug induced neutropenia (likely [id number]-[id number]) who had outpatient MRI showing progression of R parietal lobe lesion with mass effect (also noted to have enlarging right lung mass). He is now s/p right parietal and temporal craniotomies for resection of two brain lesions as well as IVC filter placement. Course c/by hyponatremia and hypertension.     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 Tony on 1/2  - on steroid taper and receiving levetiracetam 500mg BID for seizure ppx  - pain control with bowel regimen  - frequent perioperative incentive spirometer use  - early ambulation and OOBTC as tolerated    DVT/PE  - diagnosed 8/2023 and has been on therapeutic enoxaparin   - venous duplex 12/31/23 with acute proximal DVT   - s/p IVC filter placement 1/2  - ideally should re-start therapeutic enoxaparin once cleared by NSGY (plan for 1w post-op)  - once stable on therapeutic AC, vascular f/u for IVC filater removal     Hyponatremia  - resolved, goal is normal sodium  - salt tabs reduced to 1g TID - should repeat BMP In one week and consider d/c    Hypertension  - normotensive BP goals  - denies prior history of HTN, not on any home meds  - potential contributors: salt tabs, steroids, pain  - increase nifedipine to 60mg ER with holding parameters    Metastatic melanoma  Right lung mass  - CXR with 10.8cm RUL mass (enlarged from 9.3cm), cont pulmonary hygiene   - receiving immunotherapy monthly (last dose 12/15/23, next dose 1/15/2023)    Leukocytosis  - afebrile, no s/s infection  - likely reactive 2/2 surgery, steroids, VTE  - monitor off antibiotics    Thrombocytopenia   - -140s  - no s/s active bleeding  - trend daily     Dispo: anticipate [facility] acute rehab today    Plan discussed with primary team.

## 2024-01-05 NOTE — PROGRESS NOTE ADULT - PROVIDER SPECIALTY LIST ADULT
Internal Medicine
Internal Medicine
NSICU
NSICU
Neurosurgery
Neurosurgery
Vascular Surgery
Neurosurgery
Neurosurgery
Vascular Surgery
Neurosurgery
Neurosurgery
NSICU
Vascular Surgery

## 2024-01-05 NOTE — H&P ADULT - HISTORY OF PRESENT ILLNESS
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 Worthy, s/p SRS (completed 5 rounds 8/15/23), on immunotherapy monthly (last dose 12/15/23, next dose 1/15/2023), drug induced neutropenia (likely bactrim), who presented to [facility] on 12/31/23 for resection of brain tumor. Patient was initially diagnosed after he was found to be leaving the car door open, putting his shoes on the wrong feet, and being forgetful of his usual daily routine. Patient reported on 12/20/23 he fell and hit his head (no LOC) with left sided weakness. Within 2 days, the symptoms of forgetfulness returned. MRI as outpatient on 12/22/23 showed progression of right parietal lobe lesion with worsening mass effect, edema, and 1.2cm MLS. Hospital Course included episodes of hypertension which were controlled with medication management. He underwent a Right parietal and temporal crani and resection (frozen: metastatic melanoma) on 1/2. He tolerated the procedure well and was started on nifedipine post operatively for BP management. He remains on keppra for seizure prophylaxis and on decadron. His lovenox is held presently but is s/p IVC filter on 1/2 with noted LE duplex on 12/31 with proximal DVT. Plan for 1 week post operative resumption of full dose lovenox. He had post op hyponatremia and was started on salt tabs. He was evaluated for admission to acute inpatient rehab and admitted to [facility] on 1/5/24.               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 Worthy, s/p SRS (completed 5 rounds 8/15/23), on immunotherapy monthly (last dose 12/15/23, next dose 1/15/2023), drug induced neutropenia (likely bactrim), who presented to [facility] on 12/31/23 for resection of brain tumor. Patient was initially diagnosed after he was found to be leaving the car door open, putting his shoes on the wrong feet, and being forgetful of his usual daily routine. Patient reported on 12/20/23 he fell and hit his head (no LOC) with left sided weakness. Within 2 days, the symptoms of forgetfulness returned. MRI as outpatient on 12/22/23 showed progression of right parietal lobe lesion with worsening mass effect, edema, and 1.2cm MLS. Hospital Course included episodes of hypertension which were controlled with medication management. He underwent a Right parietal and temporal crani and resection (frozen: metastatic melanoma) on 1/2. He tolerated the procedure well and was started on nifedipine post operatively for BP management. He remains on keppra for seizure prophylaxis and on decadron. His lovenox is held presently but is s/p IVC filter on 1/2 with noted LE duplex on 12/31 with proximal DVT. Plan for 1 week post operative resumption of full dose lovenox. He had post op hyponatremia and was started on salt tabs. He was evaluated for admission to acute inpatient rehab and admitted to [facility] on 1/5/24.

## 2024-01-05 NOTE — DISCHARGE NOTE PROVIDER - NSDCFUADDINST_GEN_ALL_CORE_FT
Neurosurgery follow up appointment date/time: 1/18/24 at 11AM  - follow up in the office for a wound check and staple removal   - please call the office to confirm appointment: [phone]    Wound Care:  - shower and wash hair daily with shampoo  - gently clean incision with soapy water  - pat dry incision with a clean towel after showering  - leave incision uncovered, open to air   - no picking at incision    Devices:  - IVC filter in place, follow up with Vascular Surgery outpatient     Activity:  - fatigue is common after surgery, rest if you feel tired   - no bending, lifting, twisting or heavy lifting   - walking is recommended, ambulate as tolerated  - you may shower when you get home, keep your incision dry  - no soaking in a tub/pool/hot tub   - no driving within 24 hours of anesthesia or while taking prescription pain medications   - keep hydrated, drink plenty of water     Inpatient consults:  - Follow up with Vascular Surgery and Oncology outpatient     Please also follow up with your primary care doctor, especially for management of hypertension    AT REHAB:  - please check sodium level and wean off of salt tabs for a normal sodium goal of 135-145  - transition to full anticoagulation 1 week post op = 1/9/24    Pain Expectations:  - pain after surgery is expected  - please take pain meds as prescribed     Medications:  - Lovenox daily currently at prophylaxis dosing, can transition to HOME DOSING (full anticoagulation for DVT/PE) POD 7 = 1/9/24  - new meds:  Dexamethasone taper through 1/10 for inflammation  Protonix daily while on steroids for GI protection while on steroids   Keppra 500mg twice daily for seizure prevention, continue until follow up  Nifedipine 60mg daily for hypertension   Sodium chloride tablet every 8 hours for low sodium   - pain meds:   Tylenol as needed for mild to moderate pain  Oxycodone as needed for severe pain   - adverse affects of meds discussed with patients?   - pain medications can cause constipation, you should eat a high fiber diet and may take a stool softener while on pain meds   - Avoid taking Advil (ibuprofen), Motrin (naproxen), or Aspirin for pain as they can cause bleeding     Call the office or come to ED if:  - wound has drainage or bleeding, increased redness or pain at incision site, neurological change, fever (>101), chills, night sweats, syncope, nausea/vomiting, chest pain, shortness of breath      Playback:  - see [employer] [facility] for a copy of your discharge paperwork     WITHIN 24 HOURS OF DISCHARGE, PLEASE CONTACT NEURO PA  WITH ANY QUESTIONS OR CONCERNS: [phone]   OTHERWISE, PLEASE CALL THE OFFICE WITH ANY QUESTIONS OR CONCERNS: [phone] Neurosurgery follow up appointment date/time: 1/18/24 at 11AM  - follow up in the office for a wound check and staple removal   - please call the office to confirm appointment: [phone]    Wound Care:  - shower and wash hair daily with shampoo  - gently clean incision with soapy water  - pat dry incision with a clean towel after showering  - leave incision uncovered, open to air   - no picking at incision    Devices:  - IVC filter in place, follow up with Vascular Surgery outpatient     Activity:  - fatigue is common after surgery, rest if you feel tired   - no bending, lifting, twisting or heavy lifting   - walking is recommended, ambulate as tolerated  - you may shower when you get home, keep your incision dry  - no soaking in a tub/pool/hot tub   - no driving within 24 hours of anesthesia or while taking prescription pain medications   - keep hydrated, drink plenty of water     Inpatient consults:  - Follow up with Vascular Surgery and Oncology outpatient     Please also follow up with your primary care doctor, especially for management of hypertension    AT REHAB:  - please check sodium level and wean off of salt tabs for a normal sodium goal of 135-145  - transition to full anticoagulation 1 week post op = 1/9/24    Pain Expectations:  - pain after surgery is expected  - please take pain meds as prescribed     Medications:  - Lovenox daily currently at prophylaxis dosing, can transition to HOME DOSING (full anticoagulation for DVT/PE) POD 7 = 1/9/24  - new meds:  Dexamethasone taper through 1/10 for inflammation  Protonix daily while on steroids for GI protection while on steroids   Keppra 500mg twice daily for seizure prevention, continue until follow up  Nifedipine 60mg daily for hypertension   Sodium chloride tablet every 8 hours for low sodium   - pain meds:   Tylenol as needed for mild to moderate pain  Oxycodone as needed for severe pain   - adverse affects of meds discussed with patients?   - pain medications can cause constipation, you should eat a high fiber diet and may take a stool softener while on pain meds   - Avoid taking Advil (ibuprofen), Motrin (naproxen), or Aspirin for pain as they can cause bleeding     Call the office or come to ED if:  - wound has drainage or bleeding, increased redness or pain at incision site, neurological change, fever (>101), chills, night sweats, syncope, nausea/vomiting, chest pain, shortness of breath      Playback:  - see [employer] [facility] for a copy of your discharge paperwork     WITHIN 24 HOURS OF DISCHARGE, PLEASE CONTACT NEURO PA  WITH ANY QUESTIONS OR CONCERNS: [phone]   OTHERWISE, PLEASE CALL THE OFFICE WITH ANY QUESTIONS OR CONCERNS: [phone] Neurosurgery follow up appointment date/time: 1/18/24 at 11AM  - follow up in the office for a wound check and staple removal   - please call the office to confirm appointment: [phone]    Wound Care:  - shower and wash hair daily with shampoo  - gently clean incision with soapy water  - pat dry incision with a clean towel after showering  - leave incision uncovered, open to air   - no picking at incision    Devices:  - IVC filter in place, follow up with Vascular Surgery outpatient     Activity:  - fatigue is common after surgery, rest if you feel tired   - no bending, lifting, twisting or heavy lifting   - walking is recommended, ambulate as tolerated  - you may shower when you get home, keep your incision dry  - no soaking in a tub/pool/hot tub   - no driving within 24 hours of anesthesia or while taking prescription pain medications   - keep hydrated, drink plenty of water     Inpatient consults:  - Follow up with Vascular Surgery and Oncology outpatient     Please also follow up with your primary care doctor, especially for management of hypertension    AT REHAB:  - please check sodium level and wean off of salt tabs for a normal sodium goal of 135-145  - transition to full anticoagulation 1 week post op = 1/9/24    Pain Expectations:  - pain after surgery is expected  - please take pain meds as prescribed     Medications:  - Lovenox daily currently at prophylaxis dosing, can transition to HOME DOSING (full anticoagulation for DVT/PE) POD 7 = 1/9/24  - new meds:  Dexamethasone taper through 1/10 for inflammation  Protonix daily while on steroids for GI protection while on steroids   Keppra 500mg twice daily for seizure prevention, continue until follow up  Nifedipine 60mg daily for hypertension   Sodium chloride tablet every 8 hours for low sodium   - pain meds:   Tylenol as needed for mild to moderate pain  Oxycodone as needed for severe pain   - adverse affects of meds discussed with patients?   - pain medications can cause constipation, you should eat a high fiber diet and may take a stool softener while on pain meds   - Avoid taking Advil (ibuprofen), Motrin (naproxen), or Aspirin for pain as they can cause bleeding     Call the office or come to ED if:  - wound has drainage or bleeding, increased redness or pain at incision site, neurological change, fever (>101), chills, night sweats, syncope, nausea/vomiting, chest pain, shortness of breath      Playback:  - see [employer] [facility] for a copy of your discharge paperwork     WITHIN 24 HOURS OF DISCHARGE, PLEASE CONTACT NEURO PA  WITH ANY QUESTIONS OR CONCERNS: [phone]   OTHERWISE, PLEASE CALL THE OFFICE WITH ANY QUESTIONS OR CONCERNS: [phone] Neurosurgery follow up appointment date/time: 1/18/24 at 11AM  - follow up in the office for a wound check and staple removal   - you will need a post op MRI outpatient  - please call the office to confirm appointment: [phone]    Wound Care:  - shower and wash hair daily with shampoo  - gently clean incision with soapy water  - pat dry incision with a clean towel after showering  - leave incision uncovered, open to air   - no picking at incision    Devices:  - IVC filter in place, follow up with Vascular Surgery outpatient     Activity:  - fatigue is common after surgery, rest if you feel tired   - no bending, lifting, twisting or heavy lifting   - walking is recommended, ambulate as tolerated  - you may shower when you get home, keep your incision dry  - no soaking in a tub/pool/hot tub   - no driving within 24 hours of anesthesia or while taking prescription pain medications   - keep hydrated, drink plenty of water     Inpatient consults:  - Follow up with Vascular Surgery and Oncology outpatient     Please also follow up with your primary care doctor, especially for management of hypertension    AT REHAB:  - please check sodium level and wean off of salt tabs for a normal sodium goal of 135-145  - transition to full anticoagulation 1 week post op = 1/9/24  - please obtain post op MRI head     Pain Expectations:  - pain after surgery is expected  - please take pain meds as prescribed     Medications:  - Lovenox daily currently at prophylaxis dosing, can transition to HOME DOSING (full anticoagulation for DVT/PE) POD 7 = 1/9/24  - new meds:  Dexamethasone taper through 1/10 for inflammation  Protonix daily while on steroids for GI protection while on steroids   Keppra 500mg twice daily for seizure prevention, continue until follow up  Nifedipine 60mg daily for hypertension   Sodium chloride tablet every 8 hours for low sodium   - pain meds:   Tylenol as needed for mild to moderate pain  Oxycodone as needed for severe pain   - adverse affects of meds discussed with patients?   - pain medications can cause constipation, you should eat a high fiber diet and may take a stool softener while on pain meds   - Avoid taking Advil (ibuprofen), Motrin (naproxen), or Aspirin for pain as they can cause bleeding     Call the office or come to ED if:  - wound has drainage or bleeding, increased redness or pain at incision site, neurological change, fever (>101), chills, night sweats, syncope, nausea/vomiting, chest pain, shortness of breath      Playback:  - see [employer] health for a copy of your discharge paperwork     WITHIN 24 HOURS OF DISCHARGE, PLEASE CONTACT NEURO PA  WITH ANY QUESTIONS OR CONCERNS: [phone]   OTHERWISE, PLEASE CALL THE OFFICE WITH ANY QUESTIONS OR CONCERNS: [phone] Neurosurgery follow up appointment date/time: 1/18/24 at 11AM  - follow up in the office for a wound check and staple removal   - you will need a post op MRI outpatient  - please call the office to confirm appointment: [phone]    Wound Care:  - shower and wash hair daily with shampoo  - gently clean incision with soapy water  - pat dry incision with a clean towel after showering  - leave incision uncovered, open to air   - no picking at incision    Devices:  - IVC filter in place, follow up with Vascular Surgery outpatient     Activity:  - fatigue is common after surgery, rest if you feel tired   - no bending, lifting, twisting or heavy lifting   - walking is recommended, ambulate as tolerated  - you may shower when you get home, keep your incision dry  - no soaking in a tub/pool/hot tub   - no driving within 24 hours of anesthesia or while taking prescription pain medications   - keep hydrated, drink plenty of water     Inpatient consults:  - Follow up with Vascular Surgery and Oncology outpatient     Please also follow up with your primary care doctor, especially for management of hypertension    AT REHAB:  - please check sodium level and wean off of salt tabs for a normal sodium goal of 135-145  - transition to full anticoagulation 1 week post op = 1/9/24  - please obtain post op MRI head     Pain Expectations:  - pain after surgery is expected  - please take pain meds as prescribed     Medications:  - Lovenox daily currently at prophylaxis dosing, can transition to HOME DOSING (full anticoagulation for DVT/PE) POD 7 = 1/9/24  - new meds:  Dexamethasone taper through 1/10 for inflammation  Protonix daily while on steroids for GI protection while on steroids   Keppra 500mg twice daily for seizure prevention, continue until follow up  Nifedipine 60mg daily for hypertension   Sodium chloride tablet every 8 hours for low sodium   - pain meds:   Tylenol as needed for mild to moderate pain  Oxycodone as needed for severe pain   - adverse affects of meds discussed with patients?   - pain medications can cause constipation, you should eat a high fiber diet and may take a stool softener while on pain meds   - Avoid taking Advil (ibuprofen), Motrin (naproxen), or Aspirin for pain as they can cause bleeding     Call the office or come to ED if:  - wound has drainage or bleeding, increased redness or pain at incision site, neurological change, fever (>101), chills, night sweats, syncope, nausea/vomiting, chest pain, shortness of breath      Playback:  - see [employer] [facility] for a copy of your discharge paperwork     WITHIN 24 HOURS OF DISCHARGE, PLEASE CONTACT NEURO PA  WITH ANY QUESTIONS OR CONCERNS: [phone]   OTHERWISE, PLEASE CALL THE OFFICE WITH ANY QUESTIONS OR CONCERNS: [phone] Neurosurgery follow up appointment date/time: 1/18/24 at 11AM  - follow up in the office for a wound check and staple removal   - you will need a post op MRI outpatient  - please call the office to confirm appointment: [phone]    Wound Care:  - shower and wash hair daily with shampoo  - gently clean incision with soapy water  - pat dry incision with a clean towel after showering  - leave incision uncovered, open to air   - no picking at incision    Devices:  - IVC filter in place, follow up with Vascular Surgery outpatient     Activity:  - fatigue is common after surgery, rest if you feel tired   - no bending, lifting, twisting or heavy lifting   - walking is recommended, ambulate as tolerated  - you may shower when you get home, keep your incision dry  - no soaking in a tub/pool/hot tub   - no driving within 24 hours of anesthesia or while taking prescription pain medications   - keep hydrated, drink plenty of water     Inpatient consults:  - Follow up with Vascular Surgery and Oncology outpatient     Please also follow up with your primary care doctor, especially for management of hypertension    AT REHAB:  - please check sodium level and wean off of salt tabs for a normal sodium goal of 135-145  - transition to full anticoagulation 1 week post op = 1/9/24  - please obtain post op MRI head     Pain Expectations:  - pain after surgery is expected  - please take pain meds as prescribed     Medications:  - Lovenox daily currently at prophylaxis dosing, can transition to HOME DOSING (full anticoagulation for DVT/PE) POD 7 = 1/9/24  - new meds:  Dexamethasone taper through 1/10 for inflammation  Protonix daily while on steroids for GI protection while on steroids   Keppra 500mg twice daily for seizure prevention, continue until follow up  Nifedipine 60mg daily for hypertension   Sodium chloride tablet every 8 hours for low sodium   - pain meds:   Tylenol as needed for mild to moderate pain  Oxycodone as needed for severe pain   - adverse affects of meds discussed with patients?   - pain medications can cause constipation, you should eat a high fiber diet and may take a stool softener while on pain meds   - Avoid taking Advil (ibuprofen), Motrin (naproxen), or Aspirin for pain as they can cause bleeding     Call the office or come to ED if:  - wound has drainage or bleeding, increased redness or pain at incision site, neurological change, fever (>101), chills, night sweats, syncope, nausea/vomiting, chest pain, shortness of breath      Playback:  - see [facility] health for a copy of your discharge paperwork     WITHIN 24 HOURS OF DISCHARGE, PLEASE CONTACT NEURO PA  WITH ANY QUESTIONS OR CONCERNS: [phone]   OTHERWISE, PLEASE CALL THE OFFICE WITH ANY QUESTIONS OR CONCERNS: [phone]

## 2024-01-05 NOTE — PROGRESS NOTE ADULT - SUBJECTIVE AND OBJECTIVE BOX
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 Worthy, s/p SRS (completed 5 rounds 8/15/23), on immunotherapy monthly (last dose 12/15/23, next dose 1/15/2023), drug induced neutropenia (likely bactrim), presents for resection of brain tumor. Patient was initially diagnosed after he was found to be leaving the car door opened, putting his shoes on the wrong feet, and being forgetful of his usual daily routine. Patient reports on 12/20/23 he fell and hit his head (no LOC) d/t developing left sided weakness. Within 2 days, the symptoms of forgetfulness returned. MRI o/p 12/22/23 showed progression of right parietal lobe lesion w/ worsening mass effect, edema, and 1.2cm MLS. Admitted to [facility] for further management.     Heme-onc: Dr. Richards  Rad-onc: Dr. Warren  (31 Dec 2023 15:12)    OVERNIGHT EVENTS: MADAI, pending dispo     Hospital Course:   12/31: , Given hydralazine 10mg IVP.   1/1: MADAI overnight. Neuro stable. MR brain complete.   1/2: MADAI overnight. For OR today. POD0 from right parietal and temporal crani and resection (frozen: metastatic melanoma). Given 10 labetalol x2 for BP, toradol x1 for pain, hydral x1 for pressures. Chamberlain removed, f/u TOV.   1/3: POD1 right parietal and temporal crani for tumor resection. MADAI overnight, neuro stable. Step down from ICU.   1/4: POD 2 R crani. MADAI overnight, neuro stable. Given labetalol IVP x 1, hydralazine IVP x 1 for SBP in 160s. Decreased salt tabs to 2 q8. Start nifedipine 30 qd. Pending MRI.  1/5: POD 3 R crani. MADAI o/n.     Vital Signs Last 24 Hrs  T(C): 36.3 (04 Jan 2024 20:00), Max: 37 (04 Jan 2024 16:51)  T(F): 97.3 (04 Jan 2024 20:00), Max: 98.6 (04 Jan 2024 16:51)  HR: 71 (04 Jan 2024 20:00) (63 - 99)  BP: 152/99 (04 Jan 2024 20:00) (144/90 - 161/94)  BP(mean): --  RR: 18 (04 Jan 2024 20:00) (18 - 19)  SpO2: 98% (04 Jan 2024 20:00) (98% - 99%)    Parameters below as of 04 Jan 2024 20:00  Patient On (Oxygen Delivery Method): room air        I&O's Summary    03 Jan 2024 07:01  -  04 Jan 2024 07:00  --------------------------------------------------------  IN: 75 mL / OUT: 1200 mL / NET: -1125 mL    04 Jan 2024 07:01  -  05 Jan 2024 01:23  --------------------------------------------------------  IN: 0 mL / OUT: 1850 mL / NET: -1850 mL        PHYSICAL EXAM:  General: patient seen laying supine in bed in NAD on RA  Neuro: AAOx3, FC, OE spontaneously, speech clear and fluent, CNII-XI grossly intact, face symmetric, no pronator drift,    strength 5/5 b/l UE and LE, sensation intact to light touch throughout  HEENT: PERRL, EOMI  Neck: supple  Cardiac: RRR, S1S2  Pulmonary: chest rise symmetric  Abdomen: soft, nontender, nondistended  Ext: perfusing well  Skin: warm, dry  Wound: R crani incision c/d/i + staples    LABS:                        12.1   16.10 )-----------( 121      ( 04 Jan 2024 06:38 )             38.7     01-04    139  |  110<H>  |  32<H>  ----------------------------<  125<H>  4.9   |  22  |  0.84    Ca    8.4      04 Jan 2024 06:38  Phos  3.2     01-04  Mg     2.6     01-04        Urinalysis Basic - ( 04 Jan 2024 06:38 )    Color: x / Appearance: x / SG: x / pH: x  Gluc: 125 mg/dL / Ketone: x  / Bili: x / Urobili: x   Blood: x / Protein: x / Nitrite: x   Leuk Esterase: x / RBC: x / WBC x   Sq Epi: x / Non Sq Epi: x / Bacteria: x          CAPILLARY BLOOD GLUCOSE      POCT Blood Glucose.: 98 mg/dL (04 Jan 2024 11:33)  POCT Blood Glucose.: 106 mg/dL (04 Jan 2024 07:28)      Drug Levels: [] N/A    CSF Analysis: [] N/A      Allergies    No Known Allergies    Intolerances      MEDICATIONS:  Antibiotics:    Neuro:  ketorolac   Injectable 15 milliGRAM(s) IV Push every 8 hours PRN  levETIRAcetam 500 milliGRAM(s) Oral every 12 hours  ondansetron Injectable 4 milliGRAM(s) IV Push every 6 hours PRN  oxyCODONE    IR 5 milliGRAM(s) Oral every 4 hours PRN    Anticoagulation:  enoxaparin Injectable 40 milliGRAM(s) SubCutaneous <User Schedule>    OTHER:  dexAMETHasone     Tablet   Oral   dexAMETHasone     Tablet 3 milliGRAM(s) Oral every 8 hours  hydrALAZINE Injectable 10 milliGRAM(s) IV Push every 2 hours PRN  labetalol Injectable 10 milliGRAM(s) IV Push every 2 hours PRN  NIFEdipine XL 30 milliGRAM(s) Oral daily  pantoprazole    Tablet 40 milliGRAM(s) Oral before breakfast  polyethylene glycol 3350 17 Gram(s) Oral daily  senna 2 Tablet(s) Oral at bedtime    IVF:  sodium chloride 2 Gram(s) Oral every 8 hours      ASSESSMENT:  64 yo male 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 Patient was initially diagnosed after he was found to be forgetful with gait instability, and these symptoms returned 12/20 after he fell d/t developing left sided weakness. MRI o/p 12/22/23 showed progression of right parietal lobe lesion w/ worsening mass effect, edema, and 1.2cm MLS. Now s/p Right parietal and temporal craniotomies for resection of 2 brain mets 1/2, and s/p IVCF 1/2.    C79.31    No pertinent family history in first degree relatives    Handoff    MEWS Score    Melanoma    Brain tumor    Deep vein thrombosis    Brain tumor    Deep vein thrombosis    Suspected pulmonary embolism    Suspected deep vein thrombosis (DVT)    Pulmonary thromboembolism    Craniotomy for tumor using frameless stereotaxy    IVC filter placement    H/O brain surgery    SysAdmin_VstLnk        PLAN:  Neuro:   - neuro/vitals q4  - neurovasc checks as ordered   - HOB 30 degrees   - pain control   - h/o seizure: c/w keppra 500mg BID   - decadron 4mg q6h taper over a week to off   - post op MRI pending (72 hr)    Cardio:  - SBP goal 100-140  - prn hydralazine, labetalol  - EKG QTC 1/2 497  - start nifedipine 30 qd for HTN    Pulm:  - RA  - CXR: large RML mass    GI:  - regular diet  - bowel regimen, last BM 1/1   - PPI while on steroids    Renal:  - voiding  - IVL  - NA tabs 2g q8h    Endo:  - ISS  - A1C: 5.3    Heme:   - no SCDs d/t b/l DVTs, SQL   - POD7- begin therapeutic AC  - LE dopplers 12/31: acute B/L popliteal DVTs  - Hx DVT/PE: therapeutic SQL held   - s/p IVCF     ID:  - afebrile    Dispo: full code, NSICU    Discussed w/ Dr. Tony and Dr. Gallego      Assessment:  Present when checked    []  GCS  E   V  M     Heart Failure: []Acute, [] acute on chronic , []chronic  Heart Failure:  [] Diastolic (HFpEF), [] Systolic (HFrEF), []Combined (HFpEF and HFrEF), [] RHF, [] Pulm HTN, [] Other    [] BASHIR, [] ATN, [] AIN, [] other  [] CKD1, [] CKD2, [] CKD 3, [] CKD 4, [] CKD 5, []ESRD    Encephalopathy: [] Metabolic, [] Hepatic, [] toxic, [] Neurological, [] Other    Abnormal Nurtitional Status: [] malnurtition (see nutrition note), [ ]underweight: BMI < 19, [] morbid obesity: BMI >40, [] Cachexia    [] Sepsis  [] hypovolemic shock,[] cardiogenic shock, [] hemorrhagic shock, [] neuogenic shock  [] Acute Respiratory Failure  []Cerebral edema, [] Brain compression/ herniation,   [] Functional quadriplegia  [] Acute blood loss anemia   HPI:  64 yo male 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 Worthy, s/p SRS (completed 5 rounds 8/15/23), on immunotherapy monthly (last dose 12/15/23, next dose 1/15/2023), drug induced neutropenia (likely bactrim), presents for resection of brain tumor. Patient was initially diagnosed after he was found to be leaving the car door opened, putting his shoes on the wrong feet, and being forgetful of his usual daily routine. Patient reports on 12/20/23 he fell and hit his head (no LOC) d/t developing left sided weakness. Within 2 days, the symptoms of forgetfulness returned. MRI o/p 12/22/23 showed progression of right parietal lobe lesion w/ worsening mass effect, edema, and 1.2cm MLS. Admitted to [facility] for further management.     Heme-onc: Dr. Richards  Rad-onc: Dr. Warren  (31 Dec 2023 15:12)    OVERNIGHT EVENTS: MADAI, pending dispo     Hospital Course:   12/31: , Given hydralazine 10mg IVP.   1/1: MADAI overnight. Neuro stable. MR brain complete.   1/2: MADAI overnight. For OR today. POD0 from right parietal and temporal crani and resection (frozen: metastatic melanoma). Given 10 labetalol x2 for BP, toradol x1 for pain, hydral x1 for pressures. Chamberlain removed, f/u TOV.   1/3: POD1 right parietal and temporal crani for tumor resection. MADAI overnight, neuro stable. Step down from ICU.   1/4: POD 2 R crani. MADAI overnight, neuro stable. Given labetalol IVP x 1, hydralazine IVP x 1 for SBP in 160s. Decreased salt tabs to 2 q8. Start nifedipine 30 qd. Pending MRI.  1/5: POD 3 R crani. MADAI o/n.     Vital Signs Last 24 Hrs  T(C): 36.3 (04 Jan 2024 20:00), Max: 37 (04 Jan 2024 16:51)  T(F): 97.3 (04 Jan 2024 20:00), Max: 98.6 (04 Jan 2024 16:51)  HR: 71 (04 Jan 2024 20:00) (63 - 99)  BP: 152/99 (04 Jan 2024 20:00) (144/90 - 161/94)  BP(mean): --  RR: 18 (04 Jan 2024 20:00) (18 - 19)  SpO2: 98% (04 Jan 2024 20:00) (98% - 99%)    Parameters below as of 04 Jan 2024 20:00  Patient On (Oxygen Delivery Method): room air        I&O's Summary    03 Jan 2024 07:01  -  04 Jan 2024 07:00  --------------------------------------------------------  IN: 75 mL / OUT: 1200 mL / NET: -1125 mL    04 Jan 2024 07:01  -  05 Jan 2024 01:23  --------------------------------------------------------  IN: 0 mL / OUT: 1850 mL / NET: -1850 mL        PHYSICAL EXAM:  General: patient seen laying supine in bed in NAD on RA  Neuro: AAOx3, FC, OE spontaneously, speech clear and fluent, CNII-XI grossly intact, face symmetric, no pronator drift,    strength 5/5 b/l UE and LE, sensation intact to light touch throughout  HEENT: PERRL, EOMI  Neck: supple  Cardiac: RRR, S1S2  Pulmonary: chest rise symmetric  Abdomen: soft, nontender, nondistended  Ext: perfusing well  Skin: warm, dry  Wound: R crani incision c/d/i + staples    LABS:                        12.1   16.10 )-----------( 121      ( 04 Jan 2024 06:38 )             38.7     01-04    139  |  110<H>  |  32<H>  ----------------------------<  125<H>  4.9   |  22  |  0.84    Ca    8.4      04 Jan 2024 06:38  Phos  3.2     01-04  Mg     2.6     01-04        Urinalysis Basic - ( 04 Jan 2024 06:38 )    Color: x / Appearance: x / SG: x / pH: x  Gluc: 125 mg/dL / Ketone: x  / Bili: x / Urobili: x   Blood: x / Protein: x / Nitrite: x   Leuk Esterase: x / RBC: x / WBC x   Sq Epi: x / Non Sq Epi: x / Bacteria: x          CAPILLARY BLOOD GLUCOSE      POCT Blood Glucose.: 98 mg/dL (04 Jan 2024 11:33)  POCT Blood Glucose.: 106 mg/dL (04 Jan 2024 07:28)      Drug Levels: [] N/A    CSF Analysis: [] N/A      Allergies    No Known Allergies    Intolerances      MEDICATIONS:  Antibiotics:    Neuro:  ketorolac   Injectable 15 milliGRAM(s) IV Push every 8 hours PRN  levETIRAcetam 500 milliGRAM(s) Oral every 12 hours  ondansetron Injectable 4 milliGRAM(s) IV Push every 6 hours PRN  oxyCODONE    IR 5 milliGRAM(s) Oral every 4 hours PRN    Anticoagulation:  enoxaparin Injectable 40 milliGRAM(s) SubCutaneous <User Schedule>    OTHER:  dexAMETHasone     Tablet   Oral   dexAMETHasone     Tablet 3 milliGRAM(s) Oral every 8 hours  hydrALAZINE Injectable 10 milliGRAM(s) IV Push every 2 hours PRN  labetalol Injectable 10 milliGRAM(s) IV Push every 2 hours PRN  NIFEdipine XL 30 milliGRAM(s) Oral daily  pantoprazole    Tablet 40 milliGRAM(s) Oral before breakfast  polyethylene glycol 3350 17 Gram(s) Oral daily  senna 2 Tablet(s) Oral at bedtime    IVF:  sodium chloride 2 Gram(s) Oral every 8 hours      ASSESSMENT:  64 yo male 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 Patient was initially diagnosed after he was found to be forgetful with gait instability, and these symptoms returned 12/20 after he fell d/t developing left sided weakness. MRI o/p 12/22/23 showed progression of right parietal lobe lesion w/ worsening mass effect, edema, and 1.2cm MLS. Now s/p Right parietal and temporal craniotomies for resection of 2 brain mets 1/2, and s/p IVCF 1/2.    C79.31    No pertinent family history in first degree relatives    Handoff    MEWS Score    Melanoma    Brain tumor    Deep vein thrombosis    Brain tumor    Deep vein thrombosis    Suspected pulmonary embolism    Suspected deep vein thrombosis (DVT)    Pulmonary thromboembolism    Craniotomy for tumor using frameless stereotaxy    IVC filter placement    H/O brain surgery    SysAdmin_VstLnk        PLAN:  Neuro:   - neuro/vitals q4  - neurovasc checks as ordered   - HOB 30 degrees   - pain control   - h/o seizure: c/w keppra 500mg BID   - decadron 4mg q6h taper over a week to off   - post op MRI pending (72 hr)    Cardio:  - SBP goal 100-140  - prn hydralazine, labetalol  - EKG QTC 1/2 497  - start nifedipine 30 qd for HTN    Pulm:  - RA  - CXR: large RML mass    GI:  - regular diet  - bowel regimen, last BM 1/1   - PPI while on steroids    Renal:  - voiding  - IVL  - NA tabs 2g q8h    Endo:  - ISS  - A1C: 5.3    Heme:   - no SCDs d/t b/l DVTs, SQL   - POD7- begin therapeutic AC  - LE dopplers 12/31: acute B/L popliteal DVTs  - Hx DVT/PE: therapeutic SQL held   - s/p IVCF     ID:  - afebrile    Dispo: full code, NSICU    Discussed w/ Dr. Tony and Dr. Gallego      Assessment:  Present when checked    []  GCS  E   V  M     Heart Failure: []Acute, [] acute on chronic , []chronic  Heart Failure:  [] Diastolic (HFpEF), [] Systolic (HFrEF), []Combined (HFpEF and HFrEF), [] RHF, [] Pulm HTN, [] Other    [] BASHIR, [] ATN, [] AIN, [] other  [] CKD1, [] CKD2, [] CKD 3, [] CKD 4, [] CKD 5, []ESRD    Encephalopathy: [] Metabolic, [] Hepatic, [] toxic, [] Neurological, [] Other    Abnormal Nurtitional Status: [] malnurtition (see nutrition note), [ ]underweight: BMI < 19, [] morbid obesity: BMI >40, [] Cachexia    [] Sepsis  [] hypovolemic shock,[] cardiogenic shock, [] hemorrhagic shock, [] neuogenic shock  [] Acute Respiratory Failure  []Cerebral edema, [] Brain compression/ herniation,   [] Functional quadriplegia  [] Acute blood loss anemia

## 2024-01-05 NOTE — H&P ADULT - NSHPLABSRESULTS_GEN_ALL_CORE
LABS:                          13.0   15.37 )-----------( 137      ( 05 Jan 2024 05:30 )             41.1     01-05    137  |  105  |  31<H>  ----------------------------<  108<H>  4.6   |  23  |  0.92    Ca    8.6      05 Jan 2024 05:30  Phos  3.3     01-05  Mg     2.5     01-05      < from: MR Head w/wo IV Cont (12.22.23 @ 13:38) >    IMPRESSION: Mild interval enlargement of a dominant metastatic melanotic   lesion within the right parietal lobe with worsening surrounding mass   effect andvasogenic edema. Worsening shift of midline structures from   right to left currently measuring up to 1.2 cm, previously measured up to   1 cm. Unchanged borderline right-sided uncal herniation.    Additional melanotic metastatic disease looks roughly similar in   comparison to the most recent prior contrast enhanced brain MRI study.    < end of copied text >    < from: US Duplex Venous Lower Ext Complete, Bilateral (12.31.23 @ 19:07) >    IMPRESSION:  Acute deep venous thrombosis: above the knee.    Acute DVTs in the bilateral popliteal veins.    < end of copied text >    < from: MR Brain Stereotactic w/wo IV Cont (01.01.24 @ 11:42) >    IMPRESSION:    Since 12/22/23, there is interval improvement in vasogenic edema and mass   effect from multiple brain metastasis- likely a response to steroids.    Largest masses and greatest mass effect is again at the right cerebral   hemisphere. No significant change in lesional size nor visibly new brain   metastasis or hemorrhagic focus at this time.    < end of copied text >    < from: Xray Chest 1 View- PORTABLE-Routine (01.03.24 @ 06:07) >    IMPRESSION:    Similar appearance to prior exam 1/1/2024. Large right lung mass again   noted. No acute infiltrates appearing. No pneumothorax or pleural   effusion.    < end of copied text >

## 2024-01-05 NOTE — DISCHARGE NOTE PROVIDER - NSDCFUSCHEDAPPT_GEN_ALL_CORE_FT
[facility]  [facility]  Scheduled Appointment: 01/11/2024    [facility]  [facility]  Scheduled Appointment: 01/11/2024    [facility]  MRI  [address]  Scheduled Appointment: 01/12/2024    [facility]  MRI  [address]  Scheduled Appointment: 01/12/2024    Marcial Warren  [facility]  RADMED [address]  Scheduled Appointment: 01/17/2024    Cherry Garcia  [facility]  NEUROSURG [address]  Scheduled Appointment: 01/18/2024    Willi Faulkner  [facility]  VASCULAR [address]  Scheduled Appointment: 01/25/2024     [facility]  [facility]  Scheduled Appointment: 01/11/2024    [facility]  [facility]  Scheduled Appointment: 01/11/2024    [facility]  MRI  [address]  Scheduled Appointment: 01/12/2024    [facility]  MRI  [address]  Scheduled Appointment: 01/12/2024    Marcial Warren  [facility]  RADMED [address]  Scheduled Appointment: 01/17/2024    Cherry Garcia  [facility]  NEUROSURG [address]  Scheduled Appointment: 01/18/2024    Willi Faulkner  [facility]  VASCULAR [address]  Scheduled Appointment: 01/25/2024     [facility]  [facility]  Scheduled Appointment: 01/11/2024    [facility]  [facility]  Scheduled Appointment: 01/11/2024    [facility]  MRI  [address]  Scheduled Appointment: 01/12/2024    [facility]  MRI  [address]  Scheduled Appointment: 01/12/2024    Marcial Warren  [facility]  RADMED [address]  Scheduled Appointment: 01/17/2024    Cherry Garcia  [facility]  NEUROSURG [address]  Scheduled Appointment: 01/18/2024    Willi Faulkner  [facility]  VASCULAR [address]  Scheduled Appointment: 01/25/2024

## 2024-01-05 NOTE — DISCHARGE NOTE PROVIDER - NSDCCPCAREPLAN_GEN_ALL_CORE_FT
PRINCIPAL DISCHARGE DIAGNOSIS  Diagnosis: Brain tumor  Assessment and Plan of Treatment: s/p right  craniotomy for resection of 2 brain mets on 1/2/24      SECONDARY DISCHARGE DIAGNOSES  Diagnosis: DVT, lower extremity  Assessment and Plan of Treatment: s/p IVC filter   Resume home anticoagulation on POD 7 = 1/9/24    Diagnosis: Pulmonary embolism  Assessment and Plan of Treatment:     Diagnosis: H/O melanoma excision  Assessment and Plan of Treatment: Follow up with Oncology outpatient    Diagnosis: Hyponatremia  Assessment and Plan of Treatment: continue salt tabs, wean off as tolerated for a normal sodium goal (135-145)    Diagnosis: HTN (hypertension)  Assessment and Plan of Treatment: started on Nifedipine

## 2024-01-05 NOTE — H&P ADULT - NSHPSOCIALHISTORY_GEN_ALL_CORE
SOCIAL HISTORY  Smoking - Denied  EtOH - Denied   Drugs - Denied    FUNCTIONAL HISTORY:  PTA independent with ADLs and mobility.  Lives on [address] with his supportive wife in a private home with stairs in home.     CURRENT FUNCTIONAL STATUS:    Physical Therapy: 1/3 initial eval  Transfer: Stand to Sit:   · Level of [place]	contact guard  · Physical Assist/Nonphysical Assist	verbal cues; 1 person assist  · Weight-Bearing Restrictions	weight-bearing as tolerated  · Assistive Device	rolling walker    Sit/Stand Transfer Safety Analysis:   · Transfer Safety Concerns Noted	decreased safety awareness  · Impairments Contributing to Impaired Transfers	impaired postural control; decreased strength; impaired balance; cognition    Gait Skills:   · [place] of [place]	minAx1 with RW, bilateral handheld assist x2 with no DME  · Physical Assist/Nonphysical Assist	verbal cues  · Weight-Bearing Restrictions	weight-bearing as tolerated  · Assistive Device	RW x50 feet, no DME x10 feet  · Gait Distance	60 feet "    Occupational Therapy: 1/3 initial eval  · Balance Skills	Pt performed functional mob 25x2 with min A and consistently Rward steering of RW requiring cues to maintain straight path with RW and maintain self within scope of RW  · Coordination Assessed	finger to nose; impaired bilat SOCIAL HISTORY  Smoking - Denied  EtOH - Denied   Drugs - Denied    FUNCTIONAL HISTORY:  PTA independent with ADLs and mobility.  Lives on [address] with his supportive wife in a private home with stairs in home.     CURRENT FUNCTIONAL STATUS:    Physical Therapy: 1/3 initial eval  Transfer: Stand to Sit:   · Level of [place]	contact guard  · Physical Assist/Nonphysical Assist	verbal cues; 1 person assist  · Weight-Bearing Restrictions	weight-bearing as tolerated  · Assistive Device	rolling walker    Sit/Stand Transfer Safety Analysis:   · Transfer Safety Concerns Noted	decreased safety awareness  · Impairments Contributing to Impaired Transfers	impaired postural control; decreased strength; impaired balance; cognition    Gait Skills:   · Level of [place]	minAx1 with RW, bilateral handheld assist x2 with no DME  · Physical Assist/Nonphysical Assist	verbal cues  · Weight-Bearing Restrictions	weight-bearing as tolerated  · Assistive Device	RW x50 feet, no DME x10 feet  · Gait Distance	60 feet "    Occupational Therapy: 1/3 initial eval  · Balance Skills	Pt performed functional mob 25x2 with min A and consistently Rward steering of RW requiring cues to maintain straight path with RW and maintain self within scope of RW  · Coordination Assessed	finger to nose; impaired bilat SOCIAL HISTORY  Smoking - Denied  EtOH - Socially  Drugs - Denied    FUNCTIONAL HISTORY:  PTA independent with ADLs and mobility.  Lives on [address] with his supportive wife in a private home with stairs in home.     CURRENT FUNCTIONAL STATUS:    Physical Therapy: 1/3 initial eval  Transfer: Stand to Sit:   · Level of [place]	contact guard  · Physical Assist/Nonphysical Assist	verbal cues; 1 person assist  · Weight-Bearing Restrictions	weight-bearing as tolerated  · Assistive Device	rolling walker    Sit/Stand Transfer Safety Analysis:   · Transfer Safety Concerns Noted	decreased safety awareness  · Impairments Contributing to Impaired Transfers	impaired postural control; decreased strength; impaired balance; cognition    Gait Skills:   · Level of [place]	minAx1 with RW, bilateral handheld assist x2 with no DME  · Physical Assist/Nonphysical Assist	verbal cues  · Weight-Bearing Restrictions	weight-bearing as tolerated  · Assistive Device	RW x50 feet, no DME x10 feet  · Gait Distance	60 feet "    Occupational Therapy: 1/3 initial eval  · Balance Skills	Pt performed functional mob 25x2 with min A and consistently Rward steering of RW requiring cues to maintain straight path with RW and maintain self within scope of RW  · Coordination Assessed	finger to nose; impaired bilat SOCIAL HISTORY  Smoking - Denied  EtOH - Socially  Drugs - Denied    FUNCTIONAL HISTORY:  PTA independent with ADLs and mobility.  Lives on [address] with his supportive wife in a private home with stairs in home.     CURRENT FUNCTIONAL STATUS:    Physical Therapy: 1/3 initial eval  Transfer: Stand to Sit:   · Level of [place]	contact guard  · Physical Assist/Nonphysical Assist	verbal cues; 1 person assist  · Weight-Bearing Restrictions	weight-bearing as tolerated  · Assistive Device	rolling walker    Sit/Stand Transfer Safety Analysis:   · Transfer Safety Concerns Noted	decreased safety awareness  · Impairments Contributing to Impaired Transfers	impaired postural control; decreased strength; impaired balance; cognition    Gait Skills:   · Level of [place]	minAx1 with RW, bilateral handheld assist x2 with no DME  · Physical Assist/Nonphysical Assist	verbal cues  · Weight-Bearing Restrictions	weight-bearing as tolerated  · Assistive Device	RW x50 feet, no DME x10 feet  · Gait Distance	60 feet "    Occupational Therapy: 1/3 initial eval  · Balance Skills	Pt performed functional mob 25x2 with min A and consistently Rward steering of RW requiring cues to maintain straight path with RW and maintain self within scope of RW  · Coordination Assessed	finger to nose; impaired bilat

## 2024-01-05 NOTE — DISCHARGE NOTE PROVIDER - HOSPITAL COURSE
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 Worthy, s/p SRS (completed 5 rounds 8/15/23), on immunotherapy monthly (last dose 12/15/23, next dose 1/15/2023), drug induced neutropenia (likely bactrim), presents for resection of brain tumor. Patient was initially diagnosed after he was found to be leaving the car door opened, putting his shoes on the wrong feet, and being forgetful of his usual daily routine. Patient reports on 12/20/23 he fell and hit his head (no LOC) d/t developing left sided weakness. Within 2 days, the symptoms of forgetfulness returned. MRI o/p 12/22/23 showed progression of right parietal lobe lesion w/ worsening mass effect, edema, and 1.2cm MLS. Admitted to [facility] for further management.       Hospital Course:  12/31: , Given hydralazine 10mg IVP.   1/1: MADAI overnight. Neuro stable. MR brain complete.   1/2: MADAI overnight. For OR today. POD0 from right parietal and temporal crani and resection (frozen: metastatic melanoma). Given 10 labetalol x2 for BP, toradol x1 for pain, hydral x1 for pressures. Chamberlain removed, f/u TOV.   1/3: POD1 right parietal and temporal crani for tumor resection. MADAI overnight, neuro stable. Step down from ICU.   1/4: POD 2 R crani. MADAI overnight, neuro stable. Given labetalol IVP x 1, hydralazine IVP x 1 for SBP in 160s. Decreased salt tabs to 2 q8. Start nifedipine 30 qd. Pending MRI.  1/5: POD 3 R crani. MADAI o/n.       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 Worthy, s/p SRS (completed 5 rounds 8/15/23), on immunotherapy monthly (last dose 12/15/23, next dose 1/15/2023), drug induced neutropenia (likely bactrim), presents for resection of brain tumor. Patient was initially diagnosed after he was found to be leaving the car door opened, putting his shoes on the wrong feet, and being forgetful of his usual daily routine. Patient reports on 12/20/23 he fell and hit his head (no LOC) d/t developing left sided weakness. Within 2 days, the symptoms of forgetfulness returned. MRI o/p 12/22/23 showed progression of right parietal lobe lesion w/ worsening mass effect, edema, and 1.2cm MLS. Admitted to [facility] for further management.       Hospital Course:  12/31: , Given hydralazine 10mg IVP.   1/1: MADAI overnight. Neuro stable. MR brain complete.   1/2: MADAI overnight. For OR today. POD0 from right parietal and temporal crani and resection (frozen: metastatic melanoma). Given 10 labetalol x2 for BP, toradol x1 for pain, hydral x1 for pressures. Chamberlain removed, f/u TOV.   1/3: POD1 right parietal and temporal crani for tumor resection. MADAI overnight, neuro stable. Step down from ICU.   1/4: POD 2 R crani. MADAI overnight, neuro stable. Given labetalol IVP x 1, hydralazine IVP x 1 for SBP in 160s. Decreased salt tabs to 2 q8. Start nifedipine 30 qd. Pending MRI.  1/5: POD 3 R crani. MADAI o/n.       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 Worthy, s/p SRS (completed 5 rounds 8/15/23), on immunotherapy monthly (last dose 12/15/23, next dose 1/15/2023), drug induced neutropenia (likely bactrim), presents for resection of brain tumor. Patient was initially diagnosed after he was found to be leaving the car door opened, putting his shoes on the wrong feet, and being forgetful of his usual daily routine. Patient reports on 12/20/23 he fell and hit his head (no LOC) d/t developing left sided weakness. Within 2 days, the symptoms of forgetfulness returned. MRI o/p 12/22/23 showed progression of right parietal lobe lesion w/ worsening mass effect, edema, and 1.2cm MLS. Admitted to [facility] for further management.       Hospital Course:  12/31: , Given hydralazine 10mg IVP.   1/1: MADAI overnight. Neuro stable. MR brain complete.   1/2: MADAI overnight. For OR today. POD0 from right parietal and temporal crani and resection (frozen: metastatic melanoma). Given 10 labetalol x2 for BP, toradol x1 for pain, hydral x1 for pressures. Chamberlain removed, f/u TOV.   1/3: POD1 right parietal and temporal crani for tumor resection. MADAI overnight, neuro stable. Step down from ICU.   1/4: POD 2 R crani. MADAI overnight, neuro stable. Given labetalol IVP x 1, hydralazine IVP x 1 for SBP in 160s. Decreased salt tabs to 2 q8. Start nifedipine 30 qd. Pending MRI.  1/5: POD 3 R crani. MADAI o/n.       Patient evaluated by PT/OT who recommended: acute rehab   Patient is going to [facility] c/b hypertension (started on Nifedipine)     Exam on day of discharge:  Neuro: AAOx3, FC, OE spontaneously, speech clear and fluent, CNII-XI grossly intact, face symmetric, no pronator drift,    strength 5/5 b/l UE and LE, sensation intact to light touch throughout  HEENT: PERRL, EOMI  Wound: R crani incision c/d/i + staples    Patient is neuro stable, vitals stable, afebrile, medically ready for discharge

## 2024-01-06 NOTE — PROGRESS NOTE ADULT - ASSESSMENT
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 Worthy, s/p SRS (completed 5 rounds 8/15/23), on immunotherapy monthly (last dose 12/15/23, next dose 1/15/2023), drug induced neutropenia (likely bactrim), who presented to [facility] on 12/31/23 for resection of brain tumor. Patient was initially diagnosed after he was found to be leaving the car door open, putting his shoes on the wrong feet, and being forgetful of his usual daily routine. Patient reported on 12/20/23 he fell and hit his head (no LOC) with left sided weakness. Within 2 days, the symptoms of forgetfulness returned. MRI as outpatient on 12/22/23 showed progression of right parietal lobe lesion with worsening mass effect, edema, and 1.2cm MLS. Hospital Course included episodes of hypertension which were controlled with medication management. He underwent a Right parietal and temporal crani and resection (frozen: metastatic melanoma) on 1/2. He tolerated the procedure well and was started on nifedipine post operatively for BP management. He remains on keppra for seizure prophylaxis and on decadron. His lovenox is held presently but is s/p IVC filter on 1/2 with noted LE duplex on 12/31 with proximal DVT. Plan for 1 week post operative resumption of full dose lovenox. He had post op hyponatremia and was started on salt tabs. He was evaluated for admission to acute inpatient rehab and admitted to [facility] on 1/5/24.       #Metastatic melanoma to the brain s/p craniotomy for resection now with Gait Instability, ADL impairments and Functional impairments  - Start Comprehensive Rehab Program of PT/OT/SLP  -Continue keppra 500mg BID for seizure prophylaxis  -Continue dexamethasone [id number] TID until 1/6, then 2mg BID until 1/8. 2mg daily until 1/10 then stop   -Staples to craniotomy incision (s/p craniotomy on 1/2)   -May shower  - will need a post op MRI before your next appointment with neurosurgery   #Right lung mass  - CXR with 10.8cm RUL mass (enlarged from 9.3cm), cont pulmonary hygiene   - receiving immunotherapy monthly (last dose 12/15/23, next dose 1/15/2023)    #Hyponatremia  -Last   -Monitor labs  -Continue salt tabs 1G TID  -wean off as tolerated for a normal sodium goal (135-145)    #HTN  -Continue nifedipine 50mg daily    #DVT  - diagnosed 8/2023 and has been on therapeutic enoxaparin at home - stopped prior to surgery   - venous duplex 12/31/23 with acute proximal DVT   -IVC filter placed 1/2/24  -As per neurosurgery note, may resume full dose lovenox on 1/9 (POD #7)  -To follow up with vascular surgery as outpatient for IVC filter management     #Pain control  - Tylenol PRN  -Oxycodone PRn     #GI/Bowel Mgmt   - Continue Senna at bedtime   - Miralax     #Bladder management  - Continue to monitor PVR q 8 hours (SC if > 400)  -Monitor UO    #Skin:  -  right cranial incision with staples TARA with some dried old blood, right groin puncture site TARA, non blanchable erythema to  back 2/2 immunotherapy- per wife.    FEN   - Diet - Regular with thins    - Dysphagia  SLP - evaluation and treatment    Precautions / PROPHYLAXIS:   - Falls, Cardiac, Seizure, aspiratoin  - ortho: Weight bearing status: WBAT   - Lungs: Aspiration, Incentive Spirometer   - Pressure injury/Skin: Turn Q2hrs while in bed, OOB to Chair, PT/OT      Johan Tony  Neurosurgery  [address] [place] [address]  Phone: [phone]  Fax: [phone]  Follow Up Time:     Willi Faulkner  Vascular Surgery  [address] [address]  Phone: [phone]  Fax: [phone]  Follow Up Time:     Tano Richards  Medical Oncology  [address] [place] [id number]  Phone: [phone]  Fax: [phone]  Follow Up Time:    MEDICAL PROGNOSIS: GOOD              REHAB POTENTIAL: GOOD              ESTIMATED DISPOSITION: HOME WITH HOME CARE              ELOS: 10-14 Days   EXPECTED THERAPY:     P.T. 1hr/day       O.T. 1hr/day      S.L.P. 1hr/day       P&O Unnecessary       EXP FREQUENCY: 5 days per 7 day period     PRESCREEN COMPARISON:   I have reviewed the prescreen information and I have found no relevant changes between the preadmission screening and my post admission evaluation     RATIONALE FOR INPATIENT ADMISSION - Patient demonstrates the following: (check all that apply)  [X] Medically appropriate for rehabilitation admission  [X] Has attainable rehab goals with an appropriate initial discharge plan  [X] Has rehabilitation potential (expected to make a significant improvement within a reasonable period of time)   [X] Requires close medical management by a rehab physician, rehab nursing care, Hospitalist and comprehensive interdisciplinary team (including PT, OT, & or SLP, Prosthetics and Orthotics)   From primary team:  ASSESSMENT/PLAN  63 year old male with PMH of 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 Worthy, s/p SRS (completed 5 rounds 8/15/23), on immunotherapy monthly (last dose 12/15/23, next dose 1/15/2023), drug induced neutropenia (likely bactrim), who presented to [facility] on 12/31/23 for resection of brain tumor. Patient was initially diagnosed after he was found to be leaving the car door open, putting his shoes on the wrong feet, and being forgetful of his usual daily routine. Patient reported on 12/20/23 he fell and hit his head (no LOC) with left sided weakness. Within 2 days, the symptoms of forgetfulness returned. MRI as outpatient on 12/22/23 showed progression of right parietal lobe lesion with worsening mass effect, edema, and 1.2cm MLS. Hospital Course included episodes of hypertension which were controlled with medication management. He underwent a Right parietal and temporal crani and resection (frozen: metastatic melanoma) on 1/2. He tolerated the procedure well and was started on nifedipine post operatively for BP management. He remains on keppra for seizure prophylaxis and on decadron. His lovenox is held presently but is s/p IVC filter on 1/2 with noted LE duplex on 12/31 with proximal DVT. Plan for 1 week post operative resumption of full dose lovenox. He had post op hyponatremia and was started on salt tabs. He was evaluated for admission to acute inpatient rehab and admitted to [facility] on 1/5/24.       #Metastatic melanoma to the brain s/p craniotomy for resection now with Gait Instability, ADL impairments and Functional impairments  - Start Comprehensive Rehab Program of PT/OT/SLP  -Continue keppra 500mg BID for seizure prophylaxis  -Continue dexamethasone [id number] TID until 1/6, then 2mg BID until 1/8. 2mg daily until 1/10 then stop   -Staples to craniotomy incision (s/p craniotomy on 1/2)   -May shower  - will need a post op MRI before your next appointment with neurosurgery   #Right lung mass  - CXR with 10.8cm RUL mass (enlarged from 9.3cm), cont pulmonary hygiene   - receiving immunotherapy monthly (last dose 12/15/23, next dose 1/15/2023)    #Hyponatremia  -Last   -Monitor labs  -Continue salt tabs 1G TID  -wean off as tolerated for a normal sodium goal (135-145)    #HTN  -Continue nifedipine 50mg daily    #DVT  - diagnosed 8/2023 and has been on therapeutic enoxaparin at home - stopped prior to surgery   - venous duplex 12/31/23 with acute proximal DVT   -IVC filter placed 1/2/24  -As per neurosurgery note, may resume full dose lovenox on 1/9 (POD #7)  -To follow up with vascular surgery as outpatient for IVC filter management     #Pain control  - Tylenol PRN  -Oxycodone PRn     #GI/Bowel Mgmt   - Continue Senna at bedtime   - Miralax     #Bladder management  - Continue to monitor PVR q 8 hours (SC if > 400)  -Monitor UO    #Skin:  -  right cranial incision with staples TARA with some dried old blood, right groin puncture site TARA, non blanchable erythema to  back 2/2 immunotherapy- per wife.    FEN   - Diet - Regular with thins    - Dysphagia  SLP - evaluation and treatment    Precautions / PROPHYLAXIS:   - Falls, Cardiac, Seizure, aspiratoin  - ortho: Weight bearing status: WBAT   - Lungs: Aspiration, Incentive Spirometer   - Pressure injury/Skin: Turn Q2hrs while in bed, OOB to Chair, PT/OT      Johan Tony  Neurosurgery  [address] [place] [address]  Phone: [phone]  Fax: [phone]  Follow Up Time:     Willi Faulkner  Vascular Surgery  [address] [address]  Phone: [phone]  Fax: [phone]  Follow Up Time:     Tano Richards  Medical Oncology  [address] [place] [address]  Phone: [phone]  Fax: [phone]  Follow Up Time:    MEDICAL PROGNOSIS: GOOD              REHAB POTENTIAL: GOOD              ESTIMATED DISPOSITION: HOME WITH HOME CARE              ELOS: 10-14 Days   EXPECTED THERAPY:     P.T. 1hr/day       O.T. 1hr/day      S.L.P. 1hr/day       P&O Unnecessary       EXP FREQUENCY: 5 days per 7 day period     PRESCREEN COMPARISON:   I have reviewed the prescreen information and I have found no relevant changes between the preadmission screening and my post admission evaluation     RATIONALE FOR INPATIENT ADMISSION - Patient demonstrates the following: (check all that apply)  [X] Medically appropriate for rehabilitation admission  [X] Has attainable rehab goals with an appropriate initial discharge plan  [X] Has rehabilitation potential (expected to make a significant improvement within a reasonable period of time)   [X] Requires close medical management by a rehab physician, rehab nursing care, Hospitalist and comprehensive interdisciplinary team (including PT, OT, & or SLP, Prosthetics and Orthotics)

## 2024-01-06 NOTE — CONSULT NOTE ADULT - SUBJECTIVE AND OBJECTIVE BOX
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 Worthy, s/p SRS (completed 5 rounds 8/15/23), on immunotherapy monthly (last dose 12/15/23, next dose 1/15/2023), drug induced neutropenia (likely bactrim), who presented to [facility] on 12/31/23 for resection of brain tumor. Patient was initially diagnosed after he was found to be leaving the car door open, putting his shoes on the wrong feet, and being forgetful of his usual daily routine. Patient reported on 12/20/23 he fell and hit his head (no LOC) with left sided weakness. Within 2 days, the symptoms of forgetfulness returned. MRI as outpatient on 12/22/23 showed progression of right parietal lobe lesion with worsening mass effect, edema, and 1.2cm MLS. Hospital Course included episodes of hypertension which were controlled with medication management. He underwent a Right parietal and temporal crani and resection (frozen: metastatic melanoma) on 1/2. He tolerated the procedure well and was started on nifedipine post operatively for BP management. He remains on keppra for seizure prophylaxis and on decadron. His lovenox is held presently but is s/p IVC filter on 1/2 with noted LE duplex on 12/31 with proximal DVT. Plan for 1 week post operative resumption of full dose lovenox. He had post op hyponatremia and was started on salt tabs. He was evaluated for admission to acute inpatient rehab and admitted to [facility] on 1/5/24.       ALLERGIES:  No Known Allergies    MEDICATIONS  (STANDING):  dexAMETHasone     Tablet   Oral   dexAMETHasone     Tablet 2 milliGRAM(s) Oral every 8 hours  enoxaparin Injectable 40 milliGRAM(s) SubCutaneous <User Schedule>  levETIRAcetam 500 milliGRAM(s) Oral every 12 hours  NIFEdipine XL 60 milliGRAM(s) Oral daily  pantoprazole    Tablet 40 milliGRAM(s) Oral before breakfast  polyethylene glycol 3350 17 Gram(s) Oral daily  senna 2 Tablet(s) Oral at bedtime  sodium chloride 1 Gram(s) Oral every 8 hours    MEDICATIONS  (PRN):  acetaminophen     Tablet .. 650 milliGRAM(s) Oral every 6 hours PRN Mild Pain (1 - 3)  oxyCODONE    IR 5 milliGRAM(s) Oral every 4 hours PRN Severe Pain (7 - 10)    Vital Signs Last 24 Hrs  T(F): 97.3 (06 Jan 2024 08:12), Max: 98.2 (05 Jan 2024 14:48)  HR: 85 (06 Jan 2024 08:12) (78 - 100)  BP: 129/87 (06 Jan 2024 08:12) (122/91 - 146/99)  RR: 16 (06 Jan 2024 08:12) (16 - 18)  SpO2: 100% (06 Jan 2024 08:12) (97% - 100%)  I&O's Summary    05 Jan 2024 07:01  -  06 Jan 2024 07:00  --------------------------------------------------------  IN: 0 mL / OUT: 2 mL / NET: -2 mL      BMI (kg/m2): 23.9 (01-05-24 @ 14:48), 23.9 (01-02-24 @ 08:03)        LABS:                        12.9   10.32 )-----------( 138      ( 06 Jan 2024 06:45 )             40.2       01-06    137  |  103  |  30  ----------------------------<  92  4.0   |  25  |  1.02    Ca    8.4      06 Jan 2024 06:45  Phos  3.3     01-05  Mg     2.5     01-05    TPro  5.8  /  Alb  2.4  /  TBili  0.7  /  DBili  x   /  AST  16  /  ALT  20  /  AlkPhos  53  01-06     Urinalysis Basic - ( 06 Jan 2024 06:45 )    Color: x / Appearance: x / SG: x / pH: x  Gluc: 92 mg/dL / Ketone: x  / Bili: x / Urobili: x   Blood: x / Protein: x / Nitrite: x   Leuk Esterase: x / RBC: x / WBC x   Sq Epi: x / Non Sq Epi: x / Bacteria: x    Consultant(s) Notes Reviewed:   Care Discused with Consultants/Other Providers: yes, rehab team   Imaging Personally Reviewed:     LE Duplex 12/31/2023  IMPRESSION:  Acute deep venous thrombosis: above the knee.  Acute DVTs in the bilateral popliteal veins.    MRI Brain 1/1/2024  IMPRESSION:  Since 12/22/23, there is interval improvement in vasogenic edema and mass   effect from multiple brain metastasis- likely a response to steroids.    Largest masses and greatest mass effect is again at the right cerebral   hemisphere. No significant change in lesional size nor visibly new brain   metastasis or hemorrhagic focus at this time.    CXR 1/3  IMPRESSION:  Similar appearance to prior exam 1/1/2024. Large right lung mass again   noted. No acute infiltrates appearing. No pneumothorax or pleural   effusion. 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 Worthy, s/p SRS (completed 5 rounds 8/15/23), on immunotherapy monthly (last dose 12/15/23, next dose 1/15/2023), drug induced neutropenia (likely bactrim), who presented to [facility] on 12/31/23 for resection of brain tumor. Patient was initially diagnosed after he was found to be leaving the car door open, putting his shoes on the wrong feet, and being forgetful of his usual daily routine. Patient reported on 12/20/23 he fell and hit his head (no LOC) with left sided weakness. Within 2 days, the symptoms of forgetfulness returned. MRI as outpatient on 12/22/23 showed progression of right parietal lobe lesion with worsening mass effect, edema, and 1.2cm MLS. Hospital Course included episodes of hypertension which were controlled with medication management. He underwent a Right parietal and temporal crani and resection (frozen: metastatic melanoma) on 1/2. He tolerated the procedure well and was started on nifedipine post operatively for BP management. He remains on keppra for seizure prophylaxis and on decadron. His lovenox is held presently but is s/p IVC filter on 1/2 with noted LE duplex on 12/31 with proximal DVT. Plan for 1 week post operative resumption of full dose lovenox. He had post op hyponatremia and was started on salt tabs. He was evaluated for admission to acute inpatient rehab and admitted to [facility] on 1/5/24.       ALLERGIES:  No Known Allergies    MEDICATIONS  (STANDING):  dexAMETHasone     Tablet   Oral   dexAMETHasone     Tablet 2 milliGRAM(s) Oral every 8 hours  enoxaparin Injectable 40 milliGRAM(s) SubCutaneous <User Schedule>  levETIRAcetam 500 milliGRAM(s) Oral every 12 hours  NIFEdipine XL 60 milliGRAM(s) Oral daily  pantoprazole    Tablet 40 milliGRAM(s) Oral before breakfast  polyethylene glycol 3350 17 Gram(s) Oral daily  senna 2 Tablet(s) Oral at bedtime  sodium chloride 1 Gram(s) Oral every 8 hours    MEDICATIONS  (PRN):  acetaminophen     Tablet .. 650 milliGRAM(s) Oral every 6 hours PRN Mild Pain (1 - 3)  oxyCODONE    IR 5 milliGRAM(s) Oral every 4 hours PRN Severe Pain (7 - 10)    Vital Signs Last 24 Hrs  T(F): 97.3 (06 Jan 2024 08:12), Max: 98.2 (05 Jan 2024 14:48)  HR: 85 (06 Jan 2024 08:12) (78 - 100)  BP: 129/87 (06 Jan 2024 08:12) (122/91 - 146/99)  RR: 16 (06 Jan 2024 08:12) (16 - 18)  SpO2: 100% (06 Jan 2024 08:12) (97% - 100%)  I&O's Summary    05 Jan 2024 07:01  -  06 Jan 2024 07:00  --------------------------------------------------------  IN: 0 mL / OUT: 2 mL / NET: -2 mL      BMI (kg/m2): 23.9 (01-05-24 @ 14:48), 23.9 (01-02-24 @ 08:03)        LABS:                        12.9   10.32 )-----------( 138      ( 06 Jan 2024 06:45 )             40.2       01-06    137  |  103  |  30  ----------------------------<  92  4.0   |  25  |  1.02    Ca    8.4      06 Jan 2024 06:45  Phos  3.3     01-05  Mg     2.5     01-05    TPro  5.8  /  Alb  2.4  /  TBili  0.7  /  DBili  x   /  AST  16  /  ALT  20  /  AlkPhos  53  01-06     Urinalysis Basic - ( 06 Jan 2024 06:45 )    Color: x / Appearance: x / SG: x / pH: x  Gluc: 92 mg/dL / Ketone: x  / Bili: x / Urobili: x   Blood: x / Protein: x / Nitrite: x   Leuk Esterase: x / RBC: x / WBC x   Sq Epi: x / Non Sq Epi: x / Bacteria: x    Consultant(s) Notes Reviewed:   Care Discused with Consultants/Other Providers: yes, rehab team   Imaging Personally Reviewed:     LE Duplex 12/31/2023  IMPRESSION:  Acute deep venous thrombosis: above the knee.  Acute DVTs in the bilateral popliteal veins.    MRI Brain 1/1/2024  IMPRESSION:  Since 12/22/23, there is interval improvement in vasogenic edema and mass   effect from multiple brain metastasis- likely a response to steroids.    Largest masses and greatest mass effect is again at the right cerebral   hemisphere. No significant change in lesional size nor visibly new brain   metastasis or hemorrhagic focus at this time.    CXR 1/3  IMPRESSION:  Similar appearance to prior exam 1/1/2024. Large right lung mass again   noted. No acute infiltrates appearing. No pneumothorax or pleural   effusion. 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 with Dr. Worthy, s/p SRS (completed 5 rounds 8/15/23), on immunotherapy monthly (last dose 12/15/23, next dose 1/15/2023), drug induced neutropenia (likely bactrim), who presented to [facility] on 12/31/23 for resection of brain tumor. Patient was initially diagnosed after he was found to be leaving the car door open, putting his shoes on the wrong feet, and being forgetful of his usual daily routine. Patient reported on 12/20/23 he fell and hit his head (no LOC) with left sided weakness. Within 2 days, the symptoms of forgetfulness returned. MRI as outpatient on 12/22/23 showed progression of right parietal lobe lesion with worsening mass effect, edema, and 1.2cm MLS. Hospital Course included episodes of hypertension which were controlled with medication management. He underwent a Right parietal and temporal crani and resection (frozen: metastatic melanoma) on 1/2. He tolerated the procedure well and was started on nifedipine post operatively for BP management. He remains on keppra for seizure prophylaxis and on decadron. His lovenox is held presently but is s/p IVC filter on 1/2 with noted LE duplex on 12/31 with proximal DVT. Plan for 1 week post operative resumption of full dose lovenox. He had post op hyponatremia and was started on salt tabs. He was evaluated for admission to acute inpatient rehab and admitted to [facility] on 1/5/24.     Patient seen and examined at bedside. He reports feeling well, denies any acute complaints. No HA/blurred vision/focal deficit. Rest of ROS is negative.     ALLERGIES:  No Known Allergies    PAST MEDICAL HISTORY:  DVT, lower extremity   HTN (hypertension)   Melanoma with mets to liver and brain.     PAST SURGICAL HISTORY:  H/O brain surgery.     FAMILY HISTORY:  No pertinent family history in first degree relatives. No pertinent family history of: dementia.    SOCIAL HISTORY  Smoking - Denied  EtOH - Socially  Drugs - Denied    MEDICATIONS  (STANDING):  dexAMETHasone     Tablet   Oral   dexAMETHasone     Tablet 2 milliGRAM(s) Oral every 8 hours  enoxaparin Injectable 40 milliGRAM(s) SubCutaneous <User Schedule>  levETIRAcetam 500 milliGRAM(s) Oral every 12 hours  NIFEdipine XL 60 milliGRAM(s) Oral daily  pantoprazole    Tablet 40 milliGRAM(s) Oral before breakfast  polyethylene glycol 3350 17 Gram(s) Oral daily  senna 2 Tablet(s) Oral at bedtime  sodium chloride 1 Gram(s) Oral every 8 hours    MEDICATIONS  (PRN):  acetaminophen     Tablet .. 650 milliGRAM(s) Oral every 6 hours PRN Mild Pain (1 - 3)  T(C): 36.3 (01-06-24 @ 08:12), Max: 36.8 (01-05-24 @ 14:48)  HR: 85 (01-06-24 @ 08:12) (85 - 100)  BP: 129/87 (01-06-24 @ 08:12) (122/91 - 146/99)  RR: 16 (01-06-24 @ 08:12) (16 - 16)  SpO2: 100% (01-06-24 @ 08:12) (97% - 100%)    Parameters below as of 06 Jan 2024 08:12  Patient On (Oxygen Delivery Method): room air    PHYSICAL EXAM:  GENERAL: NAD  HEENT: NCAT, right scalp incision with staples in place, no drainage/redness   NECK: Supple, No JVD  CHEST/LUNG: Clear to percussion bilaterally; No rales, rhonchi, wheezing  HEART: Regular rate and rhythm; No murmurs  ABDOMEN: Soft, Nontender, Nondistended; Bowel sounds present  MUSCULOSKELETAL/EXTREMITIES:  2+ Peripheral Pulses, No LE edema  SKIN: No rashes or lesions  PSYCH: Appropriate affect  NEURO: AAO x 3, 5/5 UE/LE     LABS:                        12.9   10.32 )-----------( 138      ( 06 Jan 2024 06:45 )             40.2       01-06    137  |  103  |  30  ----------------------------<  92  4.0   |  25  |  1.02    Ca    8.4      06 Jan 2024 06:45  Phos  3.3     01-05  Mg     2.5     01-05    TPro  5.8  /  Alb  2.4  /  TBili  0.7  /  DBili  x   /  AST  16  /  ALT  20  /  AlkPhos  53  01-06     Urinalysis Basic - ( 06 Jan 2024 06:45 )    Color: x / Appearance: x / SG: x / pH: x  Gluc: 92 mg/dL / Ketone: x  / Bili: x / Urobili: x   Blood: x / Protein: x / Nitrite: x   Leuk Esterase: x / RBC: x / WBC x   Sq Epi: x / Non Sq Epi: x / Bacteria: x    Consultant(s) Notes Reviewed:   Care Discused with Consultants/Other Providers: yes, rehab team   Imaging Personally Reviewed:     LE Duplex 12/31/2023  IMPRESSION:  Acute deep venous thrombosis: above the knee.  Acute DVTs in the bilateral popliteal veins.    MRI Brain 1/1/2024  IMPRESSION:  Since 12/22/23, there is interval improvement in vasogenic edema and mass   effect from multiple brain metastasis- likely a response to steroids.    Largest masses and greatest mass effect is again at the right cerebral   hemisphere. No significant change in lesional size nor visibly new brain   metastasis or hemorrhagic focus at this time.    CXR 1/3  IMPRESSION:  Similar appearance to prior exam 1/1/2024. Large right lung mass again   noted. No acute infiltrates appearing. No pneumothorax or pleural   effusion. 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 Worthy, s/p SRS (completed 5 rounds 8/15/23), on immunotherapy monthly (last dose 12/15/23, next dose 1/15/2023), drug induced neutropenia (likely bactrim), who presented to [facility] on 12/31/23 for resection of brain tumor. Patient was initially diagnosed after he was found to be leaving the car door open, putting his shoes on the wrong feet, and being forgetful of his usual daily routine. Patient reported on 12/20/23 he fell and hit his head (no LOC) with left sided weakness. Within 2 days, the symptoms of forgetfulness returned. MRI as outpatient on 12/22/23 showed progression of right parietal lobe lesion with worsening mass effect, edema, and 1.2cm MLS. Hospital Course included episodes of hypertension which were controlled with medication management. He underwent a Right parietal and temporal crani and resection (frozen: metastatic melanoma) on 1/2. He tolerated the procedure well and was started on nifedipine post operatively for BP management. He remains on keppra for seizure prophylaxis and on decadron. His lovenox is held presently but is s/p IVC filter on 1/2 with noted LE duplex on 12/31 with proximal DVT. Plan for 1 week post operative resumption of full dose lovenox. He had post op hyponatremia and was started on salt tabs. He was evaluated for admission to acute inpatient rehab and admitted to [facility] on 1/5/24.     Patient seen and examined at bedside. He reports feeling well, denies any acute complaints. No HA/blurred vision/focal deficit. Rest of ROS is negative.     ALLERGIES:  No Known Allergies    PAST MEDICAL HISTORY:  DVT, lower extremity   HTN (hypertension)   Melanoma with mets to liver and brain.     PAST SURGICAL HISTORY:  H/O brain surgery.     FAMILY HISTORY:  No pertinent family history in first degree relatives. No pertinent family history of: dementia.    SOCIAL HISTORY  Smoking - Denied  EtOH - Socially  Drugs - Denied    MEDICATIONS  (STANDING):  dexAMETHasone     Tablet   Oral   dexAMETHasone     Tablet 2 milliGRAM(s) Oral every 8 hours  enoxaparin Injectable 40 milliGRAM(s) SubCutaneous <User Schedule>  levETIRAcetam 500 milliGRAM(s) Oral every 12 hours  NIFEdipine XL 60 milliGRAM(s) Oral daily  pantoprazole    Tablet 40 milliGRAM(s) Oral before breakfast  polyethylene glycol 3350 17 Gram(s) Oral daily  senna 2 Tablet(s) Oral at bedtime  sodium chloride 1 Gram(s) Oral every 8 hours    MEDICATIONS  (PRN):  acetaminophen     Tablet .. 650 milliGRAM(s) Oral every 6 hours PRN Mild Pain (1 - 3)  T(C): 36.3 (01-06-24 @ 08:12), Max: 36.8 (01-05-24 @ 14:48)  HR: 85 (01-06-24 @ 08:12) (85 - 100)  BP: 129/87 (01-06-24 @ 08:12) (122/91 - 146/99)  RR: 16 (01-06-24 @ 08:12) (16 - 16)  SpO2: 100% (01-06-24 @ 08:12) (97% - 100%)    Parameters below as of 06 Jan 2024 08:12  Patient On (Oxygen Delivery Method): room air    PHYSICAL EXAM:  GENERAL: NAD  HEENT: NCAT, right scalp incision with staples in place, no drainage/redness   NECK: Supple, No JVD  CHEST/LUNG: Clear to percussion bilaterally; No rales, rhonchi, wheezing  HEART: Regular rate and rhythm; No murmurs  ABDOMEN: Soft, Nontender, Nondistended; Bowel sounds present  MUSCULOSKELETAL/EXTREMITIES:  2+ Peripheral Pulses, No LE edema  SKIN: No rashes or lesions  PSYCH: Appropriate affect  NEURO: AAO x 3, 5/5 UE/LE     LABS:                        12.9   10.32 )-----------( 138      ( 06 Jan 2024 06:45 )             40.2       01-06    137  |  103  |  30  ----------------------------<  92  4.0   |  25  |  1.02    Ca    8.4      06 Jan 2024 06:45  Phos  3.3     01-05  Mg     2.5     01-05    TPro  5.8  /  Alb  2.4  /  TBili  0.7  /  DBili  x   /  AST  16  /  ALT  20  /  AlkPhos  53  01-06     Urinalysis Basic - ( 06 Jan 2024 06:45 )    Color: x / Appearance: x / SG: x / pH: x  Gluc: 92 mg/dL / Ketone: x  / Bili: x / Urobili: x   Blood: x / Protein: x / Nitrite: x   Leuk Esterase: x / RBC: x / WBC x   Sq Epi: x / Non Sq Epi: x / Bacteria: x    Consultant(s) Notes Reviewed:   Care Discused with Consultants/Other Providers: yes, rehab team   Imaging Personally Reviewed:     LE Duplex 12/31/2023  IMPRESSION:  Acute deep venous thrombosis: above the knee.  Acute DVTs in the bilateral popliteal veins.    MRI Brain 1/1/2024  IMPRESSION:  Since 12/22/23, there is interval improvement in vasogenic edema and mass   effect from multiple brain metastasis- likely a response to steroids.    Largest masses and greatest mass effect is again at the right cerebral   hemisphere. No significant change in lesional size nor visibly new brain   metastasis or hemorrhagic focus at this time.    CXR 1/3  IMPRESSION:  Similar appearance to prior exam 1/1/2024. Large right lung mass again   noted. No acute infiltrates appearing. No pneumothorax or pleural   effusion.

## 2024-01-06 NOTE — CONSULT NOTE ADULT - ASSESSMENT
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 Worthy, s/p SRS (completed 5 rounds 8/15/23), on immunotherapy monthly (last dose 12/15/23, next dose 1/15/2023), drug induced neutropenia (likely bactrim), MRI as outpatient on 12/22/23 showed progression of right parietal lobe lesion with worsening mass effect, edema, and 1.2cm MLS, he presented to [facility] on 12/31/23 for resection of brain tumor. Hospital Course included episodes of hypertension which were controlled with medication management. He underwent a Right parietal and temporal crani and resection (frozen: metastatic melanoma) on 1/2. He tolerated the procedure well and was started on nifedipine post operatively for BP management. He remains on keppra for seizure prophylaxis and on decadron. His lovenox is held presently but is s/p IVC filter on 1/2 with noted LE duplex on 12/31 with proximal DVT. Plan for 1 week post operative resumption of full dose lovenox. He had post op hyponatremia and was started on salt tabs. He was evaluated for admission to acute inpatient rehab and admitted to [facility] on 1/5/24.     #Recurrent Metastatic Melanoma to the Brain   -s/p Right parietal and temporal crani and resection (frozen: metastatic melanoma) on 1/2  -Continue dexamethasone taper 2mg TID until 1/6, then 2mg BID until 1/8. 2mg daily until 1/10 then stop   -Continue keppra 500mg BID for seizure prophylaxis  -Staples to craniotomy incision (s/p craniotomy on 1/2)   -Receiving immunotherapy monthly (last dose 12/15/23, next dose 1/15/2023)  -Fall precaution   -Pain control and bowel regimen per rehab team   -Start Comprehensive Rehab Program of PT/OT/SLP  -Outpatient follow up with neurosurgery     #Known Right lung mass  - CXR with 10.8cm RUL mass (enlarged from 9.3cm), cont pulmonary hygiene   - Outpatient follow up with oncology     #DVT/PE  - diagnosed 8/2023 and has been on therapeutic enoxaparin at home - stopped prior to surgery   - venous duplex 12/31/23 with acute proximal DVT   -IVC filter placed 1/2/24  -As per neurosurgery note, may resume full dose lovenox on 1/9 (POD #7), on ppx dose currently  -To follow up with vascular surgery as outpatient for IVC filter management     #Hyponatremia  -Last   -Monitor labs  -Continue salt tabs 1G TID  -Wean off as tolerated for a normal sodium goal (135-145)    #HTN  -Continue Nifedipine  -Monitor BP    #Thrombocytopenia   - -140s, stable  - no s/s active bleeding  - Monitor CBC     #DVT PPx  -Lovenox 40mg SC QD    Discussed with primary team    63 year old male with 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 Worthy, s/p SRS (completed 5 rounds 8/15/23), on immunotherapy monthly (last dose 12/15/23, next dose 1/15/2023), drug induced neutropenia (likely bactrim), MRI as outpatient on 12/22/23 showed progression of right parietal lobe lesion with worsening mass effect, edema, and 1.2cm MLS, he presented to [facility] on 12/31/23 for resection of brain tumor. Hospital Course included episodes of hypertension which were controlled with medication management. He underwent a Right parietal and temporal crani and resection (frozen: metastatic melanoma) on 1/2. He tolerated the procedure well and was started on nifedipine post operatively for BP management. He remains on keppra for seizure prophylaxis and on decadron. His lovenox is held presently but is s/p IVC filter on 1/2 with noted LE duplex on 12/31 with proximal DVT. Plan for 1 week post operative resumption of full dose lovenox. He had post op hyponatremia and was started on salt tabs. He was evaluated for admission to acute inpatient rehab and admitted to [facility] on 1/5/24.     #Recurrent Metastatic Melanoma to the Brain   -s/p Right parietal and temporal crani and resection (frozen: metastatic melanoma) on 1/2  -Continue dexamethasone taper 2mg TID until 1/6, then 2mg BID until 1/8. 2mg daily until 1/10 then stop   -Continue keppra 500mg BID for seizure prophylaxis  -Staples to craniotomy incision (s/p craniotomy on 1/2)   -Receiving immunotherapy monthly (last dose 12/15/23, next dose 1/15/2023)  -Fall precaution   -Pain control and bowel regimen per rehab team   -Start Comprehensive Rehab Program of PT/OT/SLP  -Outpatient follow up with neurosurgery     #Known Right lung mass  - CXR with 10.8cm RUL mass (enlarged from 9.3cm), cont pulmonary hygiene   - Outpatient follow up with oncology     #DVT/PE  - diagnosed 8/2023 and has been on therapeutic enoxaparin at home - stopped prior to surgery   - venous duplex 12/31/23 with acute proximal DVT   -IVC filter placed 1/2/24  -As per neurosurgery note, may resume full dose lovenox on 1/9 (POD #7), on ppx dose currently  -To follow up with vascular surgery as outpatient for IVC filter management     #Hyponatremia  -Last   -Monitor labs  -Continue salt tabs 1G TID  -Wean off as tolerated for a normal sodium goal (135-145)    #HTN  -Continue Nifedipine  -Monitor BP    #Thrombocytopenia   - -140s, stable  - no s/s active bleeding  - Monitor CBC     #DVT PPx  -Lovenox 40mg SC QD    Discussed with primary team    63 year old male with 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 Worthy, s/p SRS (completed 5 rounds 8/15/23), on immunotherapy monthly (last dose 12/15/23, next dose 1/15/2023), drug induced neutropenia (likely bactrim), MRI as outpatient on 12/22/23 showed progression of right parietal lobe lesion with worsening mass effect, edema, and 1.2cm MLS, he presented to [facility] on 12/31/23 for resection of brain tumor. Hospital Course included episodes of hypertension which were controlled with medication management. He underwent a Right parietal and temporal crani and resection (frozen: metastatic melanoma) on 1/2. He tolerated the procedure well and was started on nifedipine post operatively for BP management. He remains on keppra for seizure prophylaxis and on decadron. His lovenox is held presently but is s/p IVC filter on 1/2 with noted LE duplex on 12/31 with proximal DVT. Plan for 1 week post operative resumption of full dose lovenox. He had post op hyponatremia and was started on salt tabs. He was evaluated for admission to acute inpatient rehab and admitted to [facility] on 1/5/24.     #Metastatic Melanoma to the Brain   -s/p Right parietal and temporal crani and resection (frozen: metastatic melanoma) on 1/2  -Continue dexamethasone taper 2mg TID until 1/6, then 2mg BID until 1/8. 2mg daily until 1/10 then stop   -Continue keppra 500mg BID for seizure prophylaxis  -Staples to craniotomy incision (s/p craniotomy on 1/2)   -Fall precaution   -Pain control and bowel regimen per rehab team   -Start Comprehensive Rehab Program of PT/OT/SLP  -Outpatient follow up with neurosurgery     #Known Right lung mass  - CXR with 10.8cm RUL mass (enlarged from 9.3cm), cont pulmonary hygiene   - Receiving immunotherapy monthly (last dose 12/15/23, next dose 1/15/2023)  - Follow up with oncology     #DVT/PE  - diagnosed 8/2023 and has been on therapeutic enoxaparin at home - stopped prior to surgery   - venous duplex 12/31/23 with acute proximal DVT   -IVC filter placed 1/2/24  -As per neurosurgery note, may resume full dose lovenox on 1/9 (POD #7), on ppx dose currently  -To follow up with vascular surgery as outpatient for IVC filter management     #Hyponatremia  -Last   -Monitor labs  -Continue salt tabs 1G TID  -Wean off as tolerated for a normal sodium goal (135-145)    #HTN  -Continue Nifedipine  -Monitor BP    #Thrombocytopenia   - -140s, stable  - no s/s active bleeding  - Monitor CBC     #DVT PPx  -Lovenox 40mg SC QD    Discussed with primary team    63 year old male with 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 Worthy, s/p SRS (completed 5 rounds 8/15/23), on immunotherapy monthly (last dose 12/15/23, next dose 1/15/2023), drug induced neutropenia (likely bactrim), MRI as outpatient on 12/22/23 showed progression of right parietal lobe lesion with worsening mass effect, edema, and 1.2cm MLS, he presented to [facility] on 12/31/23 for resection of brain tumor. Hospital Course included episodes of hypertension which were controlled with medication management. He underwent a Right parietal and temporal crani and resection (frozen: metastatic melanoma) on 1/2. He tolerated the procedure well and was started on nifedipine post operatively for BP management. He remains on keppra for seizure prophylaxis and on decadron. His lovenox is held presently but is s/p IVC filter on 1/2 with noted LE duplex on 12/31 with proximal DVT. Plan for 1 week post operative resumption of full dose lovenox. He had post op hyponatremia and was started on salt tabs. He was evaluated for admission to acute inpatient rehab and admitted to [facility] on 1/5/24.     #Metastatic Melanoma to the Brain   -s/p Right parietal and temporal crani and resection (frozen: metastatic melanoma) on 1/2  -Continue dexamethasone taper 2mg TID until 1/6, then 2mg BID until 1/8. 2mg daily until 1/10 then stop   -Continue keppra 500mg BID for seizure prophylaxis  -Staples to craniotomy incision (s/p craniotomy on 1/2)   -Fall precaution   -Pain control and bowel regimen per rehab team   -Start Comprehensive Rehab Program of PT/OT/SLP  -Outpatient follow up with neurosurgery     #Known Right lung mass  - CXR with 10.8cm RUL mass (enlarged from 9.3cm), cont pulmonary hygiene   - Receiving immunotherapy monthly (last dose 12/15/23, next dose 1/15/2023)  - Follow up with oncology     #DVT/PE  - diagnosed 8/2023 and has been on therapeutic enoxaparin at home - stopped prior to surgery   - venous duplex 12/31/23 with acute proximal DVT   -IVC filter placed 1/2/24  -As per neurosurgery note, may resume full dose lovenox on 1/9 (POD #7), on ppx dose currently  -To follow up with vascular surgery as outpatient for IVC filter management     #Hyponatremia  -Last   -Monitor labs  -Continue salt tabs 1G TID  -Wean off as tolerated for a normal sodium goal (135-145)    #HTN  -Continue Nifedipine  -Monitor BP    #Thrombocytopenia   - -140s, stable  - no s/s active bleeding  - Monitor CBC     #DVT PPx  -Lovenox 40mg SC QD    Discussed with primary team

## 2024-01-06 NOTE — PROGRESS NOTE ADULT - SUBJECTIVE AND OBJECTIVE BOX
Cc: s/p craniotomy for tumor resection    HPI: Patient with no new medical issues today.  Pain controlled, no chest pain, no N/V, no Fevers/Chills. No other new ROS  Has been tolerating rehabilitation program.    acetaminophen     Tablet .. 650 milliGRAM(s) Oral every 6 hours PRN  dexAMETHasone     Tablet   Oral   dexAMETHasone     Tablet 2 milliGRAM(s) Oral every 8 hours  enoxaparin Injectable 40 milliGRAM(s) SubCutaneous <User Schedule>  levETIRAcetam 500 milliGRAM(s) Oral every 12 hours  NIFEdipine XL 60 milliGRAM(s) Oral daily  oxyCODONE    IR 5 milliGRAM(s) Oral every 4 hours PRN  pantoprazole    Tablet 40 milliGRAM(s) Oral before breakfast  polyethylene glycol 3350 17 Gram(s) Oral daily  senna 2 Tablet(s) Oral at bedtime  sodium chloride 1 Gram(s) Oral every 8 hours      T(C): 36.3 (01-06-24 @ 08:12), Max: 36.8 (01-05-24 @ 14:48)  HR: 85 (01-06-24 @ 08:12) (78 - 100)  BP: 129/87 (01-06-24 @ 08:12) (122/91 - 146/99)  RR: 16 (01-06-24 @ 08:12) (16 - 18)  SpO2: 100% (01-06-24 @ 08:12) (97% - 100%)    In NAD  HEENT- EOMI  Heart- Well Perfused  Lungs- No resp distress, no use of accessory resp muscles  Neuro- Exam unchanged  Psych- Affect wnl          Imp: Patient with diagnosis of    s/p craniotomy for tumor resection      admitted for comprehensive acute rehabilitation.    Plan:  - Continue therapies  - DVT prophylaxis  - Skin- Turn q2h, check skin daily  - Continue current medications; patient medically stable.   - Patient is stable to continue current rehabilitation program.

## 2024-01-07 NOTE — PROGRESS NOTE ADULT - ASSESSMENT
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 Worthy, s/p SRS (completed 5 rounds 8/15/23), on immunotherapy monthly (last dose 12/15/23, next dose 1/15/2023), drug induced neutropenia (likely bactrim), who presented to [facility] on 12/31/23 for resection of brain tumor. Patient was initially diagnosed after he was found to be leaving the car door open, putting his shoes on the wrong feet, and being forgetful of his usual daily routine. Patient reported on 12/20/23 he fell and hit his head (no LOC) with left sided weakness. Within 2 days, the symptoms of forgetfulness returned. MRI as outpatient on 12/22/23 showed progression of right parietal lobe lesion with worsening mass effect, edema, and 1.2cm MLS. Hospital Course included episodes of hypertension which were controlled with medication management. He underwent a Right parietal and temporal crani and resection (frozen: metastatic melanoma) on 1/2. He tolerated the procedure well and was started on nifedipine post operatively for BP management. He remains on keppra for seizure prophylaxis and on decadron. His lovenox is held presently but is s/p IVC filter on 1/2 with noted LE duplex on 12/31 with proximal DVT. Plan for 1 week post operative resumption of full dose lovenox. He had post op hyponatremia and was started on salt tabs. He was evaluated for admission to acute inpatient rehab and admitted to [facility] on 1/5/24.       #Metastatic melanoma to the brain s/p craniotomy for resection now with Gait Instability, ADL impairments and Functional impairments  - Start Comprehensive Rehab Program of PT/OT/SLP  -Continue keppra 500mg BID for seizure prophylaxis  -Continue dexamethasone [id number] TID until 1/6, then 2mg BID until 1/8. 2mg daily until 1/10 then stop   -Staples to craniotomy incision (s/p craniotomy on 1/2)   -May shower  - will need a post op MRI before your next appointment with neurosurgery   #Right lung mass  - CXR with 10.8cm RUL mass (enlarged from 9.3cm), cont pulmonary hygiene   - receiving immunotherapy monthly (last dose 12/15/23, next dose 1/15/2023)    #Hyponatremia  -Last   -Monitor labs  -Continue salt tabs 1G TID  -wean off as tolerated for a normal sodium goal (135-145)    #HTN  -Continue nifedipine 50mg daily    #DVT  - diagnosed 8/2023 and has been on therapeutic enoxaparin at home - stopped prior to surgery   - venous duplex 12/31/23 with acute proximal DVT   -IVC filter placed 1/2/24  -As per neurosurgery note, may resume full dose lovenox on 1/9 (POD #7)  -To follow up with vascular surgery as outpatient for IVC filter management     #Pain control  - Tylenol PRN  -Oxycodone PRn     #GI/Bowel Mgmt   - Continue Senna at bedtime   - Miralax     #Bladder management  - Continue to monitor PVR q 8 hours (SC if > 400)  -Monitor UO    #Skin:  -  right cranial incision with staples TARA with some dried old blood, right groin puncture site TARA, non blanchable erythema to  back 2/2 immunotherapy- per wife.    FEN   - Diet - Regular with thins    - Dysphagia  SLP - evaluation and treatment    Precautions / PROPHYLAXIS:   - Falls, Cardiac, Seizure, aspiratoin  - ortho: Weight bearing status: WBAT   - Lungs: Aspiration, Incentive Spirometer   - Pressure injury/Skin: Turn Q2hrs while in bed, OOB to Chair, PT/OT      Johan Tony  Neurosurgery  [address] [place] [address]  Phone: [phone]  Fax: [phone]  Follow Up Time:     iWlli Faulkner  Vascular Surgery  [address] [address]  Phone: [phone]  Fax: [phone]  Follow Up Time:     Tano Richards  Medical Oncology  [address] [place] [address]  Phone: [phone]  Fax: [phone]  Follow Up Time:    MEDICAL PROGNOSIS: GOOD              REHAB POTENTIAL: GOOD              ESTIMATED DISPOSITION: HOME WITH HOME CARE              ELOS: 10-14 Days   EXPECTED THERAPY:     P.T. 1hr/day       O.T. 1hr/day      S.L.P. 1hr/day       P&O Unnecessary       EXP FREQUENCY: 5 days per 7 day period     PRESCREEN COMPARISON:   I have reviewed the prescreen information and I have found no relevant changes between the preadmission screening and my post admission evaluation     RATIONALE FOR INPATIENT ADMISSION - Patient demonstrates the following: (check all that apply)  [X] Medically appropriate for rehabilitation admission  [X] Has attainable rehab goals with an appropriate initial discharge plan  [X] Has rehabilitation potential (expected to make a significant improvement within a reasonable period of time)   [X] Requires close medical management by a rehab physician, rehab nursing care, Hospitalist and comprehensive interdisciplinary team (including PT, OT, & or SLP, Prosthetics and Orthotics)   From primary team:  ASSESSMENT/PLAN  63 year old male with 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 Worthy, s/p SRS (completed 5 rounds 8/15/23), on immunotherapy monthly (last dose 12/15/23, next dose 1/15/2023), drug induced neutropenia (likely bactrim), who presented to [facility] on 12/31/23 for resection of brain tumor. Patient was initially diagnosed after he was found to be leaving the car door open, putting his shoes on the wrong feet, and being forgetful of his usual daily routine. Patient reported on 12/20/23 he fell and hit his head (no LOC) with left sided weakness. Within 2 days, the symptoms of forgetfulness returned. MRI as outpatient on 12/22/23 showed progression of right parietal lobe lesion with worsening mass effect, edema, and 1.2cm MLS. Hospital Course included episodes of hypertension which were controlled with medication management. He underwent a Right parietal and temporal crani and resection (frozen: metastatic melanoma) on 1/2. He tolerated the procedure well and was started on nifedipine post operatively for BP management. He remains on keppra for seizure prophylaxis and on decadron. His lovenox is held presently but is s/p IVC filter on 1/2 with noted LE duplex on 12/31 with proximal DVT. Plan for 1 week post operative resumption of full dose lovenox. He had post op hyponatremia and was started on salt tabs. He was evaluated for admission to acute inpatient rehab and admitted to [facility] on 1/5/24.       #Metastatic melanoma to the brain s/p craniotomy for resection now with Gait Instability, ADL impairments and Functional impairments  - Start Comprehensive Rehab Program of PT/OT/SLP  -Continue keppra 500mg BID for seizure prophylaxis  -Continue dexamethasone [id number] TID until 1/6, then 2mg BID until 1/8. 2mg daily until 1/10 then stop   -Staples to craniotomy incision (s/p craniotomy on 1/2)   -May shower  - will need a post op MRI before your next appointment with neurosurgery   #Right lung mass  - CXR with 10.8cm RUL mass (enlarged from 9.3cm), cont pulmonary hygiene   - receiving immunotherapy monthly (last dose 12/15/23, next dose 1/15/2023)    #Hyponatremia  -Last   -Monitor labs  -Continue salt tabs 1G TID  -wean off as tolerated for a normal sodium goal (135-145)    #HTN  -Continue nifedipine 50mg daily    #DVT  - diagnosed 8/2023 and has been on therapeutic enoxaparin at home - stopped prior to surgery   - venous duplex 12/31/23 with acute proximal DVT   -IVC filter placed 1/2/24  -As per neurosurgery note, may resume full dose lovenox on 1/9 (POD #7)  -To follow up with vascular surgery as outpatient for IVC filter management     #Pain control  - Tylenol PRN  -Oxycodone PRn     #GI/Bowel Mgmt   - Continue Senna at bedtime   - Miralax     #Bladder management  - Continue to monitor PVR q 8 hours (SC if > 400)  -Monitor UO    #Skin:  -  right cranial incision with staples TARA with some dried old blood, right groin puncture site TARA, non blanchable erythema to  back 2/2 immunotherapy- per wife.    FEN   - Diet - Regular with thins    - Dysphagia  SLP - evaluation and treatment    Precautions / PROPHYLAXIS:   - Falls, Cardiac, Seizure, aspiratoin  - ortho: Weight bearing status: WBAT   - Lungs: Aspiration, Incentive Spirometer   - Pressure injury/Skin: Turn Q2hrs while in bed, OOB to Chair, PT/OT      Johan Tony  Neurosurgery  [address] [place] [address]  Phone: [phone]  Fax: [phone]  Follow Up Time:     Willi Faulkner  Vascular Surgery  [address] [address]  Phone: [phone]  Fax: [phone]  Follow Up Time:     Tano Richards  Medical Oncology  [address] [place] [address]  Phone: [phone]  Fax: [phone]  Follow Up Time:    MEDICAL PROGNOSIS: GOOD              REHAB POTENTIAL: GOOD              ESTIMATED DISPOSITION: HOME WITH HOME CARE              ELOS: 10-14 Days   EXPECTED THERAPY:     P.T. 1hr/day       O.T. 1hr/day      S.L.P. 1hr/day       P&O Unnecessary       EXP FREQUENCY: 5 days per 7 day period     PRESCREEN COMPARISON:   I have reviewed the prescreen information and I have found no relevant changes between the preadmission screening and my post admission evaluation     RATIONALE FOR INPATIENT ADMISSION - Patient demonstrates the following: (check all that apply)  [X] Medically appropriate for rehabilitation admission  [X] Has attainable rehab goals with an appropriate initial discharge plan  [X] Has rehabilitation potential (expected to make a significant improvement within a reasonable period of time)   [X] Requires close medical management by a rehab physician, rehab nursing care, Hospitalist and comprehensive interdisciplinary team (including PT, OT, & or SLP, Prosthetics and Orthotics)

## 2024-01-07 NOTE — PROGRESS NOTE ADULT - SUBJECTIVE AND OBJECTIVE BOX
Cc: s/p craniotomy for tumor resection    HPI: Patient with no new medical issues today.  Pain controlled, no chest pain, no N/V, no Fevers/Chills. No other new ROS  Has been tolerating rehabilitation program.    acetaminophen     Tablet .. 650 milliGRAM(s) Oral every 6 hours PRN  dexAMETHasone     Tablet   Oral   dexAMETHasone     Tablet 2 milliGRAM(s) Oral every 8 hours  enoxaparin Injectable 40 milliGRAM(s) SubCutaneous <User Schedule>  levETIRAcetam 500 milliGRAM(s) Oral every 12 hours  NIFEdipine XL 60 milliGRAM(s) Oral daily  oxyCODONE    IR 5 milliGRAM(s) Oral every 4 hours PRN  pantoprazole    Tablet 40 milliGRAM(s) Oral before breakfast  polyethylene glycol 3350 17 Gram(s) Oral daily  senna 2 Tablet(s) Oral at bedtime  sodium chloride 1 Gram(s) Oral every 8 hours      ICU Vital Signs Last 24 Hrs  T(C): 37 (07 Jan 2024 09:00), Max: 37 (07 Jan 2024 09:00)  T(F): 98.6 (07 Jan 2024 09:00), Max: 98.6 (07 Jan 2024 09:00)  HR: 90 (07 Jan 2024 09:00) (90 - 99)  BP: 142/90 (07 Jan 2024 09:00) (106/74 - 142/90)  RR: 16 (07 Jan 2024 09:00) (16 - 16)  SpO2: 99% (07 Jan 2024 09:00) (97% - 99%)    O2 Parameters below as of 07 Jan 2024 09:00  Patient On (Oxygen Delivery Method): room air            In NAD  HEENT- EOMI  Heart- Well Perfused  Lungs- No resp distress, no use of accessory resp muscles  Neuro- Exam unchanged  Psych- Affect wnl          Imp: Patient with diagnosis of    s/p craniotomy for tumor resection      admitted for comprehensive acute rehabilitation.    Plan:  - Continue therapies  - DVT prophylaxis  - Skin- Turn q2h, check skin daily  - Continue current medications; patient medically stable.   - Patient is stable to continue current rehabilitation program.

## 2024-01-08 NOTE — DIETITIAN INITIAL EVALUATION ADULT - ORAL INTAKE PTA/DIET HISTORY
Pt endorses excellent appetite on regular diet over the last few months. NKFA. No chewing/swallowing issues.

## 2024-01-08 NOTE — DIETITIAN INITIAL EVALUATION ADULT - OTHER INFO
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 Worthy, s/p SRS (completed 5 rounds 8/15/23), on immunotherapy monthly (last dose 12/15/23, next dose 1/15/2023), drug induced neutropenia (likely bactrim), who presented to [facility] on 12/31/23 for resection of brain tumor. Patient was initially diagnosed after he was found to be leaving the car door open, putting his shoes on the wrong feet, and being forgetful of his usual daily routine. Patient reported on 12/20/23 he fell and hit his head (no LOC) with left sided weakness. Within 2 days, the symptoms of forgetfulness returned. MRI as outpatient on 12/22/23 showed progression of right parietal lobe lesion with worsening mass effect, edema, and 1.2cm MLS. Hospital Course included episodes of hypertension which were controlled with medication management. He underwent a Right parietal and temporal crani and resection (frozen: metastatic melanoma) on 1/2. He tolerated the procedure well and was started on nifedipine post operatively for BP management. He remains on keppra for seizure prophylaxis and on decadron. His lovenox is held presently but is s/p IVC filter on 1/2 with noted LE duplex on 12/31 with proximal DVT. Plan for 1 week post operative resumption of full dose lovenox. He had post op hyponatremia and was started on salt tabs. He was evaluated for admission to acute inpatient rehab and admitted to [facility] on 1/5/24.     Pt tolerating regular diet with report of excellent appetite, enjoying meals in-house. +decadron taper. Stable weights, current weight 180.7lbs. Hx of weight loss about 3-4 months ago, which has since resolved per pt report. s/p right crani. No edema. Noted hx hyponatremia, continues on NaCl tabs. Denies GI issues, last BM 1/7. Pt receptive to 2x protein portions given increased needs s/p neurosurgery.  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 Worthy, s/p SRS (completed 5 rounds 8/15/23), on immunotherapy monthly (last dose 12/15/23, next dose 1/15/2023), drug induced neutropenia (likely bactrim), who presented to [facility] on 12/31/23 for resection of brain tumor. Patient was initially diagnosed after he was found to be leaving the car door open, putting his shoes on the wrong feet, and being forgetful of his usual daily routine. Patient reported on 12/20/23 he fell and hit his head (no LOC) with left sided weakness. Within 2 days, the symptoms of forgetfulness returned. MRI as outpatient on 12/22/23 showed progression of right parietal lobe lesion with worsening mass effect, edema, and 1.2cm MLS. Hospital Course included episodes of hypertension which were controlled with medication management. He underwent a Right parietal and temporal crani and resection (frozen: metastatic melanoma) on 1/2. He tolerated the procedure well and was started on nifedipine post operatively for BP management. He remains on keppra for seizure prophylaxis and on decadron. His lovenox is held presently but is s/p IVC filter on 1/2 with noted LE duplex on 12/31 with proximal DVT. Plan for 1 week post operative resumption of full dose lovenox. He had post op hyponatremia and was started on salt tabs. He was evaluated for admission to acute inpatient rehab and admitted to [facility] on 1/5/24.     Pt tolerating regular diet with report of excellent appetite, enjoying meals in-house. +decadron taper. Stable weights, current weight 180.7lbs. Hx of weight loss about 3-4 months ago, which has since resolved per pt report. s/p right crani. No edema. Noted hx hyponatremia, continues on NaCl tabs. Denies GI issues, last BM 1/7. Pt receptive to 2x protein portions given increased needs s/p neurosurgery.

## 2024-01-08 NOTE — PROGRESS NOTE ADULT - SUBJECTIVE AND OBJECTIVE BOX
SUBJECTIVE/ROS: Denies chest pain, fever, chills, nausea, vomiting, abdominal pain, headache, or BLE pain.       Received pt in wheelchair at PT, reports feeling well, denies any acute complaints. Pt denies headache/ blurred/double vision, nausea/vomiting, abdominal pain.   Incision site to R scalp healing well, staples intact. Safety and comfort maintained.       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 Worthy, s/p SRS (completed 5 rounds 8/15/23), on immunotherapy monthly (last dose 12/15/23, next dose 1/15/2023), drug induced neutropenia (likely bactrim), who presented to [facility] on 12/31/23 for resection of brain tumor. Patient was initially diagnosed after he was found to be leaving the car door open, putting his shoes on the wrong feet, and being forgetful of his usual daily routine. Patient reported on 12/20/23 he fell and hit his head (no LOC) with left sided weakness. Within 2 days, the symptoms of forgetfulness returned. MRI as outpatient on 12/22/23 showed progression of right parietal lobe lesion with worsening mass effect, edema, and 1.2cm MLS. Hospital Course included episodes of hypertension which were controlled with medication management. He underwent a Right parietal and temporal crani and resection (frozen: metastatic melanoma) on 1/2. He tolerated the procedure well and was started on nifedipine post operatively for BP management. He remains on keppra for seizure prophylaxis and on decadron. His lovenox is held presently but is s/p IVC filter on 1/2 with noted LE duplex on 12/31 with proximal DVT. Plan for 1 week post operative resumption of full dose lovenox. He had post op hyponatremia and was started on salt tabs. He was evaluated for admission to acute inpatient rehab and admitted to [facility] on 1/5/24.                      PHYSICAL EXAM    Vital Signs Last 24 Hrs  T(C): 36.6 (08 Jan 2024 08:29), Max: 36.8 (07 Jan 2024 20:20)  T(F): 97.8 (08 Jan 2024 08:29), Max: 98.2 (07 Jan 2024 20:20)  HR: 79 (08 Jan 2024 08:29) (79 - 87)  BP: 130/88 (08 Jan 2024 08:29) (121/80 - 134/89)  BP(mean): --  RR: 16 (08 Jan 2024 08:29) (16 - 16)  SpO2: 100% (08 Jan 2024 08:29) (98% - 100%)    Parameters below as of 08 Jan 2024 08:29  Patient On (Oxygen Delivery Method): room air      Daily     Daily       PHYSICAL EXAM  Constitutional - NAD, Comfortable  HEENT - NCAT, EOMI  Neck - Supple, No limited ROM  Chest - Breathing comfortably   Cardiovascular - RRR, S1S2  Abdomen BS+, Soft, NTND  Extremities - No C/C/E, No calf tenderness   Neurologic Exam - no new focal deficits    RECENT LABS:                          12.1   13.76 )-----------( 141      ( 08 Jan 2024 06:35 )             39.1     01-08    137  |  106  |  33<H>  ----------------------------<  91  4.6   |  23  |  0.84    Ca    8.2<L>      08 Jan 2024 06:35    TPro  5.5<L>  /  Alb  2.4<L>  /  TBili  0.3  /  DBili  x   /  AST  11  /  ALT  14  /  AlkPhos  69  01-08    LIVER FUNCTIONS - ( 08 Jan 2024 06:35 )  Alb: 2.4 g/dL / Pro: 5.5 g/dL / ALK PHOS: 69 U/L / ALT: 14 U/L / AST: 11 U/L / GGT: x               CAPILLARY BLOOD GLUCOSE          MEDICATIONS  (STANDING):  dexAMETHasone     Tablet   Oral   dexAMETHasone     Tablet 2 milliGRAM(s) Oral every 12 hours  enoxaparin Injectable 40 milliGRAM(s) SubCutaneous <User Schedule>  levETIRAcetam 500 milliGRAM(s) Oral every 12 hours  NIFEdipine XL 60 milliGRAM(s) Oral daily  pantoprazole    Tablet 40 milliGRAM(s) Oral before breakfast  polyethylene glycol 3350 17 Gram(s) Oral daily  senna 2 Tablet(s) Oral at bedtime  sodium chloride 1 Gram(s) Oral every 8 hours    MEDICATIONS  (PRN):  acetaminophen     Tablet .. 650 milliGRAM(s) Oral every 6 hours PRN Mild Pain (1 - 3)  oxyCODONE    IR 5 milliGRAM(s) Oral every 4 hours PRN Severe Pain (7 - 10)   SUBJECTIVE/ROS: Denies chest pain, fever, chills, nausea, vomiting, abdominal pain, headache, or BLE pain.       Received pt in wheelchair at PT, reports feeling well, denies any acute complaints. Pt denies headache/ blurred/double vision, nausea/vomiting, abdominal pain.   Incision site to R scalp healing well, staples intact. Safety and comfort maintained.       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 Worthy, s/p SRS (completed 5 rounds 8/15/23), on immunotherapy monthly (last dose 12/15/23, next dose 1/15/2023), drug induced neutropenia (likely bactrim), who presented to [facility] on 12/31/23 for resection of brain tumor. Patient was initially diagnosed after he was found to be leaving the car door open, putting his shoes on the wrong feet, and being forgetful of his usual daily routine. Patient reported on 12/20/23 he fell and hit his head (no LOC) with left sided weakness. Within 2 days, the symptoms of forgetfulness returned. MRI as outpatient on 12/22/23 showed progression of right parietal lobe lesion with worsening mass effect, edema, and 1.2cm MLS. Hospital Course included episodes of hypertension which were controlled with medication management. He underwent a Right parietal and temporal crani and resection (frozen: metastatic melanoma) on 1/2. He tolerated the procedure well and was started on nifedipine post operatively for BP management. He remains on keppra for seizure prophylaxis and on decadron. His lovenox is held presently but is s/p IVC filter on 1/2 with noted LE duplex on 12/31 with proximal DVT. Plan for 1 week post operative resumption of full dose lovenox. He had post op hyponatremia and was started on salt tabs. He was evaluated for admission to acute inpatient rehab and admitted to [facility] on 1/5/24.                      PHYSICAL EXAM    Vital Signs Last 24 Hrs  T(C): 36.6 (08 Jan 2024 08:29), Max: 36.8 (07 Jan 2024 20:20)  T(F): 97.8 (08 Jan 2024 08:29), Max: 98.2 (07 Jan 2024 20:20)  HR: 79 (08 Jan 2024 08:29) (79 - 87)  BP: 130/88 (08 Jan 2024 08:29) (121/80 - 134/89)  BP(mean): --  RR: 16 (08 Jan 2024 08:29) (16 - 16)  SpO2: 100% (08 Jan 2024 08:29) (98% - 100%)    Parameters below as of 08 Jan 2024 08:29  Patient On (Oxygen Delivery Method): room air      Daily     Daily       PHYSICAL EXAM  Constitutional - NAD, Comfortable  HEENT - NCAT, EOMI  Neck - Supple, No limited ROM  Chest - Breathing comfortably   Cardiovascular - RRR, S1S2  Abdomen BS+, Soft, NTND  Extremities - No C/C/E, No calf tenderness   Neurologic Exam - no new focal deficits    RECENT LABS:                          12.1   13.76 )-----------( 141      ( 08 Jan 2024 06:35 )             39.1     01-08    137  |  106  |  33<H>  ----------------------------<  91  4.6   |  23  |  0.84    Ca    8.2<L>      08 Jan 2024 06:35    TPro  5.5<L>  /  Alb  2.4<L>  /  TBili  0.3  /  DBili  x   /  AST  11  /  ALT  14  /  AlkPhos  69  01-08    LIVER FUNCTIONS - ( 08 Jan 2024 06:35 )  Alb: 2.4 g/dL / Pro: 5.5 g/dL / ALK PHOS: 69 U/L / ALT: 14 U/L / AST: 11 U/L / GGT: x               CAPILLARY BLOOD GLUCOSE          MEDICATIONS  (STANDING):  dexAMETHasone     Tablet   Oral   dexAMETHasone     Tablet 2 milliGRAM(s) Oral every 12 hours  enoxaparin Injectable 40 milliGRAM(s) SubCutaneous <User Schedule>  levETIRAcetam 500 milliGRAM(s) Oral every 12 hours  NIFEdipine XL 60 milliGRAM(s) Oral daily  pantoprazole    Tablet 40 milliGRAM(s) Oral before breakfast  polyethylene glycol 3350 17 Gram(s) Oral daily  senna 2 Tablet(s) Oral at bedtime  sodium chloride 1 Gram(s) Oral every 8 hours    MEDICATIONS  (PRN):  acetaminophen     Tablet .. 650 milliGRAM(s) Oral every 6 hours PRN Mild Pain (1 - 3)  oxyCODONE    IR 5 milliGRAM(s) Oral every 4 hours PRN Severe Pain (7 - 10)   SUBJECTIVE/ROS: Pt received in wheelchair at PT, reports feeling well, denies any acute complaints. Pt denies headache/ blurred/double vision, nausea/vomiting, fever, chills, chest pain, abdominal pain. Pt denies BLE pain  Incision site to R scalp healing well, staples intact. Safety and comfort maintained.         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 Worthy, s/p SRS (completed 5 rounds 8/15/23), on immunotherapy monthly (last dose 12/15/23, next dose 1/15/2023), drug induced neutropenia (likely bactrim), who presented to [facility] on 12/31/23 for resection of brain tumor. Patient was initially diagnosed after he was found to be leaving the car door open, putting his shoes on the wrong feet, and being forgetful of his usual daily routine. Patient reported on 12/20/23 he fell and hit his head (no LOC) with left sided weakness. Within 2 days, the symptoms of forgetfulness returned. MRI as outpatient on 12/22/23 showed progression of right parietal lobe lesion with worsening mass effect, edema, and 1.2cm MLS. Hospital Course included episodes of hypertension which were controlled with medication management. He underwent a Right parietal and temporal crani and resection (frozen: metastatic melanoma) on 1/2. He tolerated the procedure well and was started on nifedipine post operatively for BP management. He remains on keppra for seizure prophylaxis and on decadron. His lovenox is held presently but is s/p IVC filter on 1/2 with noted LE duplex on 12/31 with proximal DVT. Plan for 1 week post operative resumption of full dose lovenox. He had post op hyponatremia and was started on salt tabs. He was evaluated for admission to acute inpatient rehab and admitted to [facility] on 1/5/24.                      PHYSICAL EXAM:          Vital Signs Last 24 Hrs  T(C): 36.6 (08 Jan 2024 08:29), Max: 36.8 (07 Jan 2024 20:20)  T(F): 97.8 (08 Jan 2024 08:29), Max: 98.2 (07 Jan 2024 20:20)  HR: 79 (08 Jan 2024 08:29) (79 - 87)  BP: 130/88 (08 Jan 2024 08:29) (121/80 - 134/89)  BP(mean): --  RR: 16 (08 Jan 2024 08:29) (16 - 16)  SpO2: 100% (08 Jan 2024 08:29) (98% - 100%)    Parameters below as of 08 Jan 2024 08:29  Patient On (Oxygen Delivery Method): room air      Daily     Daily       PHYSICAL EXAM  Constitutional - NAD, Comfortable  HEENT - NCAT, EOMI  Neck - Supple, No limited ROM  Chest - Breathing comfortably   Cardiovascular - RRR, S1S2  Abdomen BS+, Soft, NTND  Extremities - No C/C/E, No calf tenderness   Neurologic Exam - no new focal deficits    RECENT LABS:                          12.1   13.76 )-----------( 141      ( 08 Jan 2024 06:35 )             39.1     01-08    137  |  106  |  33<H>  ----------------------------<  91  4.6   |  23  |  0.84    Ca    8.2<L>      08 Jan 2024 06:35    TPro  5.5<L>  /  Alb  2.4<L>  /  TBili  0.3  /  DBili  x   /  AST  11  /  ALT  14  /  AlkPhos  69  01-08    LIVER FUNCTIONS - ( 08 Jan 2024 06:35 )  Alb: 2.4 g/dL / Pro: 5.5 g/dL / ALK PHOS: 69 U/L / ALT: 14 U/L / AST: 11 U/L / GGT: x               CAPILLARY BLOOD GLUCOSE          MEDICATIONS  (STANDING):  dexAMETHasone     Tablet   Oral   dexAMETHasone     Tablet 2 milliGRAM(s) Oral every 12 hours  enoxaparin Injectable 40 milliGRAM(s) SubCutaneous <User Schedule>  levETIRAcetam 500 milliGRAM(s) Oral every 12 hours  NIFEdipine XL 60 milliGRAM(s) Oral daily  pantoprazole    Tablet 40 milliGRAM(s) Oral before breakfast  polyethylene glycol 3350 17 Gram(s) Oral daily  senna 2 Tablet(s) Oral at bedtime  sodium chloride 1 Gram(s) Oral every 8 hours    MEDICATIONS  (PRN):  acetaminophen     Tablet .. 650 milliGRAM(s) Oral every 6 hours PRN Mild Pain (1 - 3)  oxyCODONE    IR 5 milliGRAM(s) Oral every 4 hours PRN Severe Pain (7 - 10)   SUBJECTIVE/ROS: Pt received in wheelchair at PT, reports feeling well, denies any acute complaints. Pt denies headache/ blurred/double vision, nausea/vomiting, fever, chills, chest pain, abdominal pain. Pt denies BLE pain  Incision site to R scalp healing well, staples intact. Safety and comfort maintained.         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 Worthy, s/p SRS (completed 5 rounds 8/15/23), on immunotherapy monthly (last dose 12/15/23, next dose 1/15/2023), drug induced neutropenia (likely bactrim), who presented to [facility] on 12/31/23 for resection of brain tumor. Patient was initially diagnosed after he was found to be leaving the car door open, putting his shoes on the wrong feet, and being forgetful of his usual daily routine. Patient reported on 12/20/23 he fell and hit his head (no LOC) with left sided weakness. Within 2 days, the symptoms of forgetfulness returned. MRI as outpatient on 12/22/23 showed progression of right parietal lobe lesion with worsening mass effect, edema, and 1.2cm MLS. Hospital Course included episodes of hypertension which were controlled with medication management. He underwent a Right parietal and temporal crani and resection (frozen: metastatic melanoma) on 1/2. He tolerated the procedure well and was started on nifedipine post operatively for BP management. He remains on keppra for seizure prophylaxis and on decadron. His lovenox is held presently but is s/p IVC filter on 1/2 with noted LE duplex on 12/31 with proximal DVT. Plan for 1 week post operative resumption of full dose lovenox. He had post op hyponatremia and was started on salt tabs. He was evaluated for admission to acute inpatient rehab and admitted to [facility] on 1/5/24.                      PHYSICAL EXAM:          Vital Signs Last 24 Hrs  T(C): 36.6 (08 Jan 2024 08:29), Max: 36.8 (07 Jan 2024 20:20)  T(F): 97.8 (08 Jan 2024 08:29), Max: 98.2 (07 Jan 2024 20:20)  HR: 79 (08 Jan 2024 08:29) (79 - 87)  BP: 130/88 (08 Jan 2024 08:29) (121/80 - 134/89)  BP(mean): --  RR: 16 (08 Jan 2024 08:29) (16 - 16)  SpO2: 100% (08 Jan 2024 08:29) (98% - 100%)    Parameters below as of 08 Jan 2024 08:29  Patient On (Oxygen Delivery Method): room air      Daily     Daily       PHYSICAL EXAM  Constitutional - NAD, Comfortable  HEENT - NCAT, EOMI  Neck - Supple, No limited ROM  Chest - Breathing comfortably   Cardiovascular - RRR, S1S2  Abdomen BS+, Soft, NTND  Extremities - No C/C/E, No calf tenderness   Neurologic Exam - no new focal deficits    RECENT LABS:                          12.1   13.76 )-----------( 141      ( 08 Jan 2024 06:35 )             39.1     01-08    137  |  106  |  33<H>  ----------------------------<  91  4.6   |  23  |  0.84    Ca    8.2<L>      08 Jan 2024 06:35    TPro  5.5<L>  /  Alb  2.4<L>  /  TBili  0.3  /  DBili  x   /  AST  11  /  ALT  14  /  AlkPhos  69  01-08    LIVER FUNCTIONS - ( 08 Jan 2024 06:35 )  Alb: 2.4 g/dL / Pro: 5.5 g/dL / ALK PHOS: 69 U/L / ALT: 14 U/L / AST: 11 U/L / GGT: x               CAPILLARY BLOOD GLUCOSE          MEDICATIONS  (STANDING):  dexAMETHasone     Tablet   Oral   dexAMETHasone     Tablet 2 milliGRAM(s) Oral every 12 hours  enoxaparin Injectable 40 milliGRAM(s) SubCutaneous <User Schedule>  levETIRAcetam 500 milliGRAM(s) Oral every 12 hours  NIFEdipine XL 60 milliGRAM(s) Oral daily  pantoprazole    Tablet 40 milliGRAM(s) Oral before breakfast  polyethylene glycol 3350 17 Gram(s) Oral daily  senna 2 Tablet(s) Oral at bedtime  sodium chloride 1 Gram(s) Oral every 8 hours    MEDICATIONS  (PRN):  acetaminophen     Tablet .. 650 milliGRAM(s) Oral every 6 hours PRN Mild Pain (1 - 3)  oxyCODONE    IR 5 milliGRAM(s) Oral every 4 hours PRN Severe Pain (7 - 10)   SUBJECTIVE/ROS: Pt received in wheelchair at PT, reports feeling well, denies any acute complaints. Pt denies headache/ blurred/double vision, nausea/vomiting, fever, chills, chest pain, abdominal pain. Pt denies BLE pain  Incision site to R scalp healing well, staples intact. Safety and comfort maintained.         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 Worthy, s/p SRS (completed 5 rounds 8/15/23), on immunotherapy monthly (last dose 12/15/23, next dose 1/15/2023), drug induced neutropenia (likely bactrim), who presented to [facility] on 12/31/23 for resection of brain tumor. Patient was initially diagnosed after he was found to be leaving the car door open, putting his shoes on the wrong feet, and being forgetful of his usual daily routine. Patient reported on 12/20/23 he fell and hit his head (no LOC) with left sided weakness. Within 2 days, the symptoms of forgetfulness returned. MRI as outpatient on 12/22/23 showed progression of right parietal lobe lesion with worsening mass effect, edema, and 1.2cm MLS. Hospital Course included episodes of hypertension which were controlled with medication management. He underwent a Right parietal and temporal crani and resection (frozen: metastatic melanoma) on 1/2. He tolerated the procedure well and was started on nifedipine post operatively for BP management. He remains on keppra for seizure prophylaxis and on decadron. His lovenox is held presently but is s/p IVC filter on 1/2 with noted LE duplex on 12/31 with proximal DVT. Plan for 1 week post operative resumption of full dose lovenox. He had post op hyponatremia and was started on salt tabs. He was evaluated for admission to acute inpatient rehab and admitted to [facility] on 1/5/24.          PHYSICAL EXAM:  Gen - NAD, Comfortable  HEENT - +staples intact to R cranium, EOMI, MMM  Neck - Supple, No limited ROM  Pulm - CTAB, No wheeze, No rhonchi, No crackles  Cardiovascular - RRR, S1S2  Chest - good chest expansion, good respiratory effort  Abdomen - Soft, NT/ND, +BS  Extremities - No Cyanosis, no clubbing, 1-2+ edema to BLE,  trace edema to right hand, no calf tenderness  Neuro-     Cognitive - awake, alert, oriented to person, place, date, year, and situation.  Able to follow command, impulsive     Communication - Fluent, Comprehensible     Attention: Intact     Memory: memory intact     Cranial Nerves - CN 2-12 grossly intact     Motor -                     LEFT    UE - 4+/5                    RIGHT UE - 4+/5                    LEFT    LE - 4+/5                    RIGHT LE - 4+/5        Sensory - Intact  to B/L upper and lower extremities.      Reflexes - DTR Intact     Coordination - FTN intact      Tone - normal  Psychiatric - Mood stable, Affect WNL  Skin:  right cranial incision with staples TARA , right groin puncture site TARA, non blanchable erythema to  back 2/2 immunotherapy- per wife.                            Vital Signs Last 24 Hrs  T(C): 36.6 (08 Jan 2024 08:29), Max: 36.8 (07 Jan 2024 20:20)  T(F): 97.8 (08 Jan 2024 08:29), Max: 98.2 (07 Jan 2024 20:20)  HR: 79 (08 Jan 2024 08:29) (79 - 87)  BP: 130/88 (08 Jan 2024 08:29) (121/80 - 134/89)  BP(mean): --  RR: 16 (08 Jan 2024 08:29) (16 - 16)  SpO2: 100% (08 Jan 2024 08:29) (98% - 100%)    Parameters below as of 08 Jan 2024 08:29  Patient On (Oxygen Delivery Method): room air      Daily             RECENT LABS:                          12.1   13.76 )-----------( 141      ( 08 Jan 2024 06:35 )             39.1     01-08    137  |  106  |  33<H>  ----------------------------<  91  4.6   |  23  |  0.84    Ca    8.2<L>      08 Jan 2024 06:35    TPro  5.5<L>  /  Alb  2.4<L>  /  TBili  0.3  /  DBili  x   /  AST  11  /  ALT  14  /  AlkPhos  69  01-08    LIVER FUNCTIONS - ( 08 Jan 2024 06:35 )  Alb: 2.4 g/dL / Pro: 5.5 g/dL / ALK PHOS: 69 U/L / ALT: 14 U/L / AST: 11 U/L / GGT: x               CAPILLARY BLOOD GLUCOSE          MEDICATIONS  (STANDING):  dexAMETHasone     Tablet   Oral   dexAMETHasone     Tablet 2 milliGRAM(s) Oral every 12 hours  enoxaparin Injectable 40 milliGRAM(s) SubCutaneous <User Schedule>  levETIRAcetam 500 milliGRAM(s) Oral every 12 hours  NIFEdipine XL 60 milliGRAM(s) Oral daily  pantoprazole    Tablet 40 milliGRAM(s) Oral before breakfast  polyethylene glycol 3350 17 Gram(s) Oral daily  senna 2 Tablet(s) Oral at bedtime  sodium chloride 1 Gram(s) Oral every 8 hours    MEDICATIONS  (PRN):  acetaminophen     Tablet .. 650 milliGRAM(s) Oral every 6 hours PRN Mild Pain (1 - 3)  oxyCODONE    IR 5 milliGRAM(s) Oral every 4 hours PRN Severe Pain (7 - 10)   SUBJECTIVE/ROS: Pt received in wheelchair at PT, reports feeling well, denies any acute complaints. Pt denies headache/ blurred/double vision, nausea/vomiting, fever, chills, chest pain, abdominal pain. Pt denies BLE pain  Incision site to R scalp healing well, staples intact. Safety and comfort maintained.         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 Worthy, s/p SRS (completed 5 rounds 8/15/23), on immunotherapy monthly (last dose 12/15/23, next dose 1/15/2023), drug induced neutropenia (likely bactrim), who presented to [facility] on 12/31/23 for resection of brain tumor. Patient was initially diagnosed after he was found to be leaving the car door open, putting his shoes on the wrong feet, and being forgetful of his usual daily routine. Patient reported on 12/20/23 he fell and hit his head (no LOC) with left sided weakness. Within 2 days, the symptoms of forgetfulness returned. MRI as outpatient on 12/22/23 showed progression of right parietal lobe lesion with worsening mass effect, edema, and 1.2cm MLS. Hospital Course included episodes of hypertension which were controlled with medication management. He underwent a Right parietal and temporal crani and resection (frozen: metastatic melanoma) on 1/2. He tolerated the procedure well and was started on nifedipine post operatively for BP management. He remains on keppra for seizure prophylaxis and on decadron. His lovenox is held presently but is s/p IVC filter on 1/2 with noted LE duplex on 12/31 with proximal DVT. Plan for 1 week post operative resumption of full dose lovenox. He had post op hyponatremia and was started on salt tabs. He was evaluated for admission to acute inpatient rehab and admitted to [facility] on 1/5/24.          PHYSICAL EXAM:  Gen - NAD, Comfortable  HEENT - +staples intact to R cranium, EOMI, MMM  Neck - Supple, No limited ROM  Pulm - CTAB, No wheeze, No rhonchi, No crackles  Cardiovascular - RRR, S1S2  Chest - good chest expansion, good respiratory effort  Abdomen - Soft, NT/ND, +BS  Extremities - No Cyanosis, no clubbing, 1-2+ edema to BLE,  trace edema to right hand, no calf tenderness  Neuro-     Cognitive - awake, alert, oriented to person, place, date, year, and situation.  Able to follow command, impulsive     Communication - Fluent, Comprehensible     Attention: Intact     Memory: memory intact     Cranial Nerves - CN 2-12 grossly intact     Motor -                     LEFT    UE - 4+/5                    RIGHT UE - 4+/5                    LEFT    LE - 4+/5                    RIGHT LE - 4+/5        Sensory - Intact  to B/L upper and lower extremities.      Reflexes - DTR Intact     Coordination - FTN intact      Tone - normal  Psychiatric - Mood stable, Affect WNL  Skin:  right cranial incision with staples TARA , right groin puncture site TARA, non blanchable erythema to  back 2/2 immunotherapy- per wife.                            Vital Signs Last 24 Hrs  T(C): 36.6 (08 Jan 2024 08:29), Max: 36.8 (07 Jan 2024 20:20)  T(F): 97.8 (08 Jan 2024 08:29), Max: 98.2 (07 Jan 2024 20:20)  HR: 79 (08 Jan 2024 08:29) (79 - 87)  BP: 130/88 (08 Jan 2024 08:29) (121/80 - 134/89)  BP(mean): --  RR: 16 (08 Jan 2024 08:29) (16 - 16)  SpO2: 100% (08 Jan 2024 08:29) (98% - 100%)    Parameters below as of 08 Jan 2024 08:29  Patient On (Oxygen Delivery Method): room air      Daily             RECENT LABS:                          12.1   13.76 )-----------( 141      ( 08 Jan 2024 06:35 )             39.1     01-08    137  |  106  |  33<H>  ----------------------------<  91  4.6   |  23  |  0.84    Ca    8.2<L>      08 Jan 2024 06:35    TPro  5.5<L>  /  Alb  2.4<L>  /  TBili  0.3  /  DBili  x   /  AST  11  /  ALT  14  /  AlkPhos  69  01-08    LIVER FUNCTIONS - ( 08 Jan 2024 06:35 )  Alb: 2.4 g/dL / Pro: 5.5 g/dL / ALK PHOS: 69 U/L / ALT: 14 U/L / AST: 11 U/L / GGT: x               CAPILLARY BLOOD GLUCOSE          MEDICATIONS  (STANDING):  dexAMETHasone     Tablet   Oral   dexAMETHasone     Tablet 2 milliGRAM(s) Oral every 12 hours  enoxaparin Injectable 40 milliGRAM(s) SubCutaneous <User Schedule>  levETIRAcetam 500 milliGRAM(s) Oral every 12 hours  NIFEdipine XL 60 milliGRAM(s) Oral daily  pantoprazole    Tablet 40 milliGRAM(s) Oral before breakfast  polyethylene glycol 3350 17 Gram(s) Oral daily  senna 2 Tablet(s) Oral at bedtime  sodium chloride 1 Gram(s) Oral every 8 hours    MEDICATIONS  (PRN):  acetaminophen     Tablet .. 650 milliGRAM(s) Oral every 6 hours PRN Mild Pain (1 - 3)  oxyCODONE    IR 5 milliGRAM(s) Oral every 4 hours PRN Severe Pain (7 - 10)   SUBJECTIVE/ROS: Pt received in wheelchair at PT, reports feeling well, denies any acute complaints. Pt denies headache/ blurred/double vision, nausea/vomiting, fever, chills, chest pain, abdominal pain. Pt denies BLE pain  Incision site to R scalp healing well, staples intact. Safety and comfort maintained.         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 Worthy, s/p SRS (completed 5 rounds 8/15/23), on immunotherapy monthly (last dose 12/15/23, next dose 1/15/2023), drug induced neutropenia (likely bactrim), who presented to [facility] on 12/31/23 for resection of brain tumor. Patient was initially diagnosed after he was found to be leaving the car door open, putting his shoes on the wrong feet, and being forgetful of his usual daily routine. Patient reported on 12/20/23 he fell and hit his head (no LOC) with left sided weakness. Within 2 days, the symptoms of forgetfulness returned. MRI as outpatient on 12/22/23 showed progression of right parietal lobe lesion with worsening mass effect, edema, and 1.2cm MLS. Hospital Course included episodes of hypertension which were controlled with medication management. He underwent a Right parietal and temporal crani and resection (frozen: metastatic melanoma) on 1/2. He tolerated the procedure well and was started on nifedipine post operatively for BP management. He remains on keppra for seizure prophylaxis and on decadron. His lovenox is held presently but is s/p IVC filter on 1/2 with noted LE duplex on 12/31 with proximal DVT. Plan for 1 week post operative resumption of full dose lovenox. He had post op hyponatremia and was started on salt tabs. He was evaluated for admission to acute inpatient rehab and admitted to [facility] on 1/5/24.          PHYSICAL EXAM:  Gen - NAD, Comfortable  HEENT - +staples intact to R cranium, EOMI, MMM  Neck - Supple, No limited ROM  Pulm - CTAB, No wheeze, No rhonchi, No crackles  Cardiovascular - RRR, S1S2  Chest - good chest expansion, good respiratory effort  Abdomen - Soft, NT/ND, +BS  Extremities - No Cyanosis, no clubbing, 1-2+ edema to BLE,  trace edema to right hand, no calf tenderness  Neuro-     Cognitive - awake, alert, oriented to person, place, date, year, and situation.  Able to follow command, impulsive     Communication - Fluent, Comprehensible     Attention: Intact     Memory: memory intact     Cranial Nerves - CN 2-12 grossly intact     Motor -                     LEFT    UE - 5/5                    RIGHT UE - 5/5                    LEFT    LE - 5/5                    RIGHT LE -5/5        Sensory - Intact  to B/L upper and lower extremities.      Reflexes - DTR Intact     Coordination - FTN intact      Tone - normal  Psychiatric - Mood stable, Affect WNL  Skin:  right cranial incision with staples TARA , right groin puncture site TARA, non blanchable erythema to  back 2/2 immunotherapy- per wife.                            Vital Signs Last 24 Hrs  T(C): 36.6 (08 Jan 2024 08:29), Max: 36.8 (07 Jan 2024 20:20)  T(F): 97.8 (08 Jan 2024 08:29), Max: 98.2 (07 Jan 2024 20:20)  HR: 79 (08 Jan 2024 08:29) (79 - 87)  BP: 130/88 (08 Jan 2024 08:29) (121/80 - 134/89)  BP(mean): --  RR: 16 (08 Jan 2024 08:29) (16 - 16)  SpO2: 100% (08 Jan 2024 08:29) (98% - 100%)    Parameters below as of 08 Jan 2024 08:29  Patient On (Oxygen Delivery Method): room air      Daily             RECENT LABS:                          12.1   13.76 )-----------( 141      ( 08 Jan 2024 06:35 )             39.1     01-08    137  |  106  |  33<H>  ----------------------------<  91  4.6   |  23  |  0.84    Ca    8.2<L>      08 Jan 2024 06:35    TPro  5.5<L>  /  Alb  2.4<L>  /  TBili  0.3  /  DBili  x   /  AST  11  /  ALT  14  /  AlkPhos  69  01-08    LIVER FUNCTIONS - ( 08 Jan 2024 06:35 )  Alb: 2.4 g/dL / Pro: 5.5 g/dL / ALK PHOS: 69 U/L / ALT: 14 U/L / AST: 11 U/L / GGT: x               CAPILLARY BLOOD GLUCOSE          MEDICATIONS  (STANDING):  dexAMETHasone     Tablet   Oral   dexAMETHasone     Tablet 2 milliGRAM(s) Oral every 12 hours  enoxaparin Injectable 40 milliGRAM(s) SubCutaneous <User Schedule>  levETIRAcetam 500 milliGRAM(s) Oral every 12 hours  NIFEdipine XL 60 milliGRAM(s) Oral daily  pantoprazole    Tablet 40 milliGRAM(s) Oral before breakfast  polyethylene glycol 3350 17 Gram(s) Oral daily  senna 2 Tablet(s) Oral at bedtime  sodium chloride 1 Gram(s) Oral every 8 hours    MEDICATIONS  (PRN):  acetaminophen     Tablet .. 650 milliGRAM(s) Oral every 6 hours PRN Mild Pain (1 - 3)  oxyCODONE    IR 5 milliGRAM(s) Oral every 4 hours PRN Severe Pain (7 - 10)   SUBJECTIVE/ROS: Pt received in wheelchair at PT, reports feeling well, denies any acute complaints. Pt denies headache/ blurred/double vision, nausea/vomiting, fever, chills, chest pain, abdominal pain. Pt denies BLE pain  Incision site to R scalp healing well, staples intact. Safety and comfort maintained.         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 Worthy, s/p SRS (completed 5 rounds 8/15/23), on immunotherapy monthly (last dose 12/15/23, next dose 1/15/2023), drug induced neutropenia (likely bactrim), who presented to [facility] on 12/31/23 for resection of brain tumor. Patient was initially diagnosed after he was found to be leaving the car door open, putting his shoes on the wrong feet, and being forgetful of his usual daily routine. Patient reported on 12/20/23 he fell and hit his head (no LOC) with left sided weakness. Within 2 days, the symptoms of forgetfulness returned. MRI as outpatient on 12/22/23 showed progression of right parietal lobe lesion with worsening mass effect, edema, and 1.2cm MLS. Hospital Course included episodes of hypertension which were controlled with medication management. He underwent a Right parietal and temporal crani and resection (frozen: metastatic melanoma) on 1/2. He tolerated the procedure well and was started on nifedipine post operatively for BP management. He remains on keppra for seizure prophylaxis and on decadron. His lovenox is held presently but is s/p IVC filter on 1/2 with noted LE duplex on 12/31 with proximal DVT. Plan for 1 week post operative resumption of full dose lovenox. He had post op hyponatremia and was started on salt tabs. He was evaluated for admission to acute inpatient rehab and admitted to [facility] on 1/5/24.          PHYSICAL EXAM:  Gen - NAD, Comfortable  HEENT - +staples intact to R cranium, EOMI, MMM  Neck - Supple, No limited ROM  Pulm - CTAB, No wheeze, No rhonchi, No crackles  Cardiovascular - RRR, S1S2  Chest - good chest expansion, good respiratory effort  Abdomen - Soft, NT/ND, +BS  Extremities - No Cyanosis, no clubbing, 1-2+ edema to BLE,  trace edema to right hand, no calf tenderness  Neuro-     Cognitive - awake, alert, oriented to person, place, date, year, and situation.  Able to follow command, impulsive     Communication - Fluent, Comprehensible     Attention: Intact     Memory: memory intact     Cranial Nerves - CN 2-12 grossly intact     Motor -                     LEFT    UE - 5/5                    RIGHT UE - 5/5                    LEFT    LE - 5/5                    RIGHT LE -5/5        Sensory - Intact  to B/L upper and lower extremities.      Reflexes - DTR Intact     Coordination - FTN intact      Tone - normal  Psychiatric - Mood stable, Affect WNL  Skin:  right cranial incision with staples TARA , right groin puncture site TARA, non blanchable erythema to  back 2/2 immunotherapy- per wife.                            Vital Signs Last 24 Hrs  T(C): 36.6 (08 Jan 2024 08:29), Max: 36.8 (07 Jan 2024 20:20)  T(F): 97.8 (08 Jan 2024 08:29), Max: 98.2 (07 Jan 2024 20:20)  HR: 79 (08 Jan 2024 08:29) (79 - 87)  BP: 130/88 (08 Jan 2024 08:29) (121/80 - 134/89)  BP(mean): --  RR: 16 (08 Jan 2024 08:29) (16 - 16)  SpO2: 100% (08 Jan 2024 08:29) (98% - 100%)    Parameters below as of 08 Jan 2024 08:29  Patient On (Oxygen Delivery Method): room air      Daily             RECENT LABS:                          12.1   13.76 )-----------( 141      ( 08 Jan 2024 06:35 )             39.1     01-08    137  |  106  |  33<H>  ----------------------------<  91  4.6   |  23  |  0.84    Ca    8.2<L>      08 Jan 2024 06:35    TPro  5.5<L>  /  Alb  2.4<L>  /  TBili  0.3  /  DBili  x   /  AST  11  /  ALT  14  /  AlkPhos  69  01-08    LIVER FUNCTIONS - ( 08 Jan 2024 06:35 )  Alb: 2.4 g/dL / Pro: 5.5 g/dL / ALK PHOS: 69 U/L / ALT: 14 U/L / AST: 11 U/L / GGT: x               CAPILLARY BLOOD GLUCOSE          MEDICATIONS  (STANDING):  dexAMETHasone     Tablet   Oral   dexAMETHasone     Tablet 2 milliGRAM(s) Oral every 12 hours  enoxaparin Injectable 40 milliGRAM(s) SubCutaneous <User Schedule>  levETIRAcetam 500 milliGRAM(s) Oral every 12 hours  NIFEdipine XL 60 milliGRAM(s) Oral daily  pantoprazole    Tablet 40 milliGRAM(s) Oral before breakfast  polyethylene glycol 3350 17 Gram(s) Oral daily  senna 2 Tablet(s) Oral at bedtime  sodium chloride 1 Gram(s) Oral every 8 hours    MEDICATIONS  (PRN):  acetaminophen     Tablet .. 650 milliGRAM(s) Oral every 6 hours PRN Mild Pain (1 - 3)  oxyCODONE    IR 5 milliGRAM(s) Oral every 4 hours PRN Severe Pain (7 - 10)   SUBJECTIVE/ROS: Pt received in wheelchair at PT, reports feeling well, denies any acute complaints. Pt denies headache/ blurred/double vision, nausea/vomiting, fever, chills, chest pain, abdominal pain. Pt denies BLE pain  Incision site to R scalp healing well, staples intact. Safety and comfort maintained.         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 Worthy, s/p SRS (completed 5 rounds 8/15/23), on immunotherapy monthly (last dose 12/15/23, next dose 1/15/2023), drug induced neutropenia (likely bactrim), who presented to [facility] on 12/31/23 for resection of brain tumor. Patient was initially diagnosed after he was found to be leaving the car door open, putting his shoes on the wrong feet, and being forgetful of his usual daily routine. Patient reported on 12/20/23 he fell and hit his head (no LOC) with left sided weakness. Within 2 days, the symptoms of forgetfulness returned. MRI as outpatient on 12/22/23 showed progression of right parietal lobe lesion with worsening mass effect, edema, and 1.2cm MLS. Hospital Course included episodes of hypertension which were controlled with medication management. He underwent a Right parietal and temporal crani and resection (frozen: metastatic melanoma) on 1/2. He tolerated the procedure well and was started on nifedipine post operatively for BP management. He remains on keppra for seizure prophylaxis and on decadron. His lovenox is held presently but is s/p IVC filter on 1/2 with noted LE duplex on 12/31 with proximal DVT. Plan for 1 week post operative resumption of full dose lovenox. He had post op hyponatremia and was started on salt tabs. He was evaluated for admission to acute inpatient rehab and admitted to [facility] on 1/5/24.          PHYSICAL EXAM:  Gen - NAD, Comfortable  HEENT - +staples intact to R cranium, EOMI, MMM  Neck - Supple, No limited ROM  Pulm - CTAB, No wheeze, No rhonchi, No crackles  Cardiovascular - RRR, S1S2  Chest - good chest expansion, good respiratory effort  Abdomen - Soft, NT/ND, +BS  Extremities - No Cyanosis, no clubbing, 1-2+ edema to BLE,  trace edema to right hand, no calf tenderness  Neuro-     Cognitive - awake, alert, oriented to person, place, date, year, and situation.  Able to follow command, impulsive     Communication - Fluent, Comprehensible     Attention: Intact     Memory: memory intact     Cranial Nerves - CN 2-12 grossly intact     Motor -                     LEFT    UE - 5/5                    RIGHT UE - 5/5                    LEFT    LE - 5/5                    RIGHT LE -5/5        Sensory - Intact  to B/L upper and lower extremities.      Reflexes - DTR Intact     Coordination - FTN intact      Tone - normal  Psychiatric - Mood stable, Affect WNL  Skin:  right cranial incision with staples TARA , right groin puncture site TARA, non blanchable erythema to  back 2/2 immunotherapy- per wife.                            Vital Signs Last 24 Hrs  T(C): 36.6 (08 Jan 2024 08:29), Max: 36.8 (07 Jan 2024 20:20)  T(F): 97.8 (08 Jan 2024 08:29), Max: 98.2 (07 Jan 2024 20:20)  HR: 79 (08 Jan 2024 08:29) (79 - 87)  BP: 130/88 (08 Jan 2024 08:29) (121/80 - 134/89)  BP(mean): --  RR: 16 (08 Jan 2024 08:29) (16 - 16)  SpO2: 100% (08 Jan 2024 08:29) (98% - 100%)    Parameters below as of 08 Jan 2024 08:29  Patient On (Oxygen Delivery Method): room air      Daily             RECENT LABS:                          12.1   13.76 )-----------( 141      ( 08 Jan 2024 06:35 )             39.1     01-08    137  |  106  |  33<H>  ----------------------------<  91  4.6   |  23  |  0.84    Ca    8.2<L>      08 Jan 2024 06:35    TPro  5.5<L>  /  Alb  2.4<L>  /  TBili  0.3  /  DBili  x   /  AST  11  /  ALT  14  /  AlkPhos  69  01-08    LIVER FUNCTIONS - ( 08 Jan 2024 06:35 )  Alb: 2.4 g/dL / Pro: 5.5 g/dL / ALK PHOS: 69 U/L / ALT: 14 U/L / AST: 11 U/L / GGT: x                       MEDICATIONS  (STANDING):  dexAMETHasone     Tablet   Oral   dexAMETHasone     Tablet 2 milliGRAM(s) Oral every 12 hours  enoxaparin Injectable 40 milliGRAM(s) SubCutaneous <User Schedule>  levETIRAcetam 500 milliGRAM(s) Oral every 12 hours  NIFEdipine XL 60 milliGRAM(s) Oral daily  pantoprazole    Tablet 40 milliGRAM(s) Oral before breakfast  polyethylene glycol 3350 17 Gram(s) Oral daily  senna 2 Tablet(s) Oral at bedtime  sodium chloride 1 Gram(s) Oral every 8 hours    MEDICATIONS  (PRN):  acetaminophen     Tablet .. 650 milliGRAM(s) Oral every 6 hours PRN Mild Pain (1 - 3)  oxyCODONE    IR 5 milliGRAM(s) Oral every 4 hours PRN Severe Pain (7 - 10)   SUBJECTIVE/ROS: Pt received in wheelchair at PT, reports feeling well, denies any acute complaints. Pt denies headache/ blurred/double vision, nausea/vomiting, fever, chills, chest pain, abdominal pain. Pt denies BLE pain  Incision site to R scalp healing well, staples intact. Safety and comfort maintained.         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 Worthy, s/p SRS (completed 5 rounds 8/15/23), on immunotherapy monthly (last dose 12/15/23, next dose 1/15/2023), drug induced neutropenia (likely bactrim), who presented to [facility] on 12/31/23 for resection of brain tumor. Patient was initially diagnosed after he was found to be leaving the car door open, putting his shoes on the wrong feet, and being forgetful of his usual daily routine. Patient reported on 12/20/23 he fell and hit his head (no LOC) with left sided weakness. Within 2 days, the symptoms of forgetfulness returned. MRI as outpatient on 12/22/23 showed progression of right parietal lobe lesion with worsening mass effect, edema, and 1.2cm MLS. Hospital Course included episodes of hypertension which were controlled with medication management. He underwent a Right parietal and temporal crani and resection (frozen: metastatic melanoma) on 1/2. He tolerated the procedure well and was started on nifedipine post operatively for BP management. He remains on keppra for seizure prophylaxis and on decadron. His lovenox is held presently but is s/p IVC filter on 1/2 with noted LE duplex on 12/31 with proximal DVT. Plan for 1 week post operative resumption of full dose lovenox. He had post op hyponatremia and was started on salt tabs. He was evaluated for admission to acute inpatient rehab and admitted to [facility] on 1/5/24.          PHYSICAL EXAM:  Gen - NAD, Comfortable  HEENT - +staples intact to R cranium, EOMI, MMM  Neck - Supple, No limited ROM  Pulm - CTAB, No wheeze, No rhonchi, No crackles  Cardiovascular - RRR, S1S2  Chest - good chest expansion, good respiratory effort  Abdomen - Soft, NT/ND, +BS  Extremities - No Cyanosis, no clubbing, 1-2+ edema to BLE,  trace edema to right hand, no calf tenderness  Neuro-     Cognitive - awake, alert, oriented to person, place, date, year, and situation.  Able to follow command, impulsive     Communication - Fluent, Comprehensible     Attention: Intact     Memory: memory intact     Cranial Nerves - CN 2-12 grossly intact     Motor -                     LEFT    UE - 5/5                    RIGHT UE - 5/5                    LEFT    LE - 5/5                    RIGHT LE -5/5        Sensory - Intact  to B/L upper and lower extremities.      Reflexes - DTR Intact     Coordination - FTN intact      Tone - normal  Psychiatric - Mood stable, Affect WNL  Skin:  right cranial incision with staples TARA , right groin puncture site TARA, non blanchable erythema to  back 2/2 immunotherapy- per wife.                            Vital Signs Last 24 Hrs  T(C): 36.6 (08 Jan 2024 08:29), Max: 36.8 (07 Jan 2024 20:20)  T(F): 97.8 (08 Jan 2024 08:29), Max: 98.2 (07 Jan 2024 20:20)  HR: 79 (08 Jan 2024 08:29) (79 - 87)  BP: 130/88 (08 Jan 2024 08:29) (121/80 - 134/89)  BP(mean): --  RR: 16 (08 Jan 2024 08:29) (16 - 16)  SpO2: 100% (08 Jan 2024 08:29) (98% - 100%)    Parameters below as of 08 Jan 2024 08:29  Patient On (Oxygen Delivery Method): room air      Daily             RECENT LABS:                          12.1   13.76 )-----------( 141      ( 08 Jan 2024 06:35 )             39.1     01-08    137  |  106  |  33<H>  ----------------------------<  91  4.6   |  23  |  0.84    Ca    8.2<L>      08 Jan 2024 06:35    TPro  5.5<L>  /  Alb  2.4<L>  /  TBili  0.3  /  DBili  x   /  AST  11  /  ALT  14  /  AlkPhos  69  01-08    LIVER FUNCTIONS - ( 08 Jan 2024 06:35 )  Alb: 2.4 g/dL / Pro: 5.5 g/dL / ALK PHOS: 69 U/L / ALT: 14 U/L / AST: 11 U/L / GGT: x                       MEDICATIONS  (STANDING):  dexAMETHasone     Tablet   Oral   dexAMETHasone     Tablet 2 milliGRAM(s) Oral every 12 hours  enoxaparin Injectable 40 milliGRAM(s) SubCutaneous <User Schedule>  levETIRAcetam 500 milliGRAM(s) Oral every 12 hours  NIFEdipine XL 60 milliGRAM(s) Oral daily  pantoprazole    Tablet 40 milliGRAM(s) Oral before breakfast  polyethylene glycol 3350 17 Gram(s) Oral daily  senna 2 Tablet(s) Oral at bedtime  sodium chloride 1 Gram(s) Oral every 8 hours    MEDICATIONS  (PRN):  acetaminophen     Tablet .. 650 milliGRAM(s) Oral every 6 hours PRN Mild Pain (1 - 3)  oxyCODONE    IR 5 milliGRAM(s) Oral every 4 hours PRN Severe Pain (7 - 10)

## 2024-01-08 NOTE — DIETITIAN INITIAL EVALUATION ADULT - PERTINENT MEDS FT
MEDICATIONS  (STANDING):  dexAMETHasone     Tablet   Oral   dexAMETHasone     Tablet 2 milliGRAM(s) Oral every 12 hours  enoxaparin Injectable 40 milliGRAM(s) SubCutaneous <User Schedule>  levETIRAcetam 500 milliGRAM(s) Oral every 12 hours  NIFEdipine XL 60 milliGRAM(s) Oral daily  pantoprazole    Tablet 40 milliGRAM(s) Oral before breakfast  polyethylene glycol 3350 17 Gram(s) Oral daily  senna 2 Tablet(s) Oral at bedtime  sodium chloride 1 Gram(s) Oral every 8 hours    MEDICATIONS  (PRN):  acetaminophen     Tablet .. 650 milliGRAM(s) Oral every 6 hours PRN Mild Pain (1 - 3)  oxyCODONE    IR 5 milliGRAM(s) Oral every 4 hours PRN Severe Pain (7 - 10)

## 2024-01-08 NOTE — DIETITIAN INITIAL EVALUATION ADULT - ADD RECOMMEND
1. Regular diet  2. 2x protein portions  3. Obtain and honor food preferences- encourage protein rich foods  4. Add MVI if not contraindicated

## 2024-01-08 NOTE — DIETITIAN INITIAL EVALUATION ADULT - PERTINENT LABORATORY DATA
01-08    137  |  106  |  33<H>  ----------------------------<  91  4.6   |  23  |  0.84    Ca    8.2<L>      08 Jan 2024 06:35    TPro  5.5<L>  /  Alb  2.4<L>  /  TBili  0.3  /  DBili  x   /  AST  11  /  ALT  14  /  AlkPhos  69  01-08  A1C with Estimated Average Glucose Result: 5.3 % (01-01-24 @ 05:30)

## 2024-01-08 NOTE — PROGRESS NOTE ADULT - ASSESSMENT
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 Worthy, s/p SRS (completed 5 rounds 8/15/23), on immunotherapy monthly (last dose 12/15/23, next dose 1/15/2023), drug induced neutropenia (likely bactrim), who presented to [facility] on 12/31/23 for resection of brain tumor. Patient was initially diagnosed after he was found to be leaving the car door open, putting his shoes on the wrong feet, and being forgetful of his usual daily routine. Patient reported on 12/20/23 he fell and hit his head (no LOC) with left sided weakness. Within 2 days, the symptoms of forgetfulness returned. MRI as outpatient on 12/22/23 showed progression of right parietal lobe lesion with worsening mass effect, edema, and 1.2cm MLS. Hospital Course included episodes of hypertension which were controlled with medication management. He underwent a Right parietal and temporal crani and resection (frozen: metastatic melanoma) on 1/2. He tolerated the procedure well and was started on nifedipine post operatively for BP management. He remains on keppra for seizure prophylaxis and on decadron. His lovenox is held presently but is s/p IVC filter on 1/2 with noted LE duplex on 12/31 with proximal DVT. Plan for 1 week post operative resumption of full dose lovenox. He had post op hyponatremia and was started on salt tabs. He was evaluated for admission to acute inpatient rehab and admitted to [facility] on 1/5/24.       #Metastatic melanoma to the brain s/p craniotomy for resection now with Gait Instability, ADL impairments and Functional impairments  - Start Comprehensive Rehab Program of PT/OT/SLP  -Continue keppra 500mg BID for seizure prophylaxis  -Continue dexamethasone [id number] TID until 1/6, then 2mg BID until 1/8. 2mg daily until 1/10 then stop   -Staples to craniotomy incision intact (s/p craniotomy on 1/2)   -May shower  - will need a post op MRI before your next appointment with neurosurgery     #Right lung mass  - CXR with 10.8cm RUL mass (enlarged from 9.3cm), cont pulmonary hygiene   - receiving immunotherapy monthly (last dose 12/15/23, next dose 1/15/2023)    #Hyponatremia  -Last  (1/8)  -Monitor labs  -Continue salt tabs 1G TID  -wean off as tolerated for a normal sodium goal (135-145)    #HTN  -Continue nifedipine 50mg daily    #DVT  - diagnosed 8/2023 and has been on therapeutic enoxaparin at home - stopped prior to surgery   - venous duplex 12/31/23 with acute proximal DVT   -IVC filter placed 1/2/24  -As per neurosurgery note, may resume full dose lovenox on 1/9 (POD #7)  -To follow up with vascular surgery as outpatient for IVC filter management     #Pain control  - Tylenol PRN  -Oxycodone PRN     #GI/Bowel Mgmt   - Continue Senna at bedtime   - Miralax     #Bladder management  - Continue to monitor PVR q 8 hours (SC if > 400)  - Monitor UO    #Skin:  -  right cranial incision with staples TARA, right groin puncture site TARA, non blanchable erythema to  back 2/2 immunotherapy- per wife.    FEN   - Diet - Regular with thins    - Dysphagia  SLP - evaluation and treatment    Precautions / PROPHYLAXIS:   - Falls, Cardiac, Seizure, aspiratoin  - ortho: Weight bearing status: WBAT   - Lungs: Aspiration, Incentive Spirometer   - Pressure injury/Skin: Turn Q2hrs while in bed, OOB to Chair, PT/OT      Johan Tony  Neurosurgery  [address] [place] [address]  Phone: [phone]  Fax: [phone]  Follow Up Time:     Willi Faulkner  Vascular Surgery  [address] [address]  Phone: [phone]  Fax: [phone]  Follow Up Time:     Tano Richards.  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 Worthy, s/p SRS (completed 5 rounds 8/15/23), on immunotherapy monthly (last dose 12/15/23, next dose 1/15/2023), drug induced neutropenia (likely bactrim), who presented to [facility] on 12/31/23 for resection of brain tumor. Patient was initially diagnosed after he was found to be leaving the car door open, putting his shoes on the wrong feet, and being forgetful of his usual daily routine. Patient reported on 12/20/23 he fell and hit his head (no LOC) with left sided weakness. Within 2 days, the symptoms of forgetfulness returned. MRI as outpatient on 12/22/23 showed progression of right parietal lobe lesion with worsening mass effect, edema, and 1.2cm MLS. Hospital Course included episodes of hypertension which were controlled with medication management. He underwent a Right parietal and temporal crani and resection (frozen: metastatic melanoma) on 1/2. He tolerated the procedure well and was started on nifedipine post operatively for BP management. He remains on keppra for seizure prophylaxis and on decadron. His lovenox is held presently but is s/p IVC filter on 1/2 with noted LE duplex on 12/31 with proximal DVT. Plan for 1 week post operative resumption of full dose lovenox. He had post op hyponatremia and was started on salt tabs. He was evaluated for admission to acute inpatient rehab and admitted to [facility] on 1/5/24.       #Metastatic melanoma to the brain s/p craniotomy for resection now with Gait Instability, ADL impairments and Functional impairments  - Start Comprehensive Rehab Program of PT/OT/SLP  -Continue keppra 500mg BID for seizure prophylaxis  -Continue dexamethasone [id number] TID until 1/6, then 2mg BID until 1/8. 2mg daily until 1/10 then stop   -Staples to craniotomy incision intact (s/p craniotomy on 1/2)   -May shower  - will need a post op MRI before your next appointment with neurosurgery     #Right lung mass  - CXR with 10.8cm RUL mass (enlarged from 9.3cm), cont pulmonary hygiene   - receiving immunotherapy monthly (last dose 12/15/23, next dose 1/15/2023)    #Hyponatremia  -Last  (1/8)  -Monitor labs  -Continue salt tabs 1G TID  -wean off as tolerated for a normal sodium goal (135-145)    #HTN  -Continue nifedipine 50mg daily    #DVT  - diagnosed 8/2023 and has been on therapeutic enoxaparin at home - stopped prior to surgery   - venous duplex 12/31/23 with acute proximal DVT   -IVC filter placed 1/2/24  -As per neurosurgery note, may resume full dose lovenox on 1/9 (POD #7)  -To follow up with vascular surgery as outpatient for IVC filter management     #Pain control  - Tylenol PRN  -Oxycodone PRN     #GI/Bowel Mgmt   - Continue Senna at bedtime   - Miralax     #Bladder management  - Continue to monitor PVR q 8 hours (SC if > 400)  - Monitor UO    #Skin:  -  right cranial incision with staples TARA, right groin puncture site TARA, non blanchable erythema to  back 2/2 immunotherapy- per wife.    FEN   - Diet - Regular with thins    - Dysphagia  SLP - evaluation and treatment    Precautions / PROPHYLAXIS:   - Falls, Cardiac, Seizure, aspiratoin  - ortho: Weight bearing status: WBAT   - Lungs: Aspiration, Incentive Spirometer   - Pressure injury/Skin: Turn Q2hrs while in bed, OOB to Chair, PT/OT      Johan Tony  Neurosurgery  [address] [place] [address]  Phone: [phone]  Fax: [phone]  Follow Up Time:     Willi Faulkner  Vascular Surgery  [address] [address]  Phone: [phone]  Fax: [phone]  Follow Up Time:     Tano Richards.  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 Worthy, s/p SRS (completed 5 rounds 8/15/23), on immunotherapy monthly (last dose 12/15/23, next dose 1/15/2023), drug induced neutropenia (likely bactrim), who presented to [facility] on 12/31/23 for resection of brain tumor. Patient was initially diagnosed after he was found to be leaving the car door open, putting his shoes on the wrong feet, and being forgetful of his usual daily routine. Patient reported on 12/20/23 he fell and hit his head (no LOC) with left sided weakness. Within 2 days, the symptoms of forgetfulness returned. MRI as outpatient on 12/22/23 showed progression of right parietal lobe lesion with worsening mass effect, edema, and 1.2cm MLS. Hospital Course included episodes of hypertension which were controlled with medication management. He underwent a Right parietal and temporal crani and resection (frozen: metastatic melanoma) on 1/2. He tolerated the procedure well and was started on nifedipine post operatively for BP management. He remains on keppra for seizure prophylaxis and on decadron. His lovenox is held presently but is s/p IVC filter on 1/2 with noted LE duplex on 12/31 with proximal DVT. Plan for 1 week post operative resumption of full dose lovenox. He had post op hyponatremia and was started on salt tabs. He was evaluated for admission to acute inpatient rehab and admitted to [facility] on 1/5/24.       #Metastatic melanoma to the brain s/p craniotomy for resection now with Gait Instability, ADL impairments and Functional impairments  - Continue Comprehensive Rehab Program of PT/OT/SLP  -Continue keppra 500mg BID for seizure prophylaxis  -Continue dexamethasone [id number] TID until 1/6, then 2mg BID until 1/8. 2mg daily until 1/10 then stop   -Staples to craniotomy incision intact (s/p craniotomy on 1/2)   -May shower  - will need a post op MRI before your next appointment with neurosurgery     #Right lung mass  - CXR with 10.8cm RUL mass (enlarged from 9.3cm), cont pulmonary hygiene   - receiving immunotherapy monthly (last dose 12/15/23, next dose 1/15/2023)    #Hyponatremia  -Last  (1/8)  -Monitor labs  -Continue salt tabs 1G TID  -wean off as tolerated for a normal sodium goal (135-145)    #HTN  -Continue nifedipine 50mg daily    #DVT  - diagnosed 8/2023 and has been on therapeutic enoxaparin at home - stopped prior to surgery   - venous duplex 12/31/23 with acute proximal DVT   -IVC filter placed 1/2/24  -As per neurosurgery note, may resume full dose lovenox on 1/9 (POD #7)  -To follow up with vascular surgery as outpatient for IVC filter management     #Pain control  - Tylenol PRN  -Oxycodone PRN     #GI/Bowel Mgmt   - Continue Senna at bedtime   - Miralax     #Bladder management  - Continue to monitor PVR q 8 hours (SC if > 400)  - Monitor UO    #Skin:  -  right cranial incision with staples TARA, right groin puncture site TARA, non blanchable erythema to  back 2/2 immunotherapy- per wife.    FEN   - Diet - Regular with thins    - Dysphagia  SLP - evaluation and treatment    Precautions / PROPHYLAXIS:   - Falls, Cardiac, Seizure, aspiratoin  - ortho: Weight bearing status: WBAT   - Lungs: Aspiration, Incentive Spirometer   - Pressure injury/Skin: Turn Q2hrs while in bed, OOB to Chair, PT/OT      Johan Tony  Neurosurgery  [address] [place] [address]  Phone: [phone]  Fax: [phone]  Follow Up Time:     Willi Faulkner  Vascular Surgery  [address] [address]  Phone: [phone]  Fax: [phone]  Follow Up Time:     Tano Richards.  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 Worthy, s/p SRS (completed 5 rounds 8/15/23), on immunotherapy monthly (last dose 12/15/23, next dose 1/15/2023), drug induced neutropenia (likely bactrim), who presented to [facility] on 12/31/23 for resection of brain tumor. Patient was initially diagnosed after he was found to be leaving the car door open, putting his shoes on the wrong feet, and being forgetful of his usual daily routine. Patient reported on 12/20/23 he fell and hit his head (no LOC) with left sided weakness. Within 2 days, the symptoms of forgetfulness returned. MRI as outpatient on 12/22/23 showed progression of right parietal lobe lesion with worsening mass effect, edema, and 1.2cm MLS. Hospital Course included episodes of hypertension which were controlled with medication management. He underwent a Right parietal and temporal crani and resection (frozen: metastatic melanoma) on 1/2. He tolerated the procedure well and was started on nifedipine post operatively for BP management. He remains on keppra for seizure prophylaxis and on decadron. His lovenox is held presently but is s/p IVC filter on 1/2 with noted LE duplex on 12/31 with proximal DVT. Plan for 1 week post operative resumption of full dose lovenox. He had post op hyponatremia and was started on salt tabs. He was evaluated for admission to acute inpatient rehab and admitted to [facility] on 1/5/24.       #Metastatic melanoma to the brain s/p craniotomy for resection now with Gait Instability, ADL impairments and Functional impairments  - Continue Comprehensive Rehab Program of PT/OT/SLP  -Continue keppra 500mg BID for seizure prophylaxis  -Continue dexamethasone [id number] TID until 1/6, then 2mg BID until 1/8. 2mg daily until 1/10 then stop   -Staples to craniotomy incision intact (s/p craniotomy on 1/2)   -May shower  - will need a post op MRI before your next appointment with neurosurgery     #Right lung mass  - CXR with 10.8cm RUL mass (enlarged from 9.3cm), cont pulmonary hygiene   - receiving immunotherapy monthly (last dose 12/15/23, next dose 1/15/2023)    #Hyponatremia  -Last  (1/8)  -Monitor labs  -Continue salt tabs 1G TID  -wean off as tolerated for a normal sodium goal (135-145)    #HTN  -Continue nifedipine 50mg daily    #DVT  - diagnosed 8/2023 and has been on therapeutic enoxaparin at home - stopped prior to surgery   - venous duplex 12/31/23 with acute proximal DVT   -IVC filter placed 1/2/24  -As per neurosurgery note, may resume full dose lovenox on 1/9 (POD #7)  -To follow up with vascular surgery as outpatient for IVC filter management     #Pain control  - Tylenol PRN  -Oxycodone PRN     #GI/Bowel Mgmt   - Continue Senna at bedtime   - Miralax     #Bladder management  - Continue to monitor PVR q 8 hours (SC if > 400)  - Monitor UO    #Skin:  -  right cranial incision with staples TARA, right groin puncture site TARA, non blanchable erythema to  back 2/2 immunotherapy- per wife.    FEN   - Diet - Regular with thins    - Dysphagia  SLP - evaluation and treatment    Precautions / PROPHYLAXIS:   - Falls, Cardiac, Seizure, aspiratoin  - ortho: Weight bearing status: WBAT   - Lungs: Aspiration, Incentive Spirometer   - Pressure injury/Skin: Turn Q2hrs while in bed, OOB to Chair, PT/OT      Johan Tony  Neurosurgery  [address] [place] [address]  Phone: [phone]  Fax: [phone]  Follow Up Time:     Willi Faulkner  Vascular Surgery  [address] [address]  Phone: [phone]  Fax: [phone]  Follow Up Time:     Tano Richards.  Medical Oncology  [address] [place] [address]  Phone: [phone]  Fax: [phone]  Follow Up Time:

## 2024-01-08 NOTE — PROGRESS NOTE ADULT - NS ATTEND AMEND GEN_ALL_CORE FT
I have personally seen and examined the patient with the NP. Medical records reviewed. I have made amendments to the documentation where necessary and adjusted the history, physical examination, and plan as documented by the NP.    Vital Signs (24 Hrs):  T(C): 36.6 (01-08-24 @ 08:29), Max: 36.8 (01-07-24 @ 20:20)  HR: 79 (01-08-24 @ 08:29) (79 - 87)  BP: 130/88 (01-08-24 @ 08:29) (121/80 - 134/89)  RR: 16 (01-08-24 @ 08:29) (16 - 16)  SpO2: 100% (01-08-24 @ 08:29) (98% - 100%)    Gen - NAD, Comfortable  HEENT - NCAT, EOMI, MMM  Neck - Supple, No limited ROM  Pulm - CTAB, No wheeze, No rhonchi, No crackles  Cardiovascular - RRR, S1S2  Chest - good chest expansion, good respiratory effort  Abdomen - Soft, NT/ND, +BS  Extremities - No Cyanosis, no clubbing, 1-2+ edema to BLE,  trace edema to right hand, no calf tenderness  Neuro-     Cognitive - awake, alert, oriented X4     Communication - Fluent, no aphasia      Attention: Intact     Cranial Nerves - CN 2-12 intact      Motor - no new focal deficits      Sensory - Intact  to LT      Reflexes - DTR 1+=     Coordination - FTN intact      Tone - normal     Gait is ataxic

## 2024-01-08 NOTE — DIETITIAN INITIAL EVALUATION ADULT - EDUCATION DIETARY MODIFICATIONS
teach back/(2) meets goals/outcomes/verbalization Patient needs further psychiatric safety assessment prior to discharge

## 2024-01-09 NOTE — PROGRESS NOTE ADULT - SUBJECTIVE AND OBJECTIVE BOX
SUBJECTIVE/ROS: Pt received in chair, in no acute distress. Pt reports feeling well, denies any acute complaints. Pt denies headache/ blurred/double vision, nausea/vomiting, fever, chills, chest pain, abdominal pain. Pt denies BLE pain  Incision site to R scalp healing well, staples intact. Safety and comfort maintained.         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 Worthy, s/p SRS (completed 5 rounds 8/15/23), on immunotherapy monthly (last dose 12/15/23, next dose 1/15/2023), drug induced neutropenia (likely bactrim), who presented to [facility] on 12/31/23 for resection of brain tumor. Patient was initially diagnosed after he was found to be leaving the car door open, putting his shoes on the wrong feet, and being forgetful of his usual daily routine. Patient reported on 12/20/23 he fell and hit his head (no LOC) with left sided weakness. Within 2 days, the symptoms of forgetfulness returned. MRI as outpatient on 12/22/23 showed progression of right parietal lobe lesion with worsening mass effect, edema, and 1.2cm MLS. Hospital Course included episodes of hypertension which were controlled with medication management. He underwent a Right parietal and temporal crani and resection (frozen: metastatic melanoma) on 1/2. He tolerated the procedure well and was started on nifedipine post operatively for BP management. He remains on keppra for seizure prophylaxis and on decadron. His lovenox is held presently but is s/p IVC filter on 1/2 with noted LE duplex on 12/31 with proximal DVT. Plan for 1 week post operative resumption of full dose lovenox. He had post op hyponatremia and was started on salt tabs. He was evaluated for admission to acute inpatient rehab and admitted to [facility] on 1/5/24.               (05 Jan 2024 13:18)        PHYSICAL EXAM    Vital Signs Last 24 Hrs  T(C): 36.6 (09 Jan 2024 07:46), Max: 36.7 (08 Jan 2024 21:50)  T(F): 97.8 (09 Jan 2024 07:46), Max: 98.1 (08 Jan 2024 21:50)  HR: 79 (09 Jan 2024 07:46) (77 - 82)  BP: 136/95 (09 Jan 2024 07:46) (113/79 - 136/95)  BP(mean): --  RR: 16 (09 Jan 2024 07:46) (16 - 16)  SpO2: 98% (09 Jan 2024 07:46) (98% - 100%)    Parameters below as of 09 Jan 2024 07:46  Patient On (Oxygen Delivery Method): room air      Daily     Daily       PHYSICAL EXAM  Constitutional - NAD, Comfortable  HEENT - NCAT, EOMI  Neck - Supple, No limited ROM  Chest - Breathing comfortably   Cardiovascular - RRR, S1S2  Abdomen - BS+, Soft, NTND  Extremities - No C/C/E, No calf tenderness   Neurologic Exam - no new focal deficits    RECENT LABS:                          12.1   13.76 )-----------( 141      ( 08 Jan 2024 06:35 )             39.1     01-08    137  |  106  |  33<H>  ----------------------------<  91  4.6   |  23  |  0.84    Ca    8.2<L>      08 Jan 2024 06:35    TPro  5.5<L>  /  Alb  2.4<L>  /  TBili  0.3  /  DBili  x   /  AST  11  /  ALT  14  /  AlkPhos  69  01-08    LIVER FUNCTIONS - ( 08 Jan 2024 06:35 )  Alb: 2.4 g/dL / Pro: 5.5 g/dL / ALK PHOS: 69 U/L / ALT: 14 U/L / AST: 11 U/L / GGT: x               MEDICATIONS  (STANDING):  dexAMETHasone     Tablet   Oral   dexAMETHasone     Tablet 2 milliGRAM(s) Oral every 12 hours  enoxaparin Injectable 40 milliGRAM(s) SubCutaneous <User Schedule>  levETIRAcetam 500 milliGRAM(s) Oral every 12 hours  NIFEdipine XL 60 milliGRAM(s) Oral daily  pantoprazole    Tablet 40 milliGRAM(s) Oral before breakfast  polyethylene glycol 3350 17 Gram(s) Oral daily  senna 2 Tablet(s) Oral at bedtime  sodium chloride 1 Gram(s) Oral two times a day    MEDICATIONS  (PRN):  acetaminophen     Tablet .. 650 milliGRAM(s) Oral every 6 hours PRN Mild Pain (1 - 3)  oxyCODONE    IR 5 milliGRAM(s) Oral every 4 hours PRN Severe Pain (7 - 10)   SUBJECTIVE/ROS: Pt received in chair, in no acute distress. Pt reports feeling well, denies any acute complaints. Pt denies headache/ blurred/double vision, nausea/vomiting, fever, chills, chest pain, abdominal pain. Pt denies BLE pain  Incision site to R scalp healing well, staples intact. Safety and comfort maintained.         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 Worthy, s/p SRS (completed 5 rounds 8/15/23), on immunotherapy monthly (last dose 12/15/23, next dose 1/15/2023), drug induced neutropenia (likely bactrim), who presented to [facility] on 12/31/23 for resection of brain tumor. Patient was initially diagnosed after he was found to be leaving the car door open, putting his shoes on the wrong feet, and being forgetful of his usual daily routine. Patient reported on 12/20/23 he fell and hit his head (no LOC) with left sided weakness. Within 2 days, the symptoms of forgetfulness returned. MRI as outpatient on 12/22/23 showed progression of right parietal lobe lesion with worsening mass effect, edema, and 1.2cm MLS. Hospital Course included episodes of hypertension which were controlled with medication management. He underwent a Right parietal and temporal crani and resection (frozen: metastatic melanoma) on 1/2. He tolerated the procedure well and was started on nifedipine post operatively for BP management. He remains on keppra for seizure prophylaxis and on decadron. His lovenox is held presently but is s/p IVC filter on 1/2 with noted LE duplex on 12/31 with proximal DVT. Plan for 1 week post operative resumption of full dose lovenox. He had post op hyponatremia and was started on salt tabs. He was evaluated for admission to acute inpatient rehab and admitted to [facility] on 1/5/24.               (05 Jan 2024 13:18)        PHYSICAL EXAM    Vital Signs Last 24 Hrs  T(C): 36.6 (09 Jan 2024 07:46), Max: 36.7 (08 Jan 2024 21:50)  T(F): 97.8 (09 Jan 2024 07:46), Max: 98.1 (08 Jan 2024 21:50)  HR: 79 (09 Jan 2024 07:46) (77 - 82)  BP: 136/95 (09 Jan 2024 07:46) (113/79 - 136/95)  BP(mean): --  RR: 16 (09 Jan 2024 07:46) (16 - 16)  SpO2: 98% (09 Jan 2024 07:46) (98% - 100%)    Parameters below as of 09 Jan 2024 07:46  Patient On (Oxygen Delivery Method): room air      Daily     Daily       PHYSICAL EXAM  Constitutional - NAD, Comfortable  HEENT - NCAT, EOMI  Neck - Supple, No limited ROM  Chest - Breathing comfortably   Cardiovascular - RRR, S1S2  Abdomen - BS+, Soft, NTND  Extremities - No C/C/E, No calf tenderness   Neurologic Exam - no new focal deficits    RECENT LABS:                          12.1   13.76 )-----------( 141      ( 08 Jan 2024 06:35 )             39.1     01-08    137  |  106  |  33<H>  ----------------------------<  91  4.6   |  23  |  0.84    Ca    8.2<L>      08 Jan 2024 06:35    TPro  5.5<L>  /  Alb  2.4<L>  /  TBili  0.3  /  DBili  x   /  AST  11  /  ALT  14  /  AlkPhos  69  01-08    LIVER FUNCTIONS - ( 08 Jan 2024 06:35 )  Alb: 2.4 g/dL / Pro: 5.5 g/dL / ALK PHOS: 69 U/L / ALT: 14 U/L / AST: 11 U/L / GGT: x               MEDICATIONS  (STANDING):  dexAMETHasone     Tablet   Oral   dexAMETHasone     Tablet 2 milliGRAM(s) Oral every 12 hours  enoxaparin Injectable 40 milliGRAM(s) SubCutaneous <User Schedule>  levETIRAcetam 500 milliGRAM(s) Oral every 12 hours  NIFEdipine XL 60 milliGRAM(s) Oral daily  pantoprazole    Tablet 40 milliGRAM(s) Oral before breakfast  polyethylene glycol 3350 17 Gram(s) Oral daily  senna 2 Tablet(s) Oral at bedtime  sodium chloride 1 Gram(s) Oral two times a day    MEDICATIONS  (PRN):  acetaminophen     Tablet .. 650 milliGRAM(s) Oral every 6 hours PRN Mild Pain (1 - 3)  oxyCODONE    IR 5 milliGRAM(s) Oral every 4 hours PRN Severe Pain (7 - 10)   SUBJECTIVE/ROS: Pt received in chair, in no acute distress. Pt reports feeling well, denies any acute complaints. Pt denies headache/ blurred/double vision, nausea/vomiting, fever, chills, chest pain, abdominal pain. Pt denies BLE pain  Incision site to R scalp healing well, staples intact. Safety and comfort maintained.         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 Worthy, s/p SRS (completed 5 rounds 8/15/23), on immunotherapy monthly (last dose 12/15/23, next dose 1/15/2023), drug induced neutropenia (likely bactrim), who presented to [facility] on 12/31/23 for resection of brain tumor. Patient was initially diagnosed after he was found to be leaving the car door open, putting his shoes on the wrong feet, and being forgetful of his usual daily routine. Patient reported on 12/20/23 he fell and hit his head (no LOC) with left sided weakness. Within 2 days, the symptoms of forgetfulness returned. MRI as outpatient on 12/22/23 showed progression of right parietal lobe lesion with worsening mass effect, edema, and 1.2cm MLS. Hospital Course included episodes of hypertension which were controlled with medication management. He underwent a Right parietal and temporal crani and resection (frozen: metastatic melanoma) on 1/2. He tolerated the procedure well and was started on nifedipine post operatively for BP management. He remains on keppra for seizure prophylaxis and on decadron. His lovenox is held presently but is s/p IVC filter on 1/2 with noted LE duplex on 12/31 with proximal DVT. Plan for 1 week post operative resumption of full dose lovenox. He had post op hyponatremia and was started on salt tabs. He was evaluated for admission to acute inpatient rehab and admitted to [facility] on 1/5/24.               (05 Jan 2024 13:18)        PHYSICAL EXAM    Vital Signs Last 24 Hrs  T(C): 36.6 (09 Jan 2024 07:46), Max: 36.7 (08 Jan 2024 21:50)  T(F): 97.8 (09 Jan 2024 07:46), Max: 98.1 (08 Jan 2024 21:50)  HR: 79 (09 Jan 2024 07:46) (77 - 82)  BP: 136/95 (09 Jan 2024 07:46) (113/79 - 136/95)  BP(mean): --  RR: 16 (09 Jan 2024 07:46) (16 - 16)  SpO2: 98% (09 Jan 2024 07:46) (98% - 100%)    Parameters below as of 09 Jan 2024 07:46  Patient On (Oxygen Delivery Method): room air      Daily     Daily       PHYSICAL EXAM  Constitutional - NAD, Comfortable  HEENT - NCAT, EOMI  Neck - Supple, No limited ROM  Chest - Breathing comfortably   Cardiovascular - RRR, S1S2  Abdomen - BS+, Soft, NTND  Extremities - No C/C/E, No calf tenderness   Neurologic Exam - Pt is A+OX3. Speech is fluent, comprehensible, CN 2-12 grossly intact  Strength: RUE: 5/5, RLE: 5/5, LUE: 5/5 LLE: 5/5  Sensory intact to B/L upper and lower extremities.       RECENT LABS:                          12.1   13.76 )-----------( 141      ( 08 Jan 2024 06:35 )             39.1     01-08    137  |  106  |  33<H>  ----------------------------<  91  4.6   |  23  |  0.84    Ca    8.2<L>      08 Jan 2024 06:35    TPro  5.5<L>  /  Alb  2.4<L>  /  TBili  0.3  /  DBili  x   /  AST  11  /  ALT  14  /  AlkPhos  69  01-08    LIVER FUNCTIONS - ( 08 Jan 2024 06:35 )  Alb: 2.4 g/dL / Pro: 5.5 g/dL / ALK PHOS: 69 U/L / ALT: 14 U/L / AST: 11 U/L / GGT: x               MEDICATIONS  (STANDING):  dexAMETHasone     Tablet   Oral   dexAMETHasone     Tablet 2 milliGRAM(s) Oral every 12 hours  enoxaparin Injectable 40 milliGRAM(s) SubCutaneous <User Schedule>  levETIRAcetam 500 milliGRAM(s) Oral every 12 hours  NIFEdipine XL 60 milliGRAM(s) Oral daily  pantoprazole    Tablet 40 milliGRAM(s) Oral before breakfast  polyethylene glycol 3350 17 Gram(s) Oral daily  senna 2 Tablet(s) Oral at bedtime  sodium chloride 1 Gram(s) Oral two times a day    MEDICATIONS  (PRN):  acetaminophen     Tablet .. 650 milliGRAM(s) Oral every 6 hours PRN Mild Pain (1 - 3)  oxyCODONE    IR 5 milliGRAM(s) Oral every 4 hours PRN Severe Pain (7 - 10)   SUBJECTIVE/ROS: Pt received in chair, in no acute distress. Pt reports feeling well, denies any acute complaints. Pt denies headache/ blurred/double vision, nausea/vomiting, fever, chills, chest pain, abdominal pain. Pt denies BLE pain  Incision site to R scalp healing well, staples intact. Safety and comfort maintained.         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 Worthy, s/p SRS (completed 5 rounds 8/15/23), on immunotherapy monthly (last dose 12/15/23, next dose 1/15/2023), drug induced neutropenia (likely bactrim), who presented to [facility] on 12/31/23 for resection of brain tumor. Patient was initially diagnosed after he was found to be leaving the car door open, putting his shoes on the wrong feet, and being forgetful of his usual daily routine. Patient reported on 12/20/23 he fell and hit his head (no LOC) with left sided weakness. Within 2 days, the symptoms of forgetfulness returned. MRI as outpatient on 12/22/23 showed progression of right parietal lobe lesion with worsening mass effect, edema, and 1.2cm MLS. Hospital Course included episodes of hypertension which were controlled with medication management. He underwent a Right parietal and temporal crani and resection (frozen: metastatic melanoma) on 1/2. He tolerated the procedure well and was started on nifedipine post operatively for BP management. He remains on keppra for seizure prophylaxis and on decadron. His lovenox is held presently but is s/p IVC filter on 1/2 with noted LE duplex on 12/31 with proximal DVT. Plan for 1 week post operative resumption of full dose lovenox. He had post op hyponatremia and was started on salt tabs. He was evaluated for admission to acute inpatient rehab and admitted to [facility] on 1/5/24.               (05 Jan 2024 13:18)        PHYSICAL EXAM    Vital Signs Last 24 Hrs  T(C): 36.6 (09 Jan 2024 07:46), Max: 36.7 (08 Jan 2024 21:50)  T(F): 97.8 (09 Jan 2024 07:46), Max: 98.1 (08 Jan 2024 21:50)  HR: 79 (09 Jan 2024 07:46) (77 - 82)  BP: 136/95 (09 Jan 2024 07:46) (113/79 - 136/95)  BP(mean): --  RR: 16 (09 Jan 2024 07:46) (16 - 16)  SpO2: 98% (09 Jan 2024 07:46) (98% - 100%)    Parameters below as of 09 Jan 2024 07:46  Patient On (Oxygen Delivery Method): room air      Daily     Daily       PHYSICAL EXAM  Constitutional - NAD, Comfortable  HEENT - NCAT, EOMI  Neck - Supple, No limited ROM  Chest - Breathing comfortably   Cardiovascular - RRR, S1S2  Abdomen - BS+, Soft, NTND  Extremities - No C/C/E, No calf tenderness   Neurologic Exam - Pt is A+OX3. Speech is fluent, comprehensible, CN 2-12 grossly intact  Strength: RUE: 5/5, RLE: 5/5, LUE: 5/5 LLE: 5/5  Sensory intact to B/L upper and lower extremities.       RECENT LABS:                          12.1   13.76 )-----------( 141      ( 08 Jan 2024 06:35 )             39.1     01-08    137  |  106  |  33<H>  ----------------------------<  91  4.6   |  23  |  0.84    Ca    8.2<L>      08 Jan 2024 06:35    TPro  5.5<L>  /  Alb  2.4<L>  /  TBili  0.3  /  DBili  x   /  AST  11  /  ALT  14  /  AlkPhos  69  01-08    LIVER FUNCTIONS - ( 08 Jan 2024 06:35 )  Alb: 2.4 g/dL / Pro: 5.5 g/dL / ALK PHOS: 69 U/L / ALT: 14 U/L / AST: 11 U/L / GGT: x               MEDICATIONS  (STANDING):  dexAMETHasone     Tablet   Oral   dexAMETHasone     Tablet 2 milliGRAM(s) Oral every 12 hours  enoxaparin Injectable 40 milliGRAM(s) SubCutaneous <User Schedule>  levETIRAcetam 500 milliGRAM(s) Oral every 12 hours  NIFEdipine XL 60 milliGRAM(s) Oral daily  pantoprazole    Tablet 40 milliGRAM(s) Oral before breakfast  polyethylene glycol 3350 17 Gram(s) Oral daily  senna 2 Tablet(s) Oral at bedtime  sodium chloride 1 Gram(s) Oral two times a day    MEDICATIONS  (PRN):  acetaminophen     Tablet .. 650 milliGRAM(s) Oral every 6 hours PRN Mild Pain (1 - 3)  oxyCODONE    IR 5 milliGRAM(s) Oral every 4 hours PRN Severe Pain (7 - 10)

## 2024-01-09 NOTE — PROGRESS NOTE ADULT - ASSESSMENT
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 Worthy, s/p SRS (completed 5 rounds 8/15/23), on immunotherapy monthly (last dose 12/15/23, next dose 1/15/2023), drug induced neutropenia (likely bactrim), MRI as outpatient on 12/22/23 showed progression of right parietal lobe lesion with worsening mass effect, edema, and 1.2cm MLS, he presented to [facility] on 12/31/23 for resection of brain tumor. Hospital Course included episodes of hypertension which were controlled with medication management. He underwent a Right parietal and temporal crani and resection (frozen: metastatic melanoma) on 1/2. He tolerated the procedure well and was started on nifedipine post operatively for BP management. He remains on keppra for seizure prophylaxis and on decadron. His lovenox is held presently but is s/p IVC filter on 1/2 with noted LE duplex on 12/31 with proximal DVT. Plan for 1 week post operative resumption of full dose lovenox. He had post op hyponatremia and was started on salt tabs. He was evaluated for admission to acute inpatient rehab and admitted to [facility] on 1/5/24.     #Metastatic Melanoma to the Brain   -s/p Right parietal and temporal crani and resection (frozen: metastatic melanoma) on 1/2  -Continue dexamethasone taper as ordered  -Continue keppra 500mg BID for seizure prophylaxis  -Staples to craniotomy incision (s/p craniotomy on 1/2)   -Fall precaution   -Pain control and bowel regimen per rehab team   -Comprehensive Rehab Program of PT/OT/SLP  -Outpatient follow up with neurosurgery     #Known Right lung mass  - CXR with 10.8cm RUL mass (enlarged from 9.3cm), cont pulmonary hygiene   - Receiving immunotherapy monthly (last dose 12/15/23, next dose 1/15/2023)  - Follow up with oncology     #DVT/PE  - diagnosed 8/2023 and has been on therapeutic enoxaparin at home - stopped prior to surgery   - venous duplex 12/31/23 with acute proximal DVT   -IVC filter placed 1/2/24  -As per neurosurgery note, may resume full dose lovenox on 1/9 (POD #7) - discussed with primary team - pending heme input   -To follow up with vascular surgery as outpatient for IVC filter management     #Hyponatremia  -Last   -Monitor labs  -Agree with reducing salt tabs to BID  -Continue to wean as tolerated for a normal sodium goal (135-145)    #HTN  -Continue Nifedipine  -Monitor BP    #Leukocytosis   -WBC 13.8 1/8, downtrending from previous, WBC 10.3 on 1/6 possibly ?lab error   -Likely due to steroids   -Infection workup if fever spike  -Trend CBC     #Thrombocytopenia   - -140s, stable  - no s/s active bleeding  - Monitor CBC     #DVT PPx  -Lovenox 40mg SC QD  -Pending heme input     Discussed with primary team  63 year old male with 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 Worthy, s/p SRS (completed 5 rounds 8/15/23), on immunotherapy monthly (last dose 12/15/23, next dose 1/15/2023), drug induced neutropenia (likely bactrim), MRI as outpatient on 12/22/23 showed progression of right parietal lobe lesion with worsening mass effect, edema, and 1.2cm MLS, he presented to [facility] on 12/31/23 for resection of brain tumor. Hospital Course included episodes of hypertension which were controlled with medication management. He underwent a Right parietal and temporal crani and resection (frozen: metastatic melanoma) on 1/2. He tolerated the procedure well and was started on nifedipine post operatively for BP management. He remains on keppra for seizure prophylaxis and on decadron. His lovenox is held presently but is s/p IVC filter on 1/2 with noted LE duplex on 12/31 with proximal DVT. Plan for 1 week post operative resumption of full dose lovenox. He had post op hyponatremia and was started on salt tabs. He was evaluated for admission to acute inpatient rehab and admitted to [facility] on 1/5/24.     #Metastatic Melanoma to the Brain   -s/p Right parietal and temporal crani and resection (frozen: metastatic melanoma) on 1/2  -Continue dexamethasone taper as ordered  -Continue keppra 500mg BID for seizure prophylaxis  -Staples to craniotomy incision (s/p craniotomy on 1/2)   -Fall precaution   -Pain control and bowel regimen per rehab team   -Comprehensive Rehab Program of PT/OT/SLP  -Outpatient follow up with neurosurgery     #Known Right lung mass  - CXR with 10.8cm RUL mass (enlarged from 9.3cm), cont pulmonary hygiene   - Receiving immunotherapy monthly (last dose 12/15/23, next dose 1/15/2023)  - Follow up with oncology     #DVT/PE  - diagnosed 8/2023 and has been on therapeutic enoxaparin at home - stopped prior to surgery   - venous duplex 12/31/23 with acute proximal DVT   -IVC filter placed 1/2/24  -As per neurosurgery note, may resume full dose lovenox on 1/9 (POD #7) - discussed with primary team - pending heme input   -To follow up with vascular surgery as outpatient for IVC filter management     #Hyponatremia  -Last   -Monitor labs  -Agree with reducing salt tabs to BID  -Continue to wean as tolerated for a normal sodium goal (135-145)    #HTN  -Continue Nifedipine  -Monitor BP    #Leukocytosis   -WBC 13.8 1/8, downtrending from previous, WBC 10.3 on 1/6 possibly ?lab error   -Likely due to steroids   -Infection workup if fever spike  -Trend CBC     #Thrombocytopenia   - -140s, stable  - no s/s active bleeding  - Monitor CBC     #DVT PPx  -Lovenox 40mg SC QD  -Pending heme input     Discussed with primary team

## 2024-01-09 NOTE — CONSULT NOTE ADULT - SUBJECTIVE AND OBJECTIVE BOX
This is a 63  year old man with a longstanding history of recurrent DVT's, most recent 8/2023, and before that 4 years ago. Patient on intermediate dose lovenox 80mg Daily given history of melanoma with brain mets diagnosed in 6/2023.  Patient had a new DVT in August while on intermediate dose lovenox.  Patient now at [facility] for rehab. Had the anticoagulation held for surgery over hte past week. Was cleared to restart anticoagulation today 1/9/24, restared as lovenox 40mg SQ daily.  Paient was dismayed about the long term use of SQ needles, requesting an alternative.      Vital Signs Last 24 Hrs  T(C): 36.6 (09 Jan 2024 07:46), Max: 36.7 (08 Jan 2024 21:50)  T(F): 97.8 (09 Jan 2024 07:46), Max: 98.1 (08 Jan 2024 21:50)  HR: 79 (09 Jan 2024 07:46) (77 - 82)  BP: 136/95 (09 Jan 2024 07:46) (113/79 - 136/95)  BP(mean): --  RR: 16 (09 Jan 2024 07:46) (16 - 16)  SpO2: 98% (09 Jan 2024 07:46) (98% - 100%)    Parameters below as of 09 Jan 2024 07:46  Patient On (Oxygen Delivery Method): room air    01-08    137  |  106  |  33<H>  ----------------------------<  91  4.6   |  23  |  0.84    Ca    8.2<L>      08 Jan 2024 06:35    TPro  5.5<L>  /  Alb  2.4<L>  /  TBili  0.3  /  DBili  x   /  AST  11  /  ALT  14  /  AlkPhos  69  01-08                          12.1   13.76 )-----------( 141      ( 08 Jan 2024 06:35 )             39.1   MEDICATIONS  (STANDING):  dexAMETHasone     Tablet   Oral   dexAMETHasone     Tablet 2 milliGRAM(s) Oral every 12 hours  enoxaparin Injectable 40 milliGRAM(s) SubCutaneous <User Schedule>  levETIRAcetam 500 milliGRAM(s) Oral every 12 hours  NIFEdipine XL 60 milliGRAM(s) Oral daily  pantoprazole    Tablet 40 milliGRAM(s) Oral before breakfast  polyethylene glycol 3350 17 Gram(s) Oral daily  senna 2 Tablet(s) Oral at bedtime  sodium chloride 1 Gram(s) Oral two times a day    MEDICATIONS  (PRN):  acetaminophen     Tablet .. 650 milliGRAM(s) Oral every 6 hours PRN Mild Pain (1 - 3)  oxyCODONE    IR 5 milliGRAM(s) Oral every 4 hours PRN Severe Pain (7 - 10)   This is a 63  year old man with a longstanding history of recurrent DVT's, most recent 8/2023, and before that 4 years ago. Patient on intermediate dose lovenox 80mg Daily given history of melanoma with brain mets diagnosed in 6/2023.  Patient had a new DVT in August while on intermediate dose lovenox.  Patient now at [facility] for rehab. Had the anticoagulation held for surgery over hte past week. Was cleared to restart anticoagulation today 1/9/24, restared as lovenox 40mg SQ daily.  Paient was dismayed about the long term use of SQ needles, requesting an alternative.      Vital Signs Last 24 Hrs  T(C): 36.6 (09 Jan 2024 07:46), Max: 36.7 (08 Jan 2024 21:50)  T(F): 97.8 (09 Jan 2024 07:46), Max: 98.1 (08 Jan 2024 21:50)  HR: 79 (09 Jan 2024 07:46) (77 - 82)  BP: 136/95 (09 Jan 2024 07:46) (113/79 - 136/95)  BP(mean): --  RR: 16 (09 Jan 2024 07:46) (16 - 16)  SpO2: 98% (09 Jan 2024 07:46) (98% - 100%)    Parameters below as of 09 Jan 2024 07:46  Patient On (Oxygen Delivery Method): room air    01-08    137  |  106  |  33<H>  ----------------------------<  91  4.6   |  23  |  0.84    Ca    8.2<L>      08 Jan 2024 06:35    TPro  5.5<L>  /  Alb  2.4<L>  /  TBili  0.3  /  DBili  x   /  AST  11  /  ALT  14  /  AlkPhos  69  01-08                          12.1   13.76 )-----------( 141      ( 08 Jan 2024 06:35 )             39.1   MEDICATIONS  (STANDING):  dexAMETHasone     Tablet   Oral   dexAMETHasone     Tablet 2 milliGRAM(s) Oral every 12 hours  enoxaparin Injectable 40 milliGRAM(s) SubCutaneous <User Schedule>  levETIRAcetam 500 milliGRAM(s) Oral every 12 hours  NIFEdipine XL 60 milliGRAM(s) Oral daily  pantoprazole    Tablet 40 milliGRAM(s) Oral before breakfast  polyethylene glycol 3350 17 Gram(s) Oral daily  senna 2 Tablet(s) Oral at bedtime  sodium chloride 1 Gram(s) Oral two times a day    MEDICATIONS  (PRN):  acetaminophen     Tablet .. 650 milliGRAM(s) Oral every 6 hours PRN Mild Pain (1 - 3)  oxyCODONE    IR 5 milliGRAM(s) Oral every 4 hours PRN Severe Pain (7 - 10)

## 2024-01-09 NOTE — CONSULT NOTE ADULT - ASSESSMENT
This is a 63  year old man with a longstanding history of recurrent DVT's, most recent 8/2023, and before that 4 years ago. Patient on intermediate dose lovenox 80mg Daily given history of melanoma with brain mets diagnosed in 6/2023.  Patient had a new DVT in August while on intermediate dose lovenox.  Patient now at [facility] for rehab. Had the anticoagulation held for surgery over hte past week. Was cleared to restart anticoagulation today 1/9/24, restared as lovenox 40mg SQ daily.  Paidamon was dismayed about the long term use of SQ needles, requesting an alternative.      Recommend discontinue lovenox and restarting Eliquis 5mg PO BID no loading dose given no acute thrombosis right now.  Start tonight at about the time the next dose of lovenox would have been an continue Q 12 hours.   This is a 63  year old man with a longstanding history of recurrent DVT's, most recent 8/2023, and before that 4 years ago. Patient on intermediate dose lovenox 80mg Daily given history of melanoma with brain mets diagnosed in 6/2023.  Patient had a new DVT in August while on intermediate dose lovenox.  Patient now at [facility] for rehab. Had the anticoagulation held for surgery over hte past week. Was cleared to restart anticoagulation today 1/9/24, restared as lovenox 40mg SQ daily.  Paidamon was dismayed about the long term use of SQ needles, requesting an alternative.      Recommend discontinue lovenox and restarting Eliquis 5mg PO BID no loading dose given no acute thrombosis right now.  Start tonight at about the time the next dose of lovenox would have been an continue Q 12 hours.

## 2024-01-09 NOTE — PROGRESS NOTE ADULT - SUBJECTIVE AND OBJECTIVE BOX
Interval History  Patient seen and examined at bedside. No acute events noted.  He reports feeling well, no acute complaints. ROS is negative.    ALLERGIES:  No Known Allergies    MEDICATIONS  (STANDING):  dexAMETHasone     Tablet   Oral   dexAMETHasone     Tablet 2 milliGRAM(s) Oral every 12 hours  enoxaparin Injectable 40 milliGRAM(s) SubCutaneous <User Schedule>  levETIRAcetam 500 milliGRAM(s) Oral every 12 hours  NIFEdipine XL 60 milliGRAM(s) Oral daily  pantoprazole    Tablet 40 milliGRAM(s) Oral before breakfast  polyethylene glycol 3350 17 Gram(s) Oral daily  senna 2 Tablet(s) Oral at bedtime  sodium chloride 1 Gram(s) Oral two times a day    MEDICATIONS  (PRN):  acetaminophen     Tablet .. 650 milliGRAM(s) Oral every 6 hours PRN Mild Pain (1 - 3)  oxyCODONE    IR 5 milliGRAM(s) Oral every 4 hours PRN Severe Pain (7 - 10)    Vital Signs Last 24 Hrs  T(F): 97.8 (09 Jan 2024 07:46), Max: 98.1 (08 Jan 2024 21:50)  HR: 79 (09 Jan 2024 07:46) (77 - 82)  BP: 136/95 (09 Jan 2024 07:46) (113/79 - 136/95)  RR: 16 (09 Jan 2024 07:46) (16 - 16)  SpO2: 98% (09 Jan 2024 07:46) (98% - 100%)  I&O's Summary    BMI (kg/m2): 23.9 (01-05-24 @ 14:48)    PHYSICAL EXAM:  GENERAL: NAD  HEENT: NCAT, right scalp incision with staples in place, no drainage/redness   NECK: Supple, No JVD  CHEST/LUNG: Clear to percussion bilaterally; No rales, rhonchi, wheezing  HEART: Regular rate and rhythm; No murmurs  ABDOMEN: Soft, Nontender, Nondistended; Bowel sounds present  MUSCULOSKELETAL/EXTREMITIES:  2+ Peripheral Pulses, No LE edema  SKIN: No rashes or lesions  PSYCH: Appropriate affect  NEURO: AAO x 3, 5/5 UE/LE     LABS:                        12.1   13.76 )-----------( 141      ( 08 Jan 2024 06:35 )             39.1       01-08    137  |  106  |  33  ----------------------------<  91  4.6   |  23  |  0.84    Ca    8.2      08 Jan 2024 06:35    TPro  5.5  /  Alb  2.4  /  TBili  0.3  /  DBili  x   /  AST  11  /  ALT  14  /  AlkPhos  69  01-08     Urinalysis Basic - ( 08 Jan 2024 06:35 )    Color: x / Appearance: x / SG: x / pH: x  Gluc: 91 mg/dL / Ketone: x  / Bili: x / Urobili: x   Blood: x / Protein: x / Nitrite: x   Leuk Esterase: x / RBC: x / WBC x   Sq Epi: x / Non Sq Epi: x / Bacteria: x    Consultant(s) Notes Reviewed:   Care Discused with Consultants/Other Providers: yes, rehab team   Imaging Personally Reviewed:

## 2024-01-09 NOTE — PROGRESS NOTE ADULT - ASSESSMENT
ASSESSMENT/PLAN  63 year old male with PMH of DVT/PE 8/2023 on therapeutic SQL (last dose 12/30/23 AM), melanoma on face s/p MOHs 15 yrs ago, melanoma brain mets diagnosed 6/2023 s/p right crani for resection at [facility] with Dr. Worthy, s/p SRS (completed 5 rounds 8/15/23), on immunotherapy monthly (last dose 12/15/23, next dose 1/15/2023), drug induced neutropenia (likely bactrim), who presented to [facility] on 12/31/23 for resection of brain tumor. Patient was initially diagnosed after he was found to be leaving the car door open, putting his shoes on the wrong feet, and being forgetful of his usual daily routine. Patient reported on 12/20/23 he fell and hit his head (no LOC) with left sided weakness. Within 2 days, the symptoms of forgetfulness returned. MRI as outpatient on 12/22/23 showed progression of right parietal lobe lesion with worsening mass effect, edema, and 1.2cm MLS. Hospital Course included episodes of hypertension which were controlled with medication management. He underwent a Right parietal and temporal crani and resection (frozen: metastatic melanoma) on 1/2. He tolerated the procedure well and was started on nifedipine post operatively for BP management. He remains on keppra for seizure prophylaxis and on decadron. His lovenox is held presently but is s/p IVC filter on 1/2 with noted LE duplex on 12/31 with proximal DVT. Plan for 1 week post operative resumption of full dose lovenox. He had post op hyponatremia and was started on salt tabs. He was evaluated for admission to acute inpatient rehab and admitted to [facility] on 1/5/24.       #Metastatic melanoma to the brain s/p craniotomy for resection now with Gait Instability, ADL impairments and Functional impairments  - Continue Comprehensive Rehab Program of PT/OT/SLP  -Continue keppra 500mg BID for seizure prophylaxis  -Continue dexamethasone [id number] TID until 1/6, then 2mg BID until 1/8. 2mg daily until 1/10 then stop   -Staples to craniotomy incision intact (s/p craniotomy on 1/2)   -May shower  - will need a post op MRI before your next appointment with neurosurgery     #Right lung mass  - CXR with 10.8cm RUL mass (enlarged from 9.3cm), cont pulmonary hygiene   - receiving immunotherapy monthly (last dose 12/15/23, next dose 1/15/2023)    #Hyponatremia  -Last  (1/8)  -Monitor labs  - Salt tabs 1G titrated TID to BID.   -wean off as tolerated for a normal sodium goal (135-145). Tapered to 1G BID,  On 1/8, sodium level: 137.     #HTN  -Continue nifedipine 50mg daily    #DVT  - diagnosed 8/2023 and has been on therapeutic enoxaparin at home - stopped prior to surgery   - venous duplex 12/31/23 with acute proximal DVT   -IVC filter placed 1/2/24  -As per neurosurgery note, may resume full dose lovenox on 1/9 (POD #7)  -To follow up with vascular surgery as outpatient for IVC filter management     #Pain control  - Tylenol PRN  -Oxycodone PRN     #GI/Bowel Mgmt   - Continue Senna at bedtime   - Miralax     #Bladder management  - Continue to monitor PVR q 8 hours (SC if > 400)  - Monitor UO    #Skin:  -  right cranial incision with staples TARA, right groin puncture site TARA, non blanchable erythema to  back 2/2 immunotherapy- per wife.    FEN   - Diet - Regular with thins    - Dysphagia  SLP - evaluation and treatment    Precautions / PROPHYLAXIS:   - Falls, Cardiac, Seizure, aspiratoin  - ortho: Weight bearing status: WBAT   - Lungs: Aspiration, Incentive Spirometer   - Pressure injury/Skin: Turn Q2hrs while in bed, OOB to Chair, PT/OT      Johan Tony  Neurosurgery  [address] [place] [address]  Phone: [phone]  Fax: [phone]  Follow Up Time:     Willi Faulkner  Vascular Surgery  [address] [address]  Phone: [phone]  Fax: [phone]  Follow Up Time:     Tano Richards  Medical Oncology  [address] [place] [address]  Phone: [phone]  Fax: [phone]  Follow Up Time: ASSESSMENT/PLAN  63 year old male with PMH of DVT/PE 8/2023 on therapeutic SQL (last dose 12/30/23 AM), melanoma on face s/p MOHs 15 yrs ago, melanoma brain mets diagnosed 6/2023 s/p right crani for resection at [facility] with Dr. Worthy, s/p SRS (completed 5 rounds 8/15/23), on immunotherapy monthly (last dose 12/15/23, next dose 1/15/2023), drug induced neutropenia (likely bactrim), who presented to [facility] on 12/31/23 for resection of brain tumor. Patient was initially diagnosed after he was found to be leaving the car door open, putting his shoes on the wrong feet, and being forgetful of his usual daily routine. Patient reported on 12/20/23 he fell and hit his head (no LOC) with left sided weakness. Within 2 days, the symptoms of forgetfulness returned. MRI as outpatient on 12/22/23 showed progression of right parietal lobe lesion with worsening mass effect, edema, and 1.2cm MLS. Hospital Course included episodes of hypertension which were controlled with medication management. He underwent a Right parietal and temporal crani and resection (frozen: metastatic melanoma) on 1/2. He tolerated the procedure well and was started on nifedipine post operatively for BP management. He remains on keppra for seizure prophylaxis and on decadron. His lovenox is held presently but is s/p IVC filter on 1/2 with noted LE duplex on 12/31 with proximal DVT. Plan for 1 week post operative resumption of full dose lovenox. He had post op hyponatremia and was started on salt tabs. He was evaluated for admission to acute inpatient rehab and admitted to [facility] on 1/5/24.       #Metastatic melanoma to the brain s/p craniotomy for resection now with Gait Instability, ADL impairments and Functional impairments  - Continue Comprehensive Rehab Program of PT/OT/SLP  -Continue keppra 500mg BID for seizure prophylaxis  -Continue dexamethasone [id number] TID until 1/6, then 2mg BID until 1/8. 2mg daily until 1/10 then stop   -Staples to craniotomy incision intact (s/p craniotomy on 1/2)   -May shower  - will need a post op MRI before your next appointment with neurosurgery     #Right lung mass  - CXR with 10.8cm RUL mass (enlarged from 9.3cm), cont pulmonary hygiene   - receiving immunotherapy monthly (last dose 12/15/23, next dose 1/15/2023)    #Hyponatremia  -Last  (1/8)  -Monitor labs  - Salt tabs 1G titrated TID to BID.   -wean off as tolerated for a normal sodium goal (135-145). Tapered to 1G BID,  On 1/8, sodium level: 137.     #HTN  -Continue nifedipine 50mg daily    #DVT  - diagnosed 8/2023 and has been on therapeutic enoxaparin at home - stopped prior to surgery   - venous duplex 12/31/23 with acute proximal DVT   -IVC filter placed 1/2/24  -As per neurosurgery note, may resume full dose lovenox on 1/9 (POD #7)  -To follow up with vascular surgery as outpatient for IVC filter management     #Pain control  - Tylenol PRN  -Oxycodone PRN     #GI/Bowel Mgmt   - Continue Senna at bedtime   - Miralax     #Bladder management  - Continue to monitor PVR q 8 hours (SC if > 400)  - Monitor UO    #Skin:  -  right cranial incision with staples TARA, right groin puncture site TARA, non blanchable erythema to  back 2/2 immunotherapy- per wife.    FEN   - Diet - Regular with thins    - Dysphagia  SLP - evaluation and treatment    Precautions / PROPHYLAXIS:   - Falls, Cardiac, Seizure, aspiratoin  - ortho: Weight bearing status: WBAT   - Lungs: Aspiration, Incentive Spirometer   - Pressure injury/Skin: Turn Q2hrs while in bed, OOB to Chair, PT/OT      Johan Tony  Neurosurgery  [address] [place] [address]  Phone: [phone]  Fax: [phone]  Follow Up Time:     Willi Faulkner  Vascular Surgery  [address] [address]  Phone: [phone]  Fax: [phone]  Follow Up Time:     Tano Richards  Medical Oncology  [address] [place] [address]  Phone: [phone]  Fax: [phone]  Follow Up Time: ASSESSMENT/PLAN  63 year old male with PMH of DVT/PE 8/2023 on therapeutic SQL (last dose 12/30/23 AM), melanoma on face s/p MOHs 15 yrs ago, melanoma brain mets diagnosed 6/2023 s/p right crani for resection at [facility] with Dr. Worthy, s/p SRS (completed 5 rounds 8/15/23), on immunotherapy monthly (last dose 12/15/23, next dose 1/15/2023), drug induced neutropenia (likely bactrim), who presented to [facility] on 12/31/23 for resection of brain tumor. Patient was initially diagnosed after he was found to be leaving the car door open, putting his shoes on the wrong feet, and being forgetful of his usual daily routine. Patient reported on 12/20/23 he fell and hit his head (no LOC) with left sided weakness. Within 2 days, the symptoms of forgetfulness returned. MRI as outpatient on 12/22/23 showed progression of right parietal lobe lesion with worsening mass effect, edema, and 1.2cm MLS. Hospital Course included episodes of hypertension which were controlled with medication management. He underwent a Right parietal and temporal crani and resection (frozen: metastatic melanoma) on 1/2. He tolerated the procedure well and was started on nifedipine post operatively for BP management. He remains on keppra for seizure prophylaxis and on decadron. His lovenox is held presently but is s/p IVC filter on 1/2 with noted LE duplex on 12/31 with proximal DVT. Plan for 1 week post operative resumption of full dose lovenox. He had post op hyponatremia and was started on salt tabs. He was evaluated for admission to acute inpatient rehab and admitted to [facility] on 1/5/24.       #Metastatic melanoma to the brain s/p craniotomy for resection now with Gait Instability, ADL impairments and Functional impairments  - Continue Comprehensive Rehab Program of PT/OT/SLP  -Continue keppra 500mg BID for seizure prophylaxis  -Continue dexamethasone [id number] TID until 1/6, then 2mg BID until 1/8. 2mg daily until 1/10 then stop   -Staples to craniotomy incision intact (s/p craniotomy on 1/2)   -May shower  - will need a post op MRI before your next appointment with neurosurgery     #Right lung mass  - CXR with 10.8cm RUL mass (enlarged from 9.3cm), cont pulmonary hygiene   - receiving immunotherapy monthly (last dose 12/15/23, next dose 1/15/2023)    #Hyponatremia  -Last  (1/8)  -Monitor labs  - Salt tabs 1G titrated TID to BID.   -wean off as tolerated for a normal sodium goal (135-145). Tapered to 1G BID,  On 1/8, sodium level: 137.     #HTN  -Continue nifedipine 50mg daily    #DVT  - Ct head 1/9 for post-op craniotomy.   - Hematology consulted in regards to starting pre-op full dose lovenox VS oral agent.    - diagnosed 8/2023 and has been on therapeutic enoxaparin at home - stopped prior to surgery   - venous duplex 12/31/23 with acute proximal DVT   -IVC filter placed 1/2/24  -As per neurosurgery note, may resume full dose lovenox on 1/9 (POD #7)  -To follow up with vascular surgery as outpatient for IVC filter management     #Pain control  - Tylenol PRN  -Oxycodone PRN     #GI/Bowel Mgmt   - Continue Senna at bedtime   - Miralax     #Bladder management  - Continue to monitor PVR q 8 hours (SC if > 400)  - Monitor UO    #Skin:  -  right cranial incision with staples TARA, right groin puncture site TARA, non blanchable erythema to  back 2/2 immunotherapy- per wife.    FEN   - Diet - Regular with thins    - Dysphagia  SLP - evaluation and treatment    Precautions / PROPHYLAXIS:   - Falls, Cardiac, Seizure, aspiratoin  - ortho: Weight bearing status: WBAT   - Lungs: Aspiration, Incentive Spirometer   - Pressure injury/Skin: Turn Q2hrs while in bed, OOB to Chair, PT/OT      Johan Tony  Neurosurgery  [address] [place] [address]  Phone: [phone]  Fax: [phone]  Follow Up Time:     Willi Faulkner  Vascular Surgery  [address] [address]  Phone: [phone]  Fax: [phone]  Follow Up Time:     Tano Richards  Medical Oncology  [address] [place] [address]  Phone: [phone]  Fax: [phone]  Follow Up Time: ASSESSMENT/PLAN  63 year old male with PMH of DVT/PE 8/2023 on therapeutic SQL (last dose 12/30/23 AM), melanoma on face s/p MOHs 15 yrs ago, melanoma brain mets diagnosed 6/2023 s/p right crani for resection at [facility] with Dr. Worthy, s/p SRS (completed 5 rounds 8/15/23), on immunotherapy monthly (last dose 12/15/23, next dose 1/15/2023), drug induced neutropenia (likely bactrim), who presented to [facility] on 12/31/23 for resection of brain tumor. Patient was initially diagnosed after he was found to be leaving the car door open, putting his shoes on the wrong feet, and being forgetful of his usual daily routine. Patient reported on 12/20/23 he fell and hit his head (no LOC) with left sided weakness. Within 2 days, the symptoms of forgetfulness returned. MRI as outpatient on 12/22/23 showed progression of right parietal lobe lesion with worsening mass effect, edema, and 1.2cm MLS. Hospital Course included episodes of hypertension which were controlled with medication management. He underwent a Right parietal and temporal crani and resection (frozen: metastatic melanoma) on 1/2. He tolerated the procedure well and was started on nifedipine post operatively for BP management. He remains on keppra for seizure prophylaxis and on decadron. His lovenox is held presently but is s/p IVC filter on 1/2 with noted LE duplex on 12/31 with proximal DVT. Plan for 1 week post operative resumption of full dose lovenox. He had post op hyponatremia and was started on salt tabs. He was evaluated for admission to acute inpatient rehab and admitted to [facility] on 1/5/24.       #Metastatic melanoma to the brain s/p craniotomy for resection now with Gait Instability, ADL impairments and Functional impairments  - Continue Comprehensive Rehab Program of PT/OT/SLP  -Continue keppra 500mg BID for seizure prophylaxis  -Continue dexamethasone [id number] TID until 1/6, then 2mg BID until 1/8. 2mg daily until 1/10 then stop   -Staples to craniotomy incision intact (s/p craniotomy on 1/2)   -May shower  - will need a post op MRI before your next appointment with neurosurgery     #Right lung mass  - CXR with 10.8cm RUL mass (enlarged from 9.3cm), cont pulmonary hygiene   - receiving immunotherapy monthly (last dose 12/15/23, next dose 1/15/2023)    #Hyponatremia  -Last  (1/8)  -Monitor labs  - Salt tabs 1G titrated TID to BID.   -wean off as tolerated for a normal sodium goal (135-145). Tapered to 1G BID,  On 1/8, sodium level: 137.     #HTN  -Continue nifedipine 50mg daily    #DVT  - Ct head 1/9 for post-op craniotomy.   - Hematology consulted in regards to starting pre-op full dose lovenox VS oral agent.    - diagnosed 8/2023 and has been on therapeutic enoxaparin at home - stopped prior to surgery   - venous duplex 12/31/23 with acute proximal DVT   -IVC filter placed 1/2/24  -As per neurosurgery note, may resume full dose lovenox on 1/9 (POD #7)  -To follow up with vascular surgery as outpatient for IVC filter management     #Pain control  - Tylenol PRN  -Oxycodone PRN     #GI/Bowel Mgmt   - Continue Senna at bedtime   - Miralax     #Bladder management  - Continue to monitor PVR q 8 hours (SC if > 400)  - Monitor UO    #Skin:  -  right cranial incision with staples TARA, right groin puncture site TARA, non blanchable erythema to  back 2/2 immunotherapy- per wife.    FEN   - Diet - Regular with thins    - Dysphagia  SLP - evaluation and treatment    Precautions / PROPHYLAXIS:   - Falls, Cardiac, Seizure, aspiratoin  - ortho: Weight bearing status: WBAT   - Lungs: Aspiration, Incentive Spirometer   - Pressure injury/Skin: Turn Q2hrs while in bed, OOB to Chair, PT/OT      Johan Tony  Neurosurgery  [address] [place] [address]  Phone: [phone]  Fax: [phone]  Follow Up Time:     Willi Faulkner  Vascular Surgery  [address] [address]  Phone: [phone]  Fax: [phone]  Follow Up Time:     Tano Richards  Medical Oncology  [address] [place] [address]  Phone: [phone]  Fax: [phone]  Follow Up Time:

## 2024-01-10 NOTE — DISCHARGE NOTE PROVIDER - PROVIDER TOKENS
PROVIDER:[TOKEN:[[id number]],FOLLOWUP:[1 week]],PROVIDER:[TOKEN:[[id number]],FOLLOWUP:[1 week]],PROVIDER:[TOKEN:[[id number]],FOLLOWUP:[1 week]],PROVIDER:[TOKEN:[[id number]],FOLLOWUP:[1 week]] PROVIDER:[TOKEN:[[id number]],FOLLOWUP:[1 week]],PROVIDER:[TOKEN:[[id number]],FOLLOWUP:[1 week]],PROVIDER:[[id number]],FOLLOWUP:[1 week]],PROVIDER:[TOKEN:[[id number]],FOLLOWUP:[1 week]] PROVIDER:[TOKEN:[[id number]],FOLLOWUP:[1 week]],PROVIDER:[TOKEN:[[id number]],FOLLOWUP:[1 week]],PROVIDER:[TOKEN:[[id number]],FOLLOWUP:[1 week]],PROVIDER:[TOKEN:[[id number]],FOLLOWUP:[1 week]],PROVIDER:[TOKEN:[[id number]],FOLLOWUP:[Routine]] PROVIDER:[TOKEN:[[id number]],FOLLOWUP:[1 week]],PROVIDER:[TOKEN:[[id number]],FOLLOWUP:[1 week]],PROVIDER:[TOKEN:[[id number]],FOLLOWUP:[1 week]],PROVIDER:[TOKEN:[[id number]],FOLLOWUP:[1 week]],PROVIDER:[TOKEN:[[id number]],FOLLOWUP:[Routine]]

## 2024-01-10 NOTE — DISCHARGE NOTE PROVIDER - NSDCACTIVITY_GEN_ALL_CORE
Out of bed with assistance. Out of bed with assistance./Do not drive or operate machinery/Showering allowed/Do not make important decisions/Walking - Indoors allowed/No heavy lifting/straining/Walking - Outdoors allowed

## 2024-01-10 NOTE — DISCHARGE NOTE PROVIDER - NSDCMRMEDTOKEN_GEN_ALL_CORE_FT
acetaminophen 325 mg oral tablet: 2 tab(s) orally every 6 hours As needed Temp greater or equal to 38C (100.4F), Mild Pain (1 - 3)  Eliquis Starter Pack for Treatment of DVT and PE 5 mg oral tablet: 1 tab(s) orally every 12 hours  enoxaparin: 40 milligram(s) subcutaneous once a day can transition to home dosing for DVT/PE on  POD 7 = 1/9/24  levETIRAcetam 500 mg oral tablet: 1 tab(s) orally every 12 hours  NIFEdipine 60 mg oral tablet, extended release: 1 tab(s) orally once a day  oxyCODONE 5 mg oral tablet: 1 tab(s) orally every 4 hours As needed Severe Pain (7 - 10)  pantoprazole 40 mg oral delayed release tablet: 1 tab(s) orally once a day (before a meal) continue while on steroids  polyethylene glycol 3350 oral powder for reconstitution: 17 gram(s) orally once a day  sodium chloride 1 g oral tablet: 1 tab(s) orally every 8 hours   acetaminophen 325 mg oral tablet: 2 tab(s) orally every 6 hours As needed Mild Pain (1 - 3)  apixaban 5 mg oral tablet: 1 tab(s) orally every 12 hours  Keppra 500 mg oral tablet: 1 tab(s) orally every 12 hours  NIFEdipine 60 mg oral tablet, extended release: 1 tab(s) orally once a day  pantoprazole 40 mg oral delayed release tablet: 1 tab(s) orally once a day (before a meal) continue while on steroids  polyethylene glycol 3350 oral powder for reconstitution: 17 gram(s) orally once a day  senna leaf extract oral tablet: 2 tab(s) orally once a day (at bedtime)  sodium chloride 1 g oral tablet: 1 tab(s) orally 2 times a day   acetaminophen 325 mg oral tablet: 2 tab(s) orally every 6 hours As needed Mild Pain (1 - 3)  apixaban 5 mg oral tablet: 1 tab(s) orally every 12 hours  Keppra 500 mg oral tablet: 1 tab(s) orally every 12 hours  NIFEdipine 60 mg oral tablet, extended release: 1 tab(s) orally once a day  Occupational Therapy: Occupational Therapy for ADL impairment. TIW for 8 weeks. Dx: Metastatic melanoma s/p resection  pantoprazole 40 mg oral delayed release tablet: 1 tab(s) orally once a day (before a meal) continue while on steroids  Physical Therapy: Physical Therapy for gait impairment. TIW for 8 weeks. Dx: Metastatic melanoma s/p resection  polyethylene glycol 3350 oral powder for reconstitution: 17 gram(s) orally once a day  senna leaf extract oral tablet: 2 tab(s) orally once a day (at bedtime)  sodium chloride 1 g oral tablet: 1 tab(s) orally 2 times a day  Speech therapy: Speech Therapy for communication impairment and cognition. TIW for 8 weeks. Dx: Metastatic melanoma s/p resection

## 2024-01-10 NOTE — DISCHARGE NOTE NURSING/CASE MANAGEMENT/SOCIAL WORK - NSDCCRNUMBER_GEN_ALL_CORE_FT
[address] [place] [address]  [phone] [address] [place] [address]  [phone]

## 2024-01-10 NOTE — PROGRESS NOTE ADULT - SUBJECTIVE AND OBJECTIVE BOX
Patient is a 63y old  Male who presents with a chief complaint of s/p craniotomy for tumor resection (10 Sterling 2024 12:33)      Patient seen and examined at bedside.  - no events overnight, denies any nausea, vomiting, headahces, shortness of breath, cough, chest pain    ALLERGIES:  No Known Allergies    MEDICATIONS  (STANDING):  apixaban 5 milliGRAM(s) Oral every 12 hours  dexAMETHasone     Tablet 2 milliGRAM(s) Oral daily  dexAMETHasone     Tablet   Oral   levETIRAcetam 500 milliGRAM(s) Oral every 12 hours  NIFEdipine XL 60 milliGRAM(s) Oral daily  pantoprazole    Tablet 40 milliGRAM(s) Oral before breakfast  polyethylene glycol 3350 17 Gram(s) Oral daily  senna 2 Tablet(s) Oral at bedtime  sodium chloride 1 Gram(s) Oral two times a day    MEDICATIONS  (PRN):  acetaminophen     Tablet .. 650 milliGRAM(s) Oral every 6 hours PRN Mild Pain (1 - 3)  oxyCODONE    IR 5 milliGRAM(s) Oral every 4 hours PRN Severe Pain (7 - 10)    Vital Signs Last 24 Hrs  T(F): 97.5 (10 Sterling 2024 07:17), Max: 97.8 (09 Jan 2024 20:05)  HR: 82 (10 Sterling 2024 07:17) (77 - 82)  BP: 139/85 (10 Sterling 2024 07:17) (125/88 - 139/85)  RR: 16 (10 Sterling 2024 07:17) (16 - 16)  SpO2: 98% (10 Sterling 2024 07:17) (98% - 98%)  I&O's Summary        PHYSICAL EXAM:  GENERAL: NAD  HEENT: NCAT, right scalp incision with staples in place, no drainage/redness   NECK: Supple, No JVD  CHEST/LUNG: Clear to percussion bilaterally; No rales, rhonchi, wheezing  HEART: Regular rate and rhythm; No murmurs  ABDOMEN: Soft, Nontender, Nondistended; Bowel sounds present  MUSCULOSKELETAL/EXTREMITIES:  2+ Peripheral Pulses, No LE edema  SKIN: No rashes or lesions  PSYCH: Appropriate affect  NEURO: AAO x 3, 5/5 UE/LE     LABS:                        12.1   13.76 )-----------( 141      ( 08 Jan 2024 06:35 )             39.1       01-08    137  |  106  |  33  ----------------------------<  91  4.6   |  23  |  0.84    Ca    8.2      08 Jan 2024 06:35    TPro  5.5  /  Alb  2.4  /  TBili  0.3  /  DBili  x   /  AST  11  /  ALT  14  /  AlkPhos  69  01-08                                  Urinalysis Basic - ( 08 Jan 2024 06:35 )    Color: x / Appearance: x / SG: x / pH: x  Gluc: 91 mg/dL / Ketone: x  / Bili: x / Urobili: x   Blood: x / Protein: x / Nitrite: x   Leuk Esterase: x / RBC: x / WBC x   Sq Epi: x / Non Sq Epi: x / Bacteria: x            RADIOLOGY & ADDITIONAL TESTS:    Care Discussed with Consultants/Other Providers:

## 2024-01-10 NOTE — DISCHARGE NOTE PROVIDER - NPI NUMBER (FOR SYSADMIN USE ONLY) :
[[id number]],[[id number]] [[id number]],[[id number]],[[id number]],[[id number]] [[id number]],[[id number]],[[id number]],[[id number]],[[id number]] [[id number]],[[id number]],[[id number]],[[id number]],[[id number]]

## 2024-01-10 NOTE — DISCHARGE NOTE PROVIDER - NSDCCPCAREPLAN_GEN_ALL_CORE_FT
PRINCIPAL DISCHARGE DIAGNOSIS  Diagnosis: Melanoma metastatic to brain  Assessment and Plan of Treatment: -Continue keppra 500mg BID for seizure prophylaxis  -Completed dexamethasone taper   -May shower  -CTH to monitor for bleeding prior to starting AC - completed 1/9 - stable.   Followup with Dr. Cristina for staple removal.   -Follow up with radiation and medical oncology in one week for further management.         SECONDARY DISCHARGE DIAGNOSES  Diagnosis: DVT, lower extremity  Assessment and Plan of Treatment: - diagnosed 8/2023 and has been on therapeutic enoxaparin at home - stopped prior to surgery   - venous duplex 12/31/23 with acute proximal DVT   -IVC filter placed 1/2/24  -Per neurosurgery you resumed full dose lovenox on 1/9 (POD #7)  - Continue Eliquis 5mg BID 1/9/24  -Please follow up with vascular surgery as outpatient for IVC filter management  -Please followup with your PCP Dr. Woods  in one week for further management.       Diagnosis: HTN (hypertension)  Assessment and Plan of Treatment: -Continue nifedipine XL 60mg daily  -Monitor your blood pressure daily.   - Please followup with your PCP Dr. Woods in one week for further management.    Diagnosis: Hyponatremia  Assessment and Plan of Treatment: Hyponatremia  -Last  (1/8)  - You were treated with salt tabs 1G twice daily on 1/9  -Your sodium levels are now within normal range.  -Please followup with your PCP for futher management.    Diagnosis: Lung mass  Assessment and Plan of Treatment: #Right lung mass  - CXR with 10.8cm RUL mass (enlarged from 9.3cm), cont pulmonary hygiene   - receiving immunotherapy monthly (last dose 12/15/23, next dose 1/17/2023)  -Please followup with your medical oncologist Dr. Richards within one week.     PRINCIPAL DISCHARGE DIAGNOSIS  Diagnosis: Melanoma metastatic to brain  Assessment and Plan of Treatment: -Continue keppra 500mg BID for seizure prophylaxis  -Completed dexamethasone taper   -May shower  -CTH to monitor for bleeding prior to starting AC - completed 1/9 - stable.   Followup with Dr. Cristina for staple removal.   -Follow up with radiation and medical oncology in one week for further management.         SECONDARY DISCHARGE DIAGNOSES  Diagnosis: DVT, lower extremity  Assessment and Plan of Treatment: - diagnosed 8/2023 and has been on therapeutic enoxaparin at home - stopped prior to surgery   - venous duplex 12/31/23 with acute proximal DVT   -IVC filter placed 1/2/24  - Continue Eliquis 5mg BID 1/9/24  -Please follow up with vascular surgery as outpatient for IVC filter management  -Please followup with your PCP Dr. Woods  in one week for further management.       Diagnosis: HTN (hypertension)  Assessment and Plan of Treatment: -Continue nifedipine XL 60mg daily  -Monitor your blood pressure daily.   - Please followup with your PCP Dr. Woods in one week for further management.    Diagnosis: Hyponatremia  Assessment and Plan of Treatment: Hyponatremia  -Last  (1/8)  - You were treated with salt tabs 1G twice daily on 1/9  -Your sodium levels are now within normal range.  -Please followup with your PCP for futher management.    Diagnosis: Lung mass  Assessment and Plan of Treatment: #Right lung mass  - CXR with 10.8cm RUL mass (enlarged from 9.3cm), cont pulmonary hygiene   - receiving immunotherapy monthly (last dose 12/15/23, next dose 1/17/2023)  -Please followup with your medical oncologist Dr. Richards within one week.

## 2024-01-10 NOTE — DISCHARGE NOTE PROVIDER - HOSPITAL COURSE
HPI:  63 year old male with PMH of DVT/PE 8/2023 on therapeutic SQL (last dose 12/30/23 AM), melanoma on face s/p MOHs 15 yrs ago, melanoma brain mets diagnosed 6/2023 s/p right crani for resection at [facility] with Dr. Worthy, s/p SRS (completed 5 rounds 8/15/23), on immunotherapy monthly (last dose 12/15/23, next dose 1/15/2023), drug induced neutropenia (likely bactrim), who presented to [facility] on 12/31/23 for resection of brain tumor. Patient was initially diagnosed after he was found to be leaving the car door open, putting his shoes on the wrong feet, and being forgetful of his usual daily routine. Patient reported on 12/20/23 he fell and hit his head (no LOC) with left sided weakness. Within 2 days, the symptoms of forgetfulness returned. MRI as outpatient on 12/22/23 showed progression of right parietal lobe lesion with worsening mass effect, edema, and 1.2cm MLS. Hospital Course included episodes of hypertension which were controlled with medication management. He underwent a Right parietal and temporal crani and resection (frozen: metastatic melanoma) on 1/2. He tolerated the procedure well and was started on nifedipine post operatively for BP management. He remains on keppra for seizure prophylaxis and on decadron. His lovenox is held presently but is s/p IVC filter on 1/2 with noted LE duplex on 12/31 with proximal DVT. Plan for 1 week post operative resumption of full dose lovenox. He had post op hyponatremia and was started on salt tabs. He was evaluated for admission to acute inpatient rehab and admitted to [facility] on 1/5/24.                    Rehab course significant for: pt restarted on full dose anticoagulation on 1/9. Pt was transitioned from lovenox to eliquis.     All other medical co-morbidites were stable.    Pt tolerated course of inpatient pt/ot/slp rehab with significant functional improvements and met rehab goals prior to discharge.     Pt was medically cleared on 1/12/24 for discharge to home.        HPI:  63 year old male with PMH of DVT/PE 8/2023 on therapeutic SQL (last dose 12/30/23 AM), melanoma on face s/p MOHs 15 yrs ago, melanoma brain mets diagnosed 6/2023 s/p right crani for resection at [facility] with Dr. Worthy, s/p SRS (completed 5 rounds 8/15/23), on immunotherapy monthly (last dose 12/15/23, next dose 1/15/2023), drug induced neutropenia (likely bactrim), who presented to [facility] on 12/31/23 for resection of brain tumor. Patient was initially diagnosed after he was found to be leaving the car door open, putting his shoes on the wrong feet, and being forgetful of his usual daily routine. Patient reported on 12/20/23 he fell and hit his head (no LOC) with left sided weakness. Within 2 days, the symptoms of forgetfulness returned. MRI as outpatient on 12/22/23 showed progression of right parietal lobe lesion with worsening mass effect, edema, and 1.2cm MLS. Hospital Course included episodes of hypertension which were controlled with medication management. He underwent a Right parietal and temporal crani and resection (frozen: metastatic melanoma) on 1/2. He tolerated the procedure well and was started on nifedipine post operatively for BP management. He remains on keppra for seizure prophylaxis and on decadron. His lovenox is held presently but is s/p IVC filter on 1/2 with noted LE duplex on 12/31 with proximal DVT. Plan for 1 week post operative resumption of full dose lovenox. He had post op hyponatremia and was started on salt tabs. He was evaluated for admission to acute inpatient rehab and admitted to [facility] on 1/5/24.                    Rehab course significant for: pt restarted on full dose anticoagulation on 1/9. Pt was transitioned from lovenox to eliquis.     All other medical co-morbidites were stable.    Pt tolerated course of inpatient pt/ot/slp rehab with significant functional improvements and met rehab goals prior to discharge.     Pt was medically cleared on 1/12/24 for discharge to home.        HPI:  63 year old male with PMH of DVT/PE 8/2023 on therapeutic SQL (last dose 12/30/23 AM), melanoma on face s/p MOHs 15 yrs ago, melanoma brain mets diagnosed 6/2023 s/p right crani for resection at [facility] with Dr. Worthy, s/p SRS (completed 5 rounds 8/15/23), on immunotherapy monthly (last dose 12/15/23, next dose 1/15/2023), drug induced neutropenia (likely bactrim), who presented to [facility] on 12/31/23 for resection of brain tumor. Patient was initially diagnosed after he was found to be leaving the car door open, putting his shoes on the wrong feet, and being forgetful of his usual daily routine. Patient reported on 12/20/23 he fell and hit his head (no LOC) with left sided weakness. Within 2 days, the symptoms of forgetfulness returned. MRI as outpatient on 12/22/23 showed progression of right parietal lobe lesion with worsening mass effect, edema, and 1.2cm MLS. Hospital Course included episodes of hypertension which were controlled with medication management. He underwent a Right parietal and temporal crani and resection (frozen: metastatic melanoma) on 1/2. He tolerated the procedure well and was started on nifedipine post operatively for BP management. He remains on keppra for seizure prophylaxis and on decadron. His lovenox is held presently but is s/p IVC filter on 1/2 with noted LE duplex on 12/31 with proximal DVT. Plan for 1 week post operative resumption of full dose lovenox. He had post op hyponatremia and was started on salt tabs. He was evaluated for admission to acute inpatient rehab and admitted to [facility] on 1/5/24.        Rehab course significant for: pt restarted on full dose anticoagulation on 1/9. Pt was transitioned from lovenox to eliquis.     All other medical co-morbidites were stable.    Pt tolerated course of inpatient pt/ot/slp rehab with significant functional improvements and met rehab goals prior to discharge.     Pt was medically cleared on 1/12/24 for discharge to home.        HPI:  63 year old male with PMH of DVT/PE 8/2023 on therapeutic SQL (last dose 12/30/23 AM), melanoma on face s/p MOHs 15 yrs ago, melanoma brain mets diagnosed 6/2023 s/p right crani for resection at [facility] with Dr. Worthy, s/p SRS (completed 5 rounds 8/15/23), on immunotherapy monthly (last dose 12/15/23, next dose 1/15/2023), drug induced neutropenia (likely bactrim), who presented to [facility] on 12/31/23 for resection of brain tumor. Patient was initially diagnosed after he was found to be leaving the car door open, putting his shoes on the wrong feet, and being forgetful of his usual daily routine. Patient reported on 12/20/23 he fell and hit his head (no LOC) with left sided weakness. Within 2 days, the symptoms of forgetfulness returned. MRI as outpatient on 12/22/23 showed progression of right parietal lobe lesion with worsening mass effect, edema, and 1.2cm MLS. Hospital Course included episodes of hypertension which were controlled with medication management. He underwent a Right parietal and temporal crani and resection (frozen: metastatic melanoma) on 1/2. He tolerated the procedure well and was started on nifedipine post operatively for BP management. He remains on keppra for seizure prophylaxis and on decadron. His lovenox is held presently but is s/p IVC filter on 1/2 with noted LE duplex on 12/31 with proximal DVT. Plan for 1 week post operative resumption of full dose lovenox. He had post op hyponatremia and was started on salt tabs. He was evaluated for admission to acute inpatient rehab and admitted to [facility] on 1/5/24.        Rehab course significant for: pt restarted on full dose anticoagulation on 1/9. Pt was transitioned from lovenox to eliquis.     All other medical co-morbidites were stable.    Pt tolerated course of inpatient pt/ot/slp rehab with significant functional improvements and met rehab goals prior to discharge.     Pt was medically cleared on 1/12/24 for discharge to home.

## 2024-01-10 NOTE — DISCHARGE NOTE PROVIDER - NSDCFUSCHEDAPPT_GEN_ALL_CORE_FT
[facility] Physician [facility]  [facility]  Scheduled Appointment: 01/11/2024    [facility]  MRI  [address]  Scheduled Appointment: 01/12/2024    Marcial Warren  [facility]  RADMED [address]  Scheduled Appointment: 01/17/2024    Cherry Garcia  [facility]  NEUROSURG [address]  Scheduled Appointment: 01/18/2024    Willi Faulkner  [facility]  VASCULAR [address]  Scheduled Appointment: 01/25/2024     [facility] Physician [facility]  [facility]  Scheduled Appointment: 01/11/2024    [facility]  MRI  [address]  Scheduled Appointment: 01/12/2024    Marcial Warren  [facility]  RADMED [address]  Scheduled Appointment: 01/17/2024    Cherry Garcia  [facility]  NEUROSURG [address]  Scheduled Appointment: 01/18/2024    Willi Faulkner  [facility]  VASCULAR [address]  Scheduled Appointment: 01/25/2024     [facility]  MRI  [address]  Scheduled Appointment: 01/12/2024    Marcial Warren  [facility]  RADMED [address]  Scheduled Appointment: 01/17/2024    [facility]  [facility]  Scheduled Appointment: 01/17/2024    Cherry Garcia  [facility]  NEUROSURG [address]  Scheduled Appointment: 01/18/2024    Willi Faulkner  [facility]  VASCULAR [address]  Scheduled Appointment: 01/25/2024     [facility]  MRI  [address]  Scheduled Appointment: 01/12/2024    Marcial Warren  [facility]  RADMED [address]  Scheduled Appointment: 01/17/2024    [facility]  [facility]  Scheduled Appointment: 01/17/2024    Cherry Garcia  [facility]  NEUROSURG [address]  Scheduled Appointment: 01/18/2024    Willi Faulkner  [facility]  VASCULAR [address]  Scheduled Appointment: 01/25/2024

## 2024-01-10 NOTE — PROGRESS NOTE ADULT - SUBJECTIVE AND OBJECTIVE BOX
SUBJECTIVE/ROS: Patient examined while in the chair. He states he is feeling well and slept last night. No acute events overnight. He denies headache/ blurred/double vision, nausea/vomiting, fever, chills, chest pain, abdominal pain. 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 with Dr. Worthy, s/p SRS (completed 5 rounds 8/15/23), on immunotherapy monthly (last dose 12/15/23, next dose 1/15/2023), drug induced neutropenia (likely bactrim), who presented to [facility] on 12/31/23 for resection of brain tumor. Patient was initially diagnosed after he was found to be leaving the car door open, putting his shoes on the wrong feet, and being forgetful of his usual daily routine. Patient reported on 12/20/23 he fell and hit his head (no LOC) with left sided weakness. Within 2 days, the symptoms of forgetfulness returned. MRI as outpatient on 12/22/23 showed progression of right parietal lobe lesion with worsening mass effect, edema, and 1.2cm MLS. Hospital Course included episodes of hypertension which were controlled with medication management. He underwent a Right parietal and temporal crani and resection (frozen: metastatic melanoma) on 1/2. He tolerated the procedure well and was started on nifedipine post operatively for BP management. He remains on keppra for seizure prophylaxis and on decadron. His lovenox is held presently but is s/p IVC filter on 1/2 with noted LE duplex on 12/31 with proximal DVT. Plan for 1 week post operative resumption of full dose lovenox. He had post op hyponatremia and was started on salt tabs. He was evaluated for admission to acute inpatient rehab and admitted to [facility] on 1/5/24.        Vital Signs Last 24 Hrs  T(C): 36.4 (10 Sterling 2024 07:17), Max: 36.6 (09 Jan 2024 20:05)  T(F): 97.5 (10 Sterling 2024 07:17), Max: 97.8 (09 Jan 2024 20:05)  HR: 82 (10 Sterling 2024 07:17) (77 - 82)  BP: 139/85 (10 Sterling 2024 07:17) (125/88 - 139/85)  BP(mean): --  RR: 16 (10 Sterling 2024 07:17) (16 - 16)  SpO2: 98% (10 Sterling 2024 07:17) (98% - 98%)    Parameters below as of 10 Sterling 2024 07:17  Patient On (Oxygen Delivery Method): room air    PHYSICAL EXAM  Constitutional - NAD, Comfortable  HEENT - NCAT, EOMI  Neck - Supple, No limited ROM  Chest - Breathing comfortably   Cardiovascular - RRR, S1S2  Abdomen - BS+, Soft, NTND  Extremities - No C/C/E, No calf tenderness   Neurologic Exam - Pt is A+OX3. Speech is fluent, comprehensible, alvares 5/5  Skin - staples TARA on right side of head, C/D/I      RECENT LABS:                          12.1   13.76 )-----------( 141      ( 08 Jan 2024 06:35 )             39.1     01-08    137  |  106  |  33<H>  ----------------------------<  91  4.6   |  23  |  0.84    Ca    8.2<L>      08 Jan 2024 06:35    TPro  5.5<L>  /  Alb  2.4<L>  /  TBili  0.3  /  DBili  x   /  AST  11  /  ALT  14  /  AlkPhos  69  01-08    LIVER FUNCTIONS - ( 08 Jan 2024 06:35 )  Alb: 2.4 g/dL / Pro: 5.5 g/dL / ALK PHOS: 69 U/L / ALT: 14 U/L / AST: 11 U/L / GGT: x               MEDICATIONS  (STANDING):  apixaban 5 milliGRAM(s) Oral every 12 hours  dexAMETHasone     Tablet   Oral   dexAMETHasone     Tablet 2 milliGRAM(s) Oral daily  levETIRAcetam 500 milliGRAM(s) Oral every 12 hours  NIFEdipine XL 60 milliGRAM(s) Oral daily  pantoprazole    Tablet 40 milliGRAM(s) Oral before breakfast  polyethylene glycol 3350 17 Gram(s) Oral daily  senna 2 Tablet(s) Oral at bedtime  sodium chloride 1 Gram(s) Oral two times a day    MEDICATIONS  (PRN):  acetaminophen     Tablet .. 650 milliGRAM(s) Oral every 6 hours PRN Mild Pain (1 - 3)  oxyCODONE    IR 5 milliGRAM(s) Oral every 4 hours PRN Severe Pain (7 - 10)   SUBJECTIVE/ROS: Patient examined while in the chair. He states he is feeling well and slept last night. No acute events overnight. He denies headache/ blurred/double vision, nausea/vomiting, fever, chills, chest pain, abdominal pain. 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 Worthy, s/p SRS (completed 5 rounds 8/15/23), on immunotherapy monthly (last dose 12/15/23, next dose 1/15/2023), drug induced neutropenia (likely bactrim), who presented to [facility] on 12/31/23 for resection of brain tumor. Patient was initially diagnosed after he was found to be leaving the car door open, putting his shoes on the wrong feet, and being forgetful of his usual daily routine. Patient reported on 12/20/23 he fell and hit his head (no LOC) with left sided weakness. Within 2 days, the symptoms of forgetfulness returned. MRI as outpatient on 12/22/23 showed progression of right parietal lobe lesion with worsening mass effect, edema, and 1.2cm MLS. Hospital Course included episodes of hypertension which were controlled with medication management. He underwent a Right parietal and temporal crani and resection (frozen: metastatic melanoma) on 1/2. He tolerated the procedure well and was started on nifedipine post operatively for BP management. He remains on keppra for seizure prophylaxis and on decadron. His lovenox is held presently but is s/p IVC filter on 1/2 with noted LE duplex on 12/31 with proximal DVT. Plan for 1 week post operative resumption of full dose lovenox. He had post op hyponatremia and was started on salt tabs. He was evaluated for admission to acute inpatient rehab and admitted to [facility] on 1/5/24.        Vital Signs Last 24 Hrs  T(C): 36.4 (10 Sterling 2024 07:17), Max: 36.6 (09 Jan 2024 20:05)  T(F): 97.5 (10 Sterling 2024 07:17), Max: 97.8 (09 Jan 2024 20:05)  HR: 82 (10 Sterling 2024 07:17) (77 - 82)  BP: 139/85 (10 Sterling 2024 07:17) (125/88 - 139/85)  BP(mean): --  RR: 16 (10 Sterling 2024 07:17) (16 - 16)  SpO2: 98% (10 Sterling 2024 07:17) (98% - 98%)    Parameters below as of 10 Sterling 2024 07:17  Patient On (Oxygen Delivery Method): room air    PHYSICAL EXAM  Constitutional - NAD, Comfortable  HEENT - NCAT, EOMI  Neck - Supple, No limited ROM  Chest - Breathing comfortably   Cardiovascular - RRR, S1S2  Abdomen - BS+, Soft, NTND  Extremities - No C/C/E, No calf tenderness   Neurologic Exam - Pt is A+OX3. Speech is fluent, comprehensible, alvares 5/5  Skin - staples TARA on right side of head, C/D/I      RECENT LABS:                          12.1   13.76 )-----------( 141      ( 08 Jan 2024 06:35 )             39.1     01-08    137  |  106  |  33<H>  ----------------------------<  91  4.6   |  23  |  0.84    Ca    8.2<L>      08 Jan 2024 06:35    TPro  5.5<L>  /  Alb  2.4<L>  /  TBili  0.3  /  DBili  x   /  AST  11  /  ALT  14  /  AlkPhos  69  01-08    LIVER FUNCTIONS - ( 08 Jan 2024 06:35 )  Alb: 2.4 g/dL / Pro: 5.5 g/dL / ALK PHOS: 69 U/L / ALT: 14 U/L / AST: 11 U/L / GGT: x               MEDICATIONS  (STANDING):  apixaban 5 milliGRAM(s) Oral every 12 hours  dexAMETHasone     Tablet   Oral   dexAMETHasone     Tablet 2 milliGRAM(s) Oral daily  levETIRAcetam 500 milliGRAM(s) Oral every 12 hours  NIFEdipine XL 60 milliGRAM(s) Oral daily  pantoprazole    Tablet 40 milliGRAM(s) Oral before breakfast  polyethylene glycol 3350 17 Gram(s) Oral daily  senna 2 Tablet(s) Oral at bedtime  sodium chloride 1 Gram(s) Oral two times a day    MEDICATIONS  (PRN):  acetaminophen     Tablet .. 650 milliGRAM(s) Oral every 6 hours PRN Mild Pain (1 - 3)  oxyCODONE    IR 5 milliGRAM(s) Oral every 4 hours PRN Severe Pain (7 - 10)

## 2024-01-10 NOTE — DISCHARGE NOTE PROVIDER - CARE PROVIDER_API CALL
Tano Richards  Medical Oncology  [address] [place] [address]  Phone: [phone]  Fax: [phone]  Follow Up Time: 1 week    Willi Faulkner  Vascular Surgery  [address] [address]  Phone: [phone]  Fax: [phone]  Follow Up Time: 1 week    Johan Tony  Neurosurgery  [address], [address] [place] [address]  Phone: [phone]  Fax: [phone]  Follow Up Time: 1 week    Rebeca Woods  Internal Medicine  [address] [place] [address]  Phone: [phone]  Fax: [phone]  Follow Up Time: 1 week   Tano Richards  Medical Oncology  [address] [place] [address]  Phone: [phone]  Fax: [phone]  Follow Up Time: 1 week    Willi Faulkner  Vascular Surgery  [address] [address]  Phone: [phone]  Fax: [phone]  Follow Up Time: 1 week    Johan Tony  Neurosurgery  [address], [address] [place] [address]  Phone: [phone]  Fax: [phone]  Follow Up Time: 1 week    Rebeca Woods  Internal Medicine  [address] [place] [address]  Phone: [phone]  Fax: [phone]  Follow Up Time: 1 week   Tano Richards  Medical Oncology  [address] [place] [address]  Phone: [phone]  Fax: [phone]  Follow Up Time: 1 week    Willi Faulkner  Vascular Surgery  [address] [address]  Phone: [phone]  Fax: [phone]  Follow Up Time: 1 week    Johan Tony  Neurosurgery  [address], [address] [place] [address]  Phone: [phone]  Fax: [phone]  Follow Up Time: 1 week    Rebeca Woods  Internal Medicine  [address] [place] [address]  Phone: [phone]  Fax: [phone]  Follow Up Time: 1 week    Myron Plascencia  Internal Medicine  [address] [place] [address]  Phone: [phone]  Fax: [phone]  Follow Up Time: Routine   Tano Richards  Medical Oncology  [address] [place] [address]  Phone: [phone]  Fax: [phone]  Follow Up Time: 1 week    Willi Faulkner  Vascular Surgery  [address] [address]  Phone: [phone]  Fax: [phone]  Follow Up Time: 1 week    Johan Tony  Neurosurgery  [address], [address] [place] [address]  Phone: [phone]  Fax: [phone]  Follow Up Time: 1 week    Rebeca Woods  Internal Medicine  [address] [place] [address]  Phone: [phone]  Fax: [phone]  Follow Up Time: 1 week    Myron Plascencia  Internal Medicine  [address] [place] [address]  Phone: [phone]  Fax: [phone]  Follow Up Time: Routine

## 2024-01-10 NOTE — PROGRESS NOTE ADULT - ASSESSMENT
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 Worthy, s/p SRS (completed 5 rounds 8/15/23), on immunotherapy monthly (last dose 12/15/23, next dose 1/15/2023), drug induced neutropenia (likely bactrim), who presented to [facility] on 12/31/23 for resection of brain tumor. Patient was initially diagnosed after he was found to be leaving the car door open, putting his shoes on the wrong feet, and being forgetful of his usual daily routine. Patient reported on 12/20/23 he fell and hit his head (no LOC) with left sided weakness. Within 2 days, the symptoms of forgetfulness returned. MRI as outpatient on 12/22/23 showed progression of right parietal lobe lesion with worsening mass effect, edema, and 1.2cm MLS. Hospital Course included episodes of hypertension which were controlled with medication management. He underwent a Right parietal and temporal crani and resection (frozen: metastatic melanoma) on 1/2. He tolerated the procedure well and was started on nifedipine post operatively for BP management. He remains on keppra for seizure prophylaxis and on decadron. His lovenox is held presently but is s/p IVC filter on 1/2 with noted LE duplex on 12/31 with proximal DVT. Plan for 1 week post operative resumption of full dose lovenox. He had post op hyponatremia and was started on salt tabs. He was evaluated for admission to acute inpatient rehab and admitted to [facility] on 1/5/24.       #Metastatic melanoma to the brain s/p craniotomy for resection now with Gait Instability, ADL impairments and Functional impairments  - Continue Comprehensive Rehab Program of PT/OT/SLP  -Continue keppra 500mg BID for seizure prophylaxis  -dexamethasone taper 2mg TID until 1/6, then 2mg BID until 1/8. 2mg daily until then stop   -Staples to craniotomy incision intact (s/p craniotomy on 1/2)   -May shower  - will need a post op MRI before next appointment with neurosurgery - Discussed with ROMARIO Fournier [phone] from Radiation Oncology who will schedule OP MRI for patient prior to follow up appointment  -CTH to monitor for bleeding prior to starting AC - completed 1/9 - stable     #Right lung mass  - CXR with 10.8cm RUL mass (enlarged from 9.3cm), cont pulmonary hygiene   - receiving immunotherapy monthly (last dose 12/15/23, next dose 1/15/2023)    #Hyponatremia  -Last  (1/8)  -Monitor labs  - Salt tabs 1G  BID 1/9  -wean off as tolerated for a normal sodium goal (135-145)    #HTN  -Continue nifedipine XL 60mg daily  -Monitor BP    #DVT/PE  - diagnosed 8/2023 and has been on therapeutic enoxaparin at home - stopped prior to surgery   - venous duplex 12/31/23 with acute proximal DVT   -IVC filter placed 1/2/24  -As per neurosurgery note, may resume full dose lovenox on 1/9 (POD #7)  -To follow up with vascular surgery as outpatient for IVC filter management   - Hematology consulted in regards to starting pre-op full dose lovenox VS oral agent.    -Continue Eliquis 5mg BID 1/9/24    #Pain control  - Tylenol PRN  -Oxycodone PRN     #GI/Bowel Mgmt   - Continue Senna at bedtime   - Miralax     #Bladder management  - Monitor UO    #Skin:  -  right cranial incision with staples TARA, right groin puncture site TARA, non blanchable erythema to  back 2/2 immunotherapy- per wife.    FEN   - Diet - Regular with thins      Precautions / PROPHYLAXIS:   - Falls, Cardiac, Seizure  - ortho: Weight bearing status: WBAT   - Lungs: Aspiration, Incentive Spirometer   - Pressure injury/Skin: Turn Q2hrs while in bed, OOB to Chair, PT/OT      Johan Tony  Neurosurgery  [address] [place] [address]  Phone: [phone]  Fax: [phone]  Follow Up Time:     Willi Faulkner  Vascular Surgery  [address] [address]  Phone: [phone]  Fax: [phone]  Follow Up Time:     Tano Richards  Medical Oncology  [address] [place] [address]  Phone: [phone]  Fax: [phone]  Follow Up Time: ASSESSMENT/PLAN  63 year old male with 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 Worthy, s/p SRS (completed 5 rounds 8/15/23), on immunotherapy monthly (last dose 12/15/23, next dose 1/15/2023), drug induced neutropenia (likely bactrim), who presented to [facility] on 12/31/23 for resection of brain tumor. Patient was initially diagnosed after he was found to be leaving the car door open, putting his shoes on the wrong feet, and being forgetful of his usual daily routine. Patient reported on 12/20/23 he fell and hit his head (no LOC) with left sided weakness. Within 2 days, the symptoms of forgetfulness returned. MRI as outpatient on 12/22/23 showed progression of right parietal lobe lesion with worsening mass effect, edema, and 1.2cm MLS. Hospital Course included episodes of hypertension which were controlled with medication management. He underwent a Right parietal and temporal crani and resection (frozen: metastatic melanoma) on 1/2. He tolerated the procedure well and was started on nifedipine post operatively for BP management. He remains on keppra for seizure prophylaxis and on decadron. His lovenox is held presently but is s/p IVC filter on 1/2 with noted LE duplex on 12/31 with proximal DVT. Plan for 1 week post operative resumption of full dose lovenox. He had post op hyponatremia and was started on salt tabs. He was evaluated for admission to acute inpatient rehab and admitted to [facility] on 1/5/24.       #Metastatic melanoma to the brain s/p craniotomy for resection now with Gait Instability, ADL impairments and Functional impairments  - Continue Comprehensive Rehab Program of PT/OT/SLP  -Continue keppra 500mg BID for seizure prophylaxis  -dexamethasone taper 2mg TID until 1/6, then 2mg BID until 1/8. 2mg daily until then stop   -Staples to craniotomy incision intact (s/p craniotomy on 1/2)   -May shower  - will need a post op MRI before next appointment with neurosurgery - Discussed with ROMARIO Fournier [phone] from Radiation Oncology who will schedule OP MRI for patient prior to follow up appointment  -CTH to monitor for bleeding prior to starting AC - completed 1/9 - stable     #Right lung mass  - CXR with 10.8cm RUL mass (enlarged from 9.3cm), cont pulmonary hygiene   - receiving immunotherapy monthly (last dose 12/15/23, next dose 1/15/2023)    #Hyponatremia  -Last  (1/8)  -Monitor labs  - Salt tabs 1G  BID 1/9  -wean off as tolerated for a normal sodium goal (135-145)    #HTN  -Continue nifedipine XL 60mg daily  -Monitor BP    #DVT/PE  - diagnosed 8/2023 and has been on therapeutic enoxaparin at home - stopped prior to surgery   - venous duplex 12/31/23 with acute proximal DVT   -IVC filter placed 1/2/24  -As per neurosurgery note, may resume full dose lovenox on 1/9 (POD #7)  -To follow up with vascular surgery as outpatient for IVC filter management   - Hematology consulted in regards to starting pre-op full dose lovenox VS oral agent.    -Continue Eliquis 5mg BID 1/9/24    #Pain control  - Tylenol PRN  -Oxycodone PRN     #GI/Bowel Mgmt   - Continue Senna at bedtime   - Miralax     #Bladder management  - Monitor UO    #Skin:  -  right cranial incision with staples TARA, right groin puncture site TARA, non blanchable erythema to  back 2/2 immunotherapy- per wife.    FEN   - Diet - Regular with thins      Precautions / PROPHYLAXIS:   - Falls, Cardiac, Seizure  - ortho: Weight bearing status: WBAT   - Lungs: Aspiration, Incentive Spirometer   - Pressure injury/Skin: Turn Q2hrs while in bed, OOB to Chair, PT/OT      Johan Tony  Neurosurgery  [address] [place] [address]  Phone: [phone]  Fax: [phone]  Follow Up Time:     Willi Faulkner  Vascular Surgery  [address] [address]  Phone: [phone]  Fax: [phone]  Follow Up Time:     Tano Richards  Medical Oncology  [address] [place] [address]  Phone: [phone]  Fax: [phone]  Follow Up Time: ASSESSMENT/PLAN  63 year old male with 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 Worthy, s/p SRS (completed 5 rounds 8/15/23), on immunotherapy monthly (last dose 12/15/23, next dose 1/15/2023), drug induced neutropenia (likely bactrim), who presented to [facility] on 12/31/23 for resection of brain tumor. Patient was initially diagnosed after he was found to be leaving the car door open, putting his shoes on the wrong feet, and being forgetful of his usual daily routine. Patient reported on 12/20/23 he fell and hit his head (no LOC) with left sided weakness. Within 2 days, the symptoms of forgetfulness returned. MRI as outpatient on 12/22/23 showed progression of right parietal lobe lesion with worsening mass effect, edema, and 1.2cm MLS. Hospital Course included episodes of hypertension which were controlled with medication management. He underwent a Right parietal and temporal crani and resection (frozen: metastatic melanoma) on 1/2. He tolerated the procedure well and was started on nifedipine post operatively for BP management. He remains on keppra for seizure prophylaxis and on decadron. His lovenox is held presently but is s/p IVC filter on 1/2 with noted LE duplex on 12/31 with proximal DVT. Plan for 1 week post operative resumption of full dose lovenox. He had post op hyponatremia and was started on salt tabs. He was evaluated for admission to acute inpatient rehab and admitted to [facility] on 1/5/24.       #Metastatic melanoma to the brain s/p craniotomy for resection now with Gait Instability, ADL impairments and Functional impairments  - Continue Comprehensive Rehab Program of PT/OT/SLP  -Continue keppra 500mg BID for seizure prophylaxis  -dexamethasone taper 2mg TID until 1/6, then 2mg BID until 1/8. 2mg daily until then stop   -Staples to craniotomy incision intact (s/p craniotomy on 1/2)   -May shower  - will need a post op MRI before next appointment with neurosurgery - Discussed with ROMARIO Fournier [phone] from Radiation Oncology who will schedule OP MRI for patient prior to follow up appointment  -CTH to monitor for bleeding prior to starting AC - completed 1/9 - stable     #Right lung mass  - CXR with 10.8cm RUL mass (enlarged from 9.3cm), cont pulmonary hygiene   - receiving immunotherapy monthly (last dose 12/15/23, next dose 1/15/2023)    #Hyponatremia  -Last  (1/8)  -Monitor labs  - Salt tabs 1G  BID 1/9  -wean off as tolerated for a normal sodium goal (135-145)    #HTN  -Continue nifedipine XL 60mg daily  -Monitor BP    #DVT/PE  - diagnosed 8/2023 and has been on therapeutic enoxaparin at home - stopped prior to surgery   - venous duplex 12/31/23 with acute proximal DVT   -IVC filter placed 1/2/24  -As per neurosurgery note, may resume full dose lovenox on 1/9 (POD #7)  -To follow up with vascular surgery as outpatient for IVC filter management   - Hematology consulted in regards to starting pre-op full dose lovenox VS oral agent.    -Continue Eliquis 5mg BID 1/9/24    #Pain control  - Tylenol PRN  -Oxycodone PRN     #GI/Bowel Mgmt   - Continue Senna at bedtime   - Miralax     #Bladder management  - Monitor UO    #Skin:  -  right cranial incision with staples TARA, right groin puncture site TARA, non blanchable erythema to  back 2/2 immunotherapy- per wife.    FEN   - Diet - Regular with thins      Precautions / PROPHYLAXIS:   - Falls, Cardiac, Seizure  - ortho: Weight bearing status: WBAT   - Lungs: Aspiration, Incentive Spirometer   - Pressure injury/Skin: Turn Q2hrs while in bed, OOB to Chair, PT/OT      Johan Tony  Neurosurgery  [address] [place] [address]  Phone: [phone]  Fax: [phone]  Follow Up Time:     Willi Faulkner  Vascular Surgery  [address] [address]  Phone: [phone]  Fax: [phone]  Follow Up Time:     Tano Richards  Medical Oncology  [address] [place] [address]  Phone: [phone]  Fax: [phone]  Follow Up Time:    TEAM MEETING 1/10/24  SW:  2 marcus lives with wife  OT: Sv for adls, min for showers, cg for transfers  PT: CG for transfers, 120' with RW and CG, 12 steps 1 HR  SLP: Regular with thins, Dec orginzation, dec memory  barriers: impulsive at times, dec balance, needs cues  EDOD: Home 1/12 ASSESSMENT/PLAN  63 year old male with 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 Worthy, s/p SRS (completed 5 rounds 8/15/23), on immunotherapy monthly (last dose 12/15/23, next dose 1/15/2023), drug induced neutropenia (likely bactrim), who presented to [facility] on 12/31/23 for resection of brain tumor. Patient was initially diagnosed after he was found to be leaving the car door open, putting his shoes on the wrong feet, and being forgetful of his usual daily routine. Patient reported on 12/20/23 he fell and hit his head (no LOC) with left sided weakness. Within 2 days, the symptoms of forgetfulness returned. MRI as outpatient on 12/22/23 showed progression of right parietal lobe lesion with worsening mass effect, edema, and 1.2cm MLS. Hospital Course included episodes of hypertension which were controlled with medication management. He underwent a Right parietal and temporal crani and resection (frozen: metastatic melanoma) on 1/2. He tolerated the procedure well and was started on nifedipine post operatively for BP management. He remains on keppra for seizure prophylaxis and on decadron. His lovenox is held presently but is s/p IVC filter on 1/2 with noted LE duplex on 12/31 with proximal DVT. Plan for 1 week post operative resumption of full dose lovenox. He had post op hyponatremia and was started on salt tabs. He was evaluated for admission to acute inpatient rehab and admitted to [facility] on 1/5/24.       #Metastatic melanoma to the brain s/p craniotomy for resection now with Gait Instability, ADL impairments and Functional impairments  - Continue Comprehensive Rehab Program of PT/OT/SLP  -Continue keppra 500mg BID for seizure prophylaxis  -dexamethasone taper 2mg TID until 1/6, then 2mg BID until 1/8. 2mg daily until then stop   -Staples to craniotomy incision intact (s/p craniotomy on 1/2)   -May shower  - will need a post op MRI before next appointment with neurosurgery - Discussed with ROMARIO Fournier [phone] from Radiation Oncology who will schedule OP MRI for patient prior to follow up appointment  -CTH to monitor for bleeding prior to starting AC - completed 1/9 - stable     #Right lung mass  - CXR with 10.8cm RUL mass (enlarged from 9.3cm), cont pulmonary hygiene   - receiving immunotherapy monthly (last dose 12/15/23, next dose 1/15/2023)    #Hyponatremia  -Last  (1/8)  -Monitor labs  - Salt tabs 1G  BID 1/9  -wean off as tolerated for a normal sodium goal (135-145)    #HTN  -Continue nifedipine XL 60mg daily  -Monitor BP    #DVT/PE  - diagnosed 8/2023 and has been on therapeutic enoxaparin at home - stopped prior to surgery   - venous duplex 12/31/23 with acute proximal DVT   -IVC filter placed 1/2/24  -As per neurosurgery note, may resume full dose lovenox on 1/9 (POD #7)  -To follow up with vascular surgery as outpatient for IVC filter management   - Hematology consulted in regards to starting pre-op full dose lovenox VS oral agent.    -Continue Eliquis 5mg BID 1/9/24    #Pain control  - Tylenol PRN  -Oxycodone PRN     #GI/Bowel Mgmt   - Continue Senna at bedtime   - Miralax     #Bladder management  - Monitor UO    #Skin:  -  right cranial incision with staples TARA, right groin puncture site TARA, non blanchable erythema to  back 2/2 immunotherapy- per wife.    FEN   - Diet - Regular with thins      Precautions / PROPHYLAXIS:   - Falls, Cardiac, Seizure  - ortho: Weight bearing status: WBAT   - Lungs: Aspiration, Incentive Spirometer   - Pressure injury/Skin: Turn Q2hrs while in bed, OOB to Chair, PT/OT      Johan Tony  Neurosurgery  [address] [place] [address]  Phone: [phone]  Fax: [phone]  Follow Up Time:     Willi Faulnker  Vascular Surgery  [address] [address]  Phone: [phone]  Fax: [phone]  Follow Up Time:     Tano Richards  Medical Oncology  [address] [place] [address]  Phone: [phone]  Fax: [phone]  Follow Up Time:    TEAM MEETING 1/10/24  SW:  2 marcus lives with wife  OT: Sv for adls, min for showers, cg for transfers  PT: CG for transfers, 120' with RW and CG, 12 steps 1 HR  SLP: Regular with thins, Dec orginzation, dec memory  barriers: impulsive at times, dec balance, needs cues  EDOD: Home 1/12

## 2024-01-10 NOTE — PROGRESS NOTE ADULT - ASSESSMENT
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 Worthy, s/p SRS (completed 5 rounds 8/15/23), on immunotherapy monthly (last dose 12/15/23, next dose 1/15/2023), drug induced neutropenia (likely bactrim), MRI as outpatient on 12/22/23 showed progression of right parietal lobe lesion with worsening mass effect, edema, and 1.2cm MLS, he presented to [facility] on 12/31/23 for resection of brain tumor. Hospital Course included episodes of hypertension which were controlled with medication management. He underwent a Right parietal and temporal crani and resection (frozen: metastatic melanoma) on 1/2. He tolerated the procedure well and was started on nifedipine post operatively for BP management. He remains on keppra for seizure prophylaxis and on decadron. His lovenox is held presently but is s/p IVC filter on 1/2 with noted LE duplex on 12/31 with proximal DVT. Plan for 1 week post operative resumption of full dose lovenox. He had post op hyponatremia and was started on salt tabs. He was evaluated for admission to acute inpatient rehab and admitted to [facility] on 1/5/24.     #Metastatic Melanoma to the Brain   -s/p Right parietal and temporal crani and resection (frozen: metastatic melanoma) on 1/2  -Continue dexamethasone taper as ordered  -Continue keppra 500mg BID for seizure prophylaxis  -Staples to craniotomy incision (s/p craniotomy on 1/2)   -Fall precaution   -Pain control and bowel regimen per rehab team   -Comprehensive Rehab Program of PT/OT/SLP  -Outpatient follow up with neurosurgery     #Known Right lung mass  - CXR with 10.8cm RUL mass (enlarged from 9.3cm), cont pulmonary hygiene   - Receiving immunotherapy monthly (last dose 12/15/23, next dose 1/15/2023)  - Follow up with oncology     #DVT/PE  - diagnosed 8/2023 and has been on therapeutic enoxaparin at home - stopped prior to surgery   - venous duplex 12/31/23 with acute proximal DVT   -IVC filter placed 1/2/24  -As per neurosurgery note, may resume full dose lovenox on 1/9 (POD #7) - started on eliquis 5mg BID 1/10  -To follow up with vascular surgery as outpatient for IVC filter management     #Hyponatremia  -Last   -Monitor labs  -Agree with reducing salt tabs to BID  -Continue to wean as tolerated for a normal sodium goal (135-145)    #HTN  -Continue Nifedipine  -Monitor BP    #Leukocytosis   -WBC 13.8 1/8, downtrending from previous, WBC 10.3 on 1/6 possibly ?lab error   -Likely due to steroids   -Infection workup if fever spike  -Trend CBC     #Thrombocytopenia   - -140s, stable  - no s/s active bleeding  - Monitor CBC     #DVT PPx  - eliquis    Discussed with primary team  63 year old male with 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 Worthy, s/p SRS (completed 5 rounds 8/15/23), on immunotherapy monthly (last dose 12/15/23, next dose 1/15/2023), drug induced neutropenia (likely bactrim), MRI as outpatient on 12/22/23 showed progression of right parietal lobe lesion with worsening mass effect, edema, and 1.2cm MLS, he presented to [facility] on 12/31/23 for resection of brain tumor. Hospital Course included episodes of hypertension which were controlled with medication management. He underwent a Right parietal and temporal crani and resection (frozen: metastatic melanoma) on 1/2. He tolerated the procedure well and was started on nifedipine post operatively for BP management. He remains on keppra for seizure prophylaxis and on decadron. His lovenox is held presently but is s/p IVC filter on 1/2 with noted LE duplex on 12/31 with proximal DVT. Plan for 1 week post operative resumption of full dose lovenox. He had post op hyponatremia and was started on salt tabs. He was evaluated for admission to acute inpatient rehab and admitted to [facility] on 1/5/24.     #Metastatic Melanoma to the Brain   -s/p Right parietal and temporal crani and resection (frozen: metastatic melanoma) on 1/2  -Continue dexamethasone taper as ordered  -Continue keppra 500mg BID for seizure prophylaxis  -Staples to craniotomy incision (s/p craniotomy on 1/2)   -Fall precaution   -Pain control and bowel regimen per rehab team   -Comprehensive Rehab Program of PT/OT/SLP  -Outpatient follow up with neurosurgery     #Known Right lung mass  - CXR with 10.8cm RUL mass (enlarged from 9.3cm), cont pulmonary hygiene   - Receiving immunotherapy monthly (last dose 12/15/23, next dose 1/15/2023)  - Follow up with oncology     #DVT/PE  - diagnosed 8/2023 and has been on therapeutic enoxaparin at home - stopped prior to surgery   - venous duplex 12/31/23 with acute proximal DVT   -IVC filter placed 1/2/24  -As per neurosurgery note, may resume full dose lovenox on 1/9 (POD #7) - started on eliquis 5mg BID 1/10  -To follow up with vascular surgery as outpatient for IVC filter management     #Hyponatremia  -Last   -Monitor labs  -Agree with reducing salt tabs to BID  -Continue to wean as tolerated for a normal sodium goal (135-145)    #HTN  -Continue Nifedipine  -Monitor BP    #Leukocytosis   -WBC 13.8 1/8, downtrending from previous, WBC 10.3 on 1/6 possibly ?lab error   -Likely due to steroids   -Infection workup if fever spike  -Trend CBC     #Thrombocytopenia   - -140s, stable  - no s/s active bleeding  - Monitor CBC     #DVT PPx  - eliquis    Discussed with primary team

## 2024-01-10 NOTE — PROGRESS NOTE ADULT - NS ATTEND AMEND GEN_ALL_CORE FT
I have personally seen and examined the patient with the NP. Medical records reviewed. I have made amendments to the documentation where necessary and adjusted the history, physical examination, and plan as documented by the NP.    Multidisciplinary team meeting today:  patient's functional goals and needs, functional and clinical  progress were discussed, barriers to discharge were identified. Anticipate discharge home with  outpatient therapy program.    EDOD 01/12/24    Spoke with wife - update given  Scheduled Brain MRI on 01/12/24  Follow up with Oncology for continued immunotherapy - message left with Dr. Wiggins [phone]    60 min spent I have personally seen and examined the patient with the NP. Medical records reviewed. I have made amendments to the documentation where necessary and adjusted the history, physical examination, and plan as documented by the NP.    Multidisciplinary team meeting today:  patient's functional goals and needs, functional and clinical  progress were discussed, barriers to discharge were identified. Anticipate discharge home with  outpatient therapy program.    EDOD 01/12/24    Spoke with wife - update given  Scheduled Brain MRI on 01/12/24  Follow up with Oncology for continued immunotherapy - message left with Dr. Wiggins [phone]    60 min spent

## 2024-01-10 NOTE — DISCHARGE NOTE NURSING/CASE MANAGEMENT/SOCIAL WORK - NSDCPEFALRISK_GEN_ALL_CORE
For information on Fall & Injury Prevention, visit: [email or web address] OR  [email or web address] OR  https://www.cdc.gov/steadi/patient.html For information on Fall & Injury Prevention, visit: [email or web address] OR  [email or web address] OR  https://www.cdc.gov/steadi/patient.html

## 2024-01-10 NOTE — DISCHARGE NOTE NURSING/CASE MANAGEMENT/SOCIAL WORK - PATIENT PORTAL LINK FT
You can access the FollowMyHealth Patient Portal offered by [facility] by registering at the following website: [email or web address]. By joining [employer]’s FollowMyHealth portal, you will also be able to view your health information using other applications (apps) compatible with our system. You can access the FollowMyHealth Patient Portal offered by [facility] by registering at the following website: [email or web address]. By joining [employer]’s FollowMyHealth portal, you will also be able to view your health information using other applications (apps) compatible with our system.

## 2024-01-11 PROBLEM — I10 ESSENTIAL (PRIMARY) HYPERTENSION: Chronic | Status: ACTIVE | Noted: 2024-01-01

## 2024-01-11 NOTE — REASON FOR VISIT
[Other Location: e.g. School (Enter Location, City,State)___] : at [unfilled], at the time of the visit. [Medical Office: ([facility])___] : at the medical office located in  [Patient] : the patient [Follow-Up Visit] : a follow-up [Spouse] : spouse

## 2024-01-11 NOTE — PROGRESS NOTE ADULT - SUBJECTIVE AND OBJECTIVE BOX
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 Worthy, s/p SRS (completed 5 rounds 8/15/23), on immunotherapy monthly (last dose 12/15/23, next dose 1/15/2023), drug induced neutropenia (likely bactrim), who presented to [facility] on 12/31/23 for resection of brain tumor. Patient was initially diagnosed after he was found to be leaving the car door open, putting his shoes on the wrong feet, and being forgetful of his usual daily routine. Patient reported on 12/20/23 he fell and hit his head (no LOC) with left sided weakness. Within 2 days, the symptoms of forgetfulness returned. MRI as outpatient on 12/22/23 showed progression of right parietal lobe lesion with worsening mass effect, edema, and 1.2cm MLS. Hospital Course included episodes of hypertension which were controlled with medication management. He underwent a Right parietal and temporal crani and resection (frozen: metastatic melanoma) on 1/2. He tolerated the procedure well and was started on nifedipine post operatively for BP management. He remains on keppra for seizure prophylaxis and on decadron. His lovenox is held presently but is s/p IVC filter on 1/2 with noted LE duplex on 12/31 with proximal DVT. Plan for 1 week post operative resumption of full dose lovenox. He had post op hyponatremia and was started on salt tabs. He was evaluated for admission to acute inpatient rehab and admitted to [facility] on 1/5/24.               (05 Jan 2024 13:18)        PAST MEDICAL & SURGICAL HISTORY:  Melanoma  with mets to liver and brain      DVT, lower extremity      HTN (hypertension)      H/O brain surgery      VITALS  Vital Signs Last 24 Hrs  T(C): 36.6 (11 Jan 2024 07:17), Max: 36.8 (10 Sterling 2024 20:37)  T(F): 97.8 (11 Jan 2024 07:17), Max: 98.3 (10 Sterling 2024 20:37)  HR: 72 (11 Jan 2024 07:17) (72 - 84)  BP: 119/81 (11 Jan 2024 07:17) (119/81 - 135/88)  BP(mean): --  RR: 16 (11 Jan 2024 07:17) (16 - 16)  SpO2: 100% (11 Jan 2024 07:17) (99% - 100%)    Parameters below as of 11 Jan 2024 07:17  Patient On (Oxygen Delivery Method): room air          PHYSICAL EXAM  Constitutional - NAD, Comfortable  HEENT - NCAT, EOMI  Neck - Supple, No limited ROM  Chest - CTA bilaterally  Cardiovascular - RRR, S1S2  Abdomen - Soft, NTND  Extremities - No calf tenderness   Neurologic Exam -  no new focal deficits                   RECENT LABS                        12.5   11.21 )-----------( 124      ( 11 Jan 2024 07:37 )             39.0     01-11    137  |  104  |  27<H>  ----------------------------<  92  4.2   |  26  |  1.05    Ca    8.8      11 Jan 2024 07:37    TPro  5.7<L>  /  Alb  2.6<L>  /  TBili  0.3  /  DBili  x   /  AST  13  /  ALT  23  /  AlkPhos  75  01-11      LIVER FUNCTIONS - ( 11 Jan 2024 07:37 )  Alb: 2.6 g/dL / Pro: 5.7 g/dL / ALK PHOS: 75 U/L / ALT: 23 U/L / AST: 13 U/L / GGT: x           Urinalysis Basic - ( 11 Jan 2024 07:37 )    Color: x / Appearance: x / SG: x / pH: x  Gluc: 92 mg/dL / Ketone: x  / Bili: x / Urobili: x   Blood: x / Protein: x / Nitrite: x   Leuk Esterase: x / RBC: x / WBC x   Sq Epi: x / Non Sq Epi: x / Bacteria: x                  Urinalysis Basic - ( 11 Jan 2024 07:37 )    Color: x / Appearance: x / SG: x / pH: x  Gluc: 92 mg/dL / Ketone: x  / Bili: x / Urobili: x   Blood: x / Protein: x / Nitrite: x   Leuk Esterase: x / RBC: x / WBC x   Sq Epi: x / Non Sq Epi: x / Bacteria: x        CURRENT MEDICATIONS  MEDICATIONS  (STANDING):  apixaban 5 milliGRAM(s) Oral every 12 hours  levETIRAcetam 500 milliGRAM(s) Oral every 12 hours  NIFEdipine XL 60 milliGRAM(s) Oral daily  pantoprazole    Tablet 40 milliGRAM(s) Oral before breakfast  polyethylene glycol 3350 17 Gram(s) Oral daily  senna 2 Tablet(s) Oral at bedtime  sodium chloride 1 Gram(s) Oral two times a day    MEDICATIONS  (PRN):  acetaminophen     Tablet .. 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 Worthy, s/p SRS (completed 5 rounds 8/15/23), on immunotherapy monthly (last dose 12/15/23, next dose 1/15/2023), drug induced neutropenia (likely bactrim), who presented to [facility] on 12/31/23 for resection of brain tumor. Patient was initially diagnosed after he was found to be leaving the car door open, putting his shoes on the wrong feet, and being forgetful of his usual daily routine. Patient reported on 12/20/23 he fell and hit his head (no LOC) with left sided weakness. Within 2 days, the symptoms of forgetfulness returned. MRI as outpatient on 12/22/23 showed progression of right parietal lobe lesion with worsening mass effect, edema, and 1.2cm MLS. Hospital Course included episodes of hypertension which were controlled with medication management. He underwent a Right parietal and temporal crani and resection (frozen: metastatic melanoma) on 1/2. He tolerated the procedure well and was started on nifedipine post operatively for BP management. He remains on keppra for seizure prophylaxis and on decadron. His lovenox is held presently but is s/p IVC filter on 1/2 with noted LE duplex on 12/31 with proximal DVT. Plan for 1 week post operative resumption of full dose lovenox. He had post op hyponatremia and was started on salt tabs. He was evaluated for admission to acute inpatient rehab and admitted to [facility] on 1/5/24.               (05 Jan 2024 13:18)        PAST MEDICAL & SURGICAL HISTORY:  Melanoma  with mets to liver and brain      DVT, lower extremity      HTN (hypertension)      H/O brain surgery      VITALS  Vital Signs Last 24 Hrs  T(C): 36.6 (11 Jan 2024 07:17), Max: 36.8 (10 Sterling 2024 20:37)  T(F): 97.8 (11 Jan 2024 07:17), Max: 98.3 (10 Sterling 2024 20:37)  HR: 72 (11 Jan 2024 07:17) (72 - 84)  BP: 119/81 (11 Jan 2024 07:17) (119/81 - 135/88)  BP(mean): --  RR: 16 (11 Jan 2024 07:17) (16 - 16)  SpO2: 100% (11 Jan 2024 07:17) (99% - 100%)    Parameters below as of 11 Jan 2024 07:17  Patient On (Oxygen Delivery Method): room air          PHYSICAL EXAM  Constitutional - NAD, Comfortable  HEENT - NCAT, EOMI  Neck - Supple, No limited ROM  Chest - CTA bilaterally  Cardiovascular - RRR, S1S2  Abdomen - Soft, NTND  Extremities - No calf tenderness   Neurologic Exam -  no new focal deficits                   RECENT LABS                        12.5   11.21 )-----------( 124      ( 11 Jan 2024 07:37 )             39.0     01-11    137  |  104  |  27<H>  ----------------------------<  92  4.2   |  26  |  1.05    Ca    8.8      11 Jan 2024 07:37    TPro  5.7<L>  /  Alb  2.6<L>  /  TBili  0.3  /  DBili  x   /  AST  13  /  ALT  23  /  AlkPhos  75  01-11      LIVER FUNCTIONS - ( 11 Jan 2024 07:37 )  Alb: 2.6 g/dL / Pro: 5.7 g/dL / ALK PHOS: 75 U/L / ALT: 23 U/L / AST: 13 U/L / GGT: x           Urinalysis Basic - ( 11 Jan 2024 07:37 )    Color: x / Appearance: x / SG: x / pH: x  Gluc: 92 mg/dL / Ketone: x  / Bili: x / Urobili: x   Blood: x / Protein: x / Nitrite: x   Leuk Esterase: x / RBC: x / WBC x   Sq Epi: x / Non Sq Epi: x / Bacteria: x                  Urinalysis Basic - ( 11 Jan 2024 07:37 )    Color: x / Appearance: x / SG: x / pH: x  Gluc: 92 mg/dL / Ketone: x  / Bili: x / Urobili: x   Blood: x / Protein: x / Nitrite: x   Leuk Esterase: x / RBC: x / WBC x   Sq Epi: x / Non Sq Epi: x / Bacteria: x        CURRENT MEDICATIONS  MEDICATIONS  (STANDING):  apixaban 5 milliGRAM(s) Oral every 12 hours  levETIRAcetam 500 milliGRAM(s) Oral every 12 hours  NIFEdipine XL 60 milliGRAM(s) Oral daily  pantoprazole    Tablet 40 milliGRAM(s) Oral before breakfast  polyethylene glycol 3350 17 Gram(s) Oral daily  senna 2 Tablet(s) Oral at bedtime  sodium chloride 1 Gram(s) Oral two times a day    MEDICATIONS  (PRN):  acetaminophen     Tablet .. 650 milliGRAM(s) Oral every 6 hours PRN Mild Pain (1 - 3)  oxyCODONE    IR 5 milliGRAM(s) Oral every 4 hours PRN Severe Pain (7 - 10)

## 2024-01-11 NOTE — ASSESSMENT
[FreeTextEntry1] : Mr. Nogueira is a 63 year old gentleman with a prior history of a cutaneous melanoma of the right cheek, now with an M1d, stage IV, melanoma affecting the brain, lungs and lymph nodes. His tumor is wild type for BRAF.  His course has been complicated by symptomatic pulmonary emboli for which he is now on apixaban. He has experienced some mild progression in the brain to ipilimumab/nivolumab followed by nivolumab.    At this time, I will plan to see him back in clinic next Wednesday for consideration of reinitiation of therapy.  I will discuss the possibility of further gamma knife to the smaller CNS lesions.  After a long discussion, all of his questions and concerns were answered to his satisfaction.   I will plan on repeat systemic imaging in 1 months time.

## 2024-01-11 NOTE — PROGRESS NOTE ADULT - ASSESSMENT
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 Worthy, s/p SRS (completed 5 rounds 8/15/23), on immunotherapy monthly (last dose 12/15/23, next dose 1/15/2023), drug induced neutropenia (likely bactrim), who presented to [facility] on 12/31/23 for resection of brain tumor. Patient was initially diagnosed after he was found to be leaving the car door open, putting his shoes on the wrong feet, and being forgetful of his usual daily routine. Patient reported on 12/20/23 he fell and hit his head (no LOC) with left sided weakness. Within 2 days, the symptoms of forgetfulness returned. MRI as outpatient on 12/22/23 showed progression of right parietal lobe lesion with worsening mass effect, edema, and 1.2cm MLS. Hospital Course included episodes of hypertension which were controlled with medication management. He underwent a Right parietal and temporal crani and resection (frozen: metastatic melanoma) on 1/2. He tolerated the procedure well and was started on nifedipine post operatively for BP management. He remains on keppra for seizure prophylaxis and on decadron. His lovenox is held presently but is s/p IVC filter on 1/2 with noted LE duplex on 12/31 with proximal DVT. Plan for 1 week post operative resumption of full dose lovenox. He had post op hyponatremia and was started on salt tabs. He was evaluated for admission to acute inpatient rehab and admitted to [facility] on 1/5/24.       #Metastatic melanoma to the brain s/p craniotomy for resection now with Gait Instability, ADL impairments and Functional impairments  - Continue Comprehensive Rehab Program of PT/OT/SLP  -Continue keppra 500mg BID for seizure prophylaxis  -dexamethasone taper 2mg TID until 1/6, then 2mg BID until 1/8. 2mg daily until then stop   -Staples to craniotomy incision intact (s/p craniotomy on 1/2)   -May shower  - will need a post op MRI before next appointment with neurosurgery - Discussed with ROMARIO Fournier [phone] from Radiation Oncology who will schedule OP MRI for patient prior to follow up appointment  -CTH to monitor for bleeding prior to starting AC - completed 1/9 - stable     #Right lung mass  - CXR with 10.8cm RUL mass (enlarged from 9.3cm), cont pulmonary hygiene   - receiving immunotherapy monthly (last dose 12/15/23, next dose 1/15/2023)    #Hyponatremia  -Last  (1/8)  -Monitor labs  - Salt tabs 1G  BID 1/9  -wean off as tolerated for a normal sodium goal (135-145)    #HTN  -Continue nifedipine XL 60mg daily  -Monitor BP    #DVT/PE  - diagnosed 8/2023 and has been on therapeutic enoxaparin at home - stopped prior to surgery   - venous duplex 12/31/23 with acute proximal DVT   -IVC filter placed 1/2/24  -As per neurosurgery note, may resume full dose lovenox on 1/9 (POD #7)  -To follow up with vascular surgery as outpatient for IVC filter management   - Hematology consulted in regards to starting pre-op full dose lovenox VS oral agent.    -Continue Eliquis 5mg BID 1/9/24    #Pain control  - Tylenol PRN  -Oxycodone PRN     #GI/Bowel Mgmt   - Continue Senna at bedtime   - Miralax     #Bladder management  - Monitor UO    #Skin:  -  right cranial incision with staples TARA, right groin puncture site TARA, non blanchable erythema to  back 2/2 immunotherapy- per wife.    FEN   - Diet - Regular with thins      Precautions / PROPHYLAXIS:   - Falls, Cardiac, Seizure  - ortho: Weight bearing status: WBAT   - Lungs: Aspiration, Incentive Spirometer   - Pressure injury/Skin: Turn Q2hrs while in bed, OOB to Chair, PT/OT      Johan Tony  Neurosurgery  [address] [place] [address]  Phone: [phone]  Fax: [phone]  Follow Up Time:     Willi Faulkner  Vascular Surgery  [address] [address]  Phone: [phone]  Fax: [phone]  Follow Up Time:     Tano Richards  Medical Oncology  [address] [place] [address]  Phone: [phone]  Fax: [phone]  Follow Up Time:    TEAM MEETING 1/10/24  SW:  2 marcus lives with wife  OT: Sv for adls, min for showers, cg for transfers  PT: CG for transfers, 120' with RW and CG, 12 steps 1 HR  SLP: Regular with thins, Dec orginzation, dec memory  barriers: impulsive at times, dec balance, needs cues  EDOD: Home 1/12 ASSESSMENT/PLAN  63 year old male with 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 Worthy, s/p SRS (completed 5 rounds 8/15/23), on immunotherapy monthly (last dose 12/15/23, next dose 1/15/2023), drug induced neutropenia (likely bactrim), who presented to [facility] on 12/31/23 for resection of brain tumor. Patient was initially diagnosed after he was found to be leaving the car door open, putting his shoes on the wrong feet, and being forgetful of his usual daily routine. Patient reported on 12/20/23 he fell and hit his head (no LOC) with left sided weakness. Within 2 days, the symptoms of forgetfulness returned. MRI as outpatient on 12/22/23 showed progression of right parietal lobe lesion with worsening mass effect, edema, and 1.2cm MLS. Hospital Course included episodes of hypertension which were controlled with medication management. He underwent a Right parietal and temporal crani and resection (frozen: metastatic melanoma) on 1/2. He tolerated the procedure well and was started on nifedipine post operatively for BP management. He remains on keppra for seizure prophylaxis and on decadron. His lovenox is held presently but is s/p IVC filter on 1/2 with noted LE duplex on 12/31 with proximal DVT. Plan for 1 week post operative resumption of full dose lovenox. He had post op hyponatremia and was started on salt tabs. He was evaluated for admission to acute inpatient rehab and admitted to [facility] on 1/5/24.       #Metastatic melanoma to the brain s/p craniotomy for resection now with Gait Instability, ADL impairments and Functional impairments  - Continue Comprehensive Rehab Program of PT/OT/SLP  -Continue keppra 500mg BID for seizure prophylaxis  -dexamethasone taper 2mg TID until 1/6, then 2mg BID until 1/8. 2mg daily until then stop   -Staples to craniotomy incision intact (s/p craniotomy on 1/2)   -May shower  - will need a post op MRI before next appointment with neurosurgery - Discussed with ROMARIO Fournier [phone] from Radiation Oncology who will schedule OP MRI for patient prior to follow up appointment  -CTH to monitor for bleeding prior to starting AC - completed 1/9 - stable     #Right lung mass  - CXR with 10.8cm RUL mass (enlarged from 9.3cm), cont pulmonary hygiene   - receiving immunotherapy monthly (last dose 12/15/23, next dose 1/15/2023)    #Hyponatremia  -Last  (1/8)  -Monitor labs  - Salt tabs 1G  BID 1/9  -wean off as tolerated for a normal sodium goal (135-145)    #HTN  -Continue nifedipine XL 60mg daily  -Monitor BP    #DVT/PE  - diagnosed 8/2023 and has been on therapeutic enoxaparin at home - stopped prior to surgery   - venous duplex 12/31/23 with acute proximal DVT   -IVC filter placed 1/2/24  -As per neurosurgery note, may resume full dose lovenox on 1/9 (POD #7)  -To follow up with vascular surgery as outpatient for IVC filter management   - Hematology consulted in regards to starting pre-op full dose lovenox VS oral agent.    -Continue Eliquis 5mg BID 1/9/24    #Pain control  - Tylenol PRN  -Oxycodone PRN     #GI/Bowel Mgmt   - Continue Senna at bedtime   - Miralax     #Bladder management  - Monitor UO    #Skin:  -  right cranial incision with staples TARA, right groin puncture site TARA, non blanchable erythema to  back 2/2 immunotherapy- per wife.    FEN   - Diet - Regular with thins      Precautions / PROPHYLAXIS:   - Falls, Cardiac, Seizure  - ortho: Weight bearing status: WBAT   - Lungs: Aspiration, Incentive Spirometer   - Pressure injury/Skin: Turn Q2hrs while in bed, OOB to Chair, PT/OT      Johan Tony  Neurosurgery  [address] [place] [address]  Phone: [phone]  Fax: [phone]  Follow Up Time:     Willi Faulkner  Vascular Surgery  [address] [address]  Phone: [phone]  Fax: [phone]  Follow Up Time:     Tano Richards  Medical Oncology  [address] [place] [address]  Phone: [phone]  Fax: [phone]  Follow Up Time:    TEAM MEETING 1/10/24  SW:  2 marcus lives with wife  OT: Sv for adls, min for showers, cg for transfers  PT: CG for transfers, 120' with RW and CG, 12 steps 1 HR  SLP: Regular with thins, Dec orginzation, dec memory  barriers: impulsive at times, dec balance, needs cues  EDOD: Home 1/12

## 2024-01-11 NOTE — PROGRESS NOTE ADULT - ASSESSMENT
This is a 63  year old man with a longstanding history of recurrent DVT's, most recent 8/2023, and before that 4 years ago. Patient on intermediate dose lovenox 80mg Daily given history of melanoma with brain mets diagnosed in 6/2023.  Patient had a new DVT in August while on intermediate dose lovenox.  Patient now at [facility] for rehab, improved significantly in functional status. Going home tomorrow 1/12/24.  No side effects from starting the eliquis in the past couple of days.  Explained to patient he should continue long term anticoagulation due to recurrent DVT's.  HE has a number of oncologists he has to follow up with with the metastatic melanoma, and can ask one of them such as our own Dr. Richards to renew them medications  IF that somehow does not pan out, he can give me a call at the same center and I can take over for continuing the Eliquis 5mg PO BID too.  e been an continue Q 12 hours.   This is a 63  year old man with a longstanding history of recurrent DVT's, most recent 8/2023, and before that 4 years ago. Patient on intermediate dose lovenox 80mg Daily given history of melanoma with brain mets diagnosed in 6/2023.  Patient had a new DVT in August while on intermediate dose lovenox.  Patient now at [facility] for rehab, improved significantly in functional status. Going home tomorrow 1/12/24.  No side effects from starting the eliquis in the past couple of days.  Explained to patient he should continue long term anticoagulation due to recurrent DVT's.  HE has a number of oncologists he has to follow up with with the metastatic melanoma, and can ask one of them such as our own Dr. Richards to renew them medications  IF that somehow does not pan out, he can give me a call at the same center and I can take over for continuing the Eliquis 5mg PO BID too.  e been an continue Q 12 hours.

## 2024-01-11 NOTE — REVIEW OF SYSTEMS
[Diarrhea: Grade 0] : Diarrhea: Grade 0 [Difficulty Walking] : difficulty walking [Negative] : Allergic/Immunologic

## 2024-01-11 NOTE — PROGRESS NOTE ADULT - SUBJECTIVE AND OBJECTIVE BOX
Patient feeling well, participating with rehab. Excited about leaving hospital tomorrow.  Patient is very appreciative of his functional  status gains with the physical therapy unit on this admission.    Has not noted any side effects or problems after starting the Eliquis.    MEDICATIONS  (STANDING):  apixaban 5 milliGRAM(s) Oral every 12 hours  levETIRAcetam 500 milliGRAM(s) Oral every 12 hours  NIFEdipine XL 60 milliGRAM(s) Oral daily  pantoprazole    Tablet 40 milliGRAM(s) Oral before breakfast  polyethylene glycol 3350 17 Gram(s) Oral daily  senna 2 Tablet(s) Oral at bedtime  sodium chloride 1 Gram(s) Oral two times a day    MEDICATIONS  (PRN):  acetaminophen     Tablet .. 650 milliGRAM(s) Oral every 6 hours PRN Mild Pain (1 - 3)  oxyCODONE    IR 5 milliGRAM(s) Oral every 4 hours PRN Severe Pain (7 - 10)    Vital Signs Last 24 Hrs  T(C): 36.6 (11 Jan 2024 07:17), Max: 36.8 (10 Sterling 2024 20:37)  T(F): 97.8 (11 Jan 2024 07:17), Max: 98.3 (10 [place] 2024 20:37)  HR: 72 (11 Jan 2024 07:17) (72 - 84)  BP: 119/81 (11 Jan 2024 07:17) (119/81 - 135/88)  BP(mean): --  RR: 16 (11 Jan 2024 07:17) (16 - 16)  SpO2: 100% (11 Jan 2024 07:17) (99% - 100%)    Parameters below as of 11 Jan 2024 07:17  Patient On (Oxygen Delivery Method): room air                          12.5   11.21 )-----------( 124      ( 11 Jan 2024 07:37 )             39.0   01-11    137  |  104  |  27<H>  ----------------------------<  92  4.2   |  26  |  1.05    Ca    8.8      11 Jan 2024 07:37    TPro  5.7<L>  /  Alb  2.6<L>  /  TBili  0.3  /  DBili  x   /  AST  13  /  ALT  23  /  AlkPhos  75  01-11     Patient feeling well, participating with rehab. Excited about leaving hospital tomorrow.  Patient is very appreciative of his functional  status gains with the physical therapy unit on this admission.    Has not noted any side effects or problems after starting the Eliquis.    MEDICATIONS  (STANDING):  apixaban 5 milliGRAM(s) Oral every 12 hours  levETIRAcetam 500 milliGRAM(s) Oral every 12 hours  NIFEdipine XL 60 milliGRAM(s) Oral daily  pantoprazole    Tablet 40 milliGRAM(s) Oral before breakfast  polyethylene glycol 3350 17 Gram(s) Oral daily  senna 2 Tablet(s) Oral at bedtime  sodium chloride 1 Gram(s) Oral two times a day    MEDICATIONS  (PRN):  acetaminophen     Tablet .. 650 milliGRAM(s) Oral every 6 hours PRN Mild Pain (1 - 3)  oxyCODONE    IR 5 milliGRAM(s) Oral every 4 hours PRN Severe Pain (7 - 10)    Vital Signs Last 24 Hrs  T(C): 36.6 (11 Jan 2024 07:17), Max: 36.8 (10 Sterling 2024 20:37)  T(F): 97.8 (11 Jan 2024 07:17), Max: 98.3 (10 Sterling 2024 20:37)  HR: 72 (11 Jan 2024 07:17) (72 - 84)  BP: 119/81 (11 Jan 2024 07:17) (119/81 - 135/88)  BP(mean): --  RR: 16 (11 Jan 2024 07:17) (16 - 16)  SpO2: 100% (11 Jan 2024 07:17) (99% - 100%)    Parameters below as of 11 Jan 2024 07:17  Patient On (Oxygen Delivery Method): room air                          12.5   11.21 )-----------( 124      ( 11 Jan 2024 07:37 )             39.0   01-11    137  |  104  |  27<H>  ----------------------------<  92  4.2   |  26  |  1.05    Ca    8.8      11 Jan 2024 07:37    TPro  5.7<L>  /  Alb  2.6<L>  /  TBili  0.3  /  DBili  x   /  AST  13  /  ALT  23  /  AlkPhos  75  01-11

## 2024-01-11 NOTE — PROGRESS NOTE ADULT - MUSCULOSKELETAL
Have You Had Laser Resurfacing Before?: has not had previous treatments normal/ROM intact/normal gait/strength 5/5 bilateral upper extremities/strength 5/5 bilateral lower extremities negative

## 2024-01-11 NOTE — HISTORY OF PRESENT ILLNESS
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 Alla Mendez at [facility] in 2014, who is seen today for continued management of his M1d, stage IV melanoma, now with brain and lung metastases.  HIs treatment course has been complicated by the development of a DVT and PE. He presents to clinic today for continued management of his advanced disease.  Briefly, he initially developed a change in mental status, with slight confusion and balance problems, on Friday 06/16/2023.  Non contrast head CT scan, performed on 06/22/2022 was significant for multiple masses in the bilateral cerebral hemisphere, the largest measuring approximately 5 cm in the right parietal lobe, associated with mass effect and a 14 mm right to left midline shift. He was then admitted to [facility].  Chest, abdominal and pelvic CT scan, performed on 06/22/2023 demonstrates a 9cm heterogeneous right upper lobe mass extending to the right hilum and right perihilar adenopathy.  Brain MRI, performed on 06/23/2023, again demonstrated the multifocal brain metastases associated with adjacent edema. He then underwent right craniotomy for resection of the dominant right frontal parietal complex mass on 06/26/2023 by Dr. Robert Worthy.  Pathology was consistent with metastatic melanoma.  He was evaluated by Dr. Maco Marr hematology/oncology as well as by Dr. Nasima English of radiation oncology, and was ultimately discharged on 06/26/2023.   He was initiated on therapy with ipilimumab and nivolumab, receiving his first dose on 07/18/2023. Prior to his second dose, he was admitted for symptomatic pulmonary emboli.  He was discharged on lovenox QD.  He remains on lovenox 80mg QD and has completed GK radiotherapy on 08/15/2023 for the brain metastases. He ultimately received his second dose on 08/17/2023 and received his third dose of ipilimumab and nivolumab on 9/7/23. Repeat imaging was performed on 9/9/2023 to assess response to treatment and compared with a prior study dated 08/16/2023, CT head demonstrated improvement in hemorrhagic lesion within the right inferior temporal lobe and hemorrhagic lesion within the posterior parietal lobe is minimally smaller. CT C/A/P demonstrated Slight interval increase in size of right upper lobe lung mass since CT of 8/3/2023. Diminished thrombus in left pulmonary artery.  On 09/27/2023, he was found to be progressively neutropenic and his fourth cycle of therapy was held. The etiology was felt to be possibly related to recently started antibiotics, to the keppra or to immunotherapy. Bone marrow biopsy was performed, antibiotics discontinued and steroids initiated, with subsequent improvement in the counts. After a long discussion, it was opted to continue him on therapy with nivolumab alone which he initiated on 10/19/2023.   On 10/25/2023, he underwent repeat brain imaging with MRI. This study, when compared with a prior dated 08/05/2023, demonstrated at least 10 small enhancing nodules which are new as well as stable or slightly larger additional pre-existing lesions.  He was evaluated by Dr. Marcial Warren of radiation oncology who discussed standard and investigational options and who recommended gamma knife if standard of care options were to be pursued.  After several discussions, the patient opted for close observation, with radiation should there be progression on the next set of imaging studies.  On 12/05/2023, he underwent a PET/CT scan which, when compared with a prior CT scan dated 09/09/2023, demonstrated an FDG avid, partially necrotic right upper lobe lung mass similar in size and appearance to the prior scan.  A brain MRI, performed on 12/04/2023, demonstrated increase in size of a right medial thalamus lesion, now measuring 1.3 cm with surrounding edema. There are other lesions with some evidence of growth.   Pt being was seen on 12/22/2023 as an urgent add on. Pt reports fell from bed on Wednesday striking head very hard. He did not lose consciousness but reports was definitely stunned. Wife reports that he has "been off" the last few days. This morning he was unable to correct pants from being inside out and couldn't remember how to button them once on. He also had trouble getting his shoes on correctly. He denies HA/visual changes. Affect does appear different. Denies nausea/vomiting. Completed first dose of Opdualog last week.  Repeat brain imaging, performed on 12/22/2023, when compared with a prior dated 12/04/2023, demonstrated mild interval enlargement of a dominant lesion within the right parietal lobe, with worsening right to left midline shift.    He was admitted to [facility] and ultimately underwent right parietal and temporal craniotomy with resection of the two dominant lesions by Johan Cabrera on 01/02/2024. His anticoagulation was held and an IVC filter placed beforehand.  The patient tolerated the procedure well and was discharged to [facility] on 01/05/2024.    Today, the patient is in good spirits and is set for discharge from rehab tomorrow. His anticoagulation was changed from lovenox to Eliquis. He decadron dose is currently 2 mg daily.  He is scheduled to see Dr. Warren next Wednesday with repeat imaging studies.  PAST MEDICAL HISTORY: 1. 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 Right lower extremity deep venous thrombosis in the setting of a vein stripping procedure  SOCIAL HISTORY: , denies tobacco.  He drinks alcohol socially.  He has two daughters, one who is getting  next month in [place], and the other who is having a baby in the near future.   He currently works in [employer].  ALLERGIES:  No known drug allergies  CURRENT MEDICATIONS: 1. Decadron taper 2. Keppra [de-identified] : LDH

## 2024-01-12 PROBLEM — I82.409 ACUTE EMBOLISM AND THROMBOSIS OF UNSPECIFIED DEEP VEINS OF UNSPECIFIED LOWER EXTREMITY: Chronic | Status: ACTIVE | Noted: 2024-01-01

## 2024-01-12 NOTE — PROGRESS NOTE ADULT - PROVIDER SPECIALTY LIST ADULT
Physiatry
Hospitalist
Neuro Rehabilitation
Hospitalist
Neuro Rehabilitation
Neuro Rehabilitation
Physiatry
Neuro Rehabilitation
Neuro Rehabilitation
Heme/Onc
Hospitalist

## 2024-01-12 NOTE — PROGRESS NOTE ADULT - NS ATTEND AMEND GEN_ALL_CORE FT
I have personally seen and examined the patient with the NP. Medical records reviewed. I have made amendments to the documentation where necessary and adjusted the history, physical examination, and plan as documented by the NP.    Patient is being discharged home with outpatient therapy today.  Discharge instructions were discussed with patient and family, all current medications were sent to the pharmacy. Patient and family were educated on importance of medication compliance,  continued  care with PMD and follow-up care with the specialists in the community. Safety and fall risk precautions  were discussed in detail, counseled on healthy life style modifications.  All questions were answered to their satisfaction.    55 min spent

## 2024-01-12 NOTE — PROGRESS NOTE ADULT - SUBJECTIVE AND OBJECTIVE BOX
SUBJECTIVE/ROS: Patient seen in the chair. He denies chest pain, fever, chills, nausea, vomiting, abdominal pain, headache, or BLE pain. He is medically stable and ready for discharge home. All questions answered and instructions reviewed.     HPI:  63 year old male with PMH of DVT/PE 8/2023 on therapeutic SQL (last dose 12/30/23 AM), melanoma on face s/p MOHs 15 yrs ago, melanoma brain mets diagnosed 6/2023 s/p right crani for resection at [facility] with Dr. Worthy, s/p SRS (completed 5 rounds 8/15/23), on immunotherapy monthly (last dose 12/15/23, next dose 1/15/2023), drug induced neutropenia (likely bactrim), who presented to [facility] on 12/31/23 for resection of brain tumor. Patient was initially diagnosed after he was found to be leaving the car door open, putting his shoes on the wrong feet, and being forgetful of his usual daily routine. Patient reported on 12/20/23 he fell and hit his head (no LOC) with left sided weakness. Within 2 days, the symptoms of forgetfulness returned. MRI as outpatient on 12/22/23 showed progression of right parietal lobe lesion with worsening mass effect, edema, and 1.2cm MLS. Hospital Course included episodes of hypertension which were controlled with medication management. He underwent a Right parietal and temporal crani and resection (frozen: metastatic melanoma) on 1/2. He tolerated the procedure well and was started on nifedipine post operatively for BP management. He remains on keppra for seizure prophylaxis and on decadron. His lovenox is held presently but is s/p IVC filter on 1/2 with noted LE duplex on 12/31 with proximal DVT. Plan for 1 week post operative resumption of full dose lovenox. He had post op hyponatremia and was started on salt tabs. He was evaluated for admission to acute inpatient rehab and admitted to [facility] on 1/5/24.            PHYSICAL EXAM    Vital Signs Last 24 Hrs  T(C): 36.4 (12 Jan 2024 07:50), Max: 36.7 (11 Jan 2024 22:03)  T(F): 97.5 (12 Jan 2024 07:50), Max: 98.1 (11 Jan 2024 22:03)  HR: 78 (12 Jan 2024 07:50) (78 - 89)  BP: 123/83 (12 Jan 2024 07:50) (115/80 - 123/83)  BP(mean): --  RR: 16 (12 Jan 2024 07:50) (16 - 16)  SpO2: 97% (12 Jan 2024 07:50) (97% - 97%)    Parameters below as of 12 Jan 2024 07:50  Patient On (Oxygen Delivery Method): room air            PHYSICAL EXAM  Constitutional - NAD, Comfortable  HEENT - NCAT, EOMI  Neck - Supple, No limited ROM  Chest - Breathing comfortably   Cardiovascular - RRR, S1S2  Abdomen -BS+, Soft, NTND  Extremities - No C/C/E, No calf tenderness   Neurologic Exam -aox3, alvares 5/5     RECENT LABS:                          12.5   11.21 )-----------( 124      ( 11 Jan 2024 07:37 )             39.0     01-11    137  |  104  |  27<H>  ----------------------------<  92  4.2   |  26  |  1.05    Ca    8.8      11 Jan 2024 07:37    TPro  5.7<L>  /  Alb  2.6<L>  /  TBili  0.3  /  DBili  x   /  AST  13  /  ALT  23  /  AlkPhos  75  01-11    LIVER FUNCTIONS - ( 11 Jan 2024 07:37 )  Alb: 2.6 g/dL / Pro: 5.7 g/dL / ALK PHOS: 75 U/L / ALT: 23 U/L / AST: 13 U/L / GGT: x               MEDICATIONS  (STANDING):  apixaban 5 milliGRAM(s) Oral every 12 hours  levETIRAcetam 500 milliGRAM(s) Oral every 12 hours  NIFEdipine XL 60 milliGRAM(s) Oral daily  pantoprazole    Tablet 40 milliGRAM(s) Oral before breakfast  polyethylene glycol 3350 17 Gram(s) Oral daily  senna 2 Tablet(s) Oral at bedtime  sodium chloride 1 Gram(s) Oral two times a day    MEDICATIONS  (PRN):  acetaminophen     Tablet .. 650 milliGRAM(s) Oral every 6 hours PRN Mild Pain (1 - 3)  oxyCODONE    IR 5 milliGRAM(s) Oral every 4 hours PRN Severe Pain (7 - 10)   SUBJECTIVE/ROS: Patient seen in the chair. He denies chest pain, fever, chills, nausea, vomiting, abdominal pain, headache, or BLE pain. He is medically stable and ready for discharge home. All questions answered and instructions reviewed.     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 Worthy, s/p SRS (completed 5 rounds 8/15/23), on immunotherapy monthly (last dose 12/15/23, next dose 1/15/2023), drug induced neutropenia (likely bactrim), who presented to [facility] on 12/31/23 for resection of brain tumor. Patient was initially diagnosed after he was found to be leaving the car door open, putting his shoes on the wrong feet, and being forgetful of his usual daily routine. Patient reported on 12/20/23 he fell and hit his head (no LOC) with left sided weakness. Within 2 days, the symptoms of forgetfulness returned. MRI as outpatient on 12/22/23 showed progression of right parietal lobe lesion with worsening mass effect, edema, and 1.2cm MLS. Hospital Course included episodes of hypertension which were controlled with medication management. He underwent a Right parietal and temporal crani and resection (frozen: metastatic melanoma) on 1/2. He tolerated the procedure well and was started on nifedipine post operatively for BP management. He remains on keppra for seizure prophylaxis and on decadron. His lovenox is held presently but is s/p IVC filter on 1/2 with noted LE duplex on 12/31 with proximal DVT. Plan for 1 week post operative resumption of full dose lovenox. He had post op hyponatremia and was started on salt tabs. He was evaluated for admission to acute inpatient rehab and admitted to [facility] on 1/5/24.            PHYSICAL EXAM    Vital Signs Last 24 Hrs  T(C): 36.4 (12 Jan 2024 07:50), Max: 36.7 (11 Jan 2024 22:03)  T(F): 97.5 (12 Jan 2024 07:50), Max: 98.1 (11 Jan 2024 22:03)  HR: 78 (12 Jan 2024 07:50) (78 - 89)  BP: 123/83 (12 Jan 2024 07:50) (115/80 - 123/83)  BP(mean): --  RR: 16 (12 Jan 2024 07:50) (16 - 16)  SpO2: 97% (12 Jan 2024 07:50) (97% - 97%)    Parameters below as of 12 Jan 2024 07:50  Patient On (Oxygen Delivery Method): room air            PHYSICAL EXAM  Constitutional - NAD, Comfortable  HEENT - NCAT, EOMI  Neck - Supple, No limited ROM  Chest - Breathing comfortably   Cardiovascular - RRR, S1S2  Abdomen -BS+, Soft, NTND  Extremities - No C/C/E, No calf tenderness   Neurologic Exam -aox3, alvares 5/5     RECENT LABS:                          12.5   11.21 )-----------( 124      ( 11 Jan 2024 07:37 )             39.0     01-11    137  |  104  |  27<H>  ----------------------------<  92  4.2   |  26  |  1.05    Ca    8.8      11 Jan 2024 07:37    TPro  5.7<L>  /  Alb  2.6<L>  /  TBili  0.3  /  DBili  x   /  AST  13  /  ALT  23  /  AlkPhos  75  01-11    LIVER FUNCTIONS - ( 11 Jan 2024 07:37 )  Alb: 2.6 g/dL / Pro: 5.7 g/dL / ALK PHOS: 75 U/L / ALT: 23 U/L / AST: 13 U/L / GGT: x               MEDICATIONS  (STANDING):  apixaban 5 milliGRAM(s) Oral every 12 hours  levETIRAcetam 500 milliGRAM(s) Oral every 12 hours  NIFEdipine XL 60 milliGRAM(s) Oral daily  pantoprazole    Tablet 40 milliGRAM(s) Oral before breakfast  polyethylene glycol 3350 17 Gram(s) Oral daily  senna 2 Tablet(s) Oral at bedtime  sodium chloride 1 Gram(s) Oral two times a day    MEDICATIONS  (PRN):  acetaminophen     Tablet .. 650 milliGRAM(s) Oral every 6 hours PRN Mild Pain (1 - 3)  oxyCODONE    IR 5 milliGRAM(s) Oral every 4 hours PRN Severe Pain (7 - 10)

## 2024-01-12 NOTE — PROGRESS NOTE ADULT - ASSESSMENT
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 Worthy, s/p SRS (completed 5 rounds 8/15/23), on immunotherapy monthly (last dose 12/15/23, next dose 1/15/2023), drug induced neutropenia (likely bactrim), MRI as outpatient on 12/22/23 showed progression of right parietal lobe lesion with worsening mass effect, edema, and 1.2cm MLS, he presented to [facility] on 12/31/23 for resection of brain tumor. Hospital Course included episodes of hypertension which were controlled with medication management. He underwent a Right parietal and temporal crani and resection (frozen: metastatic melanoma) on 1/2. He tolerated the procedure well and was started on nifedipine post operatively for BP management. He remains on keppra for seizure prophylaxis and on decadron. His lovenox is held presently but is s/p IVC filter on 1/2 with noted LE duplex on 12/31 with proximal DVT. Plan for 1 week post operative resumption of full dose lovenox. He had post op hyponatremia and was started on salt tabs. He was evaluated for admission to acute inpatient rehab and admitted to [facility] on 1/5/24.     #Metastatic Melanoma to the Brain   -s/p Right parietal and temporal crani and resection (frozen: metastatic melanoma) on 1/2  -Continue dexamethasone taper as ordered  -Continue keppra 500mg BID for seizure prophylaxis  -Staples to craniotomy incision (s/p craniotomy on 1/2)   -Fall precaution   -Pain control and bowel regimen per rehab team   -Comprehensive Rehab Program of PT/OT/SLP  -Outpatient follow up with neurosurgery     #Known Right lung mass  - CXR with 10.8cm RUL mass (enlarged from 9.3cm), cont pulmonary hygiene   - Receiving immunotherapy monthly (last dose 12/15/23, next dose 1/15/2023)  - Follow up with oncology     #DVT/PE  - diagnosed 8/2023 and has been on therapeutic enoxaparin at home - stopped prior to surgery   - venous duplex 12/31/23 with acute proximal DVT   -IVC filter placed 1/2/24  -As per neurosurgery note, may resume full dose lovenox on 1/9 (POD #7) - started on eliquis 5mg BID 1/10  -To follow up with vascular surgery as outpatient for IVC filter management  -heme recs appreciated     #Hyponatremia  -Last   -Monitor labs  -Agree with reducing salt tabs to BID  -Continue to wean as tolerated for a normal sodium goal (135-145)    #HTN  -Continue Nifedipine  -Monitor BP    #Leukocytosis   -WBC 13.8 1/8, downtrending from previous, WBC 10.3 on 1/6 possibly ?lab error   -Likely due to steroids   -Infection workup if fever spike  -Trend CBC     #Thrombocytopenia   - -140s, stable  - no s/s active bleeding  - Monitor CBC     #DVT PPx  - eliquis 63 year old male with 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 Worthy, s/p SRS (completed 5 rounds 8/15/23), on immunotherapy monthly (last dose 12/15/23, next dose 1/15/2023), drug induced neutropenia (likely bactrim), MRI as outpatient on 12/22/23 showed progression of right parietal lobe lesion with worsening mass effect, edema, and 1.2cm MLS, he presented to [facility] on 12/31/23 for resection of brain tumor. Hospital Course included episodes of hypertension which were controlled with medication management. He underwent a Right parietal and temporal crani and resection (frozen: metastatic melanoma) on 1/2. He tolerated the procedure well and was started on nifedipine post operatively for BP management. He remains on keppra for seizure prophylaxis and on decadron. His lovenox is held presently but is s/p IVC filter on 1/2 with noted LE duplex on 12/31 with proximal DVT. Plan for 1 week post operative resumption of full dose lovenox. He had post op hyponatremia and was started on salt tabs. He was evaluated for admission to acute inpatient rehab and admitted to [facility] on 1/5/24.     #Metastatic Melanoma to the Brain   -s/p Right parietal and temporal crani and resection (frozen: metastatic melanoma) on 1/2  -Continue dexamethasone taper as ordered  -Continue keppra 500mg BID for seizure prophylaxis  -Staples to craniotomy incision (s/p craniotomy on 1/2)   -Fall precaution   -Pain control and bowel regimen per rehab team   -Comprehensive Rehab Program of PT/OT/SLP  -Outpatient follow up with neurosurgery     #Known Right lung mass  - CXR with 10.8cm RUL mass (enlarged from 9.3cm), cont pulmonary hygiene   - Receiving immunotherapy monthly (last dose 12/15/23, next dose 1/15/2023)  - Follow up with oncology     #DVT/PE  - diagnosed 8/2023 and has been on therapeutic enoxaparin at home - stopped prior to surgery   - venous duplex 12/31/23 with acute proximal DVT   -IVC filter placed 1/2/24  -As per neurosurgery note, may resume full dose lovenox on 1/9 (POD #7) - started on eliquis 5mg BID 1/10  -To follow up with vascular surgery as outpatient for IVC filter management  -heme recs appreciated     #Hyponatremia  -Last   -Monitor labs  -Agree with reducing salt tabs to BID  -Continue to wean as tolerated for a normal sodium goal (135-145)    #HTN  -Continue Nifedipine  -Monitor BP    #Leukocytosis   -WBC 13.8 1/8, downtrending from previous, WBC 10.3 on 1/6 possibly ?lab error   -Likely due to steroids   -Infection workup if fever spike  -Trend CBC     #Thrombocytopenia   - -140s, stable  - no s/s active bleeding  - Monitor CBC     #DVT PPx  - eliquis

## 2024-01-12 NOTE — PROGRESS NOTE ADULT - REASON FOR ADMISSION
s/p craniotomy for tumor resection

## 2024-01-12 NOTE — PROGRESS NOTE ADULT - SUBJECTIVE AND OBJECTIVE BOX
Patient is a 63y old  Male who presents with a chief complaint of s/p craniotomy for tumor resection (11 Jan 2024 17:42)      Patient seen and examined at bedside. No events overnight. Denies headache, nausea, vomiting, chest pain, sob, abd pain. Excited to go home today.    ALLERGIES:  No Known Allergies    MEDICATIONS  (STANDING):  apixaban 5 milliGRAM(s) Oral every 12 hours  levETIRAcetam 500 milliGRAM(s) Oral every 12 hours  NIFEdipine XL 60 milliGRAM(s) Oral daily  pantoprazole    Tablet 40 milliGRAM(s) Oral before breakfast  polyethylene glycol 3350 17 Gram(s) Oral daily  senna 2 Tablet(s) Oral at bedtime  sodium chloride 1 Gram(s) Oral two times a day    MEDICATIONS  (PRN):  acetaminophen     Tablet .. 650 milliGRAM(s) Oral every 6 hours PRN Mild Pain (1 - 3)  oxyCODONE    IR 5 milliGRAM(s) Oral every 4 hours PRN Severe Pain (7 - 10)    Vital Signs Last 24 Hrs  T(F): 97.5 (12 Jan 2024 07:50), Max: 98.1 (11 Jan 2024 22:03)  HR: 78 (12 Jan 2024 07:50) (78 - 89)  BP: 123/83 (12 Jan 2024 07:50) (115/80 - 123/83)  RR: 16 (12 Jan 2024 07:50) (16 - 16)  SpO2: 97% (12 Jan 2024 07:50) (97% - 97%)  I&O's Summary      PHYSICAL EXAM:  GENERAL: NAD, sitting in chair  HEAD:  Normocephalic, R sided temporal incision with staples c/d/i  ENMT: Moist mucous membranes, Supple, No JVD  CHEST/LUNG: Clear to auscultation bilaterally, good air entry, non-labored breathing  HEART: RRR; S1/S2, No murmur  ABDOMEN: Soft, Nontender, Nondistended; Bowel sounds present  EXTREMITIES: No calf tenderness, No cyanosis, No edema  SKIN: Warm, perfused  PSYCH: Normal mood, Normal affect    LABS:                        12.5   11.21 )-----------( 124      ( 11 Jan 2024 07:37 )             39.0     01-11    137  |  104  |  27  ----------------------------<  92  4.2   |  26  |  1.05    Ca    8.8      11 Jan 2024 07:37    TPro  5.7  /  Alb  2.6  /  TBili  0.3  /  DBili  x   /  AST  13  /  ALT  23  /  AlkPhos  75  01-11                                Urinalysis Basic - ( 11 Jan 2024 07:37 )    Color: x / Appearance: x / SG: x / pH: x  Gluc: 92 mg/dL / Ketone: x  / Bili: x / Urobili: x   Blood: x / Protein: x / Nitrite: x   Leuk Esterase: x / RBC: x / WBC x   Sq Epi: x / Non Sq Epi: x / Bacteria: x            RADIOLOGY & ADDITIONAL TESTS:    Care Discussed with Consultants/Other Providers:

## 2024-01-12 NOTE — PROGRESS NOTE ADULT - ASSESSMENT
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 Worthy, s/p SRS (completed 5 rounds 8/15/23), on immunotherapy monthly (last dose 12/15/23, next dose 1/15/2023), drug induced neutropenia (likely bactrim), who presented to [facility] on 12/31/23 for resection of brain tumor. Patient was initially diagnosed after he was found to be leaving the car door open, putting his shoes on the wrong feet, and being forgetful of his usual daily routine. Patient reported on 12/20/23 he fell and hit his head (no LOC) with left sided weakness. Within 2 days, the symptoms of forgetfulness returned. MRI as outpatient on 12/22/23 showed progression of right parietal lobe lesion with worsening mass effect, edema, and 1.2cm MLS. Hospital Course included episodes of hypertension which were controlled with medication management. He underwent a Right parietal and temporal crani and resection (frozen: metastatic melanoma) on 1/2. He tolerated the procedure well and was started on nifedipine post operatively for BP management. He remains on keppra for seizure prophylaxis and on decadron. His lovenox is held presently but is s/p IVC filter on 1/2 with noted LE duplex on 12/31 with proximal DVT. Plan for 1 week post operative resumption of full dose lovenox. He had post op hyponatremia and was started on salt tabs. He was evaluated for admission to acute inpatient rehab and admitted to [facility] on 1/5/24.       #Metastatic melanoma to the brain s/p craniotomy for resection now with Gait Instability, ADL impairments and Functional impairments  -Continue keppra 500mg BID for seizure prophylaxis  -dexamethasone taper completed   -Staples to craniotomy incision intact (s/p craniotomy on 1/2)   -May shower  - will need a post op MRI before next appointment with neurosurgery - Discussed with ROMARIO Fournier [phone] from Radiation Oncology who will schedule OP MRI for patient prior to follow up appointment  -CT to monitor for bleeding prior to starting AC - completed 1/9 - stable     #Right lung mass  - CXR with 10.8cm RUL mass (enlarged from 9.3cm), cont pulmonary hygiene   - receiving immunotherapy monthly    #Hyponatremia  - Salt tabs 1G  BID 1/9  -wean off as tolerated for a normal sodium goal (135-145)    #HTN  -Continue nifedipine XL 60mg daily    #DVT/PE  - diagnosed 8/2023 and has been on therapeutic enoxaparin at home - stopped prior to surgery   - venous duplex 12/31/23 with acute proximal DVT   -IVC filter placed 1/2/24  -As per neurosurgery note, may resume full dose lovenox on 1/9 (POD #7)  -To follow up with vascular surgery as outpatient for IVC filter management   - Hematology consulted in regards to starting pre-op full dose lovenox VS oral agent.    -Continue Eliquis 5mg BID 1/9/24    #Pain control  - Tylenol PRN  -Oxycodone PRN     #GI/Bowel Mgmt   - Continue Senna at bedtime   - Miralax     #Skin:  -  right cranial incision with staples TARA, right groin puncture site TARA, non blanchable erythema to  back 2/2 immunotherapy- per wife.    FEN   - Diet - Regular with Johan Smith  Neurosurgery  [address] [place] [address]  Phone: [phone]  Fax: [phone]  Follow Up Time:     Willi Faulkner  Vascular Surgery  [address] [address]  Phone: [phone]  Fax: [phone]  Follow Up Time:     Tano Richards  Medical Oncology  [address] [place] [address]  Phone: [phone]  Fax: [phone]  Follow Up Time:    TEAM MEETING 1/10/24  SW:  2 marcus lives with wife  OT: Sv for adls, min for showers, cg for transfers  PT: CG for transfers, 120' with RW and CG, 12 steps 1 HR  SLP: Regular with thins, Dec orginzation, dec memory  barriers: impulsive at times, dec balance, needs cues  EDOD: Home 1/12    MEDICALLY STABLE AND READY FOR DISCHARGE HOME.  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 Worthy, s/p SRS (completed 5 rounds 8/15/23), on immunotherapy monthly (last dose 12/15/23, next dose 1/15/2023), drug induced neutropenia (likely bactrim), who presented to [facility] on 12/31/23 for resection of brain tumor. Patient was initially diagnosed after he was found to be leaving the car door open, putting his shoes on the wrong feet, and being forgetful of his usual daily routine. Patient reported on 12/20/23 he fell and hit his head (no LOC) with left sided weakness. Within 2 days, the symptoms of forgetfulness returned. MRI as outpatient on 12/22/23 showed progression of right parietal lobe lesion with worsening mass effect, edema, and 1.2cm MLS. Hospital Course included episodes of hypertension which were controlled with medication management. He underwent a Right parietal and temporal crani and resection (frozen: metastatic melanoma) on 1/2. He tolerated the procedure well and was started on nifedipine post operatively for BP management. He remains on keppra for seizure prophylaxis and on decadron. His lovenox is held presently but is s/p IVC filter on 1/2 with noted LE duplex on 12/31 with proximal DVT. Plan for 1 week post operative resumption of full dose lovenox. He had post op hyponatremia and was started on salt tabs. He was evaluated for admission to acute inpatient rehab and admitted to [facility] on 1/5/24.       #Metastatic melanoma to the brain s/p craniotomy for resection now with Gait Instability, ADL impairments and Functional impairments  -Continue keppra 500mg BID for seizure prophylaxis  -dexamethasone taper completed   -Staples to craniotomy incision intact (s/p craniotomy on 1/2)   -May shower  - will need a post op MRI before next appointment with neurosurgery - Discussed with ROMARIO Fournier [phone] from Radiation Oncology who will schedule OP MRI for patient prior to follow up appointment  -CT to monitor for bleeding prior to starting AC - completed 1/9 - stable     #Right lung mass  - CXR with 10.8cm RUL mass (enlarged from 9.3cm), cont pulmonary hygiene   - receiving immunotherapy monthly    #Hyponatremia  - Salt tabs 1G  BID 1/9  -wean off as tolerated for a normal sodium goal (135-145)    #HTN  -Continue nifedipine XL 60mg daily    #DVT/PE  - diagnosed 8/2023 and has been on therapeutic enoxaparin at home - stopped prior to surgery   - venous duplex 12/31/23 with acute proximal DVT   -IVC filter placed 1/2/24  -As per neurosurgery note, may resume full dose lovenox on 1/9 (POD #7)  -To follow up with vascular surgery as outpatient for IVC filter management   - Hematology consulted in regards to starting pre-op full dose lovenox VS oral agent.    -Continue Eliquis 5mg BID 1/9/24    #Pain control  - Tylenol PRN  -Oxycodone PRN     #GI/Bowel Mgmt   - Continue Senna at bedtime   - Miralax     #Skin:  -  right cranial incision with staples TARA, right groin puncture site TARA, non blanchable erythema to  back 2/2 immunotherapy- per wife.    FEN   - Diet - Regular with Johan Smith  Neurosurgery  [address] [place] [address]  Phone: [phone]  Fax: [phone]  Follow Up Time:     Willi Faulkner  Vascular Surgery  [address] [address]  Phone: [phone]  Fax: [phone]  Follow Up Time:     Tano Richards  Medical Oncology  [address] [place] [address]  Phone: [phone]  Fax: [phone]  Follow Up Time:    TEAM MEETING 1/10/24  SW:  2 marcus lives with wife  OT: Sv for adls, min for showers, cg for transfers  PT: CG for transfers, 120' with RW and CG, 12 steps 1 HR  SLP: Regular with thins, Dec orginzation, dec memory  barriers: impulsive at times, dec balance, needs cues  EDOD: Home 1/12    MEDICALLY STABLE AND READY FOR DISCHARGE HOME.

## 2024-01-18 NOTE — HISTORY OF PRESENT ILLNESS
[FreeTextEntry1] : RT hx: GKRS RIGHT, frontal, parietal cavity, Thalmus, occipital, temporal, LEFT occipital, parietal 3000cgy over 5 Fxs 8/8-8/15/2023   Mr. 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 He recently developed altered mental status, with noncontrast head CT revealing numerous masses, for which he is now s/p right craniotomy with resection of a right frontal parietal mass 6/26/23, with surgical pathology revealing metastatic melanoma. He presents today to discuss radiation therapy recommendations. Prior to this recent presentation, he had last seen his dermatologist 18 months ago.  The course of his recent illness began with appreciation of slight confusion and balance problems 6/16/23. He presented to his PCP, who obtained a CT head non-contrast, that revealed multiple masses in the bilateral cerebral hemisphere, with the largest measuring ~ 5 cm in size, and associated with mass effect as well as a 14 mm right to left midline shift. This prompted admission to [facility], with CT C/A/P demonstrative of a 9 cm heterogeneous right upper lobe mass with extension to the right hilum and notable as well for right perihilar lymphadenopathy. [facility] MRI 6/23/23, re-demonstrated the aforementioned multifocal brain metastases with adjacent edema.  On 6/26/23, he underwent right craniotomy with resection of the dominant right frontal parietal complex mass, with Dr. Robert Worthy, with pathology ultimately revealing metastatic melanoma, wild-type for BRAF. He was evaluated at this time, by medical oncology and radiation oncology (Dr. Nasima English) and discharged on 6/26/23.   He presented to Dr. Richards for initial consult to discuss systemic therapy recommendations, with plan made to proceed with Ipi-nivo, with referral to us, and dermatology.   7/27/23: He presents today for radiation therapy recommendations, for post-op RT to the resection cavity of the right frontal parietal complex mass, as well as definitive therapy of his other intracranial metastases. He feels well today. He reports improvement in his functional status post-operatively   Visit dated 10/11/23 Patient returns for post treatment f/u and progress check after completion of GKRS to multiple metastatic lesions 3000cgy over 5 FXs 8/8-8/15/2023. (RIGHT, frontal, parietal cavity, Thalmus, occipital, temporal, LEFT occipital, parietal) and demonstrated tolerance/ SE. Reports doing well post treatment. Denies N/V, HA/unilateral extremity weakness/memory changes/gait disturbance/bowel/bladder dysfunction or other neurologic symptoms. No issues with speech or comprehension. "I feel well" also back to normal activity. will begin PT on 10/12/2023. Sleep well. Eating well.  Continues to follow with Dr. Richards in the interim patient with severe neutropenia (hematoxity 2/2 immunotherapy) (WBC 1.28) on Prednisone now being tapered. (current dose 30mg) 10/9/23 WBC 20.79 Plans to restart single agent Nivo.  CT C/A/P 9/9/2023 IMPRESSION: Slight interval increase in size of right upper lobe lung mass since CT of 8/3/2023. No other evidence of metastatic disease in the chest, abdomen and pelvis. Diminished thrombus in left pulmonary artery.  Visit dated 10/25/2023 Patient returns to review completed cranial images. Denies N/V, HA/unilateral extremity weakness/memory changes/gait disturbance/bowel/bladder dysfunction or other neurologic symptoms. No issues with speech or comprehension.  he is w/o a new concern during today visit. Follows with Dr Richards Prednisone tapered OFF, Nivo restarted x 1 week.  MRI brain w w/o contrast 10/25/2023:  Multiple large brain metastases consistent with metastatic melanoma are similar or slightly larger in size compared with 8/5/2023. The right posterior temporal postoperative bed is slightly smaller. In addition there are multiple, approximately 10, small nodular enhancing lesions in the left cerebellum, the left medial occipital lobe, the left occipital white matter and right posterior frontal cortex which are new since the prior exam.  Visit dated 11/7/2023 Patient returns to discuss POC.   Visit dated 12/6/2023 patient returns for routine follow up to include progress check with completed short interval cranial images for review. Patient with cutaneous melanoma of the right cheek, now with an M1d, stage IV, melanoma affecting the brain, lungs and lymph nodes. His tumor is wild type for BRAF. Recently completed GKRS to multiple brain mets 8/2023.Reports overall doing well but " my face is a bit red/on fire" onset x 1 week ago prior to this did have a body rash which may have been thought to be r/t immunotherapy. Denies fevers/fatigue. Currently on Minocycline tabs and Clindamycin topical cream  Denies N/V, HA/unilateral extremity weakness/memory changes/gait disturbance/bowel/bladder dysfunction or other neurologic symptoms. No issues with speech or comprehension. Eating well.  Continues to follow with Dr Richards with systemic therapy to be determined after imaging could be Nivo or Nivo/Relatlimab.  MRI brain w w/o contrast 12/4/2023 final read not available at time of entry.  PET CT 12/5/2023 final read not available at time of this entry.  Visit dated 1/172024 Since Mr. Watson' last office visit he underwent a resection of a neoplastic lesion in the cranial middle fossa with cranioplasty on 1/2/2024 with Dr. Johan Hoyosvar PATH consistent with metastatic melanoma Now home from rehab. Overall doing well. Reports walking well and feeling safe w/o the need for assistive device. Started outpatient PT which he thinks has been helping with his balance/gait Denies N/V, HA/unilateral extremity weakness/memory changes/bowel/bladder dysfunction or other neurologic symptoms. No issues with speech or comprehension. Continues to follow with Dr. Rihcards possible reinitiation of systemic therapy to be finalized.  MRI brain w w/o contrast 1/12/2024

## 2024-01-18 NOTE — PHYSICAL EXAM
[de-identified] : facial redness appreciated.  cranial incision RIGHT CDI w/o signs of infection. ALL staples removed

## 2024-01-18 NOTE — REVIEW OF SYSTEMS
[Fever] : no fever [Confused] : no confusion [Dizziness] : no dizziness [Difficulty Walking] : no difficulty walking [de-identified] : facial rash/redness/itchiness restarted steroids 1/14/2024. + staples to RIGHT cranial incision [de-identified] : Denies HAs, or gait disturbance, vision/speech difficulty.

## 2024-01-21 NOTE — CONSULT NOTE ADULT - SUBJECTIVE AND OBJECTIVE BOX
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 Worthy, s/p SRS (completed 5 rounds 8/15/23), on immunotherapy monthly, drug induced neutropenia (likely bactrim), who presented to [facility] on 12/31/23 for resection of brain tumor. He underwent a Right parietal and temporal crani and resection (path: metastatic melanoma) on 1/2, uncomplicated hospital course, discharged to [facility] on 1/5. On 1/20, he was transferred to [facility] from [facility] for concerns of focal seizures described as uncontrollable left arm tremors that stopped with Ativan/Keppra 1500mg. On arrival to [facility], pt with L facial droop, L sided weakness, and dysarthria (new since 3pm per wife), so stroke code was called.  Pt states he feels fine, does not note any new events or weakness.    T(C): --  HR: 80 (01-21-24 @ 00:47) (80 - 94)  BP: 139/80 (01-21-24 @ 00:47) (139/80 - 155/89)  RR: 16 (01-21-24 @ 00:47) (16 - 19)  SpO2: 99% (01-21-24 @ 00:47) (99% - 100%)    PAST MEDICAL & SURGICAL HISTORY:  Melanoma  with mets to liver and brain      DVT, lower extremity      HTN (hypertension)      H/O brain surgery          FAMILY HISTORY:  No pertinent family history in first degree relatives      ROS:   Constitutional: No fever, weight loss or fatigue  Eyes: No eye pain    MEDICATIONS  (STANDING):  dexAMETHasone  Injectable 4 milliGRAM(s) IV Push every 12 hours  levETIRAcetam  IVPB 1500 milliGRAM(s) IV Intermittent two times a day  minocycline 100 milliGRAM(s) Oral two times a day  NIFEdipine XL 60 milliGRAM(s) Oral daily  pantoprazole  Injectable 40 milliGRAM(s) IV Push daily  polyethylene glycol 3350 17 Gram(s) Oral daily  sodium chloride 1 Gram(s) Oral two times a day  sodium chloride 0.9%. 1000 milliLiter(s) (100 mL/Hr) IV Continuous <Continuous>    MEDICATIONS  (PRN):  LORazepam   Injectable 2 milliGRAM(s) IV Push once PRN seizure  ondansetron Injectable 4 milliGRAM(s) IV Push every 6 hours PRN Nausea and/or Vomiting    Allergies    No Known Allergies    Intolerances      Vital Signs Last 24 Hrs  T(C): --  T(F): --  HR: 80 (21 Jan 2024 00:47) (80 - 94)  BP: 139/80 (21 Jan 2024 00:47) (139/80 - 155/89)  BP(mean): 104 (21 Jan 2024 00:47) (104 - 116)  RR: 16 (21 Jan 2024 00:47) (16 - 19)  SpO2: 99% (21 Jan 2024 00:47) (99% - 100%)    Parameters below as of 21 Jan 2024 00:47  Patient On (Oxygen Delivery Method): room air        Physical exam:  Constitutional: No acute distress    Neurologic:  -Mental status: Slight lethargic but awakens easily,, alert, oriented to person, age, and month. Speech is fluent with intact naming, but some hesitancy requiring prompting for attention. +dysarthria.  -Cranial nerves:   II: Visual fields are full to confrontation.  III, IV, VI: Extraocular movements are intact without nystagmus.   VII: Left facial droop  Motor: Normal bulk and tone. Left arm drift. right arm 5/5. Both legs able to lift antigravity  Sensation: ?decreased sensation of left side, continued to say "left" no matter which side was touched    NIHSS: 7    Fingerstick Blood Glucose: CAPILLARY BLOOD GLUCOSE        LABS:                    RADIOLOGY & ADDITIONAL STUDIES:      =

## 2024-01-21 NOTE — H&P ADULT - ASSESSMENT
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 Worthy, s/p SRS (completed 5 rounds 8/15/23), on immunotherapy monthly, s/p Right parietal and temporal crani and resection (path: metastatic melanoma) on 1/2 (Dr. Tony), tx from [place] 1/20 with uncontrollable left arm tremors. On arrival to [facility], pt with L facial droop, L sided weakness, and dysarthria.    NEURO  - neurochecks/vitals q1h  - CTH 1/21 slight interval increase size and conspicuity of metastases and extensive surrounding vasogenic edema  - CTA 1/20 unremarkable  - Dexamethasone 4mg BID  - Keppra 1500mg BID  - EEG, epilepsy consult    CV  - -160  - cont home nifedipine 60mg qd    PULM  - hx large lung mass    GI  - regular diet   - pantoprazole 40mg qd (GI ppx)  - bowel regimen    ENDO  - no active issues    RENAL  - NS 100cc/hr  - voiding    HEME  - hx DVT/PE; hold Eliquis for now, s/p IVCF     ID  - cont minocycline 100mg BID (for facial/body rash)    d/w Dr. Tony

## 2024-01-21 NOTE — STROKE CODE NOTE - HH GRADE HIDDEN
How Many Mls Were Removed From The 40 Mg/Ml (5ml) Vial When Preparing The Injectable Solution?: 0 hide

## 2024-01-21 NOTE — PATIENT PROFILE ADULT - NSPROGENPREVTRANSF_GEN_A_NUR
Assessment:     Healthy 7 m.o. male infant. Problem List Items Addressed This Visit          Musculoskeletal and Integument    Eczema     Other Visit Diagnoses       Well adolescent visit    -  Primary    Encounter for immunization        Relevant Orders    DTAP HIB IPV COMBINED VACCINE IM (Completed)    HEPATITIS B VACCINE PEDIATRIC / ADOLESCENT 3-DOSE IM (Completed)    ROTAVIRUS VACCINE PENTAVALENT 3 DOSE ORAL (Completed)    PNEUMOCOCCAL CONJUGATE VACCINE 13-VALENT GREATER THAN 6 MONTHS (Completed)          Sarah Fernandez is here a well visit today. He is growing and developing well. Routine vaccines given today. Eczema care should include using scent free detergents, soap, and lotions. Cream is better to use vs lotion, for example Aveeno cream.  Frequent "emollient" use is key, such as Vaseline or Aquaphor, at least 3 times per day including after bath. Try to bathe every other day and avoid long hot bathing. Follow up for worsening or for signs of infection including bleeding/drainage or increase redness. Follow up for next [facility] at age 6 months. Enjoy trying more baby foods! Plan:     1. Anticipatory guidance discussed. Specific topics reviewed: add one food at a time every 3-5 days to see if tolerated, avoid small toys (choking hazard), and child-proof home with cabinet locks, outlet plugs, window guardsm and stair tang. 2. Development: appropriate for age    1. Immunizations today: per orders. Discussed with: parents    4. Follow-up visit in 3 months for next well child visit, or sooner as needed. Subjective:    Mykle Boyd is a 7 m.o. male who is brought in for this well child visit. Current Issues:  Sarah Fernandez is here for a well visit today with mom. He is doing well. Current concerns include none. He continues with eczema, which is improving with Aquaphor. Takes Similac Sensitive. He has been trying a variety of fruits and veggies, no reactions as of yet.   Stools vary between loose and formed, no blood. Jorge Gasca is now crawling, sitting on his own, babbling and drinking water from a sippy cup. Well Child Assessment:  History was provided by the mother. Jorge Gasca lives with his mother, father, grandfather, grandmother and sister. Nutrition  Types of milk consumed include formula. Additional intake includes solids. Formula - 26 (every 24 hours) ounces are consumed every 24 hours. Cereal - Types of cereal consumed include oat (2oz oatmeal). Solid Foods - Types of intake include vegetables and fruits. Feeding problems do not include vomiting. Elimination  Urination occurs with every feeding. Bowel movements occur 1-3 times per 24 hours. Stools have a hard and loose consistency. Elimination problems do not include constipation or diarrhea. Sleep  The patient sleeps in his crib. Sleep positions include supine, on side and prone. Average sleep duration is 10 hours. Safety  Home is child-proofed? yes. There is no smoking in the home. Home has working smoke alarms? yes. Home has working carbon monoxide alarms? yes. There is an appropriate car seat in use. Screening  Immunizations are not up-to-date. There are no risk factors for hearing loss. There are no risk factors for tuberculosis. There are no risk factors for oral health. There are no risk factors for lead toxicity. Social  The caregiver enjoys the child. Childcare is provided at child's home. The childcare provider is a parent.        Birth History    Birth     Length: 19.5" (49.5 cm)     Weight: 3890 g (8 lb 9.2 oz)     HC 33 cm (12.99")    Apgar     One: 9     Five: 9    Discharge Weight: 3685 g (8 lb 2 oz)    Delivery Method: Vaginal, Spontaneous    Gestation Age: 39 4/7 wks    Duration of Labor: 2nd: 1h 23m    Days in Hospital: 2.0    Hospital Name: [address] [place] [address] Location: [place], [place]     The following portions of the patient's history were reviewed and updated as appropriate: He  has a past medical history of Eczema. Patient Active Problem List    Diagnosis Date Noted    Eczema     Nevus simplex 2023    Seborrhea capitis 2023    Gastroesophageal reflux disease with esophagitis without hemorrhage 2023     He  has a past surgical history that includes Circumcision. His family history includes Aneurysm in his maternal grandfather; Diverticulitis in his maternal grandmother; No Known Problems in his father; Post-traumatic stress disorder in his mother. Current Outpatient Medications   Medication Sig Dispense Refill    Glycerin, Laxative, (Glycerin, Infants & Children,) 1 g SUPP Insert 1 suppository into the rectum 2 (two) times a day as needed (constipation) 12 suppository 0    mupirocin (BACTROBAN) 2 % ointment Apply topically 3 (three) times a day for 10 days 22 g 0     No current facility-administered medications for this visit. He has No Known Allergies.     Developmental 4 Months Appropriate       Question Response Comments    Gurgles, coos, babbles, or similar sounds Yes  Yes on 2023 (Age - 3 m)    Follows caretaker's movements by turning head from one side to facing directly forward Yes  Yes on 2023 (Age - 3 m)    Follows parent's movements by turning head from one side almost all the way to the other side Yes  Yes on 2023 (Age - 3 m)    Lifts head off ground when lying prone Yes  Yes on 2023 (Age - 3 m)    Lifts head to 39' off ground when lying prone Yes  Yes on 2023 (Age - 3 m)    Lifts head to 80' off ground when lying prone Yes  Yes on 2023 (Age - 3 m)    Laughs out loud without being tickled or touched Yes  Yes on 2023 (Age - 3 m)    Plays with hands by touching them together Yes  Yes on 2023 (Age - 3 m)    Will follow caretaker's movements by turning head all the way from one side to the other Yes  Yes on 2023 (Age - 3 m)            Screening Questions:  Risk factors for lead toxicity: no      Objective: Growth parameters are noted and are appropriate for age. Wt Readings from Last 1 Encounters:   10/12/23 8.99 kg (19 lb 13.1 oz) (76 %, Z= 0.69)*     * Growth percentiles are based on WHO (Boys, 0-2 years) data. Ht Readings from Last 1 Encounters:   10/12/23 27.4" (69.6 cm) (55 %, Z= 0.12)*     * Growth percentiles are based on WHO (Boys, 0-2 years) data. Head Circumference: 44 cm (17.32")  Vitals:    10/12/23 1627   Weight: 8.99 kg (19 lb 13.1 oz)   Height: 27.4" (69.6 cm)   HC: 44 cm (17.32")       Physical Exam  HENT:      Head: Normocephalic. Anterior fontanelle is flat. Right Ear: Tympanic membrane and ear canal normal.      Left Ear: Tympanic membrane and ear canal normal.      Nose: Nose normal.      Mouth/Throat:      Mouth: Mucous membranes are moist.      Pharynx: No posterior oropharyngeal erythema. Eyes:      General: Red reflex is present bilaterally. Conjunctiva/sclera: Conjunctivae normal.   Cardiovascular:      Rate and Rhythm: Normal rate and regular rhythm. Heart sounds: Normal heart sounds. No murmur heard. Pulmonary:      Effort: Pulmonary effort is normal.      Breath sounds: Normal breath sounds. Abdominal:      General: Bowel sounds are normal. There is no distension. Palpations: Abdomen is soft. Genitourinary:     Penis: Normal and circumcised. Testes: Normal.   Musculoskeletal:         General: Normal range of motion. Cervical back: Neck supple. Right hip: Negative right Ortolani and negative right Lozano. Left hip: Negative left Ortolani and negative left Lozano. Lymphadenopathy:      Cervical: No cervical adenopathy. Skin:     Capillary Refill: Capillary refill takes less than 2 seconds. Findings: Rash present. Comments: Few scattered dry patches of skin on the chest, cheeks and legs   Neurological:      General: No focal deficit present. Mental Status: He is alert. Motor: No abnormal muscle tone. Review of Systems   Constitutional:  Negative for fever. HENT:  Negative for congestion. Eyes:  Negative for discharge. Respiratory:  Negative for cough. Cardiovascular:  Negative for cyanosis. Gastrointestinal:  Negative for constipation, diarrhea and vomiting. Skin:  Positive for rash.       as per HPI no

## 2024-01-21 NOTE — CONSULT NOTE ADULT - SUBJECTIVE AND OBJECTIVE BOX
HPI: 63 YOM with PMH of DVT/PT (8/2023 on enoxaparin), remote history of MOHs resection of melanoma with development of brain mets (melanoma) in 6/2023 s/p R crani for resection followed by 5-cycles SRS (end 8/2023) currently on immunotherapy (x2 doses) and recent admission for s/p right parietal and temporal craniotomies for resection of two brain lesions (Dr. Tony on 1/2) as well as IVC filter placement. Patient was discharged to [facility] where his functionally improved and he was started on therapeutic AC with apixaban, discharged to home on 1/12. On 1/20 patient had left arm tremors c/f seizures and was seen initially at [facility], transferred here for further evaluation.     Patient himself hooked up to [facility] and wondering how long he will be in the hospital. Denies headache, vision changes.   Denies fever, rhinorrhea, sore throat, chest pain, palpitations, cough, SOB, abd pain, N/V/D, dysuria. Of note, has rash on body (most severe on face) that he states is related to immunotherapy. States had similar rash after first IT infusion.     ROS: negative except as per HPI    PMH: as per HPI  PSH: as per HPI  Medications: reviewed  Allergies: reviewed  SH:  FH:    Diet, Regular (01-21-24 @ 00:39) [Active]      MEDICATIONS:  MEDICATIONS  (STANDING):  dexAMETHasone  Injectable 4 milliGRAM(s) IV Push every 12 hours  levETIRAcetam  IVPB 1500 milliGRAM(s) IV Intermittent two times a day  minocycline 100 milliGRAM(s) Oral two times a day  NIFEdipine XL 90 milliGRAM(s) Oral daily  pantoprazole  Injectable 40 milliGRAM(s) IV Push daily  polyethylene glycol 3350 17 Gram(s) Oral daily  sodium chloride 1 Gram(s) Oral two times a day  sodium chloride 0.9%. 1000 milliLiter(s) (100 mL/Hr) IV Continuous <Continuous>    MEDICATIONS  (PRN):  LORazepam   Injectable 2 milliGRAM(s) IV Push once PRN seizure  ondansetron Injectable 4 milliGRAM(s) IV Push every 6 hours PRN Nausea and/or Vomiting      Allergies    No Known Allergies    Intolerances        OBJECTIVE:  Vital Signs Last 24 Hrs  T(C): 36.4 (21 Jan 2024 08:25), Max: 37.1 (21 Jan 2024 00:47)  T(F): 97.5 (21 Jan 2024 08:25), Max: 98.8 (21 Jan 2024 00:47)  HR: 94 (21 Jan 2024 12:45) (80 - 97)  BP: 144/95 (21 Jan 2024 12:45) (139/80 - 156/96)  BP(mean): 120 (21 Jan 2024 05:30) (104 - 120)  RR: 18 (21 Jan 2024 12:45) (16 - 19)  SpO2: 100% (21 Jan 2024 08:25) (99% - 100%)    Parameters below as of 21 Jan 2024 12:45  Patient On (Oxygen Delivery Method): room air      I&O's Summary    20 Jan 2024 07:01  -  21 Jan 2024 07:00  --------------------------------------------------------  IN: 0 mL / OUT: 850 mL / NET: -850 mL    21 Jan 2024 07:01  -  21 Jan 2024 15:45  --------------------------------------------------------  IN: 500 mL / OUT: 0 mL / NET: 500 mL        PHYSICAL EXAM:  Gen: appears stated age, resting comfortably, NAD  HEENT: R crani incision CDI, MMM, see skin exam  Neck: supple  CV: RRR, no m/r/g, peripheral pulses 2+  Pulm: CTAB, no increased work of breathing, no rales/rhonchi  Abd: soft, ND, NT, no rebound or guarding  Skin: warm and dry  Ext: non-tender, no edema, scattered hyperpigmented lesions on trunk and extremities, face diffusely erythematous with scattered pustular lesions   Neuro: AOx3, speaking in full sentences, motor strength grossly intact, L facial droop  Psych: affect and behavior appropriate, pleasant at time of interview    LABS:                        13.1   7.73  )-----------( 247      ( 21 Jan 2024 06:22 )             41.1     01-21    140  |  104  |  21  ----------------------------<  124<H>  3.8   |  25  |  0.99    Ca    9.2      21 Jan 2024 06:22  Phos  4.3     01-21  Mg     2.4     01-21        PT/INR - ( 21 Jan 2024 06:22 )   PT: 11.5 sec;   INR: 1.01          PTT - ( 21 Jan 2024 06:22 )  PTT:26.7 sec  CAPILLARY BLOOD GLUCOSE        Urinalysis Basic - ( 21 Jan 2024 06:22 )    Color: x / Appearance: x / SG: x / pH: x  Gluc: 124 mg/dL / Ketone: x  / Bili: x / Urobili: x   Blood: x / Protein: x / Nitrite: x   Leuk Esterase: x / RBC: x / WBC x   Sq Epi: x / Non Sq Epi: x / Bacteria: x        MICRODATA:      RADIOLOGY/OTHER STUDIES:

## 2024-01-21 NOTE — CONSULT NOTE ADULT - ASSESSMENT
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 Worthy, s/p SRS (completed 5 rounds 8/15/23), on immunotherapy monthly, drug induced neutropenia (likely bactrim), who presented to [facility] on 12/31/23 for resection of brain tumor. He underwent a Right parietal and temporal crani and resection (path: metastatic melanoma) on 1/2, uncomplicated hospital course, discharged to [place] [facility] on 1/5. On 1/20, he was transferred to [facility] from [facility] for concerns of focal seizures described as uncontrollable left arm tremors that stopped with Ativan/Keppra 1500mg. On arrival to [facility], pt with L facial droop, L sided weakness, and dysarthria (new since 3pm per wife), so stroke code was called. CT showing worsening vasogenic edema around known lesions. CTAs negative. Epilepsy consulted in the setting of c/f seizures.     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 with Dr. Worthy, s/p SRS (completed 5 rounds 8/15/23), on immunotherapy monthly, drug induced neutropenia (likely bactrim), who presented to [facility] on 12/31/23 for resection of brain tumor. He underwent a Right parietal and temporal crani and resection (path: metastatic melanoma) on 1/2, uncomplicated hospital course, discharged to [facility] on 1/5. On 1/20, he was transferred to [facility] from [facility] for concerns of focal seizures described as uncontrollable left arm tremors that stopped with Ativan/Keppra 1500mg. On arrival to [facility], pt with L facial droop, L sided weakness, and dysarthria (new since 3pm per wife), so stroke code was called. CT showing worsening vasogenic edema around known lesions. CTAs negative. Epilepsy consulted in the setting of c/f seizures.     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 Worthy, s/p SRS (completed 5 rounds 8/15/23), on immunotherapy monthly, drug induced neutropenia (likely bactrim), who presented to [facility] on 12/31/23 for resection of brain tumor. He underwent a Right parietal and temporal crani and resection (path: metastatic melanoma) on 1/2, uncomplicated hospital course, discharged to [place] [facility] on 1/5. On 1/20, he was transferred to [facility] from [facility] for concerns of focal seizures described as uncontrollable left arm tremors that stopped with Ativan/Keppra 1500mg. On arrival to [facility], pt with L facial droop, L sided weakness, and dysarthria (new since 3pm per wife), so stroke code was called. CT showing worsening vasogenic edema around known lesions. CTAs negative. Epilepsy consulted in the setting of c/f seizures.     #Metastatic melanoma   #Seizure like activity   - Continue Dexamethasone 4mg IV q12hrs as per NSx  - Continue Keppra 1500mg IVPB q12hrs   - Continue Lorazepam 2mg IV PRN for seizure  - Continue vEEG

## 2024-01-21 NOTE — EEG REPORT - NS EEG TEXT BOX
[facility] Department of Neurology  Inpatient Continuous video-Electroencephalogram  [address] [place] [address], [address] [place] [id number], T: [phone]    Patient Name:	SHRADDHA PROCTOR    :	1960  MRN:	[id number]    Study Start Date/Time:	2024, 3:13:13 AM  Study End Date/Time: ongoing    Brief Clinical History:  SHRADDHA PROCTOR is a 63 year old Male; study performed to investigate for seizures or markers of epilepsy.     Diagnosis Code:  R56.9 convulsions/seizure    Pertinent Medication:  n/a    Acquisition Details:  Electroencephalography was acquired using a minimum of 21 channels on an [employer] system v 9.3.1 with electrode placement according to the standard International 10-20 system following ACNS (American Clinical Neurophysiology Society) guidelines.  Anterior temporal T1 and T2 electrodes were utilized whenever possible.  The [employer] automated spike & seizure detections were all reviewed in detail, in addition to the entire raw EEG.    Findings:  Day 1:  2024, 3:13:13 AM to same day at 07:00 AM   Background:  continuous, with predominantly alpha and beta frequencies.  Generalized Slowing:  None  Symmetry/Focality: Continuous (90+%) theta/delta over right temporal region   Voltage:  Normal (20+ uV)  Organization:  Appropriate anterior-posterior gradient  Posterior Dominant Rhythm:  9 Hz asymmetric, formed over the left  Sleep:  Symmetric, synchronous spindles and K complexes.  Variability:   Yes		Reactivity:  Yes    Spontaneous Activity:  Occasional ( >1/hr < 1/min) low/medium amplitude sharp waves over right temporal region  Events:  1)	No electrographic seizures or significant clinical events occurred during this study.  Provocations:  •	Hyperventilation: was not performed.  •	Photic stimulation: was not performed.  Daily Summary:    1)	Focal slowing over right temporal region suggestive of underlying cerebral dysfunction   2)	Occasional low/medium amplitude sharp waves over right temporal region suggestive of underying hyperexcitability        Kristan Camp MD  Attending Neurologist, [facility] Epilepsy [facility]

## 2024-01-21 NOTE — PATIENT PROFILE ADULT - FALL HARM RISK - HARM RISK INTERVENTIONS

## 2024-01-21 NOTE — CONSULT NOTE ADULT - ASSESSMENT
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 Tony on 1/2) on immunotherapy (x2 doses),  HTN, DVT s/p IVC 1/2 on apixaban transferred from OSH for further evaluation of seizures.     Seizure like activity  Metastatic brain melanoma s/p OR with Dr. Tony on 1/2 for R crani  - management per NSGY, neurology/epilepsy following  - CTH concerning for worsening vasogenic edema around known lesions, CTAs negative  - weaned of steroids outpatient after which patient developed seizure like activity  - EEG so far with focal slowing as well as sharp waves right temporal region   - levetiracetam increased to 1500mg IV BID  - dexamethasone increased to 4mg IV BID (+PPI)  - frequent perioperative incentive spirometer use  - early ambulation and OOBTC as tolerated    Lactic acidosis  - lact 2.1 --> 2.6  - suspect in setting of seizure like activity  - recommend 1L IVFH  - obtain repeat lactate to ensure clearance    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  - initially diagnosed 8/2023 and had been on therapeutic enoxaparin   - venous duplex 12/31/23 with acute proximal DVT --> IVC filter placed 1/2  - started on apixaban on 1/10 at [facility] - on hold per primary  - consider chemical DVT ppx with LMWH or SQH if surgically safe  - eventually will need outpatient vascular follow up for retrieval if able to tolerate full AC    Chronic hyponatremia  - discharged on salt tabs from recent hospitalization  - cont salt tabs 1g BID    Hypertension  - increase nifedipine to 90mg ER with holding parameters    Metastatic melanoma  Right lung mass  - CXR with 10.8cm RUL mass (enlarged from 9.3cm), cont pulmonary hygiene   - receiving nivolumab/relatimab monthly, has received two doses (confirm date of last infusion)  - heme/onc follow up    Facial rash  - per patient and wife, related to immunotherapy infusions  - minocycline 100mg BID per outpatient heme/onc  - patient states also uses topical cream - confirm with patient/wife    Dispo: TBD    Recommendations and plan discussed with primary team and wife at bedside.

## 2024-01-21 NOTE — CONSULT NOTE ADULT - ASSESSMENT
63y Male with PMHx of PM of DVT/PE 8/2023 (on Eliquis, s/p IVCF 1/2/24), melanoma on face s/p MOHs 15 yrs ago, melanoma brain mets diagnosed 6/2023 s/p right crani for resection at [facility] with Dr. Worthy, s/p SRS (completed 5 rounds 8/15/23), on immunotherapy monthly, drug induced neutropenia (likely bactrim), who presented to [facility] on 12/31/23 for resection of brain tumor. He underwent a Right parietal and temporal crani and resection (path: metastatic melanoma) on 1/2, uncomplicated hospital course, discharged to [place] [facility] on 1/5. On 1/20, he was transferred to [facility] from [facility] for concerns of focal seizures described as uncontrollable left arm tremors that stopped with Ativan/Keppra 1500mg. On arrival to [facility], pt with L facial droop, L sided weakness, and dysarthria (new since 3pm per wife), so stroke code was called.    CT showing worsening vasogenic edema around known lesions. CTAs negative    -recommend EEG, consider Epilepsy consult pending results  -vasogenic edema management per primary team  -please reconsult stroke team as needed    Discussed with Dr. Pryor

## 2024-01-21 NOTE — CONSULT NOTE ADULT - SUBJECTIVE AND OBJECTIVE BOX
63y Male with PMHx of PM of DVT/PE 8/2023 (on Eliquis, s/p IVCF 1/2/24), melanoma on face s/p MOHs 15 yrs ago, melanoma brain mets diagnosed 6/2023 s/p right crani for resection at [facility] with Dr. Wotrhy, s/p SRS (completed 5 rounds 8/15/23), on immunotherapy monthly, drug induced neutropenia (likely bactrim), who presented to [facility] on 12/31/23 for resection of brain tumor. He underwent a Right parietal and temporal crani and resection (path: metastatic melanoma) on 1/2, uncomplicated hospital course, discharged to [place] [facility] on 1/5. On 1/20, he was transferred to [facility] from [facility] for concerns of focal seizures described as uncontrollable left arm tremors that stopped with Ativan/Keppra 1500mg. On arrival to [facility], pt with L facial droop, L sided weakness, and dysarthria (new since 3pm per wife), so stroke code was called.    CT showing worsening vasogenic edema around known lesions. CTAs negative    vEEG with focal slowing over R temporal region. occasional low/med amplitude sharp waves over right temporal region.     Review of Systems:  CONSTITUTIONAL:  No weight loss, fever, chills, weakness or fatigue.  HEENT:  Eyes:  No visual loss, blurred vision, double vision or yellow sclerae. Ears, Nose, Throat:  No hearing loss, sneezing, congestion, runny nose or sore throat.  SKIN:  No rash or itching.  CARDIOVASCULAR:  No chest pain, chest pressure or chest discomfort. No palpitations or edema.  RESPIRATORY:  No shortness of breath, cough or sputum.  GASTROINTESTINAL:  No anorexia, nausea, vomiting or diarrhea. No abdominal pain or blood.  GENITOURINARY: No Burning on urination.   NEUROLOGICAL:  see HPI  MUSCULOSKELETAL:  No muscle, back pain, joint pain or stiffness.  HEMATOLOGIC:  No anemia, bleeding or bruising.  LYMPHATICS:  No enlarged nodes. No history of splenectomy.  PSYCHIATRIC:  No history of depression or anxiety.  ENDOCRINOLOGIC:  No reports of sweating, cold or heat intolerance. No polyuria or polydipsia.  ALLERGIES:  No history of asthma, hives, eczema or rhinitis.      General Adult Exam  GENERAL APPEARANCE: Well developed, well nourished, alert and cooperative, and appears to be in no acute distress.  EYES: PERRL, EOMI. vision is grossly intact.  EARS: External auditory canals and tympanic membranes clear, hearing grossly intact.  NOSE: No nasal discharge.  CARDIAC: Normal S1 and S2. No S3, S4 or murmurs. Rhythm is regular. There is no peripheral edema, cyanosis or pallor. Extremities are warm and well perfused. Capillary refill is less than 2 seconds. No carotid bruits.  LUNGS: Clear to auscultation and percussion without rales, rhonchi, wheezing or diminished breath sounds.  ABDOMEN: Positive bowel sounds. Soft, nondistended, nontender. No guarding or rebound. No masses.  MUSCULOSKELETAL: Normal muscular development. Normal gait.  BACK: Examination of the spine reveals normal gait and posture, no spinal deformity, symmetry of spinal muscles, without tenderness, decreased range of motion or muscular spasm.  EXTREMITIES: No significant deformity or joint abnormality. No edema. Peripheral pulses intact. No varicosities.  SKIN: Skin normal color, texture and turgor with no lesions or eruptions.    Neurological Exam:  -Mental status: Slight lethargic but awakens easily,, alert, oriented to person, age, and month. Speech is fluent with intact naming, but some hesitancy requiring prompting for attention. +dysarthria.  -Cranial nerves:   II: Visual fields are full to confrontation.  III, IV, VI: Extraocular movements are intact without nystagmus.   VII: Left facial droop  Motor: Normal bulk and tone. Left arm drift. right arm 5/5. Both legs able to lift antigravity  Sensation: ?decreased sensation of left side, continued to say "left" no matter which side was touched    Deep Tendon Reflexes: 1+ bilateral biceps, triceps, brachioradialis, knee and ankle  Toes flexor bilaterally     63y Male with PMHx of PM of DVT/PE 8/2023 (on Eliquis, s/p IVCF 1/2/24), melanoma on face s/p MOHs 15 yrs ago, melanoma brain mets diagnosed 6/2023 s/p right crani for resection at [facility] with Dr. Worthy, s/p SRS (completed 5 rounds 8/15/23), on immunotherapy monthly, drug induced neutropenia (likely bactrim), who presented to [facility] on 12/31/23 for resection of brain tumor. He underwent a Right parietal and temporal crani and resection (path: metastatic melanoma) on 1/2, uncomplicated hospital course, discharged to [place] [facility] on 1/5. On 1/20, [facility] for concerns of focal seizures described as uncontrollable left arm tremors that stopped with Ativan/Keppra 1500mg. On arrival to [facility], pt with L facial droop, L sided weakness, and dysarthria (new since 3pm per wife), so stroke code was called.    CT showing worsening vasogenic edema around known lesions. CTAs negative    vEEG with focal slowing over R temporal region. occasional low/med amplitude sharp waves over right temporal region.     Review of Systems:  CONSTITUTIONAL:  No weight loss, fever, chills, weakness or fatigue.  HEENT:  Eyes:  No visual loss, blurred vision, double vision or yellow sclerae. Ears, Nose, Throat:  No hearing loss, sneezing, congestion, runny nose or sore throat.  SKIN:  No rash or itching.  CARDIOVASCULAR:  No chest pain, chest pressure or chest discomfort. No palpitations or edema.  RESPIRATORY:  No shortness of breath, cough or sputum.  GASTROINTESTINAL:  No anorexia, nausea, vomiting or diarrhea. No abdominal pain or blood.  GENITOURINARY: No Burning on urination.   NEUROLOGICAL:  see HPI  MUSCULOSKELETAL:  No muscle, back pain, joint pain or stiffness.  HEMATOLOGIC:  No anemia, bleeding or bruising.  LYMPHATICS:  No enlarged nodes. No history of splenectomy.  PSYCHIATRIC:  No history of depression or anxiety.  ENDOCRINOLOGIC:  No reports of sweating, cold or heat intolerance. No polyuria or polydipsia.  ALLERGIES:  No history of asthma, hives, eczema or rhinitis.      General Adult Exam  GENERAL APPEARANCE: Well developed, well nourished, alert and cooperative, and appears to be in no acute distress.  EYES: PERRL, EOMI. vision is grossly intact.  EARS: External auditory canals and tympanic membranes clear, hearing grossly intact.  NOSE: No nasal discharge.  CARDIAC: Normal S1 and S2. No S3, S4 or murmurs. Rhythm is regular. There is no peripheral edema, cyanosis or pallor. Extremities are warm and well perfused. Capillary refill is less than 2 seconds. No carotid bruits.  LUNGS: Clear to auscultation and percussion without rales, rhonchi, wheezing or diminished breath sounds.  ABDOMEN: Positive bowel sounds. Soft, nondistended, nontender. No guarding or rebound. No masses.  MUSCULOSKELETAL: Normal muscular development. Normal gait.  BACK: Examination of the spine reveals normal gait and posture, no spinal deformity, symmetry of spinal muscles, without tenderness, decreased range of motion or muscular spasm.  EXTREMITIES: No significant deformity or joint abnormality. No edema. Peripheral pulses intact. No varicosities.  SKIN: Skin normal color, texture and turgor with no lesions or eruptions.    Neurological Exam:  -Mental status: Slight lethargic but awakens easily,, alert, oriented to person, age, and month. Speech is fluent with intact naming, but some hesitancy requiring prompting for attention. +dysarthria.  -Cranial nerves:   II: Visual fields are full to confrontation.  III, IV, VI: Extraocular movements are intact without nystagmus.   VII: Left facial droop  Motor: Normal bulk and tone. Left arm drift 4+/5,  right arm 5/5. Both legs able to lift antigravity  Sensation: ?decreased sensation of left side, continued to say "left" no matter which side was touched    Deep Tendon Reflexes: 1+ bilateral biceps, triceps, brachioradialis, knee and ankle  Toes flexor bilaterally

## 2024-01-21 NOTE — H&P ADULT - HISTORY OF PRESENT ILLNESS
63 year old male with PMH of DVT/PE 8/2023 (on Eliquis, s/p IVCF 1/2/24), melanoma on face s/p MOHs 15 yrs ago, melanoma brain mets diagnosed 6/2023 s/p right crani for resection at [facility] with Dr. Worthy, s/p SRS (completed 5 rounds 8/15/23), on immunotherapy monthly, drug induced neutropenia (likely bactrim), who presented to [facility] on 12/31/23 for resection of brain tumor. Patient was initially diagnosed after he was found to be leaving the car door open, putting his shoes on the wrong feet, and being forgetful of his usual daily routine. Patient reported on 12/20/23 he fell and hit his head (no LOC) with left sided weakness. Within 2 days, the symptoms of forgetfulness returned. MRI as outpatient on 12/22/23 showed progression of right parietal lobe lesion with worsening mass effect, edema, and 1.2cm MLS. Hospital Course included episodes of hypertension which were controlled with medication management. He underwent a Right parietal and temporal crani and resection (path: metastatic melanoma) on 1/2, uncomplicated hospital course, discharged to [facility] on 1/5.     On 1/20, he was transferred to [facility] from [facility] for concerns of focal seizures. Pt experienced uncontrollable left arm tremors 1/20 around 11am, more persistent throughout afternoon, ceased with Ativan/Keppra 1500mg. On arrival to [facility], pt with L facial droop, L sided weakness, and dysarthria (new since 3pm per wife). Stroke code called and notable for increased vasogenic edema around known lesions.    63 year old male with PMH of DVT/PE 8/2023 (on Eliquis, s/p IVCF 1/2/24), melanoma on face s/p MOHs 15 yrs ago, melanoma brain mets diagnosed 6/2023 s/p right crani for resection at [facility] with Dr. Worthy, s/p SRS (completed 5 rounds 8/15/23), on immunotherapy monthly, drug induced neutropenia (likely bactrim), who presented to [facility] on 12/31/23 for resection of brain tumor. Patient was initially diagnosed after he was found to be leaving the car door open, putting his shoes on the wrong feet, and being forgetful of his usual daily routine. Patient reported on 12/20/23 he fell and hit his head (no LOC) with left sided weakness. Within 2 days, the symptoms of forgetfulness returned. MRI as outpatient on 12/22/23 showed progression of right parietal lobe lesion with worsening mass effect, edema, and 1.2cm MLS. Hospital Course included episodes of hypertension which were controlled with medication management. He underwent a Right parietal and temporal crani and resection (path: metastatic melanoma) on 1/2, uncomplicated hospital course, discharged to [facility] on 1/5.     On 1/20, he was transferred to [facility] from [facility] for concerns of focal seizures. Pt experienced uncontrollable left arm tremors 1/20 around 11am, more persistent throughout afternoon, ceased with Ativan/Keppra 1500mg. On arrival to [facility], pt with L facial droop, L sided weakness, and dysarthria (new since 3pm per wife). Pt is compliant with home Keppra 500mg BID and Dexamethasone 2mg qd. Stroke code called and imaging notable for increased vasogenic edema around known lesions.

## 2024-01-21 NOTE — PATIENT PROFILE ADULT - HISTORY OF COVID-19 VACCINATION
DATE OF SERVICE:  01/18/2017



PROGRESS NOTE



DIAGNOSES:  Lumbar radiculopathy with lumbar herniated disk and lumbar spinal

stenosis.



HISTORY OF PRESENT ILLNESS:  The patient is a 78-year-old female who returns for

followup, last seen in 10/2016.  The patient is doing well on a medication

regimen of Duragesic patches at both 100 mcg and 25 mcg, which she uses either

both together or just 100 mcg by herself.  The patient reports that recently

though, she has been using 100 plus the 25 more consistently as her pain has

been much worse over the past month or so.  The patient also taking oxycodone at

10 mg up to 3 a day on some days and some days less.  The patient reports that

she is having significant pain in the base of the neck, right upper extremity,

also low back, bilateral lower extremities with pain in the feet as well.  The

patient does have a spinal cord stimulator, which she has turned off as she

reports that the stimulation was aggravating the neuropathic pain in her feet

and we discussed her following up with [employer] representative to see

if this can be adjusted.  The patient was planning on doing this as well.  The

patient reports she is having some knife pains throughout the arms, legs, and

shoulders, which are inconsistent, but in various areas of the body.  She also

is taking some IgG therapy, which she reports aggravates some joint pain

significantly as well in shoulders, elbows, hips, knees and feet.  The patient

reports the pain as an 8 on a scale of 10 at its worst, although she is doing

well with the medication, both the Duragesic patches and oxycodone and reports

approximately a 75% improvement with the medication by itself and again with no

side effects.  No nausea, no vomiting, no constipation, no dysphoria, loss of

memory or itching.



PHYSICAL EXAMINATION:

VITAL SIGNS:  The patient's blood pressure is 116/76, pulse 103, respirations

are 20, temperature 98.3 degrees Fahrenheit, weight is 215 pounds.

GENERAL:  The patient is awake, alert, oriented, appropriate, very pleasant

demeanor.  The patient is accompanied by her son and her .

HEENT:  Shows normocephalic, atraumatic.  The patient is wearing eyeglasses. 

Extraocular movements are intact and symmetrical.  Oral cavity shows mucous

membranes are moist and pink.  Dentition is intact.

NECK:  Shows anterior throat supple without palpable lymphadenopathy noted. 

Swallow reflex is symmetrical.  Neck shows full rotation and motion of the

cervical spine without difficulty or tenderness including extension and flexion.

CHEST:  Shows normal on inspection with breath sounds are clear to auscultation

bilaterally.

HEART:  Shows S1 and S2 clear.  No murmurs auscultated.

ABDOMEN:  Obese, soft, nontender, nondistended.  No palpable organomegaly is

noted.  No rebound or guarding demonstrated.

BACK:  The patient's back shows normal-appearing cervical lordotic curvature,

thoracic kyphotic curvature and flattening of the lumbar lordotic curvature. 

The patient's cervical paraspinous muscle shows some moderate tenderness with

palpation in the base of the neck on the right side compared to the left in the

superomedial trapezius as well as the inferior cervical paraspinous musculature,

normal in appearance with symmetry, but significant with palpation and pain

without specific trigger points or radiation.  The patient's low back shows some

moderate tenderness with palpation.  Well-healed surgical scaring is again noted

from spinal cord stimulator implant.  Also stimulator over the right gluteus,

which is palpable, but nontender.  Paraspinous musculature in the lumbar

distribution shows some moderate tenderness with palpation in the upper, middle

and lower distribution bilaterally, but is diffusely tender and symmetrical in

appearance.  The patient shows good rotation and motion of lumbar spine, both

laterally as well as extension and flexion without exacerbation of pain. 

EXTREMITIES:  The patient's extremities show deep tendon reflexes at 1+ in the

patellar and tendo calcaneus tendons in the lower extremities and 1+ in the

biceps and triceps tendons of the upper extremities.  Motor exam is strong with

approximately 4 on a scale of 5, dorsiflexion, extension and 4/5 with quadriceps

and hamstring flexion.   strength is strong at 5/5 bicep and tricep flexion,

approximately 4 on a scale of 5, but symmetrical as well.  Peripheral pulses are

1+ posterior tibial and dorsalis pedis pulses and 2+ in the radial distribution

bilaterally.



PLAN:  Options were discussed with the patient.  At this time, the patient's old

chart was reviewed as her current medication regimen and updated.  Current

review of systems updated today as well.  We will refill patient's Duragesic

patches at 125, two separate patches with a third patch at 25 mcg to combine and

make 150 if necessary.  The patient is apprehensive about going straight to 150

mcg, 100 mcg patch plus a 50 mcg patch and we will try this very slowly with

her, so she may wean herself up to this if needed.  Also, we will have the

ability to increase oxycodone to 4 times a day if necessary instead of 3 times. 

She has been given instruction as well as side effects to be aware of with the

medication and the _____ medication regimen as well as activity level and side

effects to be aware of.  The patient will follow up in approximately 90 days for

visit and was asked to call the clinic within the next week or so for progress

report on how regimen of medication is doing at that time.

 



______________________________

JERRY CUEVA MD



DR:  FAYE/zane  JOB#:  [id number] / [id number]

DD:  01/18/2017 12:46  DT:  01/19/2017 11:09 Vaccine status unknown

## 2024-01-21 NOTE — H&P ADULT - NSHPPHYSICALEXAM_GEN_ALL_CORE
General: NAD, drowsy, but easily arousable  HEENT:  PERRL 3mm briskly reactive, EOMI b/l, +L facial droop, tongue midline, neck FROM  Cardiovascular: RRR, normal S1 and S2   Respiratory: lungs CTAB, no wheezing, rhonchi, or crackles   GI: normoactive BS to auscultation, abd soft, NTND   Neuro: A&Ox3, needed prompting, No aphasia, speech dysarthric, no dysmetria, +LUE/LLE drifts. Follows commands.  LAZARO x4 spontaneously, RUE 5/5, LUE 4/5, RLE 5/5 except DF 2/5, LLE 4+/5. Sensation intact throughout.   Extremities: distal pulses 2+ x4  Wound/incision: R crani incision c/d/i, well healed  Skin: Red hives, some pustules to face, trunk, extremities

## 2024-01-21 NOTE — STROKE CODE NOTE - MRS SCORE
(4) Moderately severe disabilty.  Unable to attend to own bodily needs without assistance, and unable to walk unassisted. [Palpitations] : palpitations

## 2024-01-21 NOTE — CONSULT NOTE ADULT - ATTENDING COMMENTS
63y Male with PMHx of PMH of DVT/PE 8/2023 (on Eliquis, s/p IVCF 1/2/24), melanoma on face s/p MOHs 15 yrs ago, melanoma brain mets diagnosed 6/2023 s/p right crani  No history of seizures, reportedly on keppra 500mg BID as prophylaxis.  Admitted s/p episode of left arm shaking, patient reports lasted 10-15 minutes. Unable to control, no loss of consciousness,  stopped on its own. first time this has occurred.  Suspicious for focal motor seizure in the setting of worsening vasogenic edema  Agree with increase of Keppra to 1500mg BID  Steroids as per Nsx  Continue EEG  Seizure precautions

## 2024-01-22 NOTE — EEG REPORT - NS EEG TEXT BOX
[facility] Department of Neurology  Inpatient Continuous video-Electroencephalography Report    Acquisition Details:  Electroencephalography was acquired using a minimum of 21 channels on an [employer] system v 9.3.1 with electrode placement according to the standard International 10-20 system following ACNS (American Clinical Neurophysiology Society) guidelines for Long-Term Video EEG monitoring.  Anterior temporal T1 and T2 electrodes were utilized whenever possible.   The [employer] automated spike & seizure detections were all reviewed in detail, in addition to extensive portions of raw EEG.      Daily Updates (from 07:00 am until 07:00 am):  Day 2  Jan 21-22, 2024:   Medications:  Levetiracetam 1500mg bid  Background:  continuous, with predominantly alpha and beta frequencies.  Generalized Slowing:  None  Symmetry/Focality: Continuous (90+%) theta/delta over right temporal region   Voltage:  Normal (20+ uV)  Organization:  Appropriate anterior-posterior gradient  Posterior Dominant Rhythm:  9 Hz asymmetric,  better regulated over the left  Sleep:  Symmetric, synchronous spindles and K complexes.  Variability:   Yes		Reactivity:  Yes    Spontaneous Activity:  Rare (<1/Hr)  low/medium amplitude sharply contoured waves over right temporal region  Events:  •	No electrographic seizures or significant clinical events occurred during this study.  Provocations:  •	Hyperventilation: was not performed.  •	Photic stimulation: was not performed.  Daily Summary:    •	Focal slowing over right temporal region suggestive of underlying cerebral dysfunction   •	Rare low/medium amplitude sharply contoured waves over right temporal region suggestive of underying hyperexcitability but improved from prior day.      Roland Bowie MD  Attending Neurologist, [facility] Epilepsy [facility]

## 2024-01-22 NOTE — DISCHARGE NOTE NURSING/CASE MANAGEMENT/SOCIAL WORK - PATIENT PORTAL LINK FT
You can access the FollowMyHealth Patient Portal offered by [facility] by registering at the following website: [email or web address]. By joining [facility] [employer] [facility]’s FollowMyHealth portal, you will also be able to view your health information using other applications (apps) compatible with our system.

## 2024-01-22 NOTE — PROGRESS NOTE ADULT - ATTENDING COMMENTS
Appreciate Dr. Galarza' note.    Pt recovering from probable mild focal motor seizures, perhaps limited in scope by the low dose levetiracetam he was already taking.  Now pushed to 1500mg bid.  Discussed possible adverse effects of LEV to look out for.  Pt may be experiencing some mild drowsiness but cannot tell yet until he at home.    Plan:  1) EEG with improving epileptiform activity - rare now.  Safe for D/C on the higher dose of LEV.  2) will arrange f/u with Dr. Camp

## 2024-01-22 NOTE — PROGRESS NOTE ADULT - SUBJECTIVE AND OBJECTIVE BOX
Inteval history:    No events overnight. No complaints currently.    ROS  As above, otherwise negative for constitutional/HEENT/CV/pulm/GI//MSK/neuro/derm/endocrine/psych.     MEDICATIONS  (STANDING):  apixaban 5 milliGRAM(s) Oral every 12 hours  dexAMETHasone  Injectable 4 milliGRAM(s) IV Push every 12 hours  levETIRAcetam  IVPB 1500 milliGRAM(s) IV Intermittent two times a day  minocycline 100 milliGRAM(s) Oral two times a day  NIFEdipine XL 90 milliGRAM(s) Oral daily  pantoprazole  Injectable 40 milliGRAM(s) IV Push daily  polyethylene glycol 3350 17 Gram(s) Oral daily  sodium chloride 1 Gram(s) Oral two times a day  sodium chloride 0.9%. 1000 milliLiter(s) (80 mL/Hr) IV Continuous <Continuous>    MEDICATIONS  (PRN):  LORazepam   Injectable 2 milliGRAM(s) IV Push once PRN seizure  ondansetron Injectable 4 milliGRAM(s) IV Push every 6 hours PRN Nausea and/or Vomiting      T(C): 36.3 (01-22-24 @ 04:55), Max: 36.5 (01-21-24 @ 16:25)  HR: 73 (01-22-24 @ 04:55) (73 - 94)  BP: 127/86 (01-22-24 @ 04:55) (116/78 - 147/94)  RR: 20 (01-22-24 @ 04:55) (17 - 20)  SpO2: 97% (01-22-24 @ 04:55) (96% - 100%)  Wt(kg): --    Neurological Exam:  -Mental status: Slight lethargic but awakens easily,, alert, oriented to person, age, and month. Speech is fluent with intact naming, but some hesitancy requiring prompting for attention. +dysarthria.  -Cranial nerves:   II: Visual fields are full to confrontation.  III, IV, VI: Extraocular movements are intact without nystagmus.   VII: Left facial droop  Motor: Normal bulk and tone. Left arm drift 4+/5,  right arm 5/5. Both legs able to lift antigravity  Sensation: ?decreased sensation of left side, continued to say "left" no matter which side was touched  Deep Tendon Reflexes: 1+ bilateral biceps, triceps, brachioradialis, knee and ankle  Toes flexor bilaterallyNeurological Exam:  -Mental status: Slight lethargic but awakens easily,, alert, oriented to person, age, and month. Speech is fluent with intact naming, but some hesitancy requiring prompting for attention. +dysarthria.  -Cranial nerves:   II: Visual fields are full to confrontation.  III, IV, VI: Extraocular movements are intact without nystagmus.   VII: Left facial droop  Motor: Normal bulk and tone. Left arm drift 4+/5,  right arm 5/5. Both legs able to lift antigravity  Sensation: ?decreased sensation of left side, continued to say "left" no matter which side was touched    Deep Tendon Reflexes: 1+ bilateral biceps, triceps, brachioradialis, knee and ankle  Toes flexor bilaterallyGeneral:  Constitutional:  Sitting comfortably in NAD.  Ears, Nose, Throat: no abnormalities, mucus membranes moist  Neck: supple, no lymphadenopathy  Extremities: no edema, clubbing or cyanosis  Skin: no rash or neurocutaneous signs     Cognitive:  Orientation, language, memory and knowledge screens intact.    Cranial Nerves:  II: CHARLY. III/IV/VI: EOM Full.  Absent nystagmus  V1V2V3: Symmetric, VII: Face appears symmetric VIII: Normal to screening, IX/X: Palate Elevates Symmetrical  XI: Trapezius Symmetric  XII: Tongue midline  Motor:  Power: no pronator drift, power 5/5 distal U/E  Tone: normal x 4 limbs  No tremor  Coordination/Gait:  Finger-nose intact, normal finger taps and rapid-alternating movements,   Narrow based gait, tandem forward   hops well on both feet  Reflexes:  DTR: 2+ symmetric all 4 limbs, no clonus      Investigations:  CBC Full  -  ( 21 Jan 2024 06:22 )  WBC Count : 7.73 K/uL  RBC Count : 4.80 M/uL  Hemoglobin : 13.1 g/dL  Hematocrit : 41.1 %  Platelet Count - Automated : 247 K/uL  Mean Cell Volume : 85.6 fl  Mean Cell Hemoglobin : 27.3 pg  Mean Cell Hemoglobin Concentration : 31.9 gm/dL  Auto Neutrophil # : x  Auto Lymphocyte # : x  Auto Monocyte # : x  Auto Eosinophil # : x  Auto Basophil # : x  Auto Neutrophil % : x  Auto Lymphocyte % : x  Auto Monocyte % : x  Auto Eosinophil % : x  Auto Basophil % : x    01-22    144  |  x   |  23  ----------------------------<  114<H>  3.9   |  25  |  1.08    Ca    8.6      22 Jan 2024 07:24  Phos  3.7     01-22  Mg     2.3     01-22        PT/INR - ( 21 Jan 2024 06:22 )   PT: 11.5 sec;   INR: 1.01          PTT - ( 21 Jan 2024 06:22 )  PTT:26.7 sec      EEG: Inteval history:    No events overnight. No complaints currently. Last seizure was on saturday evening. Lasted for a few seconds, patient aware of the episode. Describes it as shaking his left upper extremity    ROS  As above, otherwise negative for constitutional/HEENT/CV/pulm/GI//MSK/neuro/derm/endocrine/psych.     MEDICATIONS  (STANDING):  apixaban 5 milliGRAM(s) Oral every 12 hours  dexAMETHasone  Injectable 4 milliGRAM(s) IV Push every 12 hours  levETIRAcetam  IVPB 1500 milliGRAM(s) IV Intermittent two times a day  minocycline 100 milliGRAM(s) Oral two times a day  NIFEdipine XL 90 milliGRAM(s) Oral daily  pantoprazole  Injectable 40 milliGRAM(s) IV Push daily  polyethylene glycol 3350 17 Gram(s) Oral daily  sodium chloride 1 Gram(s) Oral two times a day  sodium chloride 0.9%. 1000 milliLiter(s) (80 mL/Hr) IV Continuous <Continuous>    MEDICATIONS  (PRN):  LORazepam   Injectable 2 milliGRAM(s) IV Push once PRN seizure  ondansetron Injectable 4 milliGRAM(s) IV Push every 6 hours PRN Nausea and/or Vomiting      T(C): 36.3 (01-22-24 @ 04:55), Max: 36.5 (01-21-24 @ 16:25)  HR: 73 (01-22-24 @ 04:55) (73 - 94)  BP: 127/86 (01-22-24 @ 04:55) (116/78 - 147/94)  RR: 20 (01-22-24 @ 04:55) (17 - 20)  SpO2: 97% (01-22-24 @ 04:55) (96% - 100%)  Wt(kg): --    Neurological Exam:  -Mental status: Slight lethargic but awakens easily,, alert, oriented to person, age, and month. Speech is fluent with intact naming, but some hesitancy requiring prompting for attention. +dysarthria.  -Cranial nerves:   II: Visual fields are full to confrontation.  III, IV, VI: Extraocular movements are intact without nystagmus.   VII: Left facial droop  Motor: Normal bulk and tone. Left arm drift 4+/5,  right arm 5/5. Both legs able to lift antigravity  Sensation: ?decreased sensation of left side, continued to say "left" no matter which side was touched  Deep Tendon Reflexes: 1+ bilateral biceps, triceps, brachioradialis, knee and ankle  Toes flexor bilaterallyNeurological Exam:  -Mental status: Slight lethargic but awakens easily,, alert, oriented to person, age, and month. Speech is fluent with intact naming, but some hesitancy requiring prompting for attention. +dysarthria.  -Cranial nerves:   II: Visual fields are full to confrontation.  III, IV, VI: Extraocular movements are intact without nystagmus.   VII: Left facial droop  Motor: Normal bulk and tone. Left arm drift 5/5,  right arm 5/5. Both legs able to lift antigravity. Fine movement dexterity decreased in right hand.  Sensation: No hypoesthesia.   Deep Tendon Reflexes: 1+ bilateral biceps, triceps, brachioradialis, knee and ankle  Toes flexor bilaterallyGeneral:  Constitutional:  Sitting comfortably in NAD.  Ears, Nose, Throat: no abnormalities, mucus membranes moist  Neck: supple, no lymphadenopathy  Extremities: no edema, clubbing or cyanosis  Skin: no rash or neurocutaneous signs     Cognitive:  Orientation, language, memory and knowledge screens intact.    Cranial Nerves:  II: CHARLY. III/IV/VI: EOM Full.  Absent nystagmus  V1V2V3: Symmetric, VII: Face appears symmetric VIII: Normal to screening, IX/X: Palate Elevates Symmetrical  XI: Trapezius Symmetric  XII: Tongue midline  Motor:  Power: no pronator drift, power 5/5 distal U/E  Tone: normal x 4 limbs  No tremor  Coordination/Gait:  Finger-nose intact, normal finger taps and rapid-alternating movements,   Narrow based gait, tandem forward   hops well on both feet  Reflexes:  DTR: 2+ symmetric all 4 limbs, no clonus    Investigations:  CBC Full  -  ( 21 Jan 2024 06:22 )  WBC Count : 7.73 K/uL  RBC Count : 4.80 M/uL  Hemoglobin : 13.1 g/dL  Hematocrit : 41.1 %  Platelet Count - Automated : 247 K/uL  Mean Cell Volume : 85.6 fl  Mean Cell Hemoglobin : 27.3 pg  Mean Cell Hemoglobin Concentration : 31.9 gm/dL  Auto Neutrophil # : x  Auto Lymphocyte # : x  Auto Monocyte # : x  Auto Eosinophil # : x  Auto Basophil # : x  Auto Neutrophil % : x  Auto Lymphocyte % : x  Auto Monocyte % : x  Auto Eosinophil % : x  Auto Basophil % : x    01-22    144  |  x   |  23  ----------------------------<  114<H>  3.9   |  25  |  1.08    Ca    8.6      22 Jan 2024 07:24  Phos  3.7     01-22  Mg     2.3     01-22        PT/INR - ( 21 Jan 2024 06:22 )   PT: 11.5 sec;   INR: 1.01          PTT - ( 21 Jan 2024 06:22 )  PTT:26.7 sec      EEG: Inteval history:    No events overnight. No complaints currently. Last seizure was on saturday evening. Lasted for a few seconds, patient aware of the episode. Describes it as shaking his left upper extremity    ROS  As above, otherwise negative for constitutional/HEENT/CV/pulm/GI//MSK/neuro/derm/endocrine/psych.     MEDICATIONS  (STANDING):  apixaban 5 milliGRAM(s) Oral every 12 hours  dexAMETHasone  Injectable 4 milliGRAM(s) IV Push every 12 hours  levETIRAcetam  IVPB 1500 milliGRAM(s) IV Intermittent two times a day  minocycline 100 milliGRAM(s) Oral two times a day  NIFEdipine XL 90 milliGRAM(s) Oral daily  pantoprazole  Injectable 40 milliGRAM(s) IV Push daily  polyethylene glycol 3350 17 Gram(s) Oral daily  sodium chloride 1 Gram(s) Oral two times a day  sodium chloride 0.9%. 1000 milliLiter(s) (80 mL/Hr) IV Continuous <Continuous>    MEDICATIONS  (PRN):  LORazepam   Injectable 2 milliGRAM(s) IV Push once PRN seizure  ondansetron Injectable 4 milliGRAM(s) IV Push every 6 hours PRN Nausea and/or Vomiting      T(C): 36.3 (01-22-24 @ 04:55), Max: 36.5 (01-21-24 @ 16:25)  HR: 73 (01-22-24 @ 04:55) (73 - 94)  BP: 127/86 (01-22-24 @ 04:55) (116/78 - 147/94)  RR: 20 (01-22-24 @ 04:55) (17 - 20)  SpO2: 97% (01-22-24 @ 04:55) (96% - 100%)  Wt(kg): --    Neurological Exam:  -Mental status: Slight lethargic but awakens easily,, alert, oriented to person, age, and month. Speech is fluent with intact naming, but some hesitancy requiring prompting for attention. +dysarthria.  -Cranial nerves:   II: Visual fields are full to confrontation.  III, IV, VI: Extraocular movements are intact without nystagmus.   VII: Left facial droop  Motor: Normal bulk and tone. Left arm drift 4+/5,  right arm 5/5. Both legs able to lift antigravity  Sensation: ?decreased sensation of left side, continued to say "left" no matter which side was touched  Deep Tendon Reflexes: 1+ bilateral biceps, triceps, brachioradialis, knee and ankle  Toes flexor bilaterallyNeurological Exam:  -Mental status: Slight lethargic but awakens easily,, alert, oriented to person, age, and month. Speech is fluent with intact naming, but some hesitancy requiring prompting for attention. +dysarthria.  -Cranial nerves:   II: Visual fields are full to confrontation.  III, IV, VI: Extraocular movements are intact without nystagmus.   VII: Left facial droop  Motor: Normal bulk and tone. Left arm pronator drift 5/5,  right arm 5/5. Both legs able to lift antigravity. Fine movement dexterity decreased in right hand.  Sensation: No hypoesthesia.   Deep Tendon Reflexes: 1+ bilateral biceps, triceps, brachioradialis, knee and ankle  Toes flexor bilaterallyGeneral:  Constitutional:  Sitting comfortably in NAD.  Ears, Nose, Throat: no abnormalities, mucus membranes moist  Neck: supple, no lymphadenopathy  Extremities: no edema, clubbing or cyanosis  Skin: no rash or neurocutaneous signs     Cognitive:  Orientation, language, memory and knowledge screens intact.    Cranial Nerves:  II: CHARLY. III/IV/VI: EOM Full.  Absent nystagmus  V1V2V3: Symmetric, VII: Face appears symmetric VIII: Normal to screening, IX/X: Palate Elevates Symmetrical  XI: Trapezius Symmetric  XII: Tongue midline  Motor:  Power: no pronator drift, power 5/5 distal U/E  Tone: normal x 4 limbs  No tremor  Coordination/Gait:  Finger-nose intact, normal finger taps and rapid-alternating movements,   Narrow based gait, tandem forward   hops well on both feet  Reflexes:  DTR: 2+ symmetric all 4 limbs, no clonus    Investigations:  CBC Full  -  ( 21 Jan 2024 06:22 )  WBC Count : 7.73 K/uL  RBC Count : 4.80 M/uL  Hemoglobin : 13.1 g/dL  Hematocrit : 41.1 %  Platelet Count - Automated : 247 K/uL  Mean Cell Volume : 85.6 fl  Mean Cell Hemoglobin : 27.3 pg  Mean Cell Hemoglobin Concentration : 31.9 gm/dL  Auto Neutrophil # : x  Auto Lymphocyte # : x  Auto Monocyte # : x  Auto Eosinophil # : x  Auto Basophil # : x  Auto Neutrophil % : x  Auto Lymphocyte % : x  Auto Monocyte % : x  Auto Eosinophil % : x  Auto Basophil % : x    01-22    144  |  x   |  23  ----------------------------<  114<H>  3.9   |  25  |  1.08    Ca    8.6      22 Jan 2024 07:24  Phos  3.7     01-22  Mg     2.3     01-22        PT/INR - ( 21 Jan 2024 06:22 )   PT: 11.5 sec;   INR: 1.01          PTT - ( 21 Jan 2024 06:22 )  PTT:26.7 sec      EEG 1/21:   1)	Focal slowing over right temporal region suggestive of underlying cerebral dysfunction   2)	Occasional low/medium amplitude sharp waves over right temporal region suggestive of underying hyperexcitability Interval history:    No events overnight. No complaints currently. Last seizure was on saturday evening. Lasted for a few seconds, patient aware of the episode. Describes it as shaking his left upper extremity.  Had sensory change in this limb and this has improved.  No involvement of the face or leg.  Levetiracetam increased dose to 1500mg bid - pt reports having taken 2 naps this morning, but otherwise no definite adverse effects of the medications.    ROS  As above, otherwise negative for constitutional/HEENT/CV/pulm/GI//MSK/neuro/derm/endocrine/psych.     MEDICATIONS  (STANDING):  apixaban 5 milliGRAM(s) Oral every 12 hours  dexAMETHasone  Injectable 4 milliGRAM(s) IV Push every 12 hours  levETIRAcetam  IVPB 1500 milliGRAM(s) IV Intermittent two times a day  minocycline 100 milliGRAM(s) Oral two times a day  NIFEdipine XL 90 milliGRAM(s) Oral daily  pantoprazole  Injectable 40 milliGRAM(s) IV Push daily  polyethylene glycol 3350 17 Gram(s) Oral daily  sodium chloride 1 Gram(s) Oral two times a day  sodium chloride 0.9%. 1000 milliLiter(s) (80 mL/Hr) IV Continuous <Continuous>    MEDICATIONS  (PRN):  LORazepam   Injectable 2 milliGRAM(s) IV Push once PRN seizure  ondansetron Injectable 4 milliGRAM(s) IV Push every 6 hours PRN Nausea and/or Vomiting      T(C): 36.3 (01-22-24 @ 04:55), Max: 36.5 (01-21-24 @ 16:25)  HR: 73 (01-22-24 @ 04:55) (73 - 94)  BP: 127/86 (01-22-24 @ 04:55) (116/78 - 147/94)  RR: 20 (01-22-24 @ 04:55) (17 - 20)  SpO2: 97% (01-22-24 @ 04:55) (96% - 100%)  Wt(kg): --    Neurological Exam:  -Mental status: Slight lethargic but awakens easily,, alert, oriented to person, age, and month. Speech is fluent with intact naming, but some hesitancy requiring prompting for attention. +dysarthria.  -Cranial nerves:   II: Visual fields are full to confrontation.  III, IV, VI: Extraocular movements are intact without nystagmus.   VII: Left facial droop  Motor: Normal bulk and tone. Left arm drift 4+/5,  right arm 5/5. Both legs able to lift antigravity  Sensation: ?decreased sensation of left side, continued to say "left" no matter which side was touched  Deep Tendon Reflexes: 1+ bilateral biceps, triceps, brachioradialis, knee and ankle  Toes flexor bilaterallyNeurological Exam:  -Mental status: Slight lethargic but awakens easily,, alert, oriented to person, age, and month. Speech is fluent with intact naming, but some hesitancy requiring prompting for attention. +dysarthria.  -Cranial nerves:   II: Visual fields are full to confrontation.  III, IV, VI: Extraocular movements are intact without nystagmus.   VII: Left facial droop  Motor: Normal bulk and tone. Left arm pronator drift 5/5,  right arm 5/5. Both legs able to lift antigravity. Fine movement dexterity decreased in right hand.  Sensation: No hypoesthesia.   Deep Tendon Reflexes: 1+ bilateral biceps, triceps, brachioradialis, knee and ankle  Toes flexor bilaterallyGeneral:  Constitutional:  Sitting comfortably in NAD.  Ears, Nose, Throat: no abnormalities, mucus membranes moist  Neck: supple, no lymphadenopathy  Extremities: no edema, clubbing or cyanosis  Skin: no rash or neurocutaneous signs     Cognitive:  Orientation, language, memory and knowledge screens intact.    Cranial Nerves:  II: CHARLY. III/IV/VI: EOM Full.  Absent nystagmus  V1V2V3: Symmetric, VII: Face appears symmetric VIII: Normal to screening, IX/X: Palate Elevates Symmetrical  XI: Trapezius Symmetric  XII: Tongue midline  Motor:  Power: no pronator drift, power 5/5 distal U/E  Tone: normal x 4 limbs  No tremor  Coordination/Gait:  Finger-nose intact, normal finger taps and rapid-alternating movements,   Narrow based gait, tandem forward   hops well on both feet  Reflexes:  DTR: 2+ symmetric all 4 limbs, no clonus    Investigations:  CBC Full  -  ( 21 Jan 2024 06:22 )  WBC Count : 7.73 K/uL  RBC Count : 4.80 M/uL  Hemoglobin : 13.1 g/dL  Hematocrit : 41.1 %  Platelet Count - Automated : 247 K/uL  Mean Cell Volume : 85.6 fl  Mean Cell Hemoglobin : 27.3 pg  Mean Cell Hemoglobin Concentration : 31.9 gm/dL  Auto Neutrophil # : x  Auto Lymphocyte # : x  Auto Monocyte # : x  Auto Eosinophil # : x  Auto Basophil # : x  Auto Neutrophil % : x  Auto Lymphocyte % : x  Auto Monocyte % : x  Auto Eosinophil % : x  Auto Basophil % : x    01-22    144  |  x   |  23  ----------------------------<  114<H>  3.9   |  25  |  1.08    Ca    8.6      22 Jan 2024 07:24  Phos  3.7     01-22  Mg     2.3     01-22        PT/INR - ( 21 Jan 2024 06:22 )   PT: 11.5 sec;   INR: 1.01          PTT - ( 21 Jan 2024 06:22 )  PTT:26.7 sec      EEG 1/21:   1)	Focal slowing over right temporal region suggestive of underlying cerebral dysfunction   2)	Occasional low/medium amplitude sharp waves over right temporal region suggestive of underying hyperexcitability

## 2024-01-22 NOTE — DISCHARGE NOTE PROVIDER - PROVIDER TOKENS
PROVIDER:[TOKEN:[[id number]]],PROVIDER:[TOKEN:[[id number]]] PROVIDER:[TOKEN:[[id number]],SCHEDULEDAPPT:[02/05/2024],SCHEDULEDAPPTTIME:[12:15 PM]],PROVIDER:[TOKEN:[[id number]]]

## 2024-01-22 NOTE — PHYSICAL THERAPY INITIAL EVALUATION ADULT - GENERAL OBSERVATIONS, REHAB EVAL
PT IE completed. MRS: 3. Patient received semi supine in bed +tele, +heplock IV, NAD, willing to work with PT.

## 2024-01-22 NOTE — PHYSICAL THERAPY INITIAL EVALUATION ADULT - PERTINENT HX OF CURRENT PROBLEM, REHAB EVAL
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 Tony on 1/2) on immunotherapy (x2 doses),  HTN, DVT s/p IVC 1/2 on apixaban transferred from OSH for further evaluation of seizures.

## 2024-01-22 NOTE — DISCHARGE NOTE PROVIDER - NSDCFUSCHEDAPPT_GEN_ALL_CORE_FT
Willi Faulkner  [facility] Physician [facility]  VASCULAR [address]  Scheduled Appointment: 01/25/2024    [facility]  MRI  [address]  Scheduled Appointment: 02/27/2024    Marcial Warren  [facility]  RADMED [address]  Scheduled Appointment: 03/06/2024

## 2024-01-22 NOTE — DISCHARGE NOTE PROVIDER - CARE PROVIDERS DIRECT ADDRESSES
[email or web address]

## 2024-01-22 NOTE — PROGRESS NOTE ADULT - ASSESSMENT
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 Tony on 1/2) on immunotherapy (x2 doses),  HTN, DVT s/p IVC 1/2 on apixaban transferred from OSH for further evaluation of seizures.     Seizure like activity  Metastatic brain melanoma s/p OR with Dr. Tony on 1/2 for R crani  - management per NSGY, neurology/epilepsy following  - CTH concerning for worsening vasogenic edema around known lesions, CTAs negative  - weaned of steroids outpatient after which patient developed seizure like activity  - EEG so far with focal slowing as well as sharp waves right temporal region   - levetiracetam increased to 1500mg IV BID  - dexamethasone increased to 4mg IV BID (+PPI)  - frequent perioperative incentive spirometer use  - early ambulation and OOBTC as tolerated    Lactic acidosis  - lact 2.1 --> 2.6 --> 1.7  - suspect in setting of seizure like activity  - resolved after IVFH    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  - initially diagnosed 8/2023 and had been on therapeutic enoxaparin   - venous duplex 12/31/23 with acute proximal DVT --> IVC filter placed 1/2  - started on apixaban on 1/10 at [place], resumed 1/21  - pending vascular eval for potential IVF filter removal (?outpatient)    Hypotonic hyponatremia  - hyponatremia resolved  - discontinue salt tabs    Hypertension  - cont nifedipine 90mg ER with holding parameters    Metastatic melanoma  Right lung mass  - CXR with 10.8cm RUL mass (enlarged from 9.3cm), cont pulmonary hygiene   - receiving nivolumab/relatimab monthly, has received two doses (12/15/23, 1/19/24)  - heme/onc follow up    Facial rash  - per patient and wife, related to immunotherapy infusions (similar rash during after first infusion)  - minocycline 100mg BID per outpatient heme/onc  - patient states also uses topical cream - confirm with patient/wife    Dispo: anticipate home after seizure workup    Recommendations and plan discussed with primary team. 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 Tony on 1/2) on immunotherapy (x2 doses),  HTN, DVT s/p IVC 1/2 on apixaban transferred from OSH for further evaluation of seizures.     Seizure like activity  Metastatic brain melanoma s/p OR with Dr. Tony on 1/2 for R crani  - management per NSGY, neurology/epilepsy following  - CTH concerning for worsening vasogenic edema around known lesions, CTAs negative  - weaned of steroids outpatient after which patient developed seizure like activity  - EEG so far with focal slowing as well as sharp waves right temporal region   - levetiracetam increased to 1500mg IV BID  - dexamethasone increased to 4mg IV BID (+PPI)  - frequent perioperative incentive spirometer use  - early ambulation and OOBTC as tolerated    Lactic acidosis  - lact 2.1 --> 2.6 --> 1.7  - suspect in setting of seizure like activity  - resolved after IVFH, d/c maintenance fluids  - encourage PO hydration    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  - initially diagnosed 8/2023 and had been on therapeutic enoxaparin   - venous duplex 12/31/23 with acute proximal DVT --> IVC filter placed 1/2  - started on apixaban on 1/10 at [place], resumed 1/21  - pending vascular eval for potential IVF filter removal (?outpatient)    Hypotonic hyponatremia  - hyponatremia resolved  - discontinue salt tabs    Hypertension  - cont nifedipine 90mg ER with holding parameters    Metastatic melanoma  Right lung mass  - CXR with 10.8cm RUL mass (enlarged from 9.3cm), cont pulmonary hygiene   - receiving nivolumab/relatimab monthly, has received two doses (12/15/23, 1/19/24)  - heme/onc follow up    Facial rash  - per patient and wife, related to immunotherapy infusions (similar rash during after first infusion)  - minocycline 100mg BID per outpatient heme/onc  - patient states also uses topical cream - confirm with patient/wife    Dispo: anticipate home after seizure workup    Recommendations and plan discussed with primary team.

## 2024-01-22 NOTE — PHYSICAL THERAPY INITIAL EVALUATION ADULT - MODALITIES TREATMENT COMMENTS
R hand dominant; (L) hand  4+/5, (R) hand  5/5. CN Testing: B/L Frontalis intact; B/L buccinator intact; smile symmetrical; tongue protrusion at midline; B/L eyes open/close intact; Shoulder elevation: intact bilaterally;

## 2024-01-22 NOTE — DISCHARGE NOTE NURSING/CASE MANAGEMENT/SOCIAL WORK - NSDCFUADDAPPT_GEN_ALL_CORE_FT
Please follow up with Dr. Tony in 2-3 weeks     Please follow up with Dr. Bowie from Epilepsy in 2 weeks     Please follow up with Dr. Faulkner from Vascular Surgery (you have an appointment on 1/25)    Please follow up with Oncology    Please follow up with your primary care doctor

## 2024-01-22 NOTE — DISCHARGE NOTE PROVIDER - NSDCFUADDAPPT_GEN_ALL_CORE_FT
Please follow up with Dr. Tony    Please follow up with Dr. Camp from Epilepsy    Please follow up with Oncology    Please follow up with Dr. Faulkner from Vascular Surgery    Please follow up with your primary care doctor  Please follow up with Dr. Tony in 2-3 weeks     Please follow up with Dr. Bowie from Epilepsy in 2 weeks     Please follow up with Dr. Faulkner from Vascular Surgery (you have an appointment on 1/25)    Please follow up with Oncology    Please follow up with your primary care doctor

## 2024-01-22 NOTE — DISCHARGE NOTE PROVIDER - HOSPITAL COURSE
HPI:  63 year old male with PMH of DVT/PE 8/2023 (on Eliquis, s/p IVCF 1/2/24), melanoma on face s/p MOHs 15 yrs ago, melanoma brain mets diagnosed 6/2023 s/p right crani for resection at [facility] with Dr. Worthy, s/p SRS (completed 5 rounds 8/15/23), on immunotherapy monthly, drug induced neutropenia (likely bactrim), who presented to [facility] on 12/31/23 for resection of brain tumor. Patient was initially diagnosed after he was found to be leaving the car door open, putting his shoes on the wrong feet, and being forgetful of his usual daily routine. Patient reported on 12/20/23 he fell and hit his head (no LOC) with left sided weakness. Within 2 days, the symptoms of forgetfulness returned. MRI as outpatient on 12/22/23 showed progression of right parietal lobe lesion with worsening mass effect, edema, and 1.2cm MLS. Hospital Course included episodes of hypertension which were controlled with medication management. He underwent a Right parietal and temporal crani and resection (path: metastatic melanoma) on 1/2, uncomplicated hospital course, discharged to [facility] on 1/5.     On 1/20, he was transferred to [facility] from [facility] for concerns of focal seizures. Pt experienced uncontrollable left arm tremors 1/20 around 11am, more persistent throughout afternoon, ceased with Ativan/Keppra 1500mg. On arrival to [facility], pt with L facial droop, L sided weakness, and dysarthria (new since 3pm per wife). Pt is compliant with home Keppra 500mg BID and Dexamethasone 2mg qd. Stroke code called and imaging notable for increased vasogenic edema around known lesions.       Hospital Course:  1/21: vEEG showing sharp waves with hyperexcitability, f/u epilepsy recs for keppra dosing, no surgical plan, resumed home eliquis, 1L NS given for elevated lactate, increased nifedipine to 90mg, 6pm lactate 2.9 from 2.6, maintenance fluids started  1/22: MADAI ovn, no clinical seizures noted in evening      Patient evaluated by PT/OT who recommended:  Patient is going home? rehab? hospice? Facility Name:     Hospital course c/b:     Exam on day of discharge:    Checklist:   - Obtained follow up appointment from NP  - Reviewed final recommendations of inpatient consults  - review discharge planning on provider handoff  - post op imaging completed  - Neurologically stable for discharge  - Vitals stable for discharge   - Afebrile for discharge  - WBC is stable  - Sodium level is normal  - Pain is adequately controlled  - Pt has PICC/walker/brace/collar   - LACE score (10 or > needs PCP apt)   - stroke patient? Discharge NIHSS score   HPI:  63 year old male with PMH of DVT/PE 8/2023 (on Eliquis, s/p IVCF 1/2/24), melanoma on face s/p MOHs 15 yrs ago, melanoma brain mets diagnosed 6/2023 s/p right crani for resection at [facility] with Dr. Worthy, s/p SRS (completed 5 rounds 8/15/23), on immunotherapy monthly, drug induced neutropenia (likely bactrim), who presented to [facility] on 12/31/23 for resection of brain tumor. Patient was initially diagnosed after he was found to be leaving the car door open, putting his shoes on the wrong feet, and being forgetful of his usual daily routine. Patient reported on 12/20/23 he fell and hit his head (no LOC) with left sided weakness. Within 2 days, the symptoms of forgetfulness returned. MRI as outpatient on 12/22/23 showed progression of right parietal lobe lesion with worsening mass effect, edema, and 1.2cm MLS. Hospital Course included episodes of hypertension which were controlled with medication management. He underwent a Right parietal and temporal crani and resection (path: metastatic melanoma) on 1/2, uncomplicated hospital course, discharged to [facility] on 1/5.     On 1/20, he was transferred to [facility] from [facility] for concerns of focal seizures. Pt experienced uncontrollable left arm tremors 1/20 around 11am, more persistent throughout afternoon, ceased with Ativan/Keppra 1500mg. On arrival to [facility], pt with L facial droop, L sided weakness, and dysarthria (new since 3pm per wife). Pt is compliant with home Keppra 500mg BID and Dexamethasone 2mg qd. Stroke code called and imaging notable for increased vasogenic edema around known lesions.       Hospital Course:  1/21: vEEG showing sharp waves with hyperexcitability, f/u epilepsy recs for keppra dosing, no surgical plan, resumed home eliquis, 1L NS given for elevated lactate, increased nifedipine to 90mg, 6pm lactate 2.9 from 2.6, maintenance fluids started  1/22: MADAI ovn, maintenance fluids for elevated lactate 2.9 from 2.6. Given lactulose 10. Na 144. Salt tabs dc'd. Lactate cleared, IVF d/c'd.      Patient evaluated by PT/OT who recommended: home with no needs     Patient is going home    Hospital course c/b: elevated lactate- resolved with fluids     Exam on day of discharge:  General: NAD, awake, alert  HEENT:  PERRL 3mm briskly reactive, EOMI b/l, +L facial droop, tongue midline, neck FROM  Cardiovascular: RRR, normal S1 and S2   Respiratory: lungs CTAB, no wheezing, rhonchi, or crackles   GI: normoactive BS to auscultation, abd soft, NTND   Neuro: A&Ox3, No aphasia, speech dysarthric, no dysmetria, +LUE/LLE drifts. Follows commands.  LAZARO x4 spontaneously, RUE 5/5, LUE 4/5, RLE 5/5 except DF 2/5, LLE 4+/5. Sensation intact throughout.   Extremities: distal pulses 2+ x4  Wound/incision: R crani incision c/d/i, well healed  Skin: Red hives, some pustules to face, trunk, extremities      Patient is medically and hemodynamically cleared for discharge

## 2024-01-22 NOTE — DISCHARGE NOTE PROVIDER - CARE PROVIDER_API CALL
Johan Tony  Neurosurgery  [address] [place] [address]  Phone: [phone]  Fax: [phone]  Follow Up Time:     Kristan Camp  Neurology  [address]  [place] [address]  Phone: [phone]  Fax: [phone]  Follow Up Time:    Johan Tony  Neurosurgery  [address] [place] [address]  Phone: [phone]  Fax: [phone]  Scheduled Appointment: 02/05/2024 12:15 PM    Roland Bowie  Neurology  [address] [address]  Phone: [phone]  Fax: [phone]  Follow Up Time:

## 2024-01-22 NOTE — PROGRESS NOTE ADULT - SUBJECTIVE AND OBJECTIVE BOX
SUBJECTIVE: NAEON. Patient states he would like to return home as soon as able - explained pending EEG removal. No acute complaints.     MEDICATIONS:  MEDICATIONS  (STANDING):  apixaban 5 milliGRAM(s) Oral every 12 hours  dexAMETHasone  Injectable 4 milliGRAM(s) IV Push every 12 hours  levETIRAcetam  IVPB 1500 milliGRAM(s) IV Intermittent two times a day  minocycline 100 milliGRAM(s) Oral two times a day  NIFEdipine XL 90 milliGRAM(s) Oral daily  pantoprazole  Injectable 40 milliGRAM(s) IV Push daily  polyethylene glycol 3350 17 Gram(s) Oral daily    MEDICATIONS  (PRN):  LORazepam   Injectable 2 milliGRAM(s) IV Push once PRN seizure  ondansetron Injectable 4 milliGRAM(s) IV Push every 6 hours PRN Nausea and/or Vomiting      Allergies    No Known Allergies    Intolerances        OBJECTIVE:  Vital Signs Last 24 Hrs  T(C): 36.3 (22 Jan 2024 04:55), Max: 36.5 (21 Jan 2024 16:25)  T(F): 97.3 (22 Jan 2024 04:55), Max: 97.7 (21 Jan 2024 16:25)  HR: 87 (22 Jan 2024 11:09) (73 - 94)  BP: 120/81 (22 Jan 2024 11:09) (116/78 - 147/94)  BP(mean): 95 (22 Jan 2024 11:09) (95 - 112)  RR: 18 (22 Jan 2024 11:09) (17 - 20)  SpO2: 98% (22 Jan 2024 11:09) (96% - 100%)    Parameters below as of 22 Jan 2024 11:09  Patient On (Oxygen Delivery Method): room air      I&O's Summary    21 Jan 2024 07:01  -  22 Jan 2024 07:00  --------------------------------------------------------  IN: 2560 mL / OUT: 2475 mL / NET: 85 mL    22 Jan 2024 07:01  -  22 Jan 2024 12:01  --------------------------------------------------------  IN: 480 mL / OUT: 800 mL / NET: -320 mL        PHYSICAL EXAM:  Gen: appears stated age, resting comfortably, NAD  HEENT: R crani incision CDI, MMM, see skin exam  Neck: supple  CV: RRR, no m/r/g, peripheral pulses 2+  Pulm: CTAB, no increased work of breathing, no rales/rhonchi  Abd: soft, ND, NT, no rebound or guarding  Skin: warm and dry  Ext: non-tender, no edema, face diffusely erythematous with scattered pustular lesions erythema extends down neck to upper chest)  Neuro: AOx3, speaking in full sentences, motor strength grossly intact, L facial droop  Psych: affect and behavior appropriate, pleasant at time of interview    LABS:                        12.4   7.83  )-----------( 264      ( 22 Jan 2024 07:24 )             38.7     01-22    144  |  110<H>  |  23  ----------------------------<  114<H>  3.9   |  25  |  1.08    Ca    8.6      22 Jan 2024 07:24  Phos  3.7     01-22  Mg     2.3     01-22        PT/INR - ( 21 Jan 2024 06:22 )   PT: 11.5 sec;   INR: 1.01          PTT - ( 21 Jan 2024 06:22 )  PTT:26.7 sec  CAPILLARY BLOOD GLUCOSE        Urinalysis Basic - ( 22 Jan 2024 07:24 )    Color: x / Appearance: x / SG: x / pH: x  Gluc: 114 mg/dL / Ketone: x  / Bili: x / Urobili: x   Blood: x / Protein: x / Nitrite: x   Leuk Esterase: x / RBC: x / WBC x   Sq Epi: x / Non Sq Epi: x / Bacteria: x        MICRODATA:      RADIOLOGY/OTHER STUDIES: SUBJECTIVE: NAEON. Patient states he would like to return home as soon as able - explained pending EEG removal. No acute complaints. Patient states he has not been drinking as much water as he does at home.     MEDICATIONS:  MEDICATIONS  (STANDING):  apixaban 5 milliGRAM(s) Oral every 12 hours  dexAMETHasone  Injectable 4 milliGRAM(s) IV Push every 12 hours  levETIRAcetam  IVPB 1500 milliGRAM(s) IV Intermittent two times a day  minocycline 100 milliGRAM(s) Oral two times a day  NIFEdipine XL 90 milliGRAM(s) Oral daily  pantoprazole  Injectable 40 milliGRAM(s) IV Push daily  polyethylene glycol 3350 17 Gram(s) Oral daily    MEDICATIONS  (PRN):  LORazepam   Injectable 2 milliGRAM(s) IV Push once PRN seizure  ondansetron Injectable 4 milliGRAM(s) IV Push every 6 hours PRN Nausea and/or Vomiting      Allergies    No Known Allergies    Intolerances        OBJECTIVE:  Vital Signs Last 24 Hrs  T(C): 36.3 (22 Jan 2024 04:55), Max: 36.5 (21 Jan 2024 16:25)  T(F): 97.3 (22 Jan 2024 04:55), Max: 97.7 (21 Jan 2024 16:25)  HR: 87 (22 Jan 2024 11:09) (73 - 94)  BP: 120/81 (22 Jan 2024 11:09) (116/78 - 147/94)  BP(mean): 95 (22 Jan 2024 11:09) (95 - 112)  RR: 18 (22 Jan 2024 11:09) (17 - 20)  SpO2: 98% (22 Jan 2024 11:09) (96% - 100%)    Parameters below as of 22 Jan 2024 11:09  Patient On (Oxygen Delivery Method): room air      I&O's Summary    21 Jan 2024 07:01  -  22 Jan 2024 07:00  --------------------------------------------------------  IN: 2560 mL / OUT: 2475 mL / NET: 85 mL    22 Jan 2024 07:01  -  22 Jan 2024 12:01  --------------------------------------------------------  IN: 480 mL / OUT: 800 mL / NET: -320 mL        PHYSICAL EXAM:  Gen: appears stated age, resting comfortably, NAD  HEENT: R crani incision CDI, MMM, see skin exam  Neck: supple  CV: RRR, no m/r/g, peripheral pulses 2+  Pulm: CTAB, no increased work of breathing, no rales/rhonchi  Abd: soft, ND, NT, no rebound or guarding  Skin: warm and dry  Ext: non-tender, no edema, face diffusely erythematous with scattered pustular lesions erythema extends down neck to upper chest)  Neuro: AOx3, speaking in full sentences, motor strength grossly intact, L facial droop  Psych: affect and behavior appropriate, pleasant at time of interview    LABS:                        12.4   7.83  )-----------( 264      ( 22 Jan 2024 07:24 )             38.7     01-22    144  |  110<H>  |  23  ----------------------------<  114<H>  3.9   |  25  |  1.08    Ca    8.6      22 Jan 2024 07:24  Phos  3.7     01-22  Mg     2.3     01-22        PT/INR - ( 21 Jan 2024 06:22 )   PT: 11.5 sec;   INR: 1.01          PTT - ( 21 Jan 2024 06:22 )  PTT:26.7 sec  CAPILLARY BLOOD GLUCOSE        Urinalysis Basic - ( 22 Jan 2024 07:24 )    Color: x / Appearance: x / SG: x / pH: x  Gluc: 114 mg/dL / Ketone: x  / Bili: x / Urobili: x   Blood: x / Protein: x / Nitrite: x   Leuk Esterase: x / RBC: x / WBC x   Sq Epi: x / Non Sq Epi: x / Bacteria: x        MICRODATA:      RADIOLOGY/OTHER STUDIES:

## 2024-01-22 NOTE — PHYSICAL THERAPY INITIAL EVALUATION ADULT - LIVES WITH, PROFILE
Patient Unable To Complete Cage Questionnaire Due To Medical Issue (Limited Life Expectancy): No spouse

## 2024-01-22 NOTE — PHYSICAL THERAPY INITIAL EVALUATION ADULT - GAIT DEVIATIONS NOTED, PT EVAL
Demo fairly steady gait, no LOB observed, good navigation of hallway obstacles/decreased ashlee/increased time in double stance/decreased velocity of limb motion/decreased step length/decreased stride length/decreased weight-shifting ability

## 2024-01-22 NOTE — PHYSICAL THERAPY INITIAL EVALUATION ADULT - ADDITIONAL COMMENTS
Pt. lives with his wife in a house with 2 MARIANGEL and 1 FOS to reach the bedroom/bath. At baseline, ambulates and performs ADLs with his wife's assist. Pt and wife report that he ascends the stairs with his wife's supervision and descends the stairs on his bottom for safety, using wife's assist to stand from the bottom step. Pt. fell recently while stepping in to a pair of underwear. Pt. lives with his wife in a house with 2 MARIANGEL and 1 FOS to reach the bedroom/bath. At baseline, ambulates and performs ADLs with his wife's assist, however, states since returning from [place] he has been able to ambulate independently. Has RW on both floors in his house for assistance when needed.

## 2024-01-22 NOTE — PROGRESS NOTE ADULT - SUBJECTIVE AND OBJECTIVE BOX
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 Worthy, s/p SRS (completed 5 rounds 8/15/23), on immunotherapy monthly, drug induced neutropenia (likely bactrim), who presented to [facility] on 12/31/23 for resection of brain tumor. Patient was initially diagnosed after he was found to be leaving the car door open, putting his shoes on the wrong feet, and being forgetful of his usual daily routine. Patient reported on 12/20/23 he fell and hit his head (no LOC) with left sided weakness. Within 2 days, the symptoms of forgetfulness returned. MRI as outpatient on 12/22/23 showed progression of right parietal lobe lesion with worsening mass effect, edema, and 1.2cm MLS. Hospital Course included episodes of hypertension which were controlled with medication management. He underwent a Right parietal and temporal crani and resection (path: metastatic melanoma) on 1/2, uncomplicated hospital course, discharged to [facility] on 1/5.     On 1/20, he was transferred to [facility] from [facility] for concerns of focal seizures. Pt experienced uncontrollable left arm tremors 1/20 around 11am, more persistent throughout afternoon, ceased with Ativan/Keppra 1500mg. On arrival to [facility], pt with L facial droop, L sided weakness, and dysarthria (new since 3pm per wife). Pt is compliant with home Keppra 500mg BID and Dexamethasone 2mg qd. Stroke code called and imaging notable for increased vasogenic edema around known lesions.    (21 Jan 2024 00:39)    INTERVAL EVENTS: MADAI ovn, no clinical seizures noted in evening, 6pm lactate 2.9 from 2.6, maintenance fluids started    Hospital course:   1/21: vEEG showing sharp waves with hyperexcitability, f/u epilepsy recs for keppra dosing, no surgical plan, resumed home eliquis, 1L NS given for elevated lactate, increased nifedipine to 90mg, 6pm lactate 2.9 from 2.6, maintenance fluids started  1/22: MADAI ovn, no clinical seizures noted in evening    Vital Signs Last 24 Hrs  T(C): 36.5 (21 Jan 2024 21:00), Max: 37.1 (21 Jan 2024 00:47)  T(F): 97.7 (21 Jan 2024 21:00), Max: 98.8 (21 Jan 2024 00:47)  HR: 91 (21 Jan 2024 21:00) (80 - 94)  BP: 117/78 (21 Jan 2024 21:00) (117/78 - 151/98)  BP(mean): 120 (21 Jan 2024 05:30) (104 - 120)  RR: 18 (21 Jan 2024 21:00) (16 - 19)  SpO2: 96% (21 Jan 2024 21:00) (96% - 100%)    Parameters below as of 21 Jan 2024 21:00  Patient On (Oxygen Delivery Method): room air        I&O's Summary    20 Jan 2024 07:01  -  21 Jan 2024 07:00  --------------------------------------------------------  IN: 0 mL / OUT: 850 mL / NET: -850 mL    21 Jan 2024 07:01  -  21 Jan 2024 22:59  --------------------------------------------------------  IN: 2000 mL / OUT: 1800 mL / NET: 200 mL        PHYSICAL EXAM:  General: NAD, awake, alert  HEENT:  PERRL 3mm briskly reactive, EOMI b/l, +L facial droop, tongue midline, neck FROM  Cardiovascular: RRR, normal S1 and S2   Respiratory: lungs CTAB, no wheezing, rhonchi, or crackles   GI: normoactive BS to auscultation, abd soft, NTND   Neuro: A&Ox3, No aphasia, speech dysarthric, no dysmetria, +LUE/LLE drifts. Follows commands.  LAZARO x4 spontaneously, RUE 5/5, LUE 4/5, RLE 5/5 except DF 2/5, LLE 4+/5. Sensation intact throughout.   Extremities: distal pulses 2+ x4  Wound/incision: R crani incision c/d/i, well healed, + EEG with headwrap in place  Skin: Red hives, some pustules to face, trunk, extremities      LABS:                        13.1   7.73  )-----------( 247      ( 21 Jan 2024 06:22 )             41.1     01-21    140  |  104  |  21  ----------------------------<  124<H>  3.8   |  25  |  0.99    Ca    9.2      21 Jan 2024 06:22  Phos  4.3     01-21  Mg     2.4     01-21      PT/INR - ( 21 Jan 2024 06:22 )   PT: 11.5 sec;   INR: 1.01          PTT - ( 21 Jan 2024 06:22 )  PTT:26.7 sec  Urinalysis Basic - ( 21 Jan 2024 06:22 )    Color: x / Appearance: x / SG: x / pH: x  Gluc: 124 mg/dL / Ketone: x  / Bili: x / Urobili: x   Blood: x / Protein: x / Nitrite: x   Leuk Esterase: x / RBC: x / WBC x   Sq Epi: x / Non Sq Epi: x / Bacteria: x          CAPILLARY BLOOD GLUCOSE          Drug Levels: [] N/A    CSF Analysis: [] N/A      Allergies    No Known Allergies    Intolerances        Home Medications:  acetaminophen 325 mg oral tablet: 2 tab(s) orally every 6 hours As needed Mild Pain (1 - 3) (21 Jan 2024 00:46)      MEDICATIONS:  MEDICATIONS  (STANDING):  apixaban 5 milliGRAM(s) Oral every 12 hours  dexAMETHasone  Injectable 4 milliGRAM(s) IV Push every 12 hours  levETIRAcetam  IVPB 1500 milliGRAM(s) IV Intermittent two times a day  minocycline 100 milliGRAM(s) Oral two times a day  NIFEdipine XL 90 milliGRAM(s) Oral daily  pantoprazole  Injectable 40 milliGRAM(s) IV Push daily  polyethylene glycol 3350 17 Gram(s) Oral daily  sodium chloride 1 Gram(s) Oral two times a day    MEDICATIONS  (PRN):  LORazepam   Injectable 2 milliGRAM(s) IV Push once PRN seizure  ondansetron Injectable 4 milliGRAM(s) IV Push every 6 hours PRN Nausea and/or Vomiting      CULTURES:      RADIOLOGY & ADDITIONAL TESTS:      ASSESSMENT:  63 year old male with 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 Worthy, s/p SRS (completed 5 rounds 8/15/23), on immunotherapy monthly, s/p Right parietal and temporal crani and resection (path: metastatic melanoma) on 1/2 (Dr. Tony), tx from [facility] 1/20 with uncontrollable left arm tremors. On arrival to [facility], pt with L facial droop, L sided weakness, and dysarthria    NEURO  - neurochecks/vitals q4h  - CTH 1/21 slight interval increase size and conspicuity of metastases and extensive surrounding vasogenic edema  - CTA 1/20 unremarkable  - Dexamethasone 4mg BID  - Keppra 1500mg BID  - EEG, epilepsy following    CV  - -160  - cont home nifedipine 60mg qd (increased to 90 1/21)    PULM  - hx large R lung mass    GI  - regular diet   - pantoprazole 40mg qd (GI ppx)  - bowel regimen    ENDO  - no active issues    RENAL  - IVF for elevated lactate  - voiding  - cont Na tabs 1g BID, wean if tolerated  - trend lactate    HEME  - hx DVT/PE; Eliquis 5mg BID resumed 1/21, s/p IVCF     ID  - cont minocycline 100mg BID (for facial/body rash secondary to immunotherapy)    d/w Dr. Tony    DVT PROPHYLAXIS:  [x] Venodynes                                [x] Heparin/Lovenox      Assessment: present when checked     [] GCS   E   V   M     Heart Failure: [] Acute, [] acute on chronic, [] chronic   Heart Failure: [] Diastolic (HFpEF), [] Systolic (HRrEF), [] Combined (HFpEF and HFrEF), [] RHF, [] Pulm HTN, [] Other     [] BASHIR, [] ATN, [] AIN, [] other   [] CKD1, [] CKD2, [] CKD3, [] CKD4, [] CKD5, [] ESRD     Encephalopathy: [] Metabolic, [] Hepatic, [] Toxic, [] Neurological, [] Other     Abnormal Nutritional Status: [] malnutrition (see nutrition note), []underweight: BMI <19, [] morbid obesity: BMI >40, [] Cachexia     [] Sepsis   [] Hypovolemic shock, [] Cardiogenic shock, [] Hemorrhagic shock, [] Neurogenic shock   [] Acute respiratory failure   [] Cerebral edema, [] Brain compression / herniation   [] Functional quadriplegia   [] Acute blood loss anemia

## 2024-01-22 NOTE — DISCHARGE NOTE PROVIDER - NSDCCPCAREPLAN_GEN_ALL_CORE_FT
PRINCIPAL DISCHARGE DIAGNOSIS  Diagnosis: Seizure  Assessment and Plan of Treatment:       SECONDARY DISCHARGE DIAGNOSES  Diagnosis: Deep vein thrombosis (DVT)  Assessment and Plan of Treatment:     Diagnosis: Metastatic melanoma  Assessment and Plan of Treatment:     Diagnosis: Pulmonary embolism  Assessment and Plan of Treatment:     Diagnosis: Hyponatremia  Assessment and Plan of Treatment:     Diagnosis: Hypertension  Assessment and Plan of Treatment:     Diagnosis: Melanoma metastatic to brain  Assessment and Plan of Treatment:     Diagnosis: History of melanoma  Assessment and Plan of Treatment:

## 2024-01-22 NOTE — PROGRESS NOTE ADULT - ASSESSMENT
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 Worthy, s/p SRS (completed 5 rounds 8/15/23), on immunotherapy monthly, drug induced neutropenia (likely bactrim), who presented to [facility] on 12/31/23 for resection of brain tumor. He underwent a Right parietal and temporal crani and resection (path: metastatic melanoma) on 1/2, uncomplicated hospital course, discharged to [place] [facility] on 1/5. On 1/20, he was transferred to [facility] from [facility] for concerns of focal seizures described as uncontrollable left arm tremors that stopped with Ativan/Keppra 1500mg. On arrival to [facility], pt with L facial droop, L sided weakness, and dysarthria (new since 3pm per wife), so stroke code was called. CT showing worsening vasogenic edema around known lesions. CTAs negative. Epilepsy consulted in the setting of c/f seizures.     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 Worthy, s/p SRS (completed 5 rounds 8/15/23), on immunotherapy monthly, drug induced neutropenia (likely bactrim), who presented to [facility] on 12/31/23 for resection of brain tumor. He underwent a Right parietal and temporal crani and resection (path: metastatic melanoma) on 1/2, uncomplicated hospital course, discharged to [place] [facility] on 1/5. On 1/20, he was transferred to [facility] from [facility] for concerns of focal seizures described as uncontrollable left arm tremors that stopped with Ativan/Keppra 1500mg. On arrival to [facility], pt with L facial droop, L sided weakness, and dysarthria (new since 3pm per wife), so stroke code was called. CT showing worsening vasogenic edema around known lesions. CTAs negative. Epilepsy consulted in the setting of c/f seizures.     #Metastatic melanoma   #Seizure like activity   - Continue Dexamethasone 4mg IV q12hrs as per NSx  - Continue Keppra 1500mg IVPB q12hrs   - Continue Lorazepam 2mg IV PRN for seizure  - Continue vEEG    Case pending discussion with Epilepsy attending Dr. 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 Worthy, s/p SRS (completed 5 rounds 8/15/23), on immunotherapy monthly, drug induced neutropenia (likely bactrim), who presented to [facility] on 12/31/23 for resection of brain tumor. He underwent a Right parietal and temporal crani and resection (path: metastatic melanoma) on 1/2, uncomplicated hospital course, discharged to [place] [facility] on 1/5. On 1/20, he was transferred to [facility] from [facility] for concerns of focal seizures described as uncontrollable left arm tremors that stopped with Ativan/Keppra 1500mg. On arrival to [facility], pt with L facial droop, L sided weakness, and dysarthria (new since 3pm per wife), so stroke code was called. CT showing worsening vasogenic edema around known lesions. CTAs negative. Epilepsy consulted in the setting of c/f seizures. Today showing in the neuroexam left arm pronator drift with fine movement dexterity decreased in right hand. No seizures overnight.     #Metastatic melanoma   #Seizure like activity   - Continue Dexamethasone 4mg IV q12hrs as per NSx  - Continue Keppra 1500mg PO q12hrs   - Continue Lorazepam 2mg IV PRN for seizure  - Discontinue vEEG  - Patient will follow up outpatient with Epilepsy in outpatient neurology clinic in 2 weeks    Case discussed with Epilepsy attending Dr. Bowie

## 2024-01-22 NOTE — SBIRT NOTE ADULT - NSSBIRTALCPOSREINDET_GEN_A_CORE
Patient reports drinking socially. Per patient, he last consumed alcohol approximately a month ago. Patient educated on the low risk consumption levels and the risks of excessive alcohol use.

## 2024-01-22 NOTE — DISCHARGE NOTE PROVIDER - NSDCMRMEDTOKEN_GEN_ALL_CORE_FT
acetaminophen 325 mg oral tablet: 2 tab(s) orally every 6 hours As needed Mild Pain (1 - 3)  apixaban 5 mg oral tablet: 1 tab(s) orally every 12 hours  Keppra 500 mg oral tablet: 1 tab(s) orally every 12 hours  NIFEdipine 60 mg oral tablet, extended release: 1 tab(s) orally once a day  pantoprazole 40 mg oral delayed release tablet: 1 tab(s) orally once a day (before a meal) continue while on steroids  sodium chloride 1 g oral tablet: 1 tab(s) orally 2 times a day   acetaminophen 325 mg oral tablet: 2 tab(s) orally every 6 hours As needed Mild Pain (1 - 3)  apixaban 5 mg oral tablet: 1 tab(s) orally every 12 hours  dexAMETHasone 2 mg oral tablet: 2 tab(s) orally 2 times a day DECADRON TAPER: 4mg bid x 7 days, 2mg bid x 7 days, then continue 2mg daily  levETIRAcetam 750 mg oral tablet: 2 tab(s) orally 2 times a day  minocycline 100 mg oral capsule: 1 cap(s) orally 2 times a day  NIFEdipine 60 mg oral tablet, extended release: 1 tab(s) orally once a day  pantoprazole 40 mg oral delayed release tablet: 1 tab(s) orally once a day (before a meal) continue while on steroids  sodium chloride 1 g oral tablet: 1 tab(s) orally 2 times a day

## 2024-01-22 NOTE — DISCHARGE NOTE PROVIDER - NSDCFUADDINST_GEN_ALL_CORE_FT
Neurosurgery follow up appointment date/time:  - follow up in the office for a wound check   - please call the office to confirm appointment: [phone]     Wound Care:  - shower and wash hair daily with shampoo  - gently clean incision with soapy water  - pat dry incision with a clean towel after showering  - leave incision uncovered, open to air  - no picking at incision      Devices/Drains/Lines:    Activity:  - fatigue is common after surgery, rest if you feel tired   - no bending, lifting, twisting or heavy lifting   - walking is recommended, ambulate as tolerated  - you may shower when you get home, keep your incision dry  - no soaking in a tub/pool/hot tub   -  NO DRIVING   - keep hydrated, drink plenty of water     Inpatient consults:  - Epilepsy     Please also follow up with your primary care doctor.     Medications:  - changes to home meds (ex. AED's)?  - new meds?  - pain meds?  - when can antiplatelets or anticoagulants be restarted?  - adverse affects of meds discussed with patient  - pain medications can cause constipation, you should eat a high fiber diet and may take a stool softener while on pain meds   - Avoid taking Advil (ibuprofen), Motrin (naproxen), or Aspirin for pain as they can cause bleeding     Call the office or come to ED if:  - wound has drainage or bleeding, increased redness or pain at incision site, neurological change, fever (>101), chills, night sweats, syncope, nausea/vomiting, chest pain, shortness of breath      Playback:  - see [facility] for a copy of your discharge paperwork     WITHIN 24 HOURS OF DISCHARGE, PLEASE CONTACT NEURO PA  WITH ANY QUESTIONS OR CONCERNS: [phone]   OTHERWISE, PLEASE CALL THE OFFICE WITH ANY QUESTIONS OR CONCERNS: [phone] Neurosurgery follow up appointment date/time:  - you have an appointment on 2/5 @ 12:15pm  - please call the office to confirm appointment: [phone]     Wound Care:  - shower and wash hair daily with shampoo  - gently clean incision with soapy water    Activity:  - walking is recommended, ambulate as tolerated  - you may shower when you get home, keep your incision dry  - NO DRIVING   - keep hydrated, drink plenty of water     Inpatient consults:  - Epilepsy     Please also follow up with your primary care doctor.     Medications:  - take keppra 1500mg twice a day  - take decadron (taper x 2 weeks to 2mg daily), take protonix 40mg daily while taking decadron   - continue your home eliquis 5mg twice a day, minocycline 100mg twice a day, nifedipine 60mg daily, salt tabs 1g twice a day     Call the office or come to ED if:  - wound has drainage or bleeding, increased redness or pain at incision site, neurological change, fever (>101), chills, night sweats, syncope, nausea/vomiting, chest pain, shortness of breath     WITHIN 24 HOURS OF DISCHARGE, PLEASE CONTACT NEURO PA  WITH ANY QUESTIONS OR CONCERNS: [phone]   OTHERWISE, PLEASE CALL THE OFFICE WITH ANY QUESTIONS OR CONCERNS: [phone]

## 2024-01-23 NOTE — EEG REPORT - NS EEG TEXT BOX
[facility] Department of Neurology  Inpatient Continuous video-Electroencephalogram  [address], [address] [place] [id number], T: [phone]    Patient Name:	SHRADDHA PROCTOR    :	1960  MRN:	[id number]    Study Start Date/Time:	2024, 3:13:13 AM  Study End Date/Time:	2024, 12:25 P<    Referred by:  Johan Tony MD    Brief Clinical History:  SHRADDHA PROCTOR is a 63 year old Male with metastatic melanoma and seizure; study performed to investigate for seizures or markers of epilepsy.   Technologist notes: -  Diagnosis Code:  R56.9 convulsions/seizure    Pertinent Medication:  n/a    Acquisition Details:  Electroencephalography was acquired using a minimum of 21 channels on an [employer] system v 9.3.1 with electrode placement according to the standard International 10-20 system following ACNS (American Clinical Neurophysiology Society) guidelines.  Anterior temporal T1 and T2 electrodes were utilized whenever possible.  The [employer] automated spike & seizure detections were all reviewed in detail, in addition to the entire raw EEG.    Findings:  Day 1:  2024, 3:13:13 AM to same day at 07:00 AM   Background:  continuous, with predominantly alpha and beta frequencies.  Generalized Slowing:  None  Symmetry/Focality: Continuous (90+%) theta/delta over right temporal region   Voltage:  Normal (20+ uV)  Organization:  Appropriate anterior-posterior gradient  Posterior Dominant Rhythm:  9 Hz asymmetric, formed over the left  Sleep:  Symmetric, synchronous spindles and K complexes.  Variability:   Yes		Reactivity:  Yes    Spontaneous Activity:  Occasional ( >1/hr < 1/min) low/medium amplitude sharp waves over right temporal region  Events:  •	No electrographic seizures or significant clinical events occurred during this study.  Provocations:  •	Hyperventilation: was not performed.  •	Photic stimulation: was not performed.  Daily Summary:    1)	Focal slowing over right temporal region suggestive of underlying cerebral dysfunction   2)	Occasional low/medium amplitude sharp waves over right temporal region suggestive of underying hyperexcitability      Kristan Camp MD  Attending Neurologist, [facility] Epilepsy Program        Daily Updates (from 07:00 am until 07:00 am):  Day 2  :   Medications:  Levetiracetam 1500mg bid  Background:  continuous, with predominantly alpha and beta frequencies.  Generalized Slowing:  None  Symmetry/Focality: Continuous (90+%) theta/delta over right temporal region   Voltage:  Normal (20+ uV)  Organization:  Appropriate anterior-posterior gradient  Posterior Dominant Rhythm:  9 Hz asymmetric,  better regulated over the left  Sleep:  Symmetric, synchronous spindles and K complexes.  Variability:   Yes		Reactivity:  Yes    Spontaneous Activity:  Rare (<1/Hr)  low/medium amplitude sharply contoured waves over right temporal region  Events:  •	No electrographic seizures or significant clinical events occurred during this study.  Provocations:  •	Hyperventilation: was not performed.  •	Photic stimulation: was not performed.  Daily Summary:    •	Focal slowing over right temporal region suggestive of underlying cerebral dysfunction   •	Rare low/medium amplitude sharply contoured waves over right temporal region suggestive of underying hyperexcitability but improved from prior day.      Roland Bowie MD  Attending Neurologist, [facility] Epilepsy Program            Daily Updates  Day :   Medications:  Levetiracetam 1500mg bid  Background:  continuous, with predominantly alpha and beta frequencies.  Generalized Slowing:  None  Symmetry/Focality: Continuous (90+%) theta/delta over right temporal region   Voltage:  Normal (20+ uV)  Organization:  Appropriate anterior-posterior gradient  Posterior Dominant Rhythm:  9 Hz asymmetric,  better regulated over the left  Sleep:  Symmetric, synchronous spindles and K complexes.  Variability:   Yes		Reactivity:  Yes    Spontaneous Activity:  Rare (<1/Hr)  low/medium amplitude sharply contoured waves over right temporal region  Events:  •	No electrographic seizures or significant clinical events occurred during this study.  Provocations:  •	Hyperventilation: was not performed.  •	Photic stimulation: was not performed.  Daily Summary:    •	Focal slowing over right temporal region suggestive of underlying cerebral dysfunction   •	Rare low/medium amplitude sharply contoured waves over right temporal region suggestive of underying hyperexcitability but improved from prior day.      Roland Bowie MD  Attending Neurologist, [facility] Epilepsy Program          FINAL Impression:  Abnormal Continuous/long-term video-EEG  •	Focal slowing over right temporal region suggestive of underlying cerebral dysfunction   •	Rare to occasional sharply contoured waves embedded within the right temporal region suggestive of underying hyperexcitability and questionable epileptic potential..      Final Clinical Correlation:  1)	Markers of focal right temporal regional cerebral dysfunction.  2)	Fluctuating intensity of right temporal regional cortical hyperexcitability ending with a suspicion for epileptic potential.            Roland Bowie MD  Attending Neurologist, [facility] Epilepsy Program

## 2024-01-24 NOTE — CHART NOTE - NSCHARTNOTEFT_GEN_A_CORE
This patient is a 63 year old male with metastatic melanoma to the brain with multiple brain masses that have associated vasogenic edema with brain compression causing 5mm of right to left midline shift from the post-surgical site.

## 2024-01-26 PROBLEM — I82.403 DEEP VEIN THROMBOSIS (DVT) OF BOTH LOWER EXTREMITIES, UNSPECIFIED CHRONICITY, UNSPECIFIED VEIN: Status: ACTIVE | Noted: 2024-01-01

## 2024-01-26 PROBLEM — Z95.828 PRESENCE OF IVC FILTER: Status: ACTIVE | Noted: 2024-01-01

## 2024-01-26 NOTE — HISTORY OF PRESENT ILLNESS
[FreeTextEntry1] : 63yoM with prior DVT after right lower extremity vein stripping years ago and melanoma on his face status post Moh's  surgery complicated by brain metastasis, status post right craniotomy in June 2023 and initiation of immunotherapy, complicated by pulmonary embolism in August 2023 and has since been on therapeutic Lovenox, who was admitted for progression of right parietal lobe lesion and Neurosurgery asked to place an IVC filter prior to his surgery due to inability to therapeutically be  anticoagulated for pulmonary embolism and DVT history during the perioperative period. He is on Eliquis, denies LEs edema, pain, CP, SOB. He did however have a seizure last week at a basketball game. He scheduled to see his neurosurgeon on 2/5/24.  Bilateral lower extremity venous duplex ultrasound while in the hospital showed bilateral acute popliteal vein DVTs.

## 2024-01-26 NOTE — ADDENDUM
[FreeTextEntry1] : This note was written by Lauren FOSS, acting as a scribe for Dr. Willi Faulkner.  I, Dr. Willi Faulkner, have read and attest that all the information, medical decision-making, and discharge instructions within are true and accurate.  Mr. Pacheco Nogueira is a 63-year-old man with prior deep vein thrombosis after right leg vein striping years ago and melanoma on his face s/p MOHS complicated by brain metastasis s/p right craniotomy (6/2023) and initiation of immunotherapy, complicated by pulmonary embolism (8/2023), and now most recently s/p inferior vena cava filter and right parietal and temporal craniotomy with resection of lesions (1/2/24). Preoperatively during the admission, he was found to have bilateral acute popliteal vein deep vein thromboses. He recovered fine and is back on anticoagulation. He presents for his vascular postop visit. He has no major complaints, including leg pain or swelling. He did however have a seizure last week at a basketball game. He has follow up with Dr. Tony ([facility]) on 2/5/24. On exam, he appears well with palpable femoral and pedal pulses. His groin is soft and non-tender. Her feet are warm with good capillary refill. He has mild leg swelling, but they are non-tender. There are no ulcers or wounds. In the office, we performed a venous duplex ultrasound, which showed chronic deep vein thrombosis of his popliteal veins bilaterally. Given his exam, venous duplex findings and resumption of anticoagulation, I will plan for endovascular retrieval of inferior vena cava filter. My team will coordinate for the procedure to be done tentatively on 2/9/24 if RAZA is not planning any more procedures soon. I have also recommended he see hematology/oncology to determine how long he needs to be on anticoagulation given his cancer and pulmonary embolism history.   .I, Dr. Willi Faulkner, personally performed the evaluation and management (E/M) services for this new patient.  That E/M includes conducting the initial examination, assessing all conditions, and establishing the plan of care.  Today, my ACP, Lauren FOSS, was here to observe my evaluation and management services for this patient to be followed going forward.

## 2024-01-26 NOTE — ASSESSMENT
[Arterial/Venous Disease] : arterial/venous disease [FreeTextEntry1] : 63yoM with prior DVT after right lower extremity vein stripping years ago, who was admitted for progression of right parietal lobe lesion and Neurosurgery asked to place an IVC filter prior to his surgery due to inability to therapeutically be anticoagulated for pulmonary embolism and DVT history during the perioperative period. He returns today for a f/u. He is compliant with Eliquis and would like to discuss IVCF removal. On exam, he appears well with palpable femoral and pedal pulses. His groin is soft and non-tender. Her feet are warm with good capillary refill. He has mild leg swelling, but they are non-tender.  BL LE venous doppler was done in the office that demonstrated chronic deep vein thrombosis of his popliteal veins bilaterally. We discussed the findings and recommended to remove the IVCF, the procedure, its RABs were explained. Patient agrees and would like to proceed. He will be tentatively scheduled for 2/9/24 pending his f/u with Dr. Tony due to a recent seizure episode. He will hold Eliquis one day prior to the procedure.

## 2024-01-26 NOTE — PHYSICAL EXAM
[Respiratory Effort] : normal respiratory effort [Normal Heart Sounds] : normal heart sounds [2+] : left 2+ [Alert] : alert [Calm] : calm [Abdomen Tenderness] : ~T ~M No abdominal tenderness [de-identified] : WN/WD, NAD [de-identified] : NC/AT [de-identified] : supple [de-identified] : +FROM 5/5x4

## 2024-01-26 NOTE — PROCEDURE
[FreeTextEntry1] : BL LE venous doppler was done in the office that demonstrated chronic deep vein thrombosis of his popliteal veins bilaterally.

## 2024-02-01 NOTE — PHYSICAL EXAM
[Restricted in physically strenuous activity but ambulatory and able to carry out work of a light or sedentary nature] : Status 1- Restricted in physically strenuous activity but ambulatory and able to carry out work of a light or sedentary nature, e.g., light house work, office work [Normal] : affect appropriate [de-identified] : Facial rash

## 2024-02-01 NOTE — HISTORY OF PRESENT ILLNESS
[Disease: _____________________] : Disease: [unfilled] [M: ___] : M[unfilled] [AJCC Stage: ____] : AJCC Stage: [unfilled] [de-identified] : DIAGNOSIS:  M1d, stage IV, melanoma  MOLECULAR FINDINGS:  Gen[employer] OnkoSight NGS BRAF was negative for a BRAF mutation  FoundationOne: TMB 82 mut/Mb, LEONARDA, KRAS Q22K, NF1 Q684*  O3959D, EZH2, DAXX, , SPHA3, SPEN, STK11 Q302*, TERT promoter mutation (wt for BRAF, KIT and NNRAS)   CURRENT THERAPY:   Nivolumab/relatlimab  PRIOR THERAPIES:   Ipilimumab and nivolumab (C1: 07/18/2023; C2: delayed, administered on 08/17/2023; C3: 09/07/2023; C4: held due to neutropenia) Nivolumab 240 mg IV q2 weeks (starting 10/19/2023)  CODE STATUS:  Full code  INTERVAL HISTORY: Mr. 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 Alla Mendez at [facility] in 2014, who is seen today for continued management of his M1d, stage IV melanoma, now with brain and lung metastases.  HIs treatment course has been complicated by the development of a DVT and PE. He presents to clinic today for continued management of his advanced disease.  Briefly, he initially developed a change in mental status, with slight confusion and balance problems, on Friday 06/16/2023.  Non contrast head CT scan, performed on 06/22/2022 was significant for multiple masses in the bilateral cerebral hemisphere, the largest measuring approximately 5 cm in the right parietal lobe, associated with mass effect and a 14 mm right to left midline shift. He was then admitted to [facility].  Chest, abdominal and pelvic CT scan, performed on 06/22/2023 demonstrates a 9cm heterogeneous right upper lobe mass extending to the right hilum and right perihilar adenopathy.  Brain MRI, performed on 06/23/2023, again demonstrated the multifocal brain metastases associated with adjacent edema. He then underwent right craniotomy for resection of the dominant right frontal parietal complex mass on 06/26/2023 by Dr. Robert Worthy.  Pathology was consistent with metastatic melanoma.  He was evaluated by Dr. Maco Marr hematology/oncology as well as by Dr. Nasima English of radiation oncology, and was ultimately discharged on 06/26/2023.   He was initiated on therapy with ipilimumab and nivolumab, receiving his first dose on 07/18/2023. Prior to his second dose, he was admitted for symptomatic pulmonary emboli.  He was discharged on lovenox QD.  He remains on lovenox 80mg QD and has completed GK radiotherapy on 08/15/2023 for the brain metastases. He ultimately received his second dose on 08/17/2023 and received his third dose of ipilimumab and nivolumab on 9/7/23. Repeat imaging was performed on 9/9/2023 to assess response to treatment and compared with a prior study dated 08/16/2023, CT head demonstrated improvement in hemorrhagic lesion within the right inferior temporal lobe and hemorrhagic lesion within the posterior parietal lobe is minimally smaller. CT C/A/P demonstrated Slight interval increase in size of right upper lobe lung mass since CT of 8/3/2023. Diminished thrombus in left pulmonary artery.  On 09/27/2023, he was found to be progressively neutropenic and his fourth cycle of therapy was held. The etiology was felt to be possibly related to recently started antibiotics, to the keppra or to immunotherapy. Bone marrow biopsy was performed, antibiotics discontinued and steroids initiated, with subsequent improvement in the counts. After a long discussion, it was opted to continue him on therapy with nivolumab alone which he initiated on 10/19/2023.   On 10/25/2023, he underwent repeat brain imaging with MRI. This study, when compared with a prior dated 08/05/2023, demonstrated at least 10 small enhancing nodules which are new as well as stable or slightly larger additional pre-existing lesions.  He was evaluated by Dr. Marcial Warren of radiation oncology who discussed standard and investigational options and who recommended gamma knife if standard of care options were to be pursued.  After several discussions, the patient opted for close observation, with radiation should there be progression on the next set of imaging studies.  On 12/05/2023, he underwent a PET/CT scan which, when compared with a prior CT scan dated 09/09/2023, demonstrated an FDG avid, partially necrotic right upper lobe lung mass similar in size and appearance to the prior scan.  A brain MRI, performed on 12/04/2023, demonstrated increase in size of a right medial thalamus lesion, now measuring 1.3 cm with surrounding edema. There are other lesions with some evidence of growth.   Pt being was seen on 12/22/2023 as an urgent add on. Pt reports fell from bed on Wednesday striking head very hard. He did not lose consciousness but reports was definitely stunned. Wife reports that he has "been off" the last few days. This morning he was unable to correct pants from being inside out and couldn't remember how to button them once on. He also had trouble getting his shoes on correctly. He denies HA/visual changes. Affect does appear different. Denies nausea/vomiting. Completed first dose of Opdualog last week.  Repeat brain imaging, performed on 12/22/2023, when compared with a prior dated 12/04/2023, demonstrated mild interval enlargement of a dominant lesion within the right parietal lobe, with worsening right to left midline shift.    He was admitted to [facility] and ultimately underwent right parietal and temporal craniotomy with resection of the two dominant lesions by Johan Cabrera on 01/02/2024. His anticoagulation was held and an IVC filter placed beforehand.  Since that time, his anticoagulation was changed from lovenox to Eliquis. He decadron dose is currently 2 mg twice daily.    Since he was last seen here, his course was complicated by the development of a seizure requiring hospitalization at [facility].  He has since been discharged on a higher dose of Keppra and steroids.  He was evaluated by Dr. Warren with plans for close interval imaging.  Clinically, he remains in good spirits and is feeling well today.  PAST MEDICAL HISTORY: 1. 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 Right lower extremity deep venous thrombosis in the setting of a vein stripping procedure  SOCIAL HISTORY: , denies tobacco.  He drinks alcohol socially.  He has two daughters, one who is getting  next month in [place], and the other who is having a baby in the near future.   He currently works in [employer].  ALLERGIES:  No known drug allergies  CURRENT MEDICATIONS: 1. Decadron taper 2. Keppra [de-identified] : LDH

## 2024-02-01 NOTE — REVIEW OF SYSTEMS
[Fatigue] : fatigue [Diarrhea: Grade 0] : Diarrhea: Grade 0 [Negative] : Allergic/Immunologic [FreeTextEntry8] : Nocturia

## 2024-02-01 NOTE — ASSESSMENT
[FreeTextEntry1] : Mr. Nogueira is a 63 year old gentleman with a prior history of a cutaneous melanoma of the right cheek, now with an M1d, stage IV, melanoma affecting the brain, lungs and lymph nodes. His tumor is wild type for BRAF.  His course has been complicated by symptomatic pulmonary emboli for which he is now on apixaban. He has experienced some mild progression in the brain to ipilimumab/nivolumab followed by nivolumab, and he is currently on nivolumab/relatlimab.  At this time, I will plan to see him back in clinic in two weeks time for his next dose of therapy.  I had a long discussion with the patient and his wife regarding his clinical status, and potential plans based upon the next brain MRI scheduled in the near future.  Should this scan be stable to improved, I would anticipate continuing nivolumab/relatlimab with or without gamma knife to the smaller lesions.  Should there be progression, the patient remains strongly interested in aggressive therapy.  Consideration of reinitiation of ipilimumab/nivolumab could be given in that setting.  He would also need repeat systemic imaging at that time.  I also discussed code status this morning.  At this time, the patient remains full code; however, we will continue the discussion in future appointments.  We will keep his keppa and steroid doses as is for now and will arrange for him to see Dr. Yost in followup in the near future.  Given the neuropenia that occurred in the setting of bactrim use, we have not initiated PCP prophylaxis but may need to consider pentamadine in the future.  After a long discussion, all of his questions and concerns were answered to his satisfaction.    [With Patient/Caregiver] : With Patient/Caregiver [AdvancecareDate] : 02/01/2024 [Full Code] : full code

## 2024-02-05 NOTE — ASSESSMENT
[FreeTextEntry1] : My impression is that the patient suffers from  resected metastatic melanoma .   The patients established problem of  melanoma is being treated systemically.   His KPS is 90. I had a long discussion with the patient regarding the role of continued immunotherapy.  The patient was extensively educated about the nature of his disease process. Therapeutic and diagnostic tests include MRI brain with and without contrast this month.  The patient should continue to  see Dr. Richards.  I will see the patient back after his next MRI. I have explained the alternatives, risks and benefits to the patient and his family and they understand and agree to proceed.

## 2024-02-05 NOTE — HISTORY OF PRESENT ILLNESS
[FreeTextEntry1] : 62 yo male PMH DVT/PE 8/2023 on therapeutic SQL (last dose 12/30/23 AM),  melanoma on face s/p MOHs 15 yrs ago, melanoma brain mets dx'd 6/2023 s/p right  crani for resection at [facility] with Dr. Worthy, s/p SRS (completed 5  rounds 8/15/23), on immunotherapy monthly (last dose 12/15/23, next dose  1/15/2023), drug induced neutropenia (likely bactrim), presents for resection  of brain tumor. Patient was initially diagnosed after he was found to be  leaving the car door opened, putting his shoes on the wrong feet, and being  forgetful of his usual daily routine. Patient reports on 12/20/23 he fell and  hit his head (no LOC) d/t developing left sided weakness. Within 2 days, the  symptoms of forgetfulness returned. MRI o/p 12/22/23 showed progression of  right parietal lobe lesion w/ worsening mass effect, edema, and 1.2cm MLS.  Admitted to [facility] for further management.  1/2/22: Crani for middle cranial fossa mass resection and IVCF placement PATH: metastatic melanoma  Pt experienced uncontrollable left arm tremors 1/20 around 11am, more  persistent throughout afternoon, ceased with Ativan/Keppra 1500mg. On arrival  to [facility], pt with L facial droop, L sided weakness, and dysarthria (new since 3pm  per wife). Pt is compliant with home Keppra 500mg BID and Dexamethasone 2mg qd.  Stroke code called and imaging notable for increased vasogenic edema around  known lesions.   He comes in today for routine follow up visit. He is on a dexamethasone 2mg PO BID, apixiban for DVT and keppra 1500mg BID. he is currently on nivolumab/relatlimab with Dr. Richards. He is scheduled for follow up MRI 2/27 and appointment with Dr. Warren 3/6. He is planning to see Dr. Yost who will manage his keppra.

## 2024-02-05 NOTE — PHYSICAL EXAM
[General Appearance - Alert] : alert [General Appearance - In No Acute Distress] : in no acute distress [Longitudinal] : longitudinal [Clean] : clean [Healing Well] : healing well [Intact] : intact [No Drainage] : without drainage [Person] : oriented to person [Place] : oriented to place [Time] : oriented to time [Motor Tone] : muscle tone was normal in all four extremities [Motor Strength] : muscle strength was normal in all four extremities [Abnormal Walk] : normal gait [Balance] : balance was intact [FreeTextEntry1] : bicoronal scalp

## 2024-02-05 NOTE — REASON FOR VISIT
[de-identified] : middle cranial fossa craniotomy for resection [de-identified] : 1/2/24 [de-identified] : PATH: metastatic melanoma

## 2024-02-09 NOTE — BRIEF OPERATIVE NOTE - ESTIMATED BLOOD LOSS
50
The patient has been re-examined and I agree with the above assessment or I updated with my findings.

## 2024-02-12 NOTE — ASSESSMENT
[FreeTextEntry1] : Mr. Nogueira is a 62 year old gentleman with a prior history of a cutaneous melanoma of the right cheek, now with an M1d, stage IV, melanoma affecting the brain, lungs and lymph nodes. His tumor is wild type for BRAF.  His course has been complicated by the development of symptomatic pulmonary emboli.  He is receiving lovenox in the setting of known hemorraghic brain metastases given the clot burden, with the brain findings stable on serial imaging studies.  The patient and wife have been counseled regarding the risk and benefits of anticoagulation in this setting, as well as the potential utility of IVC filter placement and are on board with the current plan despite the risks.  At this time, we will continue his radiotherapy course and will taper his steroids further.  He will continue bactrim prophylaxis for now.  We will proceed with his second dose of ipilmumab and nivolumab once his steroid dosing is lower, along with weekly toxicity checks for the first 2 doses followed by restaging studies.  We will repeat brain imaging next week to monitor for stability in the brain. We are awaiting the echocardiogram. We will also arrange for him to see dermatology for a full body skin examination.  We will arrange for physical therapy.  Finally, we will obtain a larger next generation sequencing panel on his tumor. Follow-up with your Primary Care Physician within the next week.    Medications  Take Tylenol (Acetaminophen 160 mg/5 mL) 5 mL every 4-6 hours AND/OR Motrin (Ibuprofen 100 mg/5 mL) 5 mL every 6-8 hours as needed for pain/fever.    Advance activity as tolerated.  Continue all previously prescribed medications as directed unless otherwise instructed.  Follow up with your primary care physician in 48-72 hours- bring copies of your results.  Return to the ER for worsening or persistent symptoms, and/or ANY NEW OR CONCERNING SYMPTOMS such as fever, chest pain, shortness of breath, abdominal pain, neck pain/stiffness, or headaches. If you have issues obtaining follow up, please call: [phone] to obtain a doctor or specialist who takes your insurance in your area.  You may call [phone] to make an appointment with the internal medicine clinic.    Viruses are tiny germs that can get into a person's body and cause illness. There are many different types of viruses, and they cause many types of illness. Viral illness in children is very common. Most viral illnesses that affect children are not serious. Most go away after several days without treatment.    The most common types of viruses that affect children are:    Cold and flu (influenza) viruses.  Stomach viruses.  Viruses that cause fever and rash. These include illnesses such as measles, rubella, roseola, fifth disease, and chickenpox.    Viral illnesses also include serious conditions such as HIV (human immunodeficiency virus) infection and AIDS (acquired immunodeficiency syndrome). A few viruses have been linked to certain cancers.    What are the causes?  Many types of viruses can cause illness. Viruses invade cells in your child's body, multiply, and cause the infected cells to work abnormally or die. When these cells die, they release more of the virus. When this happens, your child develops symptoms of the illness, and the virus continues to spread to other cells. If the virus takes over the function of the cell, it can cause the cell to divide and grow out of control. This happens when a virus causes cancer.    Different viruses get into the body in different ways. Your child is most likely to get a virus from being exposed to another person who is infected with a virus. This may happen at home, at school, or at . Your child may get a virus by:    Breathing in droplets that have been coughed or sneezed into the air by an infected person. Cold and flu viruses, as well as viruses that cause fever and rash, are often spread through these droplets.  Touching anything that has the virus on it (is contaminated) and then touching his or her nose, mouth, or eyes. Objects can be contaminated with a virus if:    They have droplets on them from a recent cough or sneeze of an infected person.  They have been in contact with the vomit or stool (feces) of an infected person. Stomach viruses can spread through vomit or stool.  Eating or drinking anything that has been in contact with the virus.  Being bitten by an insect or animal that carries the virus.  Being exposed to blood or fluids that contain the virus, either through an open cut or during a transfusion.    What are the signs or symptoms?  Your child may have these symptoms, depending on the type of virus and the location of the cells that it invades:    Cold and flu viruses:    Fever.  Sore throat.  Muscle aches and headache.  Stuffy nose.  Earache.  Cough.  Stomach viruses:    Fever.  Loss of appetite.  Vomiting.  Stomachache.  Diarrhea.  Fever and rash viruses:    Fever.  Swollen glands.  Rash.  Runny nose.    How is this diagnosed?  This condition may be diagnosed based on one or more of the following:    Symptoms.  Medical history.  Physical exam.  Blood test, sample of mucus from the lungs (sputum sample), or a swab of body fluids or a skin sore (lesion).    How is this treated?  Most viral illnesses in children go away within 3–10 days. In most cases, treatment is not needed. Your child's health care provider may suggest over-the-counter medicines to relieve symptoms.    A viral illness cannot be treated with antibiotic medicines. Viruses live inside cells, and antibiotics do not get inside cells. Instead, antiviral medicines are sometimes used to treat viral illness, but these medicines are rarely needed in children.    Many childhood viral illnesses can be prevented with vaccinations (immunization shots). These shots help prevent the flu and many of the fever and rash viruses.    Follow these instructions at home:      Medicines    Give over-the-counter and prescription medicines only as told by your child's health care provider. Cold and flu medicines are usually not needed. If your child has a fever, ask the health care provider what over-the-counter medicine to use and what amount, or dose, to give.  Do not give your child aspirin because of the association with Reye's syndrome.  If your child is older than 4 years and has a cough or sore throat, ask the health care provider if you can give cough drops or a throat lozenge.  Do not ask for an antibiotic prescription if your child has been diagnosed with a viral illness. Antibiotics will not make your child's illness go away faster. Also, frequently taking antibiotics when they are not needed can lead to antibiotic resistance. When this develops, the medicine no longer works against the bacteria that it normally fights.  If your child was prescribed an antiviral medicine, give it as told by your child's health care provider. Do not stop giving the antiviral even if your child starts to feel better.        Eating and drinking     If your child is vomiting, give only sips of clear fluids. Offer sips of fluid often. Follow instructions from your child's health care provider about eating or drinking restrictions.  If your child can drink fluids, have the child drink enough fluids to keep his or her urine pale yellow.        General instructions    Make sure your child gets plenty of rest.  If your child has a stuffy nose, ask the health care provider if you can use saltwater nose drops or spray.  If your child has a cough, use a cool-mist humidifier in your child's room.  If your child is older than 1 year and has a cough, ask the health care provider if you can give teaspoons of honey and how often.  Keep your child home and rested until symptoms have cleared up. Have your child return to his or her normal activities as told by your child's health care provider. Ask your child's health care provider what activities are safe for your child.  Keep all follow-up visits as told by your child's health care provider. This is important.    How is this prevented?     To reduce your child's risk of viral illness:    Teach your child to wash his or her hands often with soap and water for at least 20 seconds. If soap and water are not available, he or she should use hand .  Teach your child to avoid touching his or her nose, eyes, and mouth, especially if the child has not washed his or her hands recently.  If anyone in your household has a viral infection, clean all household surfaces that may have been in contact with the virus. Use soap and hot water. You may also use bleach that you have added water to (diluted).  Keep your child away from people who are sick with symptoms of a viral infection.  Teach your child to not share items such as toothbrushes and water bottles with other people.  Keep all of your child's immunizations up to date.  Have your child eat a healthy diet and get plenty of rest.    Contact a health care provider if:  Your child has symptoms of a viral illness for longer than expected. Ask the health care provider how long symptoms should last.  Treatment at home is not controlling your child's symptoms or they are getting worse.  Your child has vomiting that lasts longer than 24 hours.    Get help right away if:  Your child who is younger than 3 months has a temperature of 100.4°F (38°C) or higher.  Your child who is 3 months to 3 years old has a temperature of 102.2°F (39°C) or higher.  Your child has trouble breathing.  Your child has a severe headache or a stiff neck.    These symptoms may represent a serious problem that is an emergency. Do not wait to see if the symptoms will go away. Get medical help right away. Call your local emergency services (911 in the [place]    Summary  Viruses are tiny germs that can get into a person's body and cause illness.  Most viral illnesses that affect children are not serious. Most go away after several days without treatment.  Symptoms may include fever, sore throat, cough, diarrhea, or rash.  Give over-the-counter and prescription medicines only as told by your child's health care provider. Cold and flu medicines are usually not needed. If your child has a fever, ask the health care provider what over-the-counter medicine to use and what amount to give.  Contact a health care provider if your child has symptoms of a viral illness for longer than expected. Ask the health care provider how long symptoms should last.    ADDITIONAL NOTES AND INSTRUCTIONS    Please follow up with your Primary MD in 24-48 hr.  Seek immediate medical care for any new/worsening signs or symptoms.

## 2024-02-15 NOTE — HISTORY OF PRESENT ILLNESS
[Disease: _____________________] : Disease: [unfilled] [M: ___] : M[unfilled] [AJCC Stage: ____] : AJCC Stage: [unfilled] [de-identified] : DIAGNOSIS:  M1d, stage IV, melanoma  MOLECULAR FINDINGS:  Genpath OnkoSight NGS BRAF was negative for a BRAF mutation  FoundationOne: TMB 82 mut/Mb, LEONARDA, KRAS Q22K, NF1 Q684*  [id number], EZH2, DAXX, , SPHA3, SPEN, STK11 Q302*, TERT promoter mutation (wt for BRAF, KIT and NNRAS)   CURRENT THERAPY:   Nivolumab/relatlimab  PRIOR THERAPIES:   Ipilimumab and nivolumab (C1: 07/18/2023; C2: delayed, administered on 08/17/2023; C3: 09/07/2023; C4: held due to neutropenia) Nivolumab 240 mg IV q2 weeks (starting 10/19/2023)  CODE STATUS:  Full code  INTERVAL HISTORY: Mr. 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 Alla Mendez at [facility] in 2014, who is seen today for continued management of his M1d, stage IV melanoma, now with brain and lung metastases.  HIs treatment course has been complicated by the development of a DVT and PE. He presents to clinic today for continued management of his advanced disease.  Briefly, he initially developed a change in mental status, with slight confusion and balance problems, on Friday 06/16/2023.  Non contrast head CT scan, performed on 06/22/2022 was significant for multiple masses in the bilateral cerebral hemisphere, the largest measuring approximately 5 cm in the right parietal lobe, associated with mass effect and a 14 mm right to left midline shift. He was then admitted to [facility].  Chest, abdominal and pelvic CT scan, performed on 06/22/2023 demonstrates a 9cm heterogeneous right upper lobe mass extending to the right hilum and right perihilar adenopathy.  Brain MRI, performed on 06/23/2023, again demonstrated the multifocal brain metastases associated with adjacent edema. He then underwent right craniotomy for resection of the dominant right frontal parietal complex mass on 06/26/2023 by Dr. Robert Worthy.  Pathology was consistent with metastatic melanoma.  He was evaluated by Dr. Maco Marr hematology/oncology as well as by Dr. Nasima English of radiation oncology, and was ultimately discharged on 06/26/2023.   He was initiated on therapy with ipilimumab and nivolumab, receiving his first dose on 07/18/2023. Prior to his second dose, he was admitted for symptomatic pulmonary emboli.  He was discharged on lovenox QD.  He remains on lovenox 80mg QD and has completed GK radiotherapy on 08/15/2023 for the brain metastases. He ultimately received his second dose on 08/17/2023 and received his third dose of ipilimumab and nivolumab on 9/7/23. Repeat imaging was performed on 9/9/2023 to assess response to treatment and compared with a prior study dated 08/16/2023, CT head demonstrated improvement in hemorrhagic lesion within the right inferior temporal lobe and hemorrhagic lesion within the posterior parietal lobe is minimally smaller. CT C/A/P demonstrated Slight interval increase in size of right upper lobe lung mass since CT of 8/3/2023. Diminished thrombus in left pulmonary artery.  On 09/27/2023, he was found to be progressively neutropenic and his fourth cycle of therapy was held. The etiology was felt to be possibly related to recently started antibiotics, to the keppra or to immunotherapy. Bone marrow biopsy was performed, antibiotics discontinued and steroids initiated, with subsequent improvement in the counts. After a long discussion, it was opted to continue him on therapy with nivolumab alone which he initiated on 10/19/2023.   On 10/25/2023, he underwent repeat brain imaging with MRI. This study, when compared with a prior dated 08/05/2023, demonstrated at least 10 small enhancing nodules which are new as well as stable or slightly larger additional pre-existing lesions.  He was evaluated by Dr. Marcial Warren of radiation oncology who discussed standard and investigational options and who recommended gamma knife if standard of care options were to be pursued.  After several discussions, the patient opted for close observation, with radiation should there be progression on the next set of imaging studies.  On 12/05/2023, he underwent a PET/CT scan which, when compared with a prior CT scan dated 09/09/2023, demonstrated an FDG avid, partially necrotic right upper lobe lung mass similar in size and appearance to the prior scan.  A brain MRI, performed on 12/04/2023, demonstrated increase in size of a right medial thalamus lesion, now measuring 1.3 cm with surrounding edema. There are other lesions with some evidence of growth.   Pt being was seen on 12/22/2023 as an urgent add on. Pt reports fell from bed on Wednesday striking head very hard. He did not lose consciousness but reports was definitely stunned. Wife reports that he has "been off" the last few days. This morning he was unable to correct pants from being inside out and couldn't remember how to button them once on. He also had trouble getting his shoes on correctly. He denies HA/visual changes. Affect does appear different. Denies nausea/vomiting. Completed first dose of Opdualog last week.  Repeat brain imaging, performed on 12/22/2023, when compared with a prior dated 12/04/2023, demonstrated mild interval enlargement of a dominant lesion within the right parietal lobe, with worsening right to left midline shift.    He was admitted to [facility] and ultimately underwent right parietal and temporal craniotomy with resection of the two dominant lesions by Johan Cabrera on 01/02/2024. His anticoagulation was held and an IVC filter placed beforehand.  Since that time, his anticoagulation was changed from lovenox to Eliquis. He decadron dose is currently 2 mg twice daily.    His recent course was complicated by the development of a seizure requiring hospitalization at [facility].  He has since been discharged on a higher dose of Keppra and steroids.  He was evaluated by Dr. Warren with plans for close interval imaging.  He is scheduled for followup with Dr. Yost in the near future.  Clinically, he remains in good spirits and is feeling well today.  PAST MEDICAL HISTORY: 1. 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 Right lower extremity deep venous thrombosis in the setting of a vein stripping procedure  SOCIAL HISTORY: , denies tobacco.  He drinks alcohol socially.  He has two daughters, one who is getting  next month in [place], and the other who is having a baby in the near future.   He currently works in [employer].  ALLERGIES:  No known drug allergies  CURRENT MEDICATIONS: 1. Decadron taper 2. Keppra [de-identified] : LDH

## 2024-02-15 NOTE — ASSESSMENT
[FreeTextEntry1] : Mr. Nogueira is a 63 year old gentleman with a prior history of a cutaneous melanoma of the right cheek, now with an M1d, stage IV, melanoma affecting the brain, lungs and lymph nodes. His tumor is wild type for BRAF.  His course has been complicated by symptomatic pulmonary emboli for which he is now on apixaban. He has experienced some mild progression in the brain to ipilimumab/nivolumab followed by nivolumab, and he is currently on nivolumab/relatlimab.  At this time, we will proceed with his next dose of therapy.  I had a long discussion with the patient and his wife regarding his clinical status, and potential plans based upon the next brain MRI scheduled in the near future.   At this time, we will also plan on a restaging PET/CT as well.  Should scans be stable to improved, I would anticipate continuing nivolumab/relatlimab with or without gamma knife to the smaller lesions.  Should there be progression, the patient remains strongly interested in aggressive therapy.  Consideration of reinitiation of ipilimumab/nivolumab could be given in that setting.  Consideration of MEK inhibition given the activating atypical KRAS mutation as well as NF1 loss could also be given..  I once againdiscussed code status this morning.  At this time, the patient remains full code; however, we will continue the discussion in future appointments.  We will keep his keppa and steroid doses as is for now and will arrange for him to see Dr. Yost in followup in the near future.  Given the neuropenia that occurred in the setting of bactrim use, we have not initiated PCP prophylaxis but may need to consider pentamadine in the future.  After a long discussion, all of his questions and concerns were answered to his satisfaction.

## 2024-02-29 PROBLEM — C79.31 MELANOMA METASTATIC TO BRAIN: Status: ACTIVE | Noted: 2023-01-01

## 2024-02-29 PROBLEM — R56.9 FOCAL SEIZURE: Status: ACTIVE | Noted: 2024-01-01

## 2024-02-29 NOTE — HISTORY OF PRESENT ILLNESS
[FreeTextEntry1] : NEURO-ONCOLOGY   This 12 year old right handed man is seen in follow up regarding brain metastases and seizure   He has BRAF wild type melanoma, metastatic to brain.  He presented with cloudy thinking and imbalance and was found to have a multiple brain mets.  A dominant lesion in the right temporal lobe was resected in 4/23 by Dr. Tony followed by SRS to the cavity and 5 other lesions in 5/23.  Course was complicated by PE/DVT.  He initiated ipi/nivo with neutropenia.  Follow up imaging with new and progressive lesions, and after resection of right parietal and temporal tumors in 1/24, he initiated opdualag.    INTERVAL HISTORY:  Admitted with focal motor seizure of left side.  Now on increased keppra and seizure free.  Presents with new MRI showing growth of multiple lesions but none new.   Clinically is well, without claer current deficits, on 4 mg dex daily. PET pending.   PMH:  melanoma.  DVT PSH: craniotomy x3, IVCF.  SHX:  finance.  here with wife. nonsmoker. nondrinker FHX:  none

## 2024-02-29 NOTE — DATA REVIEWED
[de-identified] : MRI brain 4/23, 6/23 and 8/23, 11/23 1/24 and 2/24 reviewed and d/w neuroradiology today, showing incerase in size in multiple lesions, including right thalamic, left occipital right frontal, no significant recurrence at right sided surgical sites.  Mild edema, no hydro.

## 2024-02-29 NOTE — PHYSICAL EXAM
[FreeTextEntry1] : Exam as below (with documented exceptions in parentheses):  General:  Cushingoid appearing man  KPS is 70  Mental Status:  Awake, alert and attentive. Oriented to person, place and time. Recent and remote memory intact. Normal concentration. Fluent spontaneous speech with intact naming and repetition. Normal fund of knowledge.    Cranial Nerves: II: Full visual fields. III,IV,VI:Pupils round, reactive to light. Full extraocular movements. No nystagmus. V: Normal bilateral bite, facial sensation. VII: No facial weakness. VIII: Hearing intact. IX,X: Palate midline, intact gag. XI:  Sternocleidomastoids normal. XII: Tongue protrudes midline. No dysarthria.  (flat NLF on left)  Motor:  Normal tone, bulk and power throughout including arms and legs, proximal and distal. No pronator drift. No abnormal movements.  (right DF only weakness, 4/5, ottherwise full)  Sensation: Normal in arms, legs and trunk to pin, proprioception and vibration. Negative Romberg.   Coordination: No dysmetria or dysdiadochokinesis bilaterally. Normal heel-shin testing.   Gait: Normal including heel and toe walking. Normal station.  (right steppage)  Reflexes: Normoactive and symmetric throughout. Absent Babinski bilaterally.   Vascular: No peripheral edema/calf tenderness.   Eyes: Funduscopic exam without papilledema (deferred)  Heart: Regular rate and rhythm. Normal s1-s2. No murmurs, rubs or gallops.   Respiratory: Lungs clear to auscultation bilaterally.   Abdomen: Nontender, non-distended. No hepatosplenomegaly. Normal bowel sounds in four quadrants.

## 2024-02-29 NOTE — DISCUSSION/SUMMARY
[FreeTextEntry1] : Brain mets of melanoma primary. Seizures.   His scan shows increase in mets, but none new.  He has excellent performance status.  I think he should have systemic imaging, and the consensus discussion on the role of changing systemic therapy, surgery (and possible SHANI to thalamic lesion) or WBRT. We discussed seizure precautions and use of abortive med.   LVT 1500 bid refilled provided clonazepam 0.5 ODT for breakthrough focal seizure Continue current steroids dex 2 bid MultiD discussion between Briana Ordonez and Susie prior to next steps  RTC 1mo

## 2024-03-01 NOTE — HISTORY OF PRESENT ILLNESS
[Disease: _____________________] : Disease: [unfilled] [M: ___] : M[unfilled] [AJCC Stage: ____] : AJCC Stage: [unfilled] [de-identified] : DIAGNOSIS:  M1d, stage IV, melanoma  MOLECULAR FINDINGS:  Genpath OnkoSight NGS BRAF was negative for a BRAF mutation  FoundationOne: TMB 82 mut/Mb, LEONARDA, KRAS Q22K, NF1 Q684*  [id number], EZH2, DAXX, , SPHA3, SPEN, STK11 Q302*, TERT promoter mutation (wt for BRAF, KIT and NNRAS)   CURRENT THERAPY:   Nivolumab/relatlimab, last dose 02/15/2024  PRIOR THERAPIES:   Ipilimumab and nivolumab (C1: 07/18/2023; C2: delayed, administered on 08/17/2023; C3: 09/07/2023; C4: held due to neutropenia) Nivolumab 240 mg IV q2 weeks (starting 10/19/2023) Nivolumab/relatlimab (12/2023 - 02/15/2024  CODE STATUS:  Full code  INTERVAL HISTORY: Mr. 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 Alla Mendez at [facility] in 2014, who is seen today for continued management of his M1d, stage IV melanoma, now with brain and lung metastases.  HIs treatment course has been complicated by the development of a DVT and PE. He presents to clinic today for continued management of his advanced disease.  Briefly, he initially developed a change in mental status, with slight confusion and balance problems, on Friday 06/16/2023.  Non contrast head CT scan, performed on 06/22/2022 was significant for multiple masses in the bilateral cerebral hemisphere, the largest measuring approximately 5 cm in the right parietal lobe, associated with mass effect and a 14 mm right to left midline shift. He was then admitted to [facility].  Chest, abdominal and pelvic CT scan, performed on 06/22/2023 demonstrates a 9cm heterogeneous right upper lobe mass extending to the right hilum and right perihilar adenopathy.  Brain MRI, performed on 06/23/2023, again demonstrated the multifocal brain metastases associated with adjacent edema. He then underwent right craniotomy for resection of the dominant right frontal parietal complex mass on 06/26/2023 by Dr. Robert Worthy.  Pathology was consistent with metastatic melanoma.  He was evaluated by Dr. Maco Marr hematology/oncology as well as by Dr. Nasima English of radiation oncology, and was ultimately discharged on 06/26/2023.   He was initiated on therapy with ipilimumab and nivolumab, receiving his first dose on 07/18/2023. Prior to his second dose, he was admitted for symptomatic pulmonary emboli.  He was discharged on lovenox QD.  He remains on lovenox 80mg QD and has completed GK radiotherapy on 08/15/2023 for the brain metastases. He ultimately received his second dose on 08/17/2023 and received his third dose of ipilimumab and nivolumab on 9/7/23. Repeat imaging was performed on 9/9/2023 to assess response to treatment and compared with a prior study dated 08/16/2023, CT head demonstrated improvement in hemorrhagic lesion within the right inferior temporal lobe and hemorrhagic lesion within the posterior parietal lobe is minimally smaller. CT C/A/P demonstrated Slight interval increase in size of right upper lobe lung mass since CT of 8/3/2023. Diminished thrombus in left pulmonary artery.  On 09/27/2023, he was found to be progressively neutropenic and his fourth cycle of therapy was held. The etiology was felt to be possibly related to recently started antibiotics, to the keppra or to immunotherapy. Bone marrow biopsy was performed, antibiotics discontinued and steroids initiated, with subsequent improvement in the counts. After a long discussion, it was opted to continue him on therapy with nivolumab alone which he initiated on 10/19/2023.   On 10/25/2023, he underwent repeat brain imaging with MRI. This study, when compared with a prior dated 08/05/2023, demonstrated at least 10 small enhancing nodules which are new as well as stable or slightly larger additional pre-existing lesions.  He was evaluated by Dr. Marcial Warren of radiation oncology who discussed standard and investigational options and who recommended gamma knife if standard of care options were to be pursued.  After several discussions, the patient opted for close observation, with radiation should there be progression on the next set of imaging studies.  On 12/05/2023, he underwent a PET/CT scan which, when compared with a prior CT scan dated 09/09/2023, demonstrated an FDG avid, partially necrotic right upper lobe lung mass similar in size and appearance to the prior scan.  A brain MRI, performed on 12/04/2023, demonstrated increase in size of a right medial thalamus lesion, now measuring 1.3 cm with surrounding edema. There are other lesions with some evidence of growth.   Pt being was seen on 12/22/2023 as an urgent add on. Pt reports fell from bed on Wednesday striking head very hard. He did not lose consciousness but reports was definitely stunned. Wife reports that he has "been off" the last few days. This morning he was unable to correct pants from being inside out and couldn't remember how to button them once on. He also had trouble getting his shoes on correctly. He denies HA/visual changes. Affect does appear different. Denies nausea/vomiting. Completed first dose of Opdualog in December 2023.  Repeat brain imaging, performed on 12/22/2023, when compared with a prior dated 12/04/2023, demonstrated mild interval enlargement of a dominant lesion within the right parietal lobe, with worsening right to left midline shift.    He was admitted to [facility] and ultimately underwent right parietal and temporal craniotomy with resection of the two dominant lesions by Johan Cabrera on 01/02/2024. His anticoagulation was held and an IVC filter placed beforehand.  Since that time, his anticoagulation was changed from lovenox to Eliquis. He decadron dose is currently 2 mg twice daily.    His recent course was complicated by the development of a seizure requiring hospitalization at [facility].  He has since been discharged on a higher dose of Keppra and steroids.    Since he was last seen here, he has clinically done well.  He underwent a restaging brain MRI on 02/27/2024.  This study, when reviewed with a prior dated 01/12/2024, demonstrated no new lesions, but some increase in the size of his pre-existing lesions. He was seen by Dr. Ricco Yost on 02/28/2024.  PAST MEDICAL HISTORY: 1. 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 Right lower extremity deep venous thrombosis in the setting of a vein stripping procedure  SOCIAL HISTORY: , denies tobacco.  He drinks alcohol socially.  He has two daughters, one who is getting  next month in [place], and the other who is having a baby in the near future.   He currently works in [employer].  ALLERGIES:  No known drug allergies  CURRENT MEDICATIONS: 1. Decadron taper 2. Keppra [de-identified] : LDH

## 2024-03-01 NOTE — RESULTS/DATA
[FreeTextEntry1] : I reviewed the recent brain MRI images with the patient and his wife and agree with the above noted findings.

## 2024-03-01 NOTE — PHYSICAL EXAM
[Restricted in physically strenuous activity but ambulatory and able to carry out work of a light or sedentary nature] : Status 1- Restricted in physically strenuous activity but ambulatory and able to carry out work of a light or sedentary nature, e.g., light house work, office work [Normal] : normal appearance, no rash, nodules, vesicles, ulcers, erythema [de-identified] : Facial rash

## 2024-03-01 NOTE — ASSESSMENT
[FreeTextEntry1] : Mr. Nogueira is a 63 year old gentleman with a prior history of a cutaneous melanoma of the right cheek, now with an M1d, stage IV, melanoma affecting the brain, lungs and lymph nodes. His tumor is wild type for BRAF.  His course has been complicated by symptomatic pulmonary emboli for which he is now on apixaban. He has experienced some mild progression in the brain to ipilimumab/nivolumab followed by nivolumab, and now on nivolumab/relatlimab.  I had a long discussion with the patient and his wife regarding the recent brain imaging results and recommendations for further management.  We will proceed with a restaging PET/CT scan.    I have reached out to Dr. Warren and will discuss potential gamma knife as an option with him.  Pacheco is scheduled to formally meet with him next week.  Given the activity observed with MEK inhibition in the setting of NF1 loss (and some degree of activity observed particularly in KRAS mutant lung cancer). 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  I would like to tentatively proceed with a trial of binimetinib.  We will obtain a baseline ECG and echocardiogram.  I reviewed the treatment schedule and potential toxicities.  This can potentially be given together with checkpoint blockade, and we will tentatively plan on reinitiate therapy with ipilimumab and nivolumab in 2 weeks time.   At this time, the patient remains full code; however, we will continue the discussion in future appointments.  We will keep his keppa and steroid doses as is for now.  Given the neuropenia that occurred in the setting of bactrim use, we have not initiated PCP prophylaxis but may need to consider pentamadine in the future.  After a long discussion, all of his questions and concerns were answered to his satisfaction.

## 2024-03-06 PROBLEM — C79.31 METASTATIC CANCER TO BRAIN: Status: ACTIVE | Noted: 2024-01-01

## 2024-03-06 PROBLEM — B02.8 HERPES ZOSTER WITH OTHER COMPLICATION: Status: ACTIVE | Noted: 2024-01-01

## 2024-03-06 NOTE — HISTORY OF PRESENT ILLNESS
[FreeTextEntry1] : RT hx: GKRS RIGHT, frontal, parietal cavity, Thalmus, occipital, temporal, LEFT occipital, parietal 3000cgy over 5 Fxs 8/8-8/15/2023   Mr. Nogueira is a 62 year old male with prior history of cutaneous melanoma of the right cheek/lower eyelid s/p multiple local excisions at [facility] [place] to ~2018, with newly metastatic disease, with involvement of the brain, lungs and lymph nodes. He recently developed altered mental status, with noncontrast head CT revealing numerous masses, for which he is now s/p right craniotomy with resection of a right frontal parietal mass 6/26/23, with surgical pathology revealing metastatic melanoma. He presents today to discuss radiation therapy recommendations. Prior to this recent presentation, he had last seen his dermatologist 18 months ago.  The course of his recent illness began with appreciation of slight confusion and balance problems 6/16/23. He presented to his PCP, who obtained a CT head non-contrast, that revealed multiple masses in the bilateral cerebral hemisphere, with the largest measuring ~ 5 cm in size, and associated with mass effect as well as a 14 mm right to left midline shift. This prompted admission to [facility], with CT C/A/P demonstrative of a 9 cm heterogeneous right upper lobe mass with extension to the right hilum and notable as well for right perihilar lymphadenopathy. [facility] MRI 6/23/23, re-demonstrated the aforementioned multifocal brain metastases with adjacent edema.  On 6/26/23, he underwent right craniotomy with resection of the dominant right frontal parietal complex mass, with Dr. Robert Worthy, with pathology ultimately revealing metastatic melanoma, wild-type for BRAF. He was evaluated at this time, by medical oncology and radiation oncology (Dr. Nasima English) and discharged on 6/26/23.   He presented to Dr. Richards for initial consult to discuss systemic therapy recommendations, with plan made to proceed with Ipi-nivo, with referral to us, and dermatology.   7/27/23: He presents today for radiation therapy recommendations, for post-op RT to the resection cavity of the right frontal parietal complex mass, as well as definitive therapy of his other intracranial metastases. He feels well today. He reports improvement in his functional status post-operatively   Visit dated 10/11/23 Patient returns for post treatment f/u and progress check after completion of GKRS to multiple metastatic lesions 3000cgy over 5 FXs 8/8-8/15/2023. (RIGHT, frontal, parietal cavity, Thalmus, occipital, temporal, LEFT occipital, parietal) and demonstrated tolerance/ SE. Reports doing well post treatment. Denies N/V, HA/unilateral extremity weakness/memory changes/gait disturbance/bowel/bladder dysfunction or other neurologic symptoms. No issues with speech or comprehension. "I feel well" also back to normal activity. will begin PT on 10/12/2023. Sleep well. Eating well.  Continues to follow with Dr. Richards in the interim patient with severe neutropenia (hematoxity 2/2 immunotherapy) (WBC 1.28) on Prednisone now being tapered. (current dose 30mg) 10/9/23 WBC 20.79 Plans to restart single agent Nivo.  CT C/A/P 9/9/2023 IMPRESSION: Slight interval increase in size of right upper lobe lung mass since CT of 8/3/2023. No other evidence of metastatic disease in the chest, abdomen and pelvis. Diminished thrombus in left pulmonary artery.  Visit dated 10/25/2023 Patient returns to review completed cranial images. Denies N/V, HA/unilateral extremity weakness/memory changes/gait disturbance/bowel/bladder dysfunction or other neurologic symptoms. No issues with speech or comprehension.  he is w/o a new concern during today visit. Follows with Dr Richards Prednisone tapered OFF, Nivo restarted x 1 week.  MRI brain w w/o contrast 10/25/2023:  Multiple large brain metastases consistent with metastatic melanoma are similar or slightly larger in size compared with 8/5/2023. The right posterior temporal postoperative bed is slightly smaller. In addition there are multiple, approximately 10, small nodular enhancing lesions in the left cerebellum, the left medial occipital lobe, the left occipital white matter and right posterior frontal cortex which are new since the prior exam.  Visit dated 11/7/2023 Patient returns to discuss POC.   Visit dated 12/6/2023 patient returns for routine follow up to include progress check with completed short interval cranial images for review. Patient with cutaneous melanoma of the right cheek, now with an M1d, stage IV, melanoma affecting the brain, lungs and lymph nodes. His tumor is wild type for BRAF. Recently completed GKRS to multiple brain mets 8/2023.Reports overall doing well but " my face is a bit red/on fire" onset x 1 week ago prior to this did have a body rash which may have been thought to be r/t immunotherapy. Denies fevers/fatigue. Currently on Minocycline tabs and Clindamycin topical cream  Denies N/V, HA/unilateral extremity weakness/memory changes/gait disturbance/bowel/bladder dysfunction or other neurologic symptoms. No issues with speech or comprehension. Eating well.  Continues to follow with Dr Richards with systemic therapy to be determined after imaging could be Nivo or Nivo/Relatlimab.  MRI brain w w/o contrast 12/4/2023 final read not available at time of entry.  PET CT 12/5/2023 final read not available at time of this entry.  Visit dated 1/172024 Since Mr. Watson' last office visit he underwent a resection of a neoplastic lesion in the cranial middle fossa with cranioplasty on 1/2/2024 with Dr. Johan Hoyosvar PATH consistent with metastatic melanoma Now home from rehab. Overall doing well. Reports walking well and feeling safe w/o the need for assistive device. Started outpatient PT which he thinks has been helping with his balance/gait Denies N/V, HA/unilateral extremity weakness/memory changes/bowel/bladder dysfunction or other neurologic symptoms. No issues with speech or comprehension. Continues to follow with Dr. Richards possible reinitiation of systemic therapy to be finalized.  MRI brain w w/o contrast 1/12/2024 Final read not available at time of entry.  Visit dated 3/6/2024 Patient returns with a short interval cranial image. He is s/p craniotomy on 1/2/2024. Reports Continues to follow with Dr. Richards now on nivolumab/relatlimab. plans for re-staging PET/CT (last PET/CT 12/2023)  MRI brain w w/o contrast 2/27/2024 IMPRESSION: No new enhancing lesions. A right frontal temporal postoperative bed and the right anterior temporal postoperative bed are slightly smaller compared with 1/12/2024. The remainder of the additional predominantly supratentorial nodular and masslike lesions are slightly larger with slightly increased vasogenic edema.

## 2024-03-06 NOTE — PHYSICAL EXAM
[de-identified] : facial redness appreciated.  cranial incision RIGHT CDI w/o signs of infection. ALL staples removed

## 2024-03-06 NOTE — REVIEW OF SYSTEMS
[Fever] : no fever [Confused] : no confusion [Dizziness] : no dizziness [Difficulty Walking] : no difficulty walking [de-identified] : facial rash/redness/itchiness restarted steroids 1/14/2024. + staples to RIGHT cranial incision [de-identified] : Denies HAs, or gait disturbance, vision/speech difficulty.

## 2024-03-18 NOTE — ASSESSMENT
[FreeTextEntry1] : Mr. Nogueira is a 63 year old gentleman with a prior history of a cutaneous melanoma of the right cheek, now with an M1d, stage IV, melanoma affecting the brain, lungs and lymph nodes. 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  His course has been complicated by symptomatic pulmonary emboli for which he is now on apixaban. He has experienced some mild progression in the brain to ipilimumab/nivolumab followed by nivolumab, and now on nivolumab/relatlimab.  I had a long discussion with the patient and his wife regarding the recent brain imaging results and recommendations for further management. I will reach out to Dr. Warren and to discuss fruther management options. Given the activity observed with MEK inhibition in the setting of NF1 loss (and some degree of activity observed particularly in KRAS mutant lung cancer). 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  We will proceed with a trial of binimetinib.  We will obtain a baseline ECG and echocardiogram.  I reviewed the treatment schedule and potential toxicities.  This can potentially be given together with checkpoint blockade, and we will tentatively plan on reinitiate therapy with ipilimumab and nivolumab in 2 weeks time.  We will speak with him next for week for a toxicity check.  We will proceed with a restaging PET/CT scan.   At this time, the patient remains full code; however, we will continue the discussion in future appointments.  We will keep his keppa and steroid doses as is for now.  Given the neuropenia that occurred in the setting of bactrim use, we have not initiated PCP prophylaxis but may need to consider pentamadine in the future.  After a long discussion, all of his questions and concerns were answered to his satisfaction.

## 2024-03-18 NOTE — REVIEW OF SYSTEMS
[Fatigue] : fatigue [Shortness Of Breath] : shortness of breath [SOB on Exertion] : shortness of breath during exertion [Diarrhea: Grade 0] : Diarrhea: Grade 0 [Difficulty Walking] : difficulty walking [Negative] : Allergic/Immunologic

## 2024-03-18 NOTE — HISTORY OF PRESENT ILLNESS
[Disease: _____________________] : Disease: [unfilled] [M: ___] : M[unfilled] [AJCC Stage: ____] : AJCC Stage: [unfilled] [de-identified] : DIAGNOSIS:  M1d, stage IV, melanoma  MOLECULAR FINDINGS:  [employer] OnkoSight NGS BRAF was negative for a BRAF mutation  [employer]: TMB 82 mut/Mb, LEONARDA, KRAS Q22K, NF1 Q684*  K9300P, EZH2, DAXX, , SPHA3, SPEN, STK11 Q302*, TERT promoter mutation (wt for BRAF, KIT and NNRAS)   CURRENT THERAPY:   Nivolumab/relatlimab, last dose 02/15/2024  PRIOR THERAPIES:   Ipilimumab and nivolumab (C1: 07/18/2023; C2: delayed, administered on 08/17/2023; C3: 09/07/2023; C4: held due to neutropenia) Nivolumab 240 mg IV q2 weeks (starting 10/19/2023) Nivolumab/relatlimab (12/2023 - 02/15/2024  CODE STATUS:  Full code  INTERVAL HISTORY: Mr. 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 Alla Mendez at [facility] in 2014, who is seen today for continued management of his M1d, stage IV melanoma, now with brain and lung metastases.  HIs treatment course has been complicated by the development of a DVT and PE. He presents to clinic today for continued management of his advanced disease.  Briefly, he initially developed a change in mental status, with slight confusion and balance problems, on Friday 06/16/2023.  Non contrast head CT scan, performed on 06/22/2022 was significant for multiple masses in the bilateral cerebral hemisphere, the largest measuring approximately 5 cm in the right parietal lobe, associated with mass effect and a 14 mm right to left midline shift. He was then admitted to [facility].  Chest, abdominal and pelvic CT scan, performed on 06/22/2023 demonstrates a 9cm heterogeneous right upper lobe mass extending to the right hilum and right perihilar adenopathy.  Brain MRI, performed on 06/23/2023, again demonstrated the multifocal brain metastases associated with adjacent edema. He then underwent right craniotomy for resection of the dominant right frontal parietal complex mass on 06/26/2023 by Dr. Robert Worthy.  Pathology was consistent with metastatic melanoma.  He was evaluated by Dr. Maco Marr hematology/oncology as well as by Dr. Nasima English of radiation oncology, and was ultimately discharged on 06/26/2023.   He was initiated on therapy with ipilimumab and nivolumab, receiving his first dose on 07/18/2023. Prior to his second dose, he was admitted for symptomatic pulmonary emboli.  He was discharged on lovenox QD.  He remains on lovenox 80mg QD and has completed GK radiotherapy on 08/15/2023 for the brain metastases. He ultimately received his second dose on 08/17/2023 and received his third dose of ipilimumab and nivolumab on 9/7/23. Repeat imaging was performed on 9/9/2023 to assess response to treatment and compared with a prior study dated 08/16/2023, CT head demonstrated improvement in hemorrhagic lesion within the right inferior temporal lobe and hemorrhagic lesion within the posterior parietal lobe is minimally smaller. CT C/A/P demonstrated slight interval increase in size of right upper lobe lung mass since CT of 8/3/2023. Diminished thrombus in left pulmonary artery.  On 09/27/2023, he was found to be progressively neutropenic and his fourth cycle of therapy was held. The etiology was felt to be possibly related to recently started antibiotics, to the keppra or to immunotherapy. Bone marrow biopsy was performed, antibiotics discontinued and steroids initiated, with subsequent improvement in the counts. After a long discussion, it was opted to continue him on therapy with nivolumab alone which he initiated on 10/19/2023.   On 10/25/2023, he underwent repeat brain imaging with MRI. This study, when compared with a prior dated 08/05/2023, demonstrated at least 10 small enhancing nodules which are new as well as stable or slightly larger additional pre-existing lesions.  He was evaluated by Dr. Marcial Warren of radiation oncology who discussed standard and investigational options and who recommended gamma knife if standard of care options were to be pursued.  After several discussions, the patient opted for no radiotherapy at that time, with radiation should there be progression on the next set of imaging studies.  On 12/05/2023, he underwent a PET/CT scan which, when compared with a prior CT scan dated 09/09/2023, demonstrated an FDG avid, partially necrotic right upper lobe lung mass similar in size and appearance to the prior scan.  A brain MRI, performed on 12/04/2023, demonstrated increase in size of a right medial thalamus lesion, now measuring 1.3 cm with surrounding edema. There are other lesions with some evidence of growth.   His therapy was changed to Opdualog in December 2023.  Repeat brain imaging, performed on 12/22/2023, when compared with a prior dated 12/04/2023, demonstrated mild interval enlargement of a dominant lesion within the right parietal lobe, with worsening right to left midline shift.  He was admitted to [facility] and ultimately underwent right parietal and temporal craniotomy with resection of the two dominant lesions by Johan Cabrera on 01/02/2024. His anticoagulation was held and an IVC filter placed beforehand.  Since that time, his anticoagulation was changed from lovenox to Eliquis. He decadron dose is currently 2 mg twice daily.    His recent course was complicated by the development of a seizure requiring hospitalization at [facility].  He has since been discharged on a higher dose of Keppra and steroids.    He underwent a restaging brain MRI on 02/27/2024.  This study, when reviewed with a prior dated 01/12/2024, demonstrated no new lesions, but some increase in the size of his pre-existing lesions. He was seen by Dr. Ricco Yost on 02/28/2024 and Dr. Marcial Warren on 03/06/2024.  He is tentatively scheduled for whole brain radiotherapy.  Since he was last seen here, he has had continued difficulties with fatigue, gait instability and limited exercise tolerance.  PAST MEDICAL HISTORY: 1. 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 Right lower extremity deep venous thrombosis in the setting of a vein stripping procedure  SOCIAL HISTORY: , denies tobacco.  He drinks alcohol socially.  He has two daughters, one who is getting  next month in [place], and the other who is having a baby in the near future.   He currently works in [employer].  ALLERGIES:  No known drug allergies  CURRENT MEDICATIONS: 1. Decadron taper 2. Keppra [de-identified] : LDH

## 2024-03-18 NOTE — PHYSICAL EXAM
[Ambulatory and capable of all self care but unable to carry out any work activities] : Status 2- Ambulatory and capable of all self care but unable to carry out any work activities. Up and about more than 50% of waking hours [de-identified] : Fatigued in a wheel chair

## 2024-03-28 NOTE — REVIEW OF SYSTEMS
[Fatigue] : fatigue [Recent Change In Weight] : ~T recent weight change [Nosebleeds] : nosebleeds [SOB on Exertion] : shortness of breath during exertion [Shortness Of Breath] : shortness of breath [Diarrhea: Grade 0] : Diarrhea: Grade 0 [Muscle Weakness] : muscle weakness [Negative] : Allergic/Immunologic

## 2024-03-28 NOTE — PHYSICAL EXAM
[Capable of only limited self care, confined to bed or chair more than 50% of waking hours] : Status 3- Capable of only limited self care, confined to bed or chair more than 50% of waking hours [Normal] : affect appropriate [de-identified] : Comfortable, sitting in a wheel chair

## 2024-03-28 NOTE — ASSESSMENT
[FreeTextEntry1] : Mr. Nogueira is a 63 year old gentleman with a prior history of a cutaneous melanoma of the right cheek, now with an M1d, stage IV, melanoma affecting the brain, lungs and lymph nodes. 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  His course has been complicated by symptomatic pulmonary emboli for which he is now on apixaban. He has experienced some mild progression in the brain to ipilimumab/nivolumab followed by nivolumab, and now on nivolumab/relatlimab. He was placed on a short course of binimetinib which was complicated by significant diarrhea.  I had a long discussion with the patient and his wife regarding his recent clinical course and management options.  I again discussed goals of care and code status.  Although he remains firm regarding his wishes to continue active therapy, he has made the decision to change his code status to DNR.    In terms of therapies, options include reinitiation of ipilimumab and nivolumab, perhaps in combination with bevacizuamb.  Consideration for tocilizumab could be given particularly with the concerns for toxicity in this patient.   Other options would include chemotherapy such as temozolomide.  After a long discussion, the patient is in agreement to proceed with ipilimumab and nivolumab today.  We will arrange for weekly home blood draws and weekly toxicity checks.  We can forego the PET/CT scan now and will plan on a restaging brain MRI after his first two cycles of ipilimumab and nivolumab.   We will keep his keppa and steroid doses as is for now.  Given the neuropenia that occurred in the setting of bactrim use, we have not initiated PCP prophylaxis but may need to consider pentamadine in the future.  After a long discussion, all of his questions and concerns were answered to his satisfaction.

## 2024-03-28 NOTE — HISTORY OF PRESENT ILLNESS
[Disease: _____________________] : Disease: [unfilled] [M: ___] : M[unfilled] [AJCC Stage: ____] : AJCC Stage: [unfilled] [de-identified] : DIAGNOSIS:  M1d, stage IV, melanoma  MOLECULAR FINDINGS:  Genpath OnkoSight NGS BRAF was negative for a BRAF mutation  FoundationOne: TMB 82 mut/Mb, LEONARDA, KRAS Q22K, NF1 Q684*  S3676D, EZH2, DAXX, , SPHA3, SPEN, STK11 Q302*, TERT promoter mutation (wt for BRAF, KIT and NNRAS)   CURRENT THERAPY:   Nivolumab/relatlimab, last dose 02/15/2024  PRIOR THERAPIES:   Ipilimumab and nivolumab (C1: 07/18/2023; C2: delayed, administered on 08/17/2023; C3: 09/07/2023; C4: held due to neutropenia) Nivolumab 240 mg IV q2 weeks (starting 10/19/2023) Nivolumab/relatlimab (12/2023 - 02/15/2024  CODE STATUS:  DNR/DNI  INTERVAL HISTORY: Mr. 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 Alla Mendez at [facility] in 2014, who is seen today for continued management of his M1d, stage IV melanoma, now with brain and lung metastases.  HIs treatment course has been complicated by the development of a DVT and PE. He presents to clinic today for continued management of his advanced disease.  Briefly, he initially developed a change in mental status, with slight confusion and balance problems, on Friday 06/16/2023.  Non contrast head CT scan, performed on 06/22/2022 was significant for multiple masses in the bilateral cerebral hemisphere, the largest measuring approximately 5 cm in the right parietal lobe, associated with mass effect and a 14 mm right to left midline shift. He was then admitted to [facility].  Chest, abdominal and pelvic CT scan, performed on 06/22/2023 demonstrates a 9cm heterogeneous right upper lobe mass extending to the right hilum and right perihilar adenopathy.  Brain MRI, performed on 06/23/2023, again demonstrated the multifocal brain metastases associated with adjacent edema. He then underwent right craniotomy for resection of the dominant right frontal parietal complex mass on 06/26/2023 by Dr. Robert Worthy.  Pathology was consistent with metastatic melanoma.  He was evaluated by Dr. Maco Marr hematology/oncology as well as by Dr. Nasima English of radiation oncology, and was ultimately discharged on 06/26/2023.   He was initiated on therapy with ipilimumab and nivolumab, receiving his first dose on 07/18/2023. Prior to his second dose, he was admitted for symptomatic pulmonary emboli.  He was discharged on lovenox QD.  He remains on lovenox 80mg QD and has completed GK radiotherapy on 08/15/2023 for the brain metastases. He ultimately received his second dose on 08/17/2023 and received his third dose of ipilimumab and nivolumab on 9/7/23. Repeat imaging was performed on 9/9/2023 to assess response to treatment and compared with a prior study dated 08/16/2023, CT head demonstrated improvement in hemorrhagic lesion within the right inferior temporal lobe and hemorrhagic lesion within the posterior parietal lobe is minimally smaller. CT C/A/P demonstrated slight interval increase in size of right upper lobe lung mass since CT of 8/3/2023. Diminished thrombus in left pulmonary artery.  On 09/27/2023, he was found to be progressively neutropenic and his fourth cycle of therapy was held. The etiology was felt to be possibly related to recently started antibiotics, to the keppra or to immunotherapy. Bone marrow biopsy was performed, antibiotics discontinued and steroids initiated, with subsequent improvement in the counts. After a long discussion, it was opted to continue him on therapy with nivolumab alone which he initiated on 10/19/2023.   On 10/25/2023, he underwent repeat brain imaging with MRI. This study, when compared with a prior dated 08/05/2023, demonstrated at least 10 small enhancing nodules which are new as well as stable or slightly larger additional pre-existing lesions.  He was evaluated by Dr. Marcial Warren of radiation oncology who discussed standard and investigational options and who recommended gamma knife if standard of care options were to be pursued.  After several discussions, the patient opted for no radiotherapy at that time, with radiation should there be progression on the next set of imaging studies.  On 12/05/2023, he underwent a PET/CT scan which, when compared with a prior CT scan dated 09/09/2023, demonstrated an FDG avid, partially necrotic right upper lobe lung mass similar in size and appearance to the prior scan.  A brain MRI, performed on 12/04/2023, demonstrated increase in size of a right medial thalamus lesion, now measuring 1.3 cm with surrounding edema. There are other lesions with some evidence of growth.   His therapy was changed to Opdualog in December 2023.  Repeat brain imaging, performed on 12/22/2023, when compared with a prior dated 12/04/2023, demonstrated mild interval enlargement of a dominant lesion within the right parietal lobe, with worsening right to left midline shift.  He was admitted to [facility] and ultimately underwent right parietal and temporal craniotomy with resection of the two dominant lesions by Johan Cabrera on 01/02/2024. His anticoagulation was held and an IVC filter placed beforehand.  Since that time, his anticoagulation was changed from lovenox to Eliquis. He decadron dose is currently 2 mg twice daily.    His recent course was complicated by the development of a seizure requiring hospitalization at [facility].  He has since been discharged on a higher dose of Keppra and steroids.    He underwent a restaging brain MRI on 02/27/2024.  This study, when reviewed with a prior dated 01/12/2024, demonstrated no new lesions, but some increase in the size of his pre-existing lesions. He was seen by Dr. Ricco Yost on 02/28/2024 and Dr. Marcial Warren on 03/06/2024.    When he was last seen here 2 weeks ago, he was initiated on therapy with binimetinib.  Although he tolerated the first week well, he ultimately developed significant diarrhea as well as one episode of vomiting and drug was held.  Today, he remains in good spirits.  PAST MEDICAL HISTORY: 1. 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 Right lower extremity deep venous thrombosis in the setting of a vein stripping procedure  SOCIAL HISTORY: , denies tobacco.  He drinks alcohol socially.  He has two daughters, one who is getting  next month in [place], and the other who is having a baby in the near future.   He currently works in [employer].  ALLERGIES:  No known drug allergies  CURRENT MEDICATIONS: 1. Decadron taper 2. Keppra [de-identified] : LDH

## 2024-04-18 NOTE — PHYSICAL EXAM
[Capable of only limited self care, confined to bed or chair more than 50% of waking hours] : Status 3- Capable of only limited self care, confined to bed or chair more than 50% of waking hours [de-identified] : Chronically ill

## 2024-04-18 NOTE — ASSESSMENT
[FreeTextEntry1] : Mr. Nogueira is a 63 year old gentleman with a prior history of a cutaneous melanoma of the right cheek, now with an M1d, stage IV, melanoma affecting the brain, lungs and lymph nodes. 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  His course has been complicated by symptomatic pulmonary emboli for which he is now on apixaban. He has experienced some mild progression in the brain to ipilimumab/nivolumab followed by nivolumab, and now on nivolumab/relatlimab. He was placed on a short course of binimetinib which was complicated by significant diarrhea.  He has now received his first cycle of reinduction ipilimumab and nivolumab.  I previously had a long discussion with the patient and his wife regarding his recent clinical course and management options.  I again discussed goals of care and code status.  He has previously made the decision to change his code status to DNR.    At this time, we will proceed with cycle 2 of ipilimumab and nivolumab today.  We will consider adding tocilizumab 4 mg/kg every 6 weeks as per the phase II trial of ipilimumab, nivolumab and tocilizumab for unresectable metastatic melanoma (Annals of Oncology Volume 32, Supplement 5, September 2021, Page S866) in an effort to improve tolerability and efficacy. We will continue weekly home blood draws and weekly toxicity checks.  We can forego the PET/CT scan now and will plan on a restaging brain MRI after his first two cycles of ipilimumab and nivolumab.  We will keep his keppra and steroid doses as is for now.  Given the neuropenia that occurred in the setting of bactrim use, we have not initiated PCP prophylaxis but may need to consider pentamadine in the future.  After a long discussion, all of his questions and concerns were answered to his satisfaction.

## 2024-04-18 NOTE — HISTORY OF PRESENT ILLNESS
[Disease: _____________________] : Disease: [unfilled] [M: ___] : M[unfilled] [AJCC Stage: ____] : AJCC Stage: [unfilled] [de-identified] : DIAGNOSIS:  M1d, stage IV, melanoma  MOLECULAR FINDINGS:  [employer] OnkoSight NGS BRAF was negative for a BRAF mutation  FoundationOne: TMB 82 mut/Mb, LEONARDA, KRAS Q22K, NF1 Q684*  D4649T, EZH2, DAXX, , SPHA3, SPEN, STK11 Q302*, TERT promoter mutation (wt for BRAF, KIT and NNRAS)   CURRENT THERAPY:   Ipilimumab and nivolumab (C1: 03/28/2024; C2: due today)  PRIOR THERAPIES:   Ipilimumab and nivolumab (C1: 07/18/2023; C2: delayed, administered on 08/17/2023; C3: 09/07/2023; C4: held due to neutropenia) Nivolumab 240 mg IV q2 weeks (starting 10/19/2023) Nivolumab/relatlimab (12/2023 - 02/15/2024)  CODE STATUS:  DNR/DNI  INTERVAL HISTORY: Mr. 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  HIs treatment course has been complicated by the development of a DVT and PE. He presents to clinic today for continued management of his advanced disease.  Briefly, he initially developed a change in mental status, with slight confusion and balance problems, on Friday 06/16/2023.  Non contrast head CT scan, performed on 06/22/2022 was significant for multiple masses in the bilateral cerebral hemisphere, the largest measuring approximately 5 cm in the right parietal lobe, associated with mass effect and a 14 mm right to left midline shift. He was then admitted to [facility].  Chest, abdominal and pelvic CT scan, performed on 06/22/2023 demonstrates a 9cm heterogeneous right upper lobe mass extending to the right hilum and right perihilar adenopathy.  Brain MRI, performed on 06/23/2023, again demonstrated the multifocal brain metastases associated with adjacent edema. He then underwent right craniotomy for resection of the dominant right frontal parietal complex mass on 06/26/2023 by Dr. Robert Worthy.  Pathology was consistent with metastatic melanoma.  He was evaluated by Dr. Maco Marr hematology/oncology as well as by Dr. Nasima English of radiation oncology, and was ultimately discharged on 06/26/2023.   He was initiated on therapy with ipilimumab and nivolumab, receiving his first dose on 07/18/2023. Prior to his second dose, he was admitted for symptomatic pulmonary emboli.  He was discharged on lovenox QD.  He remains on lovenox 80mg QD and has completed GK radiotherapy on 08/15/2023 for the brain metastases. He ultimately received his second dose on 08/17/2023 and received his third dose of ipilimumab and nivolumab on 9/7/23. Repeat imaging was performed on 9/9/2023 to assess response to treatment and compared with a prior study dated 08/16/2023, CT head demonstrated improvement in hemorrhagic lesion within the right inferior temporal lobe and hemorrhagic lesion within the posterior parietal lobe is minimally smaller. CT C/A/P demonstrated slight interval increase in size of right upper lobe lung mass since CT of 8/3/2023. Diminished thrombus in left pulmonary artery.  On 09/27/2023, he was found to be progressively neutropenic and his fourth cycle of therapy was held. The etiology was felt to be possibly related to recently started antibiotics, to the keppra or to immunotherapy. Bone marrow biopsy was performed, antibiotics discontinued and steroids initiated, with subsequent improvement in the counts. After a long discussion, it was opted to continue him on therapy with nivolumab alone which he initiated on 10/19/2023.   On 10/25/2023, he underwent repeat brain imaging with MRI. This study, when compared with a prior dated 08/05/2023, demonstrated at least 10 small enhancing nodules which are new as well as stable or slightly larger additional pre-existing lesions.  He was evaluated by Dr. Marcial Warren of radiation oncology who discussed standard and investigational options and who recommended gamma knife if standard of care options were to be pursued.  After several discussions, the patient opted for no radiotherapy at that time, with radiation should there be progression on the next set of imaging studies.  On 12/05/2023, he underwent a PET/CT scan which, when compared with a prior CT scan dated 09/09/2023, demonstrated an FDG avid, partially necrotic right upper lobe lung mass similar in size and appearance to the prior scan.  A brain MRI, performed on 12/04/2023, demonstrated increase in size of a right medial thalamus lesion, now measuring 1.3 cm with surrounding edema. There are other lesions with some evidence of growth.   His therapy was changed to Opdualog in December 2023.  Repeat brain imaging, performed on 12/22/2023, when compared with a prior dated 12/04/2023, demonstrated mild interval enlargement of a dominant lesion within the right parietal lobe, with worsening right to left midline shift.  He was admitted to [facility] and ultimately underwent right parietal and temporal craniotomy with resection of the two dominant lesions by Johan Cabrera on 01/02/2024. His anticoagulation was held and an IVC filter placed beforehand.  Since that time, his anticoagulation was changed from lovenox to Eliquis. He decadron dose is currently 2 mg twice daily.    His recent course was complicated by the development of a seizure requiring hospitalization at [facility].  He has since been discharged on a higher dose of Keppra and steroids.    He underwent a restaging brain MRI on 02/27/2024.  This study, when reviewed with a prior dated 01/12/2024, demonstrated no new lesions, but some increase in the size of his pre-existing lesions. He was seen by Dr. Ricco Yost on 02/28/2024 and Dr. Marcial Warren on 03/06/2024.    Since he was last seen here, he has had continued functional decline.  PAST MEDICAL HISTORY: 1. 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 Right lower extremity deep venous thrombosis in the setting of a vein stripping procedure  SOCIAL HISTORY: , denies tobacco.  He drinks alcohol socially.  He has two daughters, one who is getting  next month in [place], and the other who is having a baby in the near future.   He currently works in [employer].  ALLERGIES:  No known drug allergies  CURRENT MEDICATIONS: 1. Decadron taper 2. Keppra [de-identified] : LDH

## 2024-04-18 NOTE — REVIEW OF SYSTEMS
[Fatigue] : fatigue [Recent Change In Weight] : ~T recent weight change [SOB on Exertion] : shortness of breath during exertion [Diarrhea: Grade 0] : Diarrhea: Grade 0 [Muscle Weakness] : muscle weakness [Confused] : confusion [Difficulty Walking] : difficulty walking [Negative] : Allergic/Immunologic

## 2024-05-30 ENCOUNTER — APPOINTMENT (OUTPATIENT)
Dept: INFUSION THERAPY | Facility: HOSPITAL | Age: 64
End: 2024-05-30

## 2024-05-30 ENCOUNTER — APPOINTMENT (OUTPATIENT)
Dept: HEMATOLOGY ONCOLOGY | Facility: CLINIC | Age: 64
End: 2024-05-30

## 2024-10-30 NOTE — DISCHARGE NOTE PROVIDER - NSDCQMAMI_CARD_ALL_CORE
Spoke with Jinny. Advised her we can place an order for evaluation. Order being sent to [facility] for VEGA mercado.    No

## 2024-11-07 NOTE — PRE-ANESTHESIA EVALUATION ADULT - NSANTHTOBACCOSD_GEN_ALL_CORE
10 Tips For A Good Night's Sleep    Establish and maintain a regular bedtime and a regular arising time. Try to maintain a regular arising time, even if you have had trouble sleeping the night before.  Reserve your bedroom for sleep. Do not eat in bed. For some people, reading or watching TV in bed helps them to fall asleep faster, for other people, this may delay falling asleep. Avoid using smart phones and tablets in bed.   Exercising regularly and in moderation tends to deepen sleep. The best time of day to exercise is the late afternoon. Avoid vigorous exercise within two hours of bedtime.   Minimize noise, light, and excessive temperature during the sleep period with a masking noise, window blinds, or an electric blanket/air conditioner.   A light snack may be sleeping inducing, but a heavy meal close to bedtime interferes with sleep.   Caffeine is a stimulant and it disturbs sleep even in those who feel it does not.  Nicotine is also a stimulant and should be avoided near bedtime and upon night awakenings.  Alcohol helps tense people fall asleep more easily, but it causes awakenings later in the night.  People who feel angry and frustrated because they cannot sleep should NOT try harder to sleep but should turn on the light and do something different (read, do a crossword puzzle, watch TV) until drowsy. Then try again.   An occasional sleeping pill may be of some benefit, but chronic use is not advised.     No

## 2025-06-26 NOTE — CONSULT NOTE ADULT - SUBJECTIVE AND OBJECTIVE BOX
Patient is a 62y old  Male who presents with a chief complaint of PE (03 Aug 2023 18:52)      HPI:  61 yo M with PMhx of Melanoma with mets to the brain and lungs on immunotherapy presents to the ED with SOB, found to have PE on outpatient CT chest. Patient reports that for the last couple of days he has been feeling SOB. Today he underwent CTA chest which showed PE, prompting him to come to the ED. Currently, he reports to have SOB with exertion but denies any fever, chills, nausea, vomiting, abdominal pain, diarrhea, melena or hematochezia. He has hx of brain mets and had resection of one nodule.   In the ED, his vitals were notable for tachycardia. Labs were notable for thrombocytopenia and slightly elevated proBNP. CTA chest showed New bilateral pulmonary emboli; specifically in the distal right main pulmonary artery and left upper and left lower lobar pulmonary artery branches.  (03 Aug 2023 18:52)       Oncologic History:  CURRENT THERAPY: Ipilimumab and nivolumab (C1: 07/18/2023; C2: scheduled for 08/08/2023; C3: scheduled for 08/29/2023; C4: scheduled for 09/19/2023)  ?  PRIOR THERAPIES: None  ?  INTERVAL HISTORY:  Mr. 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 Alla Mendez at [facility] in 2014, who is seen today for a discussion of management options for his recently diagnosed BRAF wild-type M1d, stage IV melanoma. He was last evaluated by his dermatologist approximately 18 months ago.  ?  Briefly, he initially developed a change in mental status, with slight confusion and balance problems, on Friday 06/16/2023. Non contrast head CT scan, performed on 06/22/2023 was significant for multiple masses in the bilateral cerebral hemisphere, the largest measuring approximately 5 cm in the right parietal lobe, associated with mass effect and a 14 mm right to left midline shift. He was then admitted to [facility]. Chest, abdominal and pelvic CT scan, performed on 06/22/2023 demonstrates a 9cm heterogeneous right upper lobe mass extending to the right hilum and right perihilar adenopathy. Brain MRI, performed on 06/23/2023, again demonstrated the multifocal brain metastases associated with adjacent edema.  ?  He then underwent right craniotomy for resection of the dominant right frontal parietal complex mass on 06/26/2023 by Dr. Robert Worthy. Pathology was consistent with metastatic melanoma. He was evaluated by Dr. Maco Marr hematology/oncology as well as by Dr. Nasima English of radiation oncology, and was ultimately discharged on 06/26/2023.  ?  Since his last visit a week ago, Mr Nogueira has developed significant dyspnea with minimal exertion. Previously, he had been working full time and walking 1-2 miles daily. Currently, he is unable to walk or exert himself wo feeling shortness of breath requiring him to sit down. He denies chest pain and palpitations. He reports a new non-productive cough. No fevers/diarrhea/rash or itchiness. (+) fatigue (+) chills/feeling cold. Denies leg swelling or calf pain. Has not started PT yet. Cancelled ECHO appointment. Reports has been taking tylenol at bedtime for general achiness. Sleeping well. Appetite good. Remains on decadron 2mg BID. GK scheduled for 8/21-8/24  ?  PAST MEDICAL HISTORY:  1. 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 Right lower extremity deep venous thrombosis in the setting of a vein stripping procedure  ?  SOCIAL HISTORY:  , denies tobacco. He drinks alcohol socially. He has two daughters, one who is getting  later on this year in [place], and the other who is having a baby in the near future. He currently works in [employer].  ?  ALLERGIES:  No known drug allergies  ?  CURRENT MEDICATIONS:  1. Decadron taper  2. Keppra.     Disease: Melanoma    TNM stage: M1d  AJCC Stage: IV    Tumor Markers: LDH        MEDICATIONS  (STANDING):  dexAMETHasone     Tablet 2 milliGRAM(s) Oral two times a day  heparin  Infusion 1500 Unit(s)/Hr (15 mL/Hr) IV Continuous <Continuous>  levETIRAcetam 500 milliGRAM(s) Oral two times a day    MEDICATIONS  (PRN):  acetaminophen     Tablet .. 650 milliGRAM(s) Oral every 6 hours PRN Temp greater or equal to 38C (100.4F), Mild Pain (1 - 3)  aluminum hydroxide/magnesium hydroxide/simethicone Suspension 30 milliLiter(s) Oral every 4 hours PRN Dyspepsia  melatonin 3 milliGRAM(s) Oral at bedtime PRN Insomnia  ondansetron Injectable 4 milliGRAM(s) IV Push every 8 hours PRN Nausea and/or Vomiting      Allergies    No Known Allergies    Intolerances        Vital Signs Last 24 Hrs  T(C): 36.9 (04 Aug 2023 05:26), Max: 37.2 (03 Aug 2023 13:20)  T(F): 98.5 (04 Aug 2023 05:26), Max: 98.9 (03 Aug 2023 13:20)  HR: 81 (04 Aug 2023 05:26) (81 - 110)  BP: 137/97 (04 Aug 2023 05:26) (128/99 - 151/82)  BP(mean): --  RR: 18 (03 Aug 2023 22:58) (16 - 20)  SpO2: 99% (03 Aug 2023 22:58) (96% - 100%)    Parameters below as of 04 Aug 2023 05:26  Patient On (Oxygen Delivery Method): room air        PHYSICAL EXAM  General: adult in NAD  HEENT: clear oropharynx, anicteric sclera, pink conjunctiva  Neck: supple  CV: normal S1/S2 with no murmur rubs or gallops  Lungs: positive air movement b/l ant lungs, clear to auscultation, no wheezes, no rales  Abdomen: soft non-tender non-distended, no hepatosplenomegaly  Ext: no clubbing cyanosis or edema  Skin: no rashes and no petechiae  Neuro: alert and oriented X 3, none focal    LABS:                          13.8   9.43  )-----------( 100      ( 04 Aug 2023 06:10 )             42.1         Mean Cell Volume : 87.9 fL  Mean Cell Hemoglobin : 28.8 pg  Mean Cell Hemoglobin Concentration : 32.8 gm/dL  Auto Neutrophil # : x  Auto Lymphocyte # : x  Auto Monocyte # : x  Auto Eosinophil # : x  Auto Basophil # : x  Auto Neutrophil % : x  Auto Lymphocyte % : x  Auto Monocyte % : x  Auto Eosinophil % : x  Auto Basophil % : x      Serial CBC's  08-04 @ 06:10  Hct-42.1 / Hgb-13.8 / Plat-100 / RBC-4.79 / WBC-9.43  Serial CBC's  08-03 @ 21:10  Hct-41.9 / Hgb-14.0 / Plat-106 / RBC-4.90 / WBC-10.00  Serial CBC's  08-03 @ 13:58  Hct-43.9 / Hgb-14.7 / Plat-105 / RBC-5.13 / WBC-11.38  Serial CBC's  08-03 @ 09:51  Hct-45.6 / Hgb-15.3 / Plat-103 / RBC-5.21 / WBC-11.16      08-03    135  |  99  |  29<H>  ----------------------------<  111<H>  4.5   |  22  |  0.96    Ca    8.6      03 Aug 2023 13:58    TPro  6.3  /  Alb  3.1<L>  /  TBili  0.4  /  DBili  x   /  AST  24  /  ALT  39  /  AlkPhos  77  08-03      PT/INR - ( 03 Aug 2023 13:58 )   PT: 11.4 sec;   INR: 1.01 ratio         PTT - ( 03 Aug 2023 21:10 )  PTT:20.9 sec                RADIOLOGY & ADDITIONAL STUDIES:     Patient is a 62y old  Male who presents with a chief complaint of PE (03 Aug 2023 18:52)      HPI:  61 yo M with PMhx of Melanoma with mets to the brain and lungs on immunotherapy presents to the ED with SOB, found to have PE on outpatient CT chest. Patient reports that for the last couple of days he has been feeling SOB. Today he underwent CTA chest which showed PE, prompting him to come to the ED. Currently, he reports to have SOB with exertion but denies any fever, chills, nausea, vomiting, abdominal pain, diarrhea, melena or hematochezia. He has hx of brain mets and had resection of one nodule.   In the ED, his vitals were notable for tachycardia. Labs were notable for thrombocytopenia and slightly elevated proBNP. CTA chest showed New bilateral pulmonary emboli; specifically in the distal right main pulmonary artery and left upper and left lower lobar pulmonary artery branches.  (03 Aug 2023 18:52)     Seen and assessed on 8/4  Patient with improved symptoms  Stating that he only has shortness of breath with sig exertion  Denies any chest pain or other acute complaints    Oncologic History:  CURRENT THERAPY: Ipilimumab and nivolumab (C1: 07/18/2023; C2: scheduled for 08/08/2023; C3: scheduled for 08/29/2023; C4: scheduled for 09/19/2023)  ?  PRIOR THERAPIES: None  ?  INTERVAL HISTORY:  Mr. 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 Alla Mendez at [facility] in 2014, who is seen today for a discussion of management options for his recently diagnosed BRAF wild-type M1d, stage IV melanoma. He was last evaluated by his dermatologist approximately 18 months ago.  ?  Briefly, he initially developed a change in mental status, with slight confusion and balance problems, on Friday 06/16/2023. Non contrast head CT scan, performed on 06/22/2023 was significant for multiple masses in the bilateral cerebral hemisphere, the largest measuring approximately 5 cm in the right parietal lobe, associated with mass effect and a 14 mm right to left midline shift. He was then admitted to [facility]. Chest, abdominal and pelvic CT scan, performed on 06/22/2023 demonstrates a 9cm heterogeneous right upper lobe mass extending to the right hilum and right perihilar adenopathy. Brain MRI, performed on 06/23/2023, again demonstrated the multifocal brain metastases associated with adjacent edema.  ?  He then underwent right craniotomy for resection of the dominant right frontal parietal complex mass on 06/26/2023 by Dr. Robert Worthy. Pathology was consistent with metastatic melanoma. He was evaluated by Dr. Maco Marr hematology/oncology as well as by Dr. Nasima English of radiation oncology, and was ultimately discharged on 06/26/2023.  ?  Since his last visit a week ago, Mr Nogueira has developed significant dyspnea with minimal exertion. Previously, he had been working full time and walking 1-2 miles daily. Currently, he is unable to walk or exert himself wo feeling shortness of breath requiring him to sit down. He denies chest pain and palpitations. He reports a new non-productive cough. No fevers/diarrhea/rash or itchiness. (+) fatigue (+) chills/feeling cold. Denies leg swelling or calf pain. Has not started PT yet. Cancelled ECHO appointment. Reports has been taking tylenol at bedtime for general achiness. Sleeping well. Appetite good. Remains on decadron 2mg BID. GK scheduled for 8/21-8/24  ?  PAST MEDICAL HISTORY:  1. 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 Right lower extremity deep venous thrombosis in the setting of a vein stripping procedure  ?  SOCIAL HISTORY:  , denies tobacco. He drinks alcohol socially. He has two daughters, one who is getting  later on this year in [place], and the other who is having a baby in the near future. He currently works in [employer].  ?  ALLERGIES:  No known drug allergies  ?  CURRENT MEDICATIONS:  1. Decadron taper  2. Keppra.     Disease: Melanoma    TNM stage: M1d  AJCC Stage: IV    Tumor Markers: LDH        MEDICATIONS  (STANDING):  dexAMETHasone     Tablet 2 milliGRAM(s) Oral two times a day  heparin  Infusion 1500 Unit(s)/Hr (15 mL/Hr) IV Continuous <Continuous>  levETIRAcetam 500 milliGRAM(s) Oral two times a day    MEDICATIONS  (PRN):  acetaminophen     Tablet .. 650 milliGRAM(s) Oral every 6 hours PRN Temp greater or equal to 38C (100.4F), Mild Pain (1 - 3)  aluminum hydroxide/magnesium hydroxide/simethicone Suspension 30 milliLiter(s) Oral every 4 hours PRN Dyspepsia  melatonin 3 milliGRAM(s) Oral at bedtime PRN Insomnia  ondansetron Injectable 4 milliGRAM(s) IV Push every 8 hours PRN Nausea and/or Vomiting      Allergies    No Known Allergies    Intolerances        Vital Signs Last 24 Hrs  T(C): 36.9 (04 Aug 2023 05:26), Max: 37.2 (03 Aug 2023 13:20)  T(F): 98.5 (04 Aug 2023 05:26), Max: 98.9 (03 Aug 2023 13:20)  HR: 81 (04 Aug 2023 05:26) (81 - 110)  BP: 137/97 (04 Aug 2023 05:26) (128/99 - 151/82)  BP(mean): --  RR: 18 (03 Aug 2023 22:58) (16 - 20)  SpO2: 99% (03 Aug 2023 22:58) (96% - 100%)    Parameters below as of 04 Aug 2023 05:26  Patient On (Oxygen Delivery Method): room air        PHYSICAL EXAM  CONSTITUTIONAL: NAD, well-developed, well-groomed  EYES: PERRLA; conjunctiva and sclera clear  ENMT: Moist oral mucosa, no pharyngeal injection or exudates; normal dentition  NECK: Supple, no palpable masses; no thyromegaly  RESPIRATORY: Normal respiratory effort; lungs are clear to auscultation bilaterally  CARDIOVASCULAR: Regular rate and rhythm, normal S1 and S2, no murmur/rub/gallop; left lower extremity mild nonpitting edema; Peripheral pulses are 2+ bilaterally  ABDOMEN: Nontender to palpation, normoactive bowel sounds, no rebound/guarding; No hepatosplenomegaly  MUSCULOSKELETAL:  no clubbing or cyanosis of digits; no joint swelling or tenderness to palpation  PSYCH: A+O to person, place, and time; affect appropriate  NEUROLOGY: CN 2-12 are intact and symmetric; no gross sensory deficits   SKIN: No rashes; no palpable lesions        LABS:                          13.8   9.43  )-----------( 100      ( 04 Aug 2023 06:10 )             42.1         Mean Cell Volume : 87.9 fL  Mean Cell Hemoglobin : 28.8 pg  Mean Cell Hemoglobin Concentration : 32.8 gm/dL  Auto Neutrophil # : x  Auto Lymphocyte # : x  Auto Monocyte # : x  Auto Eosinophil # : x  Auto Basophil # : x  Auto Neutrophil % : x  Auto Lymphocyte % : x  Auto Monocyte % : x  Auto Eosinophil % : x  Auto Basophil % : x      Serial CBC's  08-04 @ 06:10  Hct-42.1 / Hgb-13.8 / Plat-100 / RBC-4.79 / WBC-9.43  Serial CBC's  08-03 @ 21:10  Hct-41.9 / Hgb-14.0 / Plat-106 / RBC-4.90 / WBC-10.00  Serial CBC's  08-03 @ 13:58  Hct-43.9 / Hgb-14.7 / Plat-105 / RBC-5.13 / WBC-11.38  Serial CBC's  08-03 @ 09:51  Hct-45.6 / Hgb-15.3 / Plat-103 / RBC-5.21 / WBC-11.16      08-03    135  |  99  |  29<H>  ----------------------------<  111<H>  4.5   |  22  |  0.96    Ca    8.6      03 Aug 2023 13:58    TPro  6.3  /  Alb  3.1<L>  /  TBili  0.4  /  DBili  x   /  AST  24  /  ALT  39  /  AlkPhos  77  08-03      PT/INR - ( 03 Aug 2023 13:58 )   PT: 11.4 sec;   INR: 1.01 ratio         PTT - ( 03 Aug 2023 21:10 )  PTT:20.9 sec                RADIOLOGY & ADDITIONAL STUDIES:     regular
